# Patient Record
Sex: FEMALE | Race: BLACK OR AFRICAN AMERICAN | URBAN - METROPOLITAN AREA
[De-identification: names, ages, dates, MRNs, and addresses within clinical notes are randomized per-mention and may not be internally consistent; named-entity substitution may affect disease eponyms.]

---

## 2022-07-06 ENCOUNTER — HOSPITAL ENCOUNTER (OUTPATIENT)
Dept: LAB | Age: 47
Discharge: HOME | End: 2022-07-06
Payer: COMMERCIAL

## 2022-07-06 LAB
ALBUMIN SERPL-MCNC: 4.2 G/DL (ref 3.5–4.6)
ALP BLD-CCNC: 83 U/L (ref 40–130)
ALT SERPL-CCNC: 18 U/L (ref 0–33)
ANION GAP SERPL CALCULATED.3IONS-SCNC: 8 MEQ/L (ref 9–15)
AST SERPL-CCNC: 24 U/L (ref 0–35)
BASOPHILS ABSOLUTE: 0.1 K/UL (ref 0–0.2)
BASOPHILS RELATIVE PERCENT: 1.3 %
BILIRUB SERPL-MCNC: 0.3 MG/DL (ref 0.2–0.7)
BUN BLDV-MCNC: 10 MG/DL (ref 6–20)
CALCIUM SERPL-MCNC: 9 MG/DL (ref 8.5–9.9)
CHLORIDE BLD-SCNC: 101 MEQ/L (ref 95–107)
CHOLESTEROL, TOTAL: 168 MG/DL (ref 0–199)
CO2: 27 MEQ/L (ref 20–31)
CREAT SERPL-MCNC: 0.7 MG/DL (ref 0.5–0.9)
EOSINOPHILS ABSOLUTE: 0.2 K/UL (ref 0–0.7)
EOSINOPHILS RELATIVE PERCENT: 3.7 %
GFR AFRICAN AMERICAN: >60
GFR NON-AFRICAN AMERICAN: >60
GLOBULIN: 2 G/DL (ref 2.3–3.5)
GLUCOSE BLD-MCNC: 280 MG/DL (ref 70–99)
HBA1C MFR BLD: 11.8 % (ref 4.8–5.9)
HCT VFR BLD CALC: 38.1 % (ref 37–47)
HDLC SERPL-MCNC: 85 MG/DL (ref 40–59)
HEMOGLOBIN: 12.7 G/DL (ref 12–16)
LDL CHOLESTEROL CALCULATED: 70 MG/DL (ref 0–129)
LYMPHOCYTES ABSOLUTE: 2.2 K/UL (ref 1–4.8)
LYMPHOCYTES RELATIVE PERCENT: 37.6 %
MCH RBC QN AUTO: 27.9 PG (ref 27–31.3)
MCHC RBC AUTO-ENTMCNC: 33.4 % (ref 33–37)
MCV RBC AUTO: 83.7 FL (ref 82–100)
MONOCYTES ABSOLUTE: 0.3 K/UL (ref 0.2–0.8)
MONOCYTES RELATIVE PERCENT: 4.5 %
NEUTROPHILS ABSOLUTE: 3.1 K/UL (ref 1.4–6.5)
NEUTROPHILS RELATIVE PERCENT: 52.9 %
PDW BLD-RTO: 14.4 % (ref 11.5–14.5)
PLATELET # BLD: 246 K/UL (ref 130–400)
POTASSIUM SERPL-SCNC: 4.9 MEQ/L (ref 3.4–4.9)
RBC # BLD: 4.56 M/UL (ref 4.2–5.4)
SODIUM BLD-SCNC: 136 MEQ/L (ref 135–144)
T4 FREE: 0.92 NG/DL (ref 0.84–1.68)
TOTAL PROTEIN: 6.2 G/DL (ref 6.3–8)
TRIGL SERPL-MCNC: 63 MG/DL (ref 0–150)
TSH SERPL DL<=0.05 MIU/L-ACNC: 1.75 UIU/ML (ref 0.44–3.86)
WBC # BLD: 5.8 K/UL (ref 4.8–10.8)

## 2022-07-06 PROCEDURE — 84443 ASSAY THYROID STIM HORMONE: CPT

## 2022-07-06 PROCEDURE — 86803 HEPATITIS C AB TEST: CPT

## 2022-07-06 PROCEDURE — 80053 COMPREHEN METABOLIC PANEL: CPT

## 2022-07-06 PROCEDURE — 87389 HIV-1 AG W/HIV-1&-2 AB AG IA: CPT

## 2022-07-06 PROCEDURE — 86696 HERPES SIMPLEX TYPE 2 TEST: CPT

## 2022-07-06 PROCEDURE — 82607 VITAMIN B-12: CPT

## 2022-07-06 PROCEDURE — 84480 ASSAY TRIIODOTHYRONINE (T3): CPT

## 2022-07-06 PROCEDURE — 83036 HEMOGLOBIN GLYCOSYLATED A1C: CPT

## 2022-07-06 PROCEDURE — 83002 ASSAY OF GONADOTROPIN (LH): CPT

## 2022-07-06 PROCEDURE — 85025 COMPLETE CBC W/AUTO DIFF WBC: CPT

## 2022-07-06 PROCEDURE — 82306 VITAMIN D 25 HYDROXY: CPT

## 2022-07-06 PROCEDURE — 80061 LIPID PANEL: CPT

## 2022-07-06 PROCEDURE — 86592 SYPHILIS TEST NON-TREP QUAL: CPT

## 2022-07-06 PROCEDURE — 83001 ASSAY OF GONADOTROPIN (FSH): CPT

## 2022-07-06 PROCEDURE — 84439 ASSAY OF FREE THYROXINE: CPT

## 2022-07-07 LAB
FOLLICLE STIMULATING HORMONE: 9.3 MIU/ML (ref 1.7–21.5)
HEPATITIS C ANTIBODY: NONREACTIVE
HIV AG/AB: NONREACTIVE
LH: 4.3 MIU/ML (ref 1–95.6)
RPR: NORMAL
VITAMIN B-12: 669 PG/ML (ref 232–1245)
VITAMIN D 25-HYDROXY: 23.3 NG/ML

## 2022-07-08 LAB — T3 TOTAL: 77 NG/DL (ref 60–181)

## 2022-07-21 LAB
MISCELLANEOUS LAB TEST ORDER: ABNORMAL
WHOPPER PROMPT: ABNORMAL

## 2024-04-30 ENCOUNTER — APPOINTMENT (OUTPATIENT)
Dept: RADIOLOGY | Facility: HOSPITAL | Age: 49
End: 2024-04-30
Payer: COMMERCIAL

## 2024-04-30 ENCOUNTER — CLINICAL SUPPORT (OUTPATIENT)
Dept: EMERGENCY MEDICINE | Facility: HOSPITAL | Age: 49
End: 2024-04-30
Payer: COMMERCIAL

## 2024-04-30 ENCOUNTER — HOSPITAL ENCOUNTER (INPATIENT)
Facility: HOSPITAL | Age: 49
LOS: 5 days | Discharge: LEFT BEFORE DISCHARGE | End: 2024-05-05
Attending: EMERGENCY MEDICINE | Admitting: INTERNAL MEDICINE
Payer: COMMERCIAL

## 2024-04-30 DIAGNOSIS — Z79.4 TYPE 2 DIABETES MELLITUS WITH KETOACIDOSIS WITHOUT COMA, WITH LONG-TERM CURRENT USE OF INSULIN (MULTI): ICD-10-CM

## 2024-04-30 DIAGNOSIS — E08.10 DIABETIC KETOACIDOSIS WITHOUT COMA ASSOCIATED WITH DIABETES MELLITUS DUE TO UNDERLYING CONDITION (MULTI): Primary | ICD-10-CM

## 2024-04-30 DIAGNOSIS — E11.10 TYPE 2 DIABETES MELLITUS WITH KETOACIDOSIS WITHOUT COMA, WITH LONG-TERM CURRENT USE OF INSULIN (MULTI): ICD-10-CM

## 2024-04-30 LAB
ABO GROUP (TYPE) IN BLOOD: NORMAL
ALBUMIN SERPL BCP-MCNC: 3.4 G/DL (ref 3.4–5)
ALBUMIN SERPL BCP-MCNC: 4.3 G/DL (ref 3.4–5)
ALP SERPL-CCNC: 148 U/L (ref 33–110)
ALP SERPL-CCNC: 199 U/L (ref 33–110)
ALT SERPL W P-5'-P-CCNC: 11 U/L (ref 7–45)
ALT SERPL W P-5'-P-CCNC: 13 U/L (ref 7–45)
ANION GAP BLDV CALCULATED.4IONS-SCNC: 11 MMOL/L (ref 10–25)
ANION GAP BLDV CALCULATED.4IONS-SCNC: 11 MMOL/L (ref 10–25)
ANION GAP SERPL CALC-SCNC: 18 MMOL/L (ref 10–20)
ANION GAP SERPL CALC-SCNC: 19 MMOL/L (ref 10–20)
ANION GAP SERPL CALC-SCNC: 27 MMOL/L (ref 10–20)
ANTIBODY SCREEN: NORMAL
APPEARANCE UR: CLEAR
AST SERPL W P-5'-P-CCNC: 12 U/L (ref 9–39)
AST SERPL W P-5'-P-CCNC: 29 U/L (ref 9–39)
ATRIAL RATE: 96 BPM
B-HCG SERPL-ACNC: <3 MIU/ML
B-OH-BUTYR SERPL-SCNC: 1.86 MMOL/L (ref 0.02–0.27)
BASE EXCESS BLDV CALC-SCNC: 5.2 MMOL/L (ref -2–3)
BASE EXCESS BLDV CALC-SCNC: 7 MMOL/L (ref -2–3)
BASOPHILS # BLD AUTO: 0.09 X10*3/UL (ref 0–0.1)
BASOPHILS NFR BLD AUTO: 0.5 %
BILIRUB SERPL-MCNC: 0.3 MG/DL (ref 0–1.2)
BILIRUB SERPL-MCNC: 0.4 MG/DL (ref 0–1.2)
BILIRUB UR STRIP.AUTO-MCNC: NEGATIVE MG/DL
BNP SERPL-MCNC: 70 PG/ML (ref 0–99)
BODY TEMPERATURE: 37 DEGREES CELSIUS
BODY TEMPERATURE: 37 DEGREES CELSIUS
BUN SERPL-MCNC: 46 MG/DL (ref 6–23)
BUN SERPL-MCNC: 47 MG/DL (ref 6–23)
BUN SERPL-MCNC: 51 MG/DL (ref 6–23)
CA-I BLDV-SCNC: 1.15 MMOL/L (ref 1.1–1.33)
CA-I BLDV-SCNC: 1.32 MMOL/L (ref 1.1–1.33)
CALCIUM SERPL-MCNC: 9 MG/DL (ref 8.6–10.6)
CALCIUM SERPL-MCNC: 9.8 MG/DL (ref 8.6–10.6)
CALCIUM SERPL-MCNC: 9.8 MG/DL (ref 8.6–10.6)
CARDIAC TROPONIN I PNL SERPL HS: 30 NG/L (ref 0–34)
CHLORIDE BLDV-SCNC: 94 MMOL/L (ref 98–107)
CHLORIDE BLDV-SCNC: 96 MMOL/L (ref 98–107)
CHLORIDE SERPL-SCNC: 86 MMOL/L (ref 98–107)
CHLORIDE SERPL-SCNC: 91 MMOL/L (ref 98–107)
CHLORIDE SERPL-SCNC: 92 MMOL/L (ref 98–107)
CO2 SERPL-SCNC: 25 MMOL/L (ref 21–32)
CO2 SERPL-SCNC: 27 MMOL/L (ref 21–32)
CO2 SERPL-SCNC: 29 MMOL/L (ref 21–32)
COLOR UR: COLORLESS
CREAT SERPL-MCNC: 1.42 MG/DL (ref 0.5–1.05)
CREAT SERPL-MCNC: 1.53 MG/DL (ref 0.5–1.05)
CREAT SERPL-MCNC: 1.56 MG/DL (ref 0.5–1.05)
EGFRCR SERPLBLD CKD-EPI 2021: 41 ML/MIN/1.73M*2
EGFRCR SERPLBLD CKD-EPI 2021: 42 ML/MIN/1.73M*2
EGFRCR SERPLBLD CKD-EPI 2021: 45 ML/MIN/1.73M*2
EOSINOPHIL # BLD AUTO: 0.01 X10*3/UL (ref 0–0.7)
EOSINOPHIL NFR BLD AUTO: 0.1 %
ERYTHROCYTE [DISTWIDTH] IN BLOOD BY AUTOMATED COUNT: 12.3 % (ref 11.5–14.5)
FLUAV RNA RESP QL NAA+PROBE: NOT DETECTED
FLUBV RNA RESP QL NAA+PROBE: NOT DETECTED
GLUCOSE BLD MANUAL STRIP-MCNC: 315 MG/DL (ref 74–99)
GLUCOSE BLD MANUAL STRIP-MCNC: 352 MG/DL (ref 74–99)
GLUCOSE BLD MANUAL STRIP-MCNC: 357 MG/DL (ref 74–99)
GLUCOSE BLD MANUAL STRIP-MCNC: 400 MG/DL (ref 74–99)
GLUCOSE BLD MANUAL STRIP-MCNC: 416 MG/DL (ref 74–99)
GLUCOSE BLD MANUAL STRIP-MCNC: 508 MG/DL (ref 74–99)
GLUCOSE BLD MANUAL STRIP-MCNC: 532 MG/DL (ref 74–99)
GLUCOSE BLDV-MCNC: 390 MG/DL (ref 74–99)
GLUCOSE BLDV-MCNC: 676 MG/DL (ref 74–99)
GLUCOSE SERPL-MCNC: 366 MG/DL (ref 74–99)
GLUCOSE SERPL-MCNC: 601 MG/DL (ref 74–99)
GLUCOSE SERPL-MCNC: 614 MG/DL (ref 74–99)
GLUCOSE UR STRIP.AUTO-MCNC: ABNORMAL MG/DL
HCO3 BLDV-SCNC: 30.5 MMOL/L (ref 22–26)
HCO3 BLDV-SCNC: 33 MMOL/L (ref 22–26)
HCT VFR BLD AUTO: 43.7 % (ref 36–46)
HCT VFR BLD EST: 36 % (ref 36–46)
HCT VFR BLD EST: 37 % (ref 36–46)
HGB BLD-MCNC: 15.5 G/DL (ref 12–16)
HGB BLDV-MCNC: 11.9 G/DL (ref 12–16)
HGB BLDV-MCNC: 12.4 G/DL (ref 12–16)
HOLD SPECIMEN: NORMAL
HOLD SPECIMEN: NORMAL
IMM GRANULOCYTES # BLD AUTO: 0.09 X10*3/UL (ref 0–0.7)
IMM GRANULOCYTES NFR BLD AUTO: 0.5 % (ref 0–0.9)
INHALED O2 CONCENTRATION: 21 %
INHALED O2 CONCENTRATION: 21 %
KETONES UR STRIP.AUTO-MCNC: ABNORMAL MG/DL
LACTATE BLDV-SCNC: 1.1 MMOL/L (ref 0.4–2)
LACTATE BLDV-SCNC: 2.7 MMOL/L (ref 0.4–2)
LEUKOCYTE ESTERASE UR QL STRIP.AUTO: NEGATIVE
LIPASE SERPL-CCNC: 21 U/L (ref 9–82)
LYMPHOCYTES # BLD AUTO: 1.64 X10*3/UL (ref 1.2–4.8)
LYMPHOCYTES NFR BLD AUTO: 9.8 %
MAGNESIUM SERPL-MCNC: 2.58 MG/DL (ref 1.6–2.4)
MAGNESIUM SERPL-MCNC: 2.95 MG/DL (ref 1.6–2.4)
MCH RBC QN AUTO: 26.3 PG (ref 26–34)
MCHC RBC AUTO-ENTMCNC: 35.5 G/DL (ref 32–36)
MCV RBC AUTO: 74 FL (ref 80–100)
MONOCYTES # BLD AUTO: 0.43 X10*3/UL (ref 0.1–1)
MONOCYTES NFR BLD AUTO: 2.6 %
NEUTROPHILS # BLD AUTO: 14.47 X10*3/UL (ref 1.2–7.7)
NEUTROPHILS NFR BLD AUTO: 86.5 %
NITRITE UR QL STRIP.AUTO: NEGATIVE
NRBC BLD-RTO: 0 /100 WBCS (ref 0–0)
OSMOLALITY SERPL: 331 MOSM/KG (ref 280–300)
OXYHGB MFR BLDV: 80.3 % (ref 45–75)
OXYHGB MFR BLDV: 82.1 % (ref 45–75)
P AXIS: 72 DEGREES
P OFFSET: 206 MS
P ONSET: 163 MS
PCO2 BLDV: 47 MM HG (ref 41–51)
PCO2 BLDV: 52 MM HG (ref 41–51)
PH BLDV: 7.41 PH (ref 7.33–7.43)
PH BLDV: 7.42 PH (ref 7.33–7.43)
PH UR STRIP.AUTO: 6 [PH]
PHOSPHATE SERPL-MCNC: 5.8 MG/DL (ref 2.5–4.9)
PHOSPHATE SERPL-MCNC: 6 MG/DL (ref 2.5–4.9)
PLATELET # BLD AUTO: 431 X10*3/UL (ref 150–450)
PO2 BLDV: 53 MM HG (ref 35–45)
PO2 BLDV: 54 MM HG (ref 35–45)
POTASSIUM BLDV-SCNC: 4.7 MMOL/L (ref 3.5–5.3)
POTASSIUM BLDV-SCNC: 5.9 MMOL/L (ref 3.5–5.3)
POTASSIUM SERPL-SCNC: 4.2 MMOL/L (ref 3.5–5.3)
POTASSIUM SERPL-SCNC: 5.4 MMOL/L (ref 3.5–5.3)
POTASSIUM SERPL-SCNC: 5.9 MMOL/L (ref 3.5–5.3)
PR INTERVAL: 124 MS
PROT SERPL-MCNC: 6.4 G/DL (ref 6.4–8.2)
PROT SERPL-MCNC: 8.7 G/DL (ref 6.4–8.2)
PROT UR STRIP.AUTO-MCNC: ABNORMAL MG/DL
Q ONSET: 225 MS
QRS COUNT: 16 BEATS
QRS DURATION: 86 MS
QT INTERVAL: 370 MS
QTC CALCULATION(BAZETT): 467 MS
QTC FREDERICIA: 432 MS
R AXIS: 15 DEGREES
RBC # BLD AUTO: 5.9 X10*6/UL (ref 4–5.2)
RBC # UR STRIP.AUTO: ABNORMAL /UL
RBC #/AREA URNS AUTO: ABNORMAL /HPF
RH FACTOR (ANTIGEN D): NORMAL
SAO2 % BLDV: 82 % (ref 45–75)
SAO2 % BLDV: 84 % (ref 45–75)
SARS-COV-2 RNA RESP QL NAA+PROBE: NOT DETECTED
SODIUM BLDV-SCNC: 130 MMOL/L (ref 136–145)
SODIUM BLDV-SCNC: 135 MMOL/L (ref 136–145)
SODIUM SERPL-SCNC: 132 MMOL/L (ref 136–145)
SODIUM SERPL-SCNC: 132 MMOL/L (ref 136–145)
SODIUM SERPL-SCNC: 135 MMOL/L (ref 136–145)
SP GR UR STRIP.AUTO: 1.03
T AXIS: 74 DEGREES
T OFFSET: 410 MS
UROBILINOGEN UR STRIP.AUTO-MCNC: NORMAL MG/DL
VENTRICULAR RATE: 96 BPM
WBC # BLD AUTO: 16.7 X10*3/UL (ref 4.4–11.3)
WBC #/AREA URNS AUTO: ABNORMAL /HPF

## 2024-04-30 PROCEDURE — 2500000004 HC RX 250 GENERAL PHARMACY W/ HCPCS (ALT 636 FOR OP/ED)

## 2024-04-30 PROCEDURE — 83735 ASSAY OF MAGNESIUM: CPT

## 2024-04-30 PROCEDURE — 96365 THER/PROPH/DIAG IV INF INIT: CPT

## 2024-04-30 PROCEDURE — 96374 THER/PROPH/DIAG INJ IV PUSH: CPT | Mod: 59

## 2024-04-30 PROCEDURE — 84132 ASSAY OF SERUM POTASSIUM: CPT | Performed by: EMERGENCY MEDICINE

## 2024-04-30 PROCEDURE — 2500000002 HC RX 250 W HCPCS SELF ADMINISTERED DRUGS (ALT 637 FOR MEDICARE OP, ALT 636 FOR OP/ED): Mod: SE

## 2024-04-30 PROCEDURE — 82947 ASSAY GLUCOSE BLOOD QUANT: CPT

## 2024-04-30 PROCEDURE — 83690 ASSAY OF LIPASE: CPT

## 2024-04-30 PROCEDURE — 80053 COMPREHEN METABOLIC PANEL: CPT

## 2024-04-30 PROCEDURE — 87636 SARSCOV2 & INF A&B AMP PRB: CPT

## 2024-04-30 PROCEDURE — 99285 EMERGENCY DEPT VISIT HI MDM: CPT | Performed by: EMERGENCY MEDICINE

## 2024-04-30 PROCEDURE — 84702 CHORIONIC GONADOTROPIN TEST: CPT

## 2024-04-30 PROCEDURE — 2500000004 HC RX 250 GENERAL PHARMACY W/ HCPCS (ALT 636 FOR OP/ED): Mod: SE

## 2024-04-30 PROCEDURE — 84484 ASSAY OF TROPONIN QUANT: CPT

## 2024-04-30 PROCEDURE — 74177 CT ABD & PELVIS W/CONTRAST: CPT | Performed by: RADIOLOGY

## 2024-04-30 PROCEDURE — 36415 COLL VENOUS BLD VENIPUNCTURE: CPT

## 2024-04-30 PROCEDURE — 83880 ASSAY OF NATRIURETIC PEPTIDE: CPT

## 2024-04-30 PROCEDURE — 96376 TX/PRO/DX INJ SAME DRUG ADON: CPT

## 2024-04-30 PROCEDURE — 84100 ASSAY OF PHOSPHORUS: CPT

## 2024-04-30 PROCEDURE — 84132 ASSAY OF SERUM POTASSIUM: CPT

## 2024-04-30 PROCEDURE — 96361 HYDRATE IV INFUSION ADD-ON: CPT

## 2024-04-30 PROCEDURE — 71045 X-RAY EXAM CHEST 1 VIEW: CPT

## 2024-04-30 PROCEDURE — 93010 ELECTROCARDIOGRAM REPORT: CPT | Performed by: EMERGENCY MEDICINE

## 2024-04-30 PROCEDURE — 36415 COLL VENOUS BLD VENIPUNCTURE: CPT | Performed by: EMERGENCY MEDICINE

## 2024-04-30 PROCEDURE — 96375 TX/PRO/DX INJ NEW DRUG ADDON: CPT

## 2024-04-30 PROCEDURE — 99285 EMERGENCY DEPT VISIT HI MDM: CPT | Mod: 25

## 2024-04-30 PROCEDURE — 81001 URINALYSIS AUTO W/SCOPE: CPT

## 2024-04-30 PROCEDURE — 86901 BLOOD TYPING SEROLOGIC RH(D): CPT

## 2024-04-30 PROCEDURE — 85025 COMPLETE CBC W/AUTO DIFF WBC: CPT

## 2024-04-30 PROCEDURE — 87040 BLOOD CULTURE FOR BACTERIA: CPT

## 2024-04-30 PROCEDURE — 82010 KETONE BODYS QUAN: CPT

## 2024-04-30 PROCEDURE — 96367 TX/PROPH/DG ADDL SEQ IV INF: CPT

## 2024-04-30 PROCEDURE — 71045 X-RAY EXAM CHEST 1 VIEW: CPT | Performed by: STUDENT IN AN ORGANIZED HEALTH CARE EDUCATION/TRAINING PROGRAM

## 2024-04-30 PROCEDURE — 2500000002 HC RX 250 W HCPCS SELF ADMINISTERED DRUGS (ALT 637 FOR MEDICARE OP, ALT 636 FOR OP/ED): Performed by: STUDENT IN AN ORGANIZED HEALTH CARE EDUCATION/TRAINING PROGRAM

## 2024-04-30 PROCEDURE — 80307 DRUG TEST PRSMV CHEM ANLYZR: CPT | Performed by: STUDENT IN AN ORGANIZED HEALTH CARE EDUCATION/TRAINING PROGRAM

## 2024-04-30 PROCEDURE — 93005 ELECTROCARDIOGRAM TRACING: CPT

## 2024-04-30 PROCEDURE — 2500000004 HC RX 250 GENERAL PHARMACY W/ HCPCS (ALT 636 FOR OP/ED): Mod: SE | Performed by: EMERGENCY MEDICINE

## 2024-04-30 PROCEDURE — 74177 CT ABD & PELVIS W/CONTRAST: CPT

## 2024-04-30 PROCEDURE — 1200000002 HC GENERAL ROOM WITH TELEMETRY DAILY

## 2024-04-30 PROCEDURE — 2550000001 HC RX 255 CONTRASTS: Mod: SE | Performed by: EMERGENCY MEDICINE

## 2024-04-30 PROCEDURE — 2500000002 HC RX 250 W HCPCS SELF ADMINISTERED DRUGS (ALT 637 FOR MEDICARE OP, ALT 636 FOR OP/ED): Mod: SE | Performed by: EMERGENCY MEDICINE

## 2024-04-30 PROCEDURE — 71260 CT THORAX DX C+: CPT | Performed by: RADIOLOGY

## 2024-04-30 PROCEDURE — 83930 ASSAY OF BLOOD OSMOLALITY: CPT | Performed by: EMERGENCY MEDICINE

## 2024-04-30 PROCEDURE — 96368 THER/DIAG CONCURRENT INF: CPT

## 2024-04-30 PROCEDURE — 2500000004 HC RX 250 GENERAL PHARMACY W/ HCPCS (ALT 636 FOR OP/ED): Mod: JZ,SE | Performed by: EMERGENCY MEDICINE

## 2024-04-30 RX ORDER — AMLODIPINE BESYLATE 10 MG/1
10 TABLET ORAL DAILY
COMMUNITY

## 2024-04-30 RX ORDER — DEXTROSE 50 % IN WATER (D50W) INTRAVENOUS SYRINGE
12.5
Status: DISCONTINUED | OUTPATIENT
Start: 2024-04-30 | End: 2024-04-30 | Stop reason: SDUPTHER

## 2024-04-30 RX ORDER — INSULIN LISPRO 100 [IU]/ML
5 INJECTION, SOLUTION INTRAVENOUS; SUBCUTANEOUS ONCE
Status: COMPLETED | OUTPATIENT
Start: 2024-04-30 | End: 2024-04-30

## 2024-04-30 RX ORDER — AMOXICILLIN 250 MG
2 CAPSULE ORAL 2 TIMES DAILY
Status: DISCONTINUED | OUTPATIENT
Start: 2024-04-30 | End: 2024-05-01

## 2024-04-30 RX ORDER — ONDANSETRON HYDROCHLORIDE 2 MG/ML
4 INJECTION, SOLUTION INTRAVENOUS ONCE
Status: COMPLETED | OUTPATIENT
Start: 2024-04-30 | End: 2024-04-30

## 2024-04-30 RX ORDER — DEXTROSE MONOHYDRATE AND SODIUM CHLORIDE 5; .45 G/100ML; G/100ML
150 INJECTION, SOLUTION INTRAVENOUS CONTINUOUS
Status: DISCONTINUED | OUTPATIENT
Start: 2024-04-30 | End: 2024-04-30

## 2024-04-30 RX ORDER — LABETALOL HYDROCHLORIDE 5 MG/ML
20 INJECTION, SOLUTION INTRAVENOUS ONCE
Status: COMPLETED | OUTPATIENT
Start: 2024-04-30 | End: 2024-04-30

## 2024-04-30 RX ORDER — LISINOPRIL 40 MG/1
40 TABLET ORAL DAILY
COMMUNITY

## 2024-04-30 RX ORDER — GABAPENTIN 100 MG/1
100 CAPSULE ORAL 2 TIMES DAILY
COMMUNITY

## 2024-04-30 RX ORDER — HYDRALAZINE HYDROCHLORIDE 20 MG/ML
20 INJECTION INTRAMUSCULAR; INTRAVENOUS ONCE
Status: COMPLETED | OUTPATIENT
Start: 2024-04-30 | End: 2024-04-30

## 2024-04-30 RX ORDER — VANCOMYCIN HYDROCHLORIDE 1 G/200ML
INJECTION, SOLUTION INTRAVENOUS
Status: COMPLETED
Start: 2024-04-30 | End: 2024-04-30

## 2024-04-30 RX ORDER — INSULIN GLARGINE 100 [IU]/ML
30 INJECTION, SOLUTION SUBCUTANEOUS NIGHTLY
COMMUNITY

## 2024-04-30 RX ORDER — HYDROCHLOROTHIAZIDE 12.5 MG/1
12.5 TABLET ORAL DAILY
COMMUNITY

## 2024-04-30 RX ORDER — POLYETHYLENE GLYCOL 3350 17 G/17G
17 POWDER, FOR SOLUTION ORAL DAILY
Status: DISCONTINUED | OUTPATIENT
Start: 2024-05-01 | End: 2024-05-01

## 2024-04-30 RX ORDER — DEXTROSE 50 % IN WATER (D50W) INTRAVENOUS SYRINGE
12.5
Status: DISCONTINUED | OUTPATIENT
Start: 2024-04-30 | End: 2024-05-05 | Stop reason: HOSPADM

## 2024-04-30 RX ORDER — VANCOMYCIN HYDROCHLORIDE 1 G/20ML
INJECTION, POWDER, LYOPHILIZED, FOR SOLUTION INTRAVENOUS DAILY PRN
Status: DISCONTINUED | OUTPATIENT
Start: 2024-04-30 | End: 2024-05-03

## 2024-04-30 RX ORDER — INSULIN GLARGINE 100 [IU]/ML
20 INJECTION, SOLUTION SUBCUTANEOUS NIGHTLY
Status: DISCONTINUED | OUTPATIENT
Start: 2024-04-30 | End: 2024-04-30

## 2024-04-30 RX ORDER — INSULIN LISPRO 100 [IU]/ML
0-5 INJECTION, SOLUTION INTRAVENOUS; SUBCUTANEOUS
Status: DISCONTINUED | OUTPATIENT
Start: 2024-04-30 | End: 2024-04-30

## 2024-04-30 RX ORDER — VANCOMYCIN HYDROCHLORIDE 1 G/200ML
1000 INJECTION, SOLUTION INTRAVENOUS ONCE
Status: COMPLETED | OUTPATIENT
Start: 2024-04-30 | End: 2024-04-30

## 2024-04-30 RX ORDER — DEXTROSE MONOHYDRATE 100 MG/ML
150 INJECTION, SOLUTION INTRAVENOUS CONTINUOUS
Status: DISCONTINUED | OUTPATIENT
Start: 2024-04-30 | End: 2024-04-30

## 2024-04-30 RX ORDER — ACETAMINOPHEN 160 MG/5ML
650 SOLUTION ORAL EVERY 6 HOURS
Status: DISCONTINUED | OUTPATIENT
Start: 2024-04-30 | End: 2024-05-01

## 2024-04-30 RX ORDER — ACETAMINOPHEN 650 MG/1
650 SUPPOSITORY RECTAL EVERY 6 HOURS
Status: DISCONTINUED | OUTPATIENT
Start: 2024-04-30 | End: 2024-05-01

## 2024-04-30 RX ORDER — PANTOPRAZOLE SODIUM 40 MG/1
40 TABLET, DELAYED RELEASE ORAL 2 TIMES DAILY
COMMUNITY

## 2024-04-30 RX ORDER — INSULIN GLARGINE 100 [IU]/ML
20 INJECTION, SOLUTION SUBCUTANEOUS ONCE
Status: COMPLETED | OUTPATIENT
Start: 2024-04-30 | End: 2024-04-30

## 2024-04-30 RX ORDER — DEXTROSE 50 % IN WATER (D50W) INTRAVENOUS SYRINGE
25
Status: DISCONTINUED | OUTPATIENT
Start: 2024-04-30 | End: 2024-04-30 | Stop reason: SDUPTHER

## 2024-04-30 RX ORDER — CALCIUM GLUCONATE 20 MG/ML
2 INJECTION, SOLUTION INTRAVENOUS ONCE
Status: COMPLETED | OUTPATIENT
Start: 2024-04-30 | End: 2024-04-30

## 2024-04-30 RX ORDER — SERTRALINE HYDROCHLORIDE 25 MG/1
25 TABLET, FILM COATED ORAL DAILY
COMMUNITY

## 2024-04-30 RX ORDER — SODIUM CHLORIDE 450 MG/100ML
250 INJECTION, SOLUTION INTRAVENOUS CONTINUOUS
Status: DISCONTINUED | OUTPATIENT
Start: 2024-04-30 | End: 2024-04-30

## 2024-04-30 RX ORDER — DEXTROSE 50 % IN WATER (D50W) INTRAVENOUS SYRINGE
25
Status: DISCONTINUED | OUTPATIENT
Start: 2024-04-30 | End: 2024-05-05 | Stop reason: HOSPADM

## 2024-04-30 RX ORDER — HYDROMORPHONE HYDROCHLORIDE 1 MG/ML
0.5 INJECTION, SOLUTION INTRAMUSCULAR; INTRAVENOUS; SUBCUTANEOUS ONCE
Status: COMPLETED | OUTPATIENT
Start: 2024-04-30 | End: 2024-04-30

## 2024-04-30 RX ORDER — INSULIN LISPRO 100 [IU]/ML
0-5 INJECTION, SOLUTION INTRAVENOUS; SUBCUTANEOUS
Status: DISCONTINUED | OUTPATIENT
Start: 2024-04-30 | End: 2024-05-01

## 2024-04-30 RX ORDER — DEXTROSE 50 % IN WATER (D50W) INTRAVENOUS SYRINGE
50
Status: DISCONTINUED | OUTPATIENT
Start: 2024-04-30 | End: 2024-04-30 | Stop reason: ALTCHOICE

## 2024-04-30 RX ORDER — ACETAMINOPHEN 325 MG/1
975 TABLET ORAL EVERY 6 HOURS
Status: DISCONTINUED | OUTPATIENT
Start: 2024-04-30 | End: 2024-05-01

## 2024-04-30 RX ORDER — INSULIN LISPRO 100 [IU]/ML
2 INJECTION, SOLUTION INTRAVENOUS; SUBCUTANEOUS
COMMUNITY

## 2024-04-30 RX ADMIN — INSULIN LISPRO 5 UNITS: 100 INJECTION, SOLUTION INTRAVENOUS; SUBCUTANEOUS at 21:13

## 2024-04-30 RX ADMIN — CALCIUM GLUCONATE 2 G: 20 INJECTION, SOLUTION INTRAVENOUS at 06:45

## 2024-04-30 RX ADMIN — SODIUM CHLORIDE, POTASSIUM CHLORIDE, SODIUM LACTATE AND CALCIUM CHLORIDE 500 ML: 600; 310; 30; 20 INJECTION, SOLUTION INTRAVENOUS at 20:19

## 2024-04-30 RX ADMIN — ONDANSETRON 4 MG: 2 INJECTION INTRAMUSCULAR; INTRAVENOUS at 02:10

## 2024-04-30 RX ADMIN — INSULIN HUMAN 10 UNITS: 100 INJECTION, SOLUTION PARENTERAL at 08:21

## 2024-04-30 RX ADMIN — VANCOMYCIN HYDROCHLORIDE 1000 MG: 1 INJECTION, SOLUTION INTRAVENOUS at 08:02

## 2024-04-30 RX ADMIN — PIPERACILLIN SODIUM AND TAZOBACTAM SODIUM 3.38 G: 3; .375 INJECTION, SOLUTION INTRAVENOUS at 23:32

## 2024-04-30 RX ADMIN — SODIUM CHLORIDE, POTASSIUM CHLORIDE, SODIUM LACTATE AND CALCIUM CHLORIDE 1000 ML: 600; 310; 30; 20 INJECTION, SOLUTION INTRAVENOUS at 06:30

## 2024-04-30 RX ADMIN — SODIUM CHLORIDE, POTASSIUM CHLORIDE, SODIUM LACTATE AND CALCIUM CHLORIDE 500 ML: 600; 310; 30; 20 INJECTION, SOLUTION INTRAVENOUS at 08:21

## 2024-04-30 RX ADMIN — PIPERACILLIN SODIUM AND TAZOBACTAM SODIUM 3.38 G: 3; .375 INJECTION, SOLUTION INTRAVENOUS at 06:58

## 2024-04-30 RX ADMIN — INSULIN HUMAN 10 UNITS: 100 INJECTION, SOLUTION PARENTERAL at 06:45

## 2024-04-30 RX ADMIN — LABETALOL HYDROCHLORIDE 20 MG: 5 INJECTION, SOLUTION INTRAVENOUS at 13:43

## 2024-04-30 RX ADMIN — INSULIN LISPRO 5 UNITS: 100 INJECTION, SOLUTION INTRAVENOUS; SUBCUTANEOUS at 18:02

## 2024-04-30 RX ADMIN — ACETAMINOPHEN 975 MG: 325 TABLET ORAL at 13:43

## 2024-04-30 RX ADMIN — PIPERACILLIN SODIUM AND TAZOBACTAM SODIUM 3.38 G: 3; .375 INJECTION, SOLUTION INTRAVENOUS at 19:23

## 2024-04-30 RX ADMIN — LABETALOL HYDROCHLORIDE 20 MG: 5 INJECTION, SOLUTION INTRAVENOUS at 14:43

## 2024-04-30 RX ADMIN — ACETAMINOPHEN 975 MG: 325 TABLET ORAL at 19:32

## 2024-04-30 RX ADMIN — HYDROMORPHONE HYDROCHLORIDE 0.5 MG: 1 INJECTION, SOLUTION INTRAMUSCULAR; INTRAVENOUS; SUBCUTANEOUS at 02:10

## 2024-04-30 RX ADMIN — SODIUM CHLORIDE, POTASSIUM CHLORIDE, SODIUM LACTATE AND CALCIUM CHLORIDE 500 ML: 600; 310; 30; 20 INJECTION, SOLUTION INTRAVENOUS at 13:04

## 2024-04-30 RX ADMIN — IOHEXOL 80 ML: 350 INJECTION, SOLUTION INTRAVENOUS at 04:48

## 2024-04-30 RX ADMIN — INSULIN GLARGINE 20 UNITS: 100 INJECTION, SOLUTION SUBCUTANEOUS at 19:01

## 2024-04-30 RX ADMIN — HYDRALAZINE HYDROCHLORIDE 20 MG: 20 INJECTION INTRAMUSCULAR; INTRAVENOUS at 15:50

## 2024-04-30 ASSESSMENT — PAIN DESCRIPTION - DESCRIPTORS: DESCRIPTORS: ACHING

## 2024-04-30 ASSESSMENT — HEART SCORE
ECG: NORMAL
RISK FACTORS: 1-2 RISK FACTORS
HEART SCORE: 2
HISTORY: SLIGHTLY SUSPICIOUS
AGE: 45-64
TROPONIN: LESS THAN OR EQUAL TO NORMAL LIMIT

## 2024-04-30 ASSESSMENT — COLUMBIA-SUICIDE SEVERITY RATING SCALE - C-SSRS
2. HAVE YOU ACTUALLY HAD ANY THOUGHTS OF KILLING YOURSELF?: NO
6. HAVE YOU EVER DONE ANYTHING, STARTED TO DO ANYTHING, OR PREPARED TO DO ANYTHING TO END YOUR LIFE?: NO
1. IN THE PAST MONTH, HAVE YOU WISHED YOU WERE DEAD OR WISHED YOU COULD GO TO SLEEP AND NOT WAKE UP?: NO

## 2024-04-30 ASSESSMENT — LIFESTYLE VARIABLES
HAVE PEOPLE ANNOYED YOU BY CRITICIZING YOUR DRINKING: NO
TOTAL SCORE: 0
EVER HAD A DRINK FIRST THING IN THE MORNING TO STEADY YOUR NERVES TO GET RID OF A HANGOVER: NO
HAVE YOU EVER FELT YOU SHOULD CUT DOWN ON YOUR DRINKING: NO
EVER FELT BAD OR GUILTY ABOUT YOUR DRINKING: NO

## 2024-04-30 ASSESSMENT — PAIN SCALES - GENERAL
PAINLEVEL_OUTOF10: 5 - MODERATE PAIN
PAINLEVEL_OUTOF10: 9

## 2024-04-30 ASSESSMENT — PAIN DESCRIPTION - LOCATION: LOCATION: ABDOMEN

## 2024-04-30 NOTE — PROGRESS NOTES
Pharmacy Medication History Review    Barbara English is a 49 y.o. female admitted for Diabetic ketoacidosis without coma associated with diabetes mellitus due to underlying condition (Multi). Pharmacy reviewed the patient's [id number] medications for accuracy.    The list below reflects the updated PTA list. Comments regarding how patient may be taking medications differently can be found in the Admit Orders Activity  Prior to Admission Medications   Prescriptions Last Dose Informant   amLODIPine (Norvasc) 10 mg tablet  Self, Other   Sig: Take 1 tablet (10 mg) by mouth once daily.   gabapentin (Neurontin) 100 mg capsule  Self, Other   Sig: Take 1 capsule (100 mg) by mouth 2 times a day.   hydroCHLOROthiazide (Microzide) 12.5 mg tablet  Self, Other   Sig: Take 1 tablet (12.5 mg) by mouth once daily.   insulin glargine (Lantus U-100 Insulin) 100 unit/mL injection  Self, Other   Sig: Inject 30 Units under the skin once daily at bedtime. Take as directed per insulin instructions.   insulin lispro (HumaLOG) 100 unit/mL injection  Self, Other   Sig: Inject 0.02 mL (2 Units) under the skin 3 times a day with meals. Take as directed per insulin instructions.   lisinopril 40 mg tablet  Self, Other   Sig: Take 1 tablet (40 mg) by mouth once daily.   pantoprazole (ProtoNix) 40 mg EC tablet  Self, Other   Sig: Take 1 tablet (40 mg) by mouth 2 times a day. Do not crush, chew, or split.   sertraline (Zoloft) 25 mg tablet  Self, Other   Sig: Take 1 tablet (25 mg) by mouth once daily.      Facility-Administered Medications: None     Patient accepts M2B at discharge. Pharmacy has been updated to [facility].    Sources:    -patient interview (was not cooperative or reliable historian- said mouth hurt too much when talking, could not remember most recent insulin doses.  Just stated she has been out of all medication for at least a month and last got medications filled at [facility] pharmacy where currently on hold because  could not afford)  -outpatient pharmacy dispense history with [facility] and [facility].  Also called [facility] pharmacy and confirmed all medications filled on 3/29/24 but never picked up/on hold (discharge meds from 3/25 admission at [facility])  -[facility]  -discharge medication list from [facility] admission on 3/25/24 (meds never picked up or started)    Below are additional concerns with the patient's PTA list:    -patient states she has not taken any medications for at least 1 month, including insulin.  She could not remember names or doses of previous medications.    -medication list is based of of [facility] discharge summary med list from 3/25 admission.  Medications were filled on 3/29/24 at [facility] pharmacy (but never picked up bc/ patient could not afford)    -only medications in list above with confirmed fill dates in the last year are lantus and humalog insulin (2/6/24), gabapentin (12/9/23), lisinopril (12/9/23), and sertraline (12/9/23)      David Yoder, PharmD  Transitions of Care Pharmacist  [facility]  Please reach out via Secure Chat for questions, or if no response call Phytel or [facility]era [facility]

## 2024-04-30 NOTE — ED PROVIDER NOTES
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  Patient endorses for the past few days has had generalized abdominal pain with emesis, nonbloody, fever, chills, chest pain, shortness of breath.  Patient reports chest pain is midsternal.  Patient reports allergies to Haldol, unspecified reaction, endorses use of cocaine.                   Clari Coma Scale Score: 15                       Patient History   History reviewed. No pertinent past medical history.  Past Surgical History:   Procedure Laterality Date    CT ANGIO CORONARY ART WITH HEARTFLOW IF SCORE >30%  11/17/2021    CT HEART CORONARY ANGIOGRAM 11/17/2021 [facility]ICAL [facility]GAC[facility]     No family history on file.  Social History     Tobacco Use    Smoking status: Every Day     Types: Cigarettes    Smokeless tobacco: Not on file   Substance Use Topics    Alcohol use: Not on file    Drug use: Not on file       Physical Exam   ED Triage Vitals [04/30/24 0139]   Temperature Heart Rate Respirations BP   37.1 °C (98.8 °F) 78 16 (!) 191/120      Pulse Ox Temp src Heart Rate Source Patient Position   98 % -- -- --      BP Location FiO2 (%)     -- --       Physical Exam  Vitals and nursing note reviewed.   Constitutional:       Appearance: She is well-developed.   HENT:      Head: Normocephalic and atraumatic.   Eyes:      Conjunctiva/sclera: Conjunctivae normal.   Cardiovascular:      Rate and Rhythm: Normal rate and regular rhythm.      Heart sounds: Normal heart sounds. No murmur heard.  Pulmonary:      Effort: Pulmonary effort is normal. No respiratory distress.      Breath sounds: Normal breath sounds.   Abdominal:      General: Abdomen is flat. There is no distension.      Palpations: Abdomen is soft.       Tenderness: There is abdominal tenderness.      Comments: Generalized tenderness   Musculoskeletal:         General: No swelling.      Cervical back: Neck supple.      Comments: Right foot amputation, tp pulses intact, sensation to ankle intact, numbness near amputation per patient, no pus or drainage, open not well healed   Skin:     General: Skin is dry.      Capillary Refill: Capillary refill takes less than 2 seconds.   Neurological:      Mental Status: She is alert.   Psychiatric:         Mood and Affect: Mood normal.         ED Course & [facility]   ED Course as of 05/02/24 2041 Tue Apr 30, 2024 0317 Chest x-ray shows no acute cardiopulmonary process. [AT]   0317 EKG independently interpreted by me shows normal sinus rhythm, rate 96, , QRS 86, QTc 467. [AT]   0426 UA shows blood, no concern for UTI. [AT]   0518 CBC shows new leukocytosis with neutrophil predominance.  Suspect infectious cause. [AT]   0601 COVID-negative. [AT]   0601 Flu negative. [AT]   0601 Lipase normal.  Glucose 615.  Potassium corrected normal.  Lactate elevated concerning for DKA.  Patient started on insulin and LR. [AT]   0602 Mag and Phos elevated. [AT]   0602 preg negative [AT]   0809 Beta Hydroxybutyrate  1.86 [YH]   0812 Gap closing 19. Glucose 601. Give another 10 units insulin and 500 LR.  [YH]   1132 Basic metabolic panel [YH]   1309 /108. IV labetalol 20.  [YH]   1418 /112. Gave 2nd dose of IV labetalol 20.  [YH]   1823 Blood Gas Venous Full Panel  Blood gas obtained at the request of medicine, demonstrates pH 7.43, pCO2 34, glucose 437, up from the 300s at the prior check.  Anion gap 13, bicarb 22.6, she is hyperglycemic without evidence of DKA, no anion gap.  Medicine states that they are unable to except a blood sugar above 400 to the floor.  Will provide 5 units of subcutaneous lispro and recheck her blood sugar.  Patient otherwise hemodynamically stable, appropriate for floor admission. [SH]      ED Course  User Index  [AT] Cornelia Levy MD  [SH] Chuckie Tapia MD  [YH] Claudia Koehler MD         Diagnoses as of 05/02/24 2041   Diabetic ketoacidosis without coma associated with diabetes mellitus due to underlying condition (Multi)       Medical Decision Making  Differential includes but is not limited to ACS, mesenteric ischemia, bowel perforation, SBO, diverticulitis, PUD, GERD, gastritis, pancreatitis, appendicitis, nephrolithiasis, cholelithiasis, cholecystitis, pleuritis, pneumonia, pneumothorax, pericarditis, musculoskeletal pain.    Lungs clear to auscultation bilaterally and chest x-ray without any signs of pneumonia, pneumothorax, widened mediastinum, or mass.  Additionally, patient has equal blood pressure and pulses in all 4 extremities, and given clinical picture, do not suspect aortic dissection. No back pain. Delta troponin less than 3, do not suspect ACS.  PERC negative, do not suspect a PE as a cause of patient's pain.     Heart score 2 low risk. Dilaudid and zofran given for symptomatic treatment. Patient tired on exam, keeps falling asleep, does not appear in severe pain. Abdomen soft, nondistended.  Glucose 600s, anion gap present.  Lactate normal. Less concern DKA. Possible HHS. Calcium gluconate and IV insulin given.  CT showed no remarkable acute findings.  Vanco and Zosyn started due to concern for possible diabetic foot infection.  Blood cultures ordered.    CT abdomen/pelvis:  IMPRESSION:  1. The stomach is decompressed, however demonstrates a degree of  gastric wall thickening, slightly out of proportion to the degree of  decompression. Correlate with symptoms of gastritis.  2. Moderate colonic fecal load with mixed air and stool in the  rectum. Subtle component of associated bowel wall thickening and  enhancement involving the ascending/transverse colon. Correlate with  symptoms of constipation and concern for stercoral colitis.  3. Slight interval worsening of body wall anasarca. Mild  mesenteric  haziness. These findings may be reactive, or the result of fluid  overload. Correlate with fluid status.  4. Small sized hiatal hernia with mild concentric mid to distal  esophageal wall thickening; correlate with concern for reflux  esophagitis.    Patient to be admitted.    Procedure  Procedures            Cornelia Levy MD  Resident  05/02/24 2041

## 2024-04-30 NOTE — ED NOTES
"Pt presents to ED for multiple medical complaints. Per EMS, pt called d/t foot pain. Pt had her foot amputated in January for \"diabetes and staph infection\" per EMS. Pt endorses abd pain, CP, SOB, n/v, and stated she still has staph because she \"Did not take any antibiotics.\"      Maryann Forde RN  04/30/24 0141    "

## 2024-04-30 NOTE — CONSULTS
"Vancomycin Dosing by Pharmacy- Initial    Barbara English is a 49 y.o. year old female who Pharmacy has been consulted for vancomycin dosing for cellulitis, skin and soft tissue. Based on the patient's indication and renal status this patient is being dosed based on a goal AUC of 400-600.     Renal function can be described as Acute Kidney Injury    Renal function is decreased from baseline      Visit Vitals  BP (!) 163/97 (BP Location: Right arm, Patient Position: Lying)   Pulse 86   Temp 36.8 °C (98.3 °F) (Oral)   Resp 16        Lab Results   Component Value Date    CREATININE 1.42 (H) 04/30/2024    CREATININE 1.56 (H) 04/30/2024    CREATININE 1.53 (H) 04/30/2024    CREATININE 0.81 02/20/2023    CREATININE 0.67 05/11/2022    CREATININE 0.83 05/10/2022    CREATININE 0.87 05/09/2022        Patient weight is No results found for: \"PTWEIGHT\"    No results found for: \"VANCORANDOM\", \"CULTURE\"     No intake/output data recorded.    Lab Results   Component Value Date    PATIENTTEMP 37.0 04/30/2024    PATIENTTEMP 37.0 04/30/2024    PATIENTTEMP 37.0 02/20/2023    PATIENTTEMP 37.0 05/09/2022          Assessment/Plan    Patient has already been given a loading dose of 1000 mg. Since patient's sCr is double her baseline, I will dose by level for a day or two until sCr returns to baseline.   Follow-up level will be ordered on 5/1 at AM labs unless clinically indicated sooner.  Will continue to monitor renal function daily while on vancomycin and order serum creatinine at least every 48 hours if not already ordered.  Follow for continued vancomycin needs, clinical response, and signs/symptoms of toxicity.     Thank you for allowing me to participate in the care of this patient.     Ade Espinoza, PharmD             "

## 2024-04-30 NOTE — PROGRESS NOTES
Emergency Medicine Transition of Care Note.    I received Barbara English in signout from previous provider. Please see the previous ED provider note for all HPI, PE and MDM up to the time of sign-out. This is in addition to the primary record.    ED Course as of 04/30/24 1947   Tue Apr 30, 2024 0317 Chest x-ray shows no acute cardiopulmonary process. [AT]   0317 EKG independently interpreted by me shows normal sinus rhythm, rate 96, , QRS 86, QTc 467. [AT]   0426 UA shows blood, no concern for UTI. [AT]   0518 CBC shows new leukocytosis with neutrophil predominance.  Suspect infectious cause. [AT]   0601 COVID-negative. [AT]   0601 Flu negative. [AT]   0601 Lipase normal.  Glucose 615.  Potassium corrected normal.  Lactate elevated concerning for DKA.  Patient started on insulin and LR. [AT]   0602 Mag and Phos elevated. [AT]   0602 preg negative [AT]   0809 Beta Hydroxybutyrate  1.86 [YH]   0812 Gap closing 19. Glucose 601. Give another 10 units insulin and 500 LR.  [YH]   1132 Basic metabolic panel [YH]   1309 /108. IV labetalol 20.  [YH]   1418 /112. Gave 2nd dose of IV labetalol 20.  [YH]   1823 Blood Gas Venous Full Panel  Blood gas obtained at the request of medicine, demonstrates pH 7.43, pCO2 34, glucose 437, up from the 300s at the prior check.  Anion gap 13, bicarb 22.6, she is hyperglycemic without evidence of DKA, no anion gap.  Medicine states that they are unable to except a blood sugar above 400 to the floor.  Will provide 5 units of subcutaneous lispro and recheck her blood sugar.  Patient otherwise hemodynamically stable, appropriate for floor admission. []      ED Course User Index  [AT] Cornelia Levy MD  [] Chuckie Tapia MD  [Y] Claudia Koehler MD         Diagnoses as of 04/30/24 1947   Diabetic ketoacidosis without coma associated with diabetes mellitus due to underlying condition (Multi)     Medical Decision Making    Final diagnoses:   [E08.10] Diabetic ketoacidosis  without coma associated with diabetes mellitus due to underlying condition (Multi)     At the time of sign out, vital signs were as follows:  Vitals:    04/30/24 1937   BP: 147/89   Pulse: 76   Resp: 16   Temp:    SpO2: 99%     In brief Barbara English is an 49 y.o. female presenting for [facility].    Patient was signed out to me pending admission.  Patient arrived to deny admission due to hypertension.  Patient given labetalol, hydralazine with a current blood pressure 147/89.  Latest glucose 359.  Patient was admitted to medicine      Dispo: Admitted to medicine    Isi Izaguirre MD  Emergency Medicine, PGY-1

## 2024-04-30 NOTE — PROGRESS NOTES
ED transition of care note     Received sign-out from previous provider. 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 Also with fever, chills, mid-sternal chest pain, shortness of breath. CT AP showing gastritis and colitis, but overall unremarkable. EKG NSR wo evidence of acute ischemic changes. Trops neg. Initial labs significant for leukocytosis of 16.7. Blood cultures ordered. Vanco and Zosyn started due to concern for possible diabetic foot infection. Dilaudid and zofran given for symptomatic treatment. Glucose initially in the 600s. K 5.4. Lactate normal. Suspect early/mild DKA given UA positive for ketones and elevated beta-hydroxybutyrate (1.86). Patient received 1.5 L LR, calcium gluconate, 20 units insulin total. R/p BMP with improvement of glucose 366, K 4.2, gap closed. R/p VBG with pH 7.41, slight bump in lactate 2.7 from 1.1 Gave another 500 LR. Pt with elevated blood pressures /112, received IV labetalol 20 without significant improvement. Gave second dose of IV labetalol 20 mg. Admission to medicine floor pending improvement of BPs.       Claudia Koehler   Anesthesiology, PGY1

## 2024-05-01 LAB
ALBUMIN SERPL BCP-MCNC: 2.9 G/DL (ref 3.4–5)
ALBUMIN SERPL BCP-MCNC: 3 G/DL (ref 3.4–5)
ALP SERPL-CCNC: 109 U/L (ref 33–110)
ALP SERPL-CCNC: 114 U/L (ref 33–110)
ALT SERPL W P-5'-P-CCNC: 10 U/L (ref 7–45)
ALT SERPL W P-5'-P-CCNC: 7 U/L (ref 7–45)
AMPHETAMINES UR QL SCN: ABNORMAL
ANION GAP BLDV CALCULATED.4IONS-SCNC: 13 MMOL/L (ref 10–25)
ANION GAP SERPL CALC-SCNC: 11 MMOL/L (ref 10–20)
ANION GAP SERPL CALC-SCNC: 13 MMOL/L (ref 10–20)
AST SERPL W P-5'-P-CCNC: 10 U/L (ref 9–39)
AST SERPL W P-5'-P-CCNC: 10 U/L (ref 9–39)
BARBITURATES UR QL SCN: ABNORMAL
BASE EXCESS BLDV CALC-SCNC: -1.4 MMOL/L (ref -2–3)
BASOPHILS # BLD AUTO: 0.06 X10*3/UL (ref 0–0.1)
BASOPHILS # BLD AUTO: 0.08 X10*3/UL (ref 0–0.1)
BASOPHILS NFR BLD AUTO: 0.6 %
BASOPHILS NFR BLD AUTO: 0.7 %
BENZODIAZ UR QL SCN: ABNORMAL
BILIRUB DIRECT SERPL-MCNC: 0 MG/DL (ref 0–0.3)
BILIRUB SERPL-MCNC: 0.3 MG/DL (ref 0–1.2)
BILIRUB SERPL-MCNC: 0.4 MG/DL (ref 0–1.2)
BODY TEMPERATURE: 37 DEGREES CELSIUS
BUN SERPL-MCNC: 43 MG/DL (ref 6–23)
BUN SERPL-MCNC: 44 MG/DL (ref 6–23)
BZE UR QL SCN: ABNORMAL
CA-I BLDV-SCNC: 1.07 MMOL/L (ref 1.1–1.33)
CALCIUM SERPL-MCNC: 8.7 MG/DL (ref 8.6–10.6)
CALCIUM SERPL-MCNC: 8.8 MG/DL (ref 8.6–10.6)
CANNABINOIDS UR QL SCN: ABNORMAL
CHLORIDE BLDV-SCNC: 101 MMOL/L (ref 98–107)
CHLORIDE SERPL-SCNC: 96 MMOL/L (ref 98–107)
CHLORIDE SERPL-SCNC: 98 MMOL/L (ref 98–107)
CO2 SERPL-SCNC: 30 MMOL/L (ref 21–32)
CO2 SERPL-SCNC: 34 MMOL/L (ref 21–32)
CREAT SERPL-MCNC: 1.31 MG/DL (ref 0.5–1.05)
CREAT SERPL-MCNC: 1.41 MG/DL (ref 0.5–1.05)
EGFRCR SERPLBLD CKD-EPI 2021: 46 ML/MIN/1.73M*2
EGFRCR SERPLBLD CKD-EPI 2021: 50 ML/MIN/1.73M*2
EOSINOPHIL # BLD AUTO: 0.06 X10*3/UL (ref 0–0.7)
EOSINOPHIL # BLD AUTO: 0.11 X10*3/UL (ref 0–0.7)
EOSINOPHIL NFR BLD AUTO: 0.6 %
EOSINOPHIL NFR BLD AUTO: 1 %
ERYTHROCYTE [DISTWIDTH] IN BLOOD BY AUTOMATED COUNT: 12.1 % (ref 11.5–14.5)
ERYTHROCYTE [DISTWIDTH] IN BLOOD BY AUTOMATED COUNT: 12.3 % (ref 11.5–14.5)
ETHANOL SERPL-MCNC: <10 MG/DL
FENTANYL+NORFENTANYL UR QL SCN: ABNORMAL
GLUCOSE BLD MANUAL STRIP-MCNC: 148 MG/DL (ref 74–99)
GLUCOSE BLD MANUAL STRIP-MCNC: 189 MG/DL (ref 74–99)
GLUCOSE BLD MANUAL STRIP-MCNC: 286 MG/DL (ref 74–99)
GLUCOSE BLDV-MCNC: 437 MG/DL (ref 74–99)
GLUCOSE SERPL-MCNC: 228 MG/DL (ref 74–99)
GLUCOSE SERPL-MCNC: 301 MG/DL (ref 74–99)
HBV CORE IGM SER QL: NONREACTIVE
HBV SURFACE AB SER-ACNC: 5.6 MIU/ML
HBV SURFACE AG SERPL QL IA: NONREACTIVE
HCO3 BLDV-SCNC: 22.6 MMOL/L (ref 22–26)
HCT VFR BLD AUTO: 31.3 % (ref 36–46)
HCT VFR BLD AUTO: 31.6 % (ref 36–46)
HCT VFR BLD EST: 27 % (ref 36–46)
HCV AB SER QL: NONREACTIVE
HGB BLD-MCNC: 10.9 G/DL (ref 12–16)
HGB BLD-MCNC: 11 G/DL (ref 12–16)
HGB BLDV-MCNC: 9 G/DL (ref 12–16)
HIV 1+2 AB+HIV1 P24 AG SERPL QL IA: NONREACTIVE
IMM GRANULOCYTES # BLD AUTO: 0.03 X10*3/UL (ref 0–0.7)
IMM GRANULOCYTES # BLD AUTO: 0.04 X10*3/UL (ref 0–0.7)
IMM GRANULOCYTES NFR BLD AUTO: 0.3 % (ref 0–0.9)
IMM GRANULOCYTES NFR BLD AUTO: 0.4 % (ref 0–0.9)
INHALED O2 CONCENTRATION: 21 %
LACTATE BLDV-SCNC: 1.3 MMOL/L (ref 0.4–2)
LACTATE SERPL-SCNC: 0.7 MMOL/L (ref 0.4–2)
LYMPHOCYTES # BLD AUTO: 2.14 X10*3/UL (ref 1.2–4.8)
LYMPHOCYTES # BLD AUTO: 3.05 X10*3/UL (ref 1.2–4.8)
LYMPHOCYTES NFR BLD AUTO: 20 %
LYMPHOCYTES NFR BLD AUTO: 27.1 %
MAGNESIUM SERPL-MCNC: 2.21 MG/DL (ref 1.6–2.4)
MCH RBC QN AUTO: 26.1 PG (ref 26–34)
MCH RBC QN AUTO: 26.5 PG (ref 26–34)
MCHC RBC AUTO-ENTMCNC: 34.5 G/DL (ref 32–36)
MCHC RBC AUTO-ENTMCNC: 35.1 G/DL (ref 32–36)
MCV RBC AUTO: 75 FL (ref 80–100)
MCV RBC AUTO: 76 FL (ref 80–100)
METHADONE UR QL SCN: ABNORMAL
MONOCYTES # BLD AUTO: 0.36 X10*3/UL (ref 0.1–1)
MONOCYTES # BLD AUTO: 0.56 X10*3/UL (ref 0.1–1)
MONOCYTES NFR BLD AUTO: 3.4 %
MONOCYTES NFR BLD AUTO: 5 %
NEUTROPHILS # BLD AUTO: 7.42 X10*3/UL (ref 1.2–7.7)
NEUTROPHILS # BLD AUTO: 8.05 X10*3/UL (ref 1.2–7.7)
NEUTROPHILS NFR BLD AUTO: 65.8 %
NEUTROPHILS NFR BLD AUTO: 75.1 %
NRBC BLD-RTO: 0 /100 WBCS (ref 0–0)
NRBC BLD-RTO: 0 /100 WBCS (ref 0–0)
OPIATES UR QL SCN: ABNORMAL
OXYCODONE+OXYMORPHONE UR QL SCN: ABNORMAL
OXYHGB MFR BLDV: 75.3 % (ref 45–75)
PCO2 BLDV: 34 MM HG (ref 41–51)
PCP UR QL SCN: ABNORMAL
PH BLDV: 7.43 PH (ref 7.33–7.43)
PHOSPHATE SERPL-MCNC: 3.2 MG/DL (ref 2.5–4.9)
PLATELET # BLD AUTO: 368 X10*3/UL (ref 150–450)
PLATELET # BLD AUTO: 402 X10*3/UL (ref 150–450)
PO2 BLDV: 46 MM HG (ref 35–45)
POTASSIUM BLDV-SCNC: 4.6 MMOL/L (ref 3.5–5.3)
POTASSIUM SERPL-SCNC: 3.6 MMOL/L (ref 3.5–5.3)
POTASSIUM SERPL-SCNC: 3.7 MMOL/L (ref 3.5–5.3)
PROT SERPL-MCNC: 5.6 G/DL (ref 6.4–8.2)
PROT SERPL-MCNC: 5.7 G/DL (ref 6.4–8.2)
RBC # BLD AUTO: 4.15 X10*6/UL (ref 4–5.2)
RBC # BLD AUTO: 4.17 X10*6/UL (ref 4–5.2)
SAO2 % BLDV: 77 % (ref 45–75)
SODIUM BLDV-SCNC: 132 MMOL/L (ref 136–145)
SODIUM SERPL-SCNC: 137 MMOL/L (ref 136–145)
SODIUM SERPL-SCNC: 137 MMOL/L (ref 136–145)
VANCOMYCIN SERPL-MCNC: 8.7 UG/ML (ref 5–20)
WBC # BLD AUTO: 10.7 X10*3/UL (ref 4.4–11.3)
WBC # BLD AUTO: 11.3 X10*3/UL (ref 4.4–11.3)

## 2024-05-01 PROCEDURE — 2500000006 HC RX 250 W HCPCS SELF ADMINISTERED DRUGS (ALT 637 FOR ALL PAYERS)

## 2024-05-01 PROCEDURE — 83735 ASSAY OF MAGNESIUM: CPT | Performed by: STUDENT IN AN ORGANIZED HEALTH CARE EDUCATION/TRAINING PROGRAM

## 2024-05-01 PROCEDURE — 87340 HEPATITIS B SURFACE AG IA: CPT | Performed by: STUDENT IN AN ORGANIZED HEALTH CARE EDUCATION/TRAINING PROGRAM

## 2024-05-01 PROCEDURE — 84075 ASSAY ALKALINE PHOSPHATASE: CPT | Performed by: STUDENT IN AN ORGANIZED HEALTH CARE EDUCATION/TRAINING PROGRAM

## 2024-05-01 PROCEDURE — 82077 ASSAY SPEC XCP UR&BREATH IA: CPT | Performed by: STUDENT IN AN ORGANIZED HEALTH CARE EDUCATION/TRAINING PROGRAM

## 2024-05-01 PROCEDURE — 2500000004 HC RX 250 GENERAL PHARMACY W/ HCPCS (ALT 636 FOR OP/ED)

## 2024-05-01 PROCEDURE — 36415 COLL VENOUS BLD VENIPUNCTURE: CPT

## 2024-05-01 PROCEDURE — 2500000004 HC RX 250 GENERAL PHARMACY W/ HCPCS (ALT 636 FOR OP/ED): Performed by: STUDENT IN AN ORGANIZED HEALTH CARE EDUCATION/TRAINING PROGRAM

## 2024-05-01 PROCEDURE — 99233 SBSQ HOSP IP/OBS HIGH 50: CPT | Performed by: STUDENT IN AN ORGANIZED HEALTH CARE EDUCATION/TRAINING PROGRAM

## 2024-05-01 PROCEDURE — 83605 ASSAY OF LACTIC ACID: CPT

## 2024-05-01 PROCEDURE — 80053 COMPREHEN METABOLIC PANEL: CPT

## 2024-05-01 PROCEDURE — 86706 HEP B SURFACE ANTIBODY: CPT | Performed by: STUDENT IN AN ORGANIZED HEALTH CARE EDUCATION/TRAINING PROGRAM

## 2024-05-01 PROCEDURE — 2500000002 HC RX 250 W HCPCS SELF ADMINISTERED DRUGS (ALT 637 FOR MEDICARE OP, ALT 636 FOR OP/ED)

## 2024-05-01 PROCEDURE — 87205 SMEAR GRAM STAIN: CPT

## 2024-05-01 PROCEDURE — 2500000001 HC RX 250 WO HCPCS SELF ADMINISTERED DRUGS (ALT 637 FOR MEDICARE OP): Performed by: STUDENT IN AN ORGANIZED HEALTH CARE EDUCATION/TRAINING PROGRAM

## 2024-05-01 PROCEDURE — 85025 COMPLETE CBC W/AUTO DIFF WBC: CPT | Performed by: STUDENT IN AN ORGANIZED HEALTH CARE EDUCATION/TRAINING PROGRAM

## 2024-05-01 PROCEDURE — 85025 COMPLETE CBC W/AUTO DIFF WBC: CPT

## 2024-05-01 PROCEDURE — 80202 ASSAY OF VANCOMYCIN: CPT

## 2024-05-01 PROCEDURE — 84132 ASSAY OF SERUM POTASSIUM: CPT | Performed by: STUDENT IN AN ORGANIZED HEALTH CARE EDUCATION/TRAINING PROGRAM

## 2024-05-01 PROCEDURE — 82947 ASSAY GLUCOSE BLOOD QUANT: CPT

## 2024-05-01 PROCEDURE — 87389 HIV-1 AG W/HIV-1&-2 AB AG IA: CPT | Performed by: STUDENT IN AN ORGANIZED HEALTH CARE EDUCATION/TRAINING PROGRAM

## 2024-05-01 PROCEDURE — 84100 ASSAY OF PHOSPHORUS: CPT | Performed by: STUDENT IN AN ORGANIZED HEALTH CARE EDUCATION/TRAINING PROGRAM

## 2024-05-01 PROCEDURE — 84132 ASSAY OF SERUM POTASSIUM: CPT

## 2024-05-01 PROCEDURE — 1200000002 HC GENERAL ROOM WITH TELEMETRY DAILY

## 2024-05-01 PROCEDURE — 2500000001 HC RX 250 WO HCPCS SELF ADMINISTERED DRUGS (ALT 637 FOR MEDICARE OP)

## 2024-05-01 PROCEDURE — 86803 HEPATITIS C AB TEST: CPT | Performed by: STUDENT IN AN ORGANIZED HEALTH CARE EDUCATION/TRAINING PROGRAM

## 2024-05-01 PROCEDURE — 86705 HEP B CORE ANTIBODY IGM: CPT | Performed by: STUDENT IN AN ORGANIZED HEALTH CARE EDUCATION/TRAINING PROGRAM

## 2024-05-01 PROCEDURE — 2500000004 HC RX 250 GENERAL PHARMACY W/ HCPCS (ALT 636 FOR OP/ED): Performed by: PHARMACY TECHNICIAN

## 2024-05-01 RX ORDER — HEPARIN SODIUM 5000 [USP'U]/ML
5000 INJECTION, SOLUTION INTRAVENOUS; SUBCUTANEOUS EVERY 8 HOURS
Status: CANCELLED | OUTPATIENT
Start: 2024-05-01

## 2024-05-01 RX ORDER — INSULIN GLARGINE 100 [IU]/ML
30 INJECTION, SOLUTION SUBCUTANEOUS NIGHTLY
Status: DISCONTINUED | OUTPATIENT
Start: 2024-05-01 | End: 2024-05-02

## 2024-05-01 RX ORDER — AMLODIPINE BESYLATE 5 MG/1
10 TABLET ORAL DAILY
Status: CANCELLED | OUTPATIENT
Start: 2024-05-01

## 2024-05-01 RX ORDER — HYDROMORPHONE HYDROCHLORIDE 1 MG/ML
0.2 INJECTION, SOLUTION INTRAMUSCULAR; INTRAVENOUS; SUBCUTANEOUS
Status: DISCONTINUED | OUTPATIENT
Start: 2024-05-01 | End: 2024-05-03

## 2024-05-01 RX ORDER — NIFEDIPINE 30 MG/1
30 TABLET, FILM COATED, EXTENDED RELEASE ORAL
Status: DISCONTINUED | OUTPATIENT
Start: 2024-05-01 | End: 2024-05-05 | Stop reason: HOSPADM

## 2024-05-01 RX ORDER — HYDRALAZINE HYDROCHLORIDE 20 MG/ML
10 INJECTION INTRAMUSCULAR; INTRAVENOUS EVERY 6 HOURS PRN
Status: DISCONTINUED | OUTPATIENT
Start: 2024-05-01 | End: 2024-05-01

## 2024-05-01 RX ORDER — ONDANSETRON HYDROCHLORIDE 2 MG/ML
4 INJECTION, SOLUTION INTRAVENOUS EVERY 6 HOURS PRN
Status: DISCONTINUED | OUTPATIENT
Start: 2024-05-01 | End: 2024-05-05 | Stop reason: HOSPADM

## 2024-05-01 RX ORDER — LANOLIN ALCOHOL/MO/W.PET/CERES
100 CREAM (GRAM) TOPICAL DAILY
Status: DISCONTINUED | OUTPATIENT
Start: 2024-05-04 | End: 2024-05-02

## 2024-05-01 RX ORDER — HYDROCHLOROTHIAZIDE 25 MG/1
12.5 TABLET ORAL DAILY
Status: CANCELLED | OUTPATIENT
Start: 2024-05-01

## 2024-05-01 RX ORDER — HYDRALAZINE HYDROCHLORIDE 10 MG/1
10 TABLET, FILM COATED ORAL 3 TIMES DAILY
Status: DISCONTINUED | OUTPATIENT
Start: 2024-05-01 | End: 2024-05-01 | Stop reason: SDUPTHER

## 2024-05-01 RX ORDER — PANTOPRAZOLE SODIUM 40 MG/1
40 TABLET, DELAYED RELEASE ORAL 2 TIMES DAILY
Status: DISCONTINUED | OUTPATIENT
Start: 2024-05-01 | End: 2024-05-05

## 2024-05-01 RX ORDER — INSULIN LISPRO 100 [IU]/ML
0-10 INJECTION, SOLUTION INTRAVENOUS; SUBCUTANEOUS
Status: DISCONTINUED | OUTPATIENT
Start: 2024-05-01 | End: 2024-05-05 | Stop reason: HOSPADM

## 2024-05-01 RX ORDER — ACETAMINOPHEN 325 MG/1
650 TABLET ORAL EVERY 4 HOURS PRN
Status: DISCONTINUED | OUTPATIENT
Start: 2024-05-01 | End: 2024-05-05 | Stop reason: HOSPADM

## 2024-05-01 RX ORDER — SODIUM CHLORIDE, SODIUM LACTATE, POTASSIUM CHLORIDE, CALCIUM CHLORIDE 600; 310; 30; 20 MG/100ML; MG/100ML; MG/100ML; MG/100ML
100 INJECTION, SOLUTION INTRAVENOUS CONTINUOUS
Status: ACTIVE | OUTPATIENT
Start: 2024-05-01 | End: 2024-05-01

## 2024-05-01 RX ORDER — VANCOMYCIN HYDROCHLORIDE 1 G/200ML
1000 INJECTION, SOLUTION INTRAVENOUS ONCE
Status: DISCONTINUED | OUTPATIENT
Start: 2024-05-01 | End: 2024-05-01

## 2024-05-01 RX ORDER — HYDRALAZINE HYDROCHLORIDE 20 MG/ML
INJECTION INTRAMUSCULAR; INTRAVENOUS
Status: COMPLETED
Start: 2024-05-01 | End: 2024-05-01

## 2024-05-01 RX ORDER — FOLIC ACID 1 MG/1
1 TABLET ORAL DAILY
Status: DISCONTINUED | OUTPATIENT
Start: 2024-05-01 | End: 2024-05-05 | Stop reason: HOSPADM

## 2024-05-01 RX ORDER — THIAMINE HYDROCHLORIDE 100 MG/ML
100 INJECTION, SOLUTION INTRAMUSCULAR; INTRAVENOUS DAILY
Status: DISCONTINUED | OUTPATIENT
Start: 2024-05-01 | End: 2024-05-02

## 2024-05-01 RX ORDER — HYDRALAZINE HYDROCHLORIDE 10 MG/1
10 TABLET, FILM COATED ORAL 3 TIMES DAILY
Status: DISCONTINUED | OUTPATIENT
Start: 2024-05-01 | End: 2024-05-01

## 2024-05-01 RX ORDER — GABAPENTIN 100 MG/1
100 CAPSULE ORAL 2 TIMES DAILY
Status: DISCONTINUED | OUTPATIENT
Start: 2024-05-01 | End: 2024-05-02

## 2024-05-01 RX ORDER — MULTIVIT-MIN/IRON FUM/FOLIC AC 7.5 MG-4
1 TABLET ORAL DAILY
Status: DISCONTINUED | OUTPATIENT
Start: 2024-05-01 | End: 2024-05-05 | Stop reason: HOSPADM

## 2024-05-01 RX ORDER — ACETAMINOPHEN 160 MG/5ML
650 SOLUTION ORAL EVERY 4 HOURS PRN
Status: DISCONTINUED | OUTPATIENT
Start: 2024-05-01 | End: 2024-05-05 | Stop reason: HOSPADM

## 2024-05-01 RX ORDER — SODIUM CHLORIDE 9 MG/ML
75 INJECTION, SOLUTION INTRAVENOUS CONTINUOUS
Status: CANCELLED | OUTPATIENT
Start: 2024-05-01

## 2024-05-01 RX ORDER — INSULIN LISPRO 100 [IU]/ML
2 INJECTION, SOLUTION INTRAVENOUS; SUBCUTANEOUS
Status: DISCONTINUED | OUTPATIENT
Start: 2024-05-01 | End: 2024-05-02

## 2024-05-01 RX ORDER — VANCOMYCIN HYDROCHLORIDE 750 MG/150ML
750 INJECTION, SOLUTION INTRAVENOUS ONCE
Status: COMPLETED | OUTPATIENT
Start: 2024-05-01 | End: 2024-05-01

## 2024-05-01 RX ORDER — CALCIUM CARBONATE 200(500)MG
1000 TABLET,CHEWABLE ORAL 2 TIMES DAILY PRN
Status: DISCONTINUED | OUTPATIENT
Start: 2024-05-01 | End: 2024-05-05 | Stop reason: HOSPADM

## 2024-05-01 RX ORDER — LISINOPRIL 20 MG/1
40 TABLET ORAL DAILY
Status: CANCELLED | OUTPATIENT
Start: 2024-05-01

## 2024-05-01 RX ORDER — VANCOMYCIN HYDROCHLORIDE 750 MG/150ML
750 INJECTION, SOLUTION INTRAVENOUS ONCE
Status: DISCONTINUED | OUTPATIENT
Start: 2024-05-01 | End: 2024-05-01

## 2024-05-01 RX ORDER — ACETAMINOPHEN 650 MG/1
650 SUPPOSITORY RECTAL EVERY 4 HOURS PRN
Status: DISCONTINUED | OUTPATIENT
Start: 2024-05-01 | End: 2024-05-05 | Stop reason: HOSPADM

## 2024-05-01 RX ORDER — NIFEDIPINE 30 MG/1
30 TABLET, FILM COATED, EXTENDED RELEASE ORAL
Status: DISCONTINUED | OUTPATIENT
Start: 2024-05-01 | End: 2024-05-01 | Stop reason: SDUPTHER

## 2024-05-01 RX ORDER — SERTRALINE HYDROCHLORIDE 25 MG/1
25 TABLET, FILM COATED ORAL DAILY
Status: DISCONTINUED | OUTPATIENT
Start: 2024-05-01 | End: 2024-05-05 | Stop reason: HOSPADM

## 2024-05-01 RX ADMIN — VANCOMYCIN HYDROCHLORIDE 750 MG: 750 INJECTION, SOLUTION INTRAVENOUS at 08:29

## 2024-05-01 RX ADMIN — HYDRALAZINE HYDROCHLORIDE 10 MG: 20 INJECTION INTRAMUSCULAR; INTRAVENOUS at 03:58

## 2024-05-01 RX ADMIN — SERTRALINE HYDROCHLORIDE 25 MG: 25 TABLET ORAL at 08:29

## 2024-05-01 RX ADMIN — PIPERACILLIN SODIUM AND TAZOBACTAM SODIUM 3.38 G: 3; .375 INJECTION, SOLUTION INTRAVENOUS at 05:04

## 2024-05-01 RX ADMIN — HYDROMORPHONE HYDROCHLORIDE 0.2 MG: 1 INJECTION, SOLUTION INTRAMUSCULAR; INTRAVENOUS; SUBCUTANEOUS at 19:30

## 2024-05-01 RX ADMIN — ACETAMINOPHEN 975 MG: 325 TABLET ORAL at 06:42

## 2024-05-01 RX ADMIN — INSULIN LISPRO 2 UNITS: 100 INJECTION, SOLUTION INTRAVENOUS; SUBCUTANEOUS at 12:02

## 2024-05-01 RX ADMIN — PANTOPRAZOLE SODIUM 40 MG: 40 TABLET, DELAYED RELEASE ORAL at 21:27

## 2024-05-01 RX ADMIN — SODIUM CHLORIDE 1000 ML: 9 INJECTION, SOLUTION INTRAVENOUS at 14:01

## 2024-05-01 RX ADMIN — SODIUM CHLORIDE, POTASSIUM CHLORIDE, SODIUM LACTATE AND CALCIUM CHLORIDE 100 ML/HR: 600; 310; 30; 20 INJECTION, SOLUTION INTRAVENOUS at 02:18

## 2024-05-01 RX ADMIN — GABAPENTIN 100 MG: 100 CAPSULE ORAL at 21:27

## 2024-05-01 RX ADMIN — PIPERACILLIN SODIUM AND TAZOBACTAM SODIUM 3.38 G: 3; .375 INJECTION, SOLUTION INTRAVENOUS at 17:46

## 2024-05-01 RX ADMIN — THIAMINE HYDROCHLORIDE 100 MG: 100 INJECTION, SOLUTION INTRAMUSCULAR; INTRAVENOUS at 09:24

## 2024-05-01 RX ADMIN — PANTOPRAZOLE SODIUM 40 MG: 40 TABLET, DELAYED RELEASE ORAL at 08:29

## 2024-05-01 RX ADMIN — HYDROMORPHONE HYDROCHLORIDE 0.2 MG: 1 INJECTION, SOLUTION INTRAMUSCULAR; INTRAVENOUS; SUBCUTANEOUS at 23:35

## 2024-05-01 RX ADMIN — INSULIN LISPRO 6 UNITS: 100 INJECTION, SOLUTION INTRAVENOUS; SUBCUTANEOUS at 08:30

## 2024-05-01 RX ADMIN — PIPERACILLIN SODIUM AND TAZOBACTAM SODIUM 3.38 G: 3; .375 INJECTION, SOLUTION INTRAVENOUS at 23:36

## 2024-05-01 RX ADMIN — GABAPENTIN 100 MG: 100 CAPSULE ORAL at 08:29

## 2024-05-01 RX ADMIN — FOLIC ACID 1 MG: 1 TABLET ORAL at 09:24

## 2024-05-01 RX ADMIN — INSULIN GLARGINE 30 UNITS: 100 INJECTION, SOLUTION SUBCUTANEOUS at 21:24

## 2024-05-01 RX ADMIN — NIFEDIPINE 30 MG: 30 TABLET, FILM COATED, EXTENDED RELEASE ORAL at 06:42

## 2024-05-01 RX ADMIN — HYDROMORPHONE HYDROCHLORIDE 0.2 MG: 1 INJECTION, SOLUTION INTRAMUSCULAR; INTRAVENOUS; SUBCUTANEOUS at 05:45

## 2024-05-01 RX ADMIN — Medication 1 TABLET: at 09:24

## 2024-05-01 RX ADMIN — POLYETHYLENE GLYCOL 3350 17 G: 17 POWDER, FOR SOLUTION ORAL at 08:29

## 2024-05-01 RX ADMIN — PIPERACILLIN SODIUM AND TAZOBACTAM SODIUM 3.38 G: 3; .375 INJECTION, SOLUTION INTRAVENOUS at 10:44

## 2024-05-01 RX ADMIN — ACETAMINOPHEN 650 MG: 325 TABLET ORAL at 21:27

## 2024-05-01 RX ADMIN — ONDANSETRON 4 MG: 2 INJECTION INTRAMUSCULAR; INTRAVENOUS at 02:06

## 2024-05-01 RX ADMIN — HYDROMORPHONE HYDROCHLORIDE 0.2 MG: 1 INJECTION, SOLUTION INTRAMUSCULAR; INTRAVENOUS; SUBCUTANEOUS at 15:56

## 2024-05-01 RX ADMIN — INSULIN LISPRO 2 UNITS: 100 INJECTION, SOLUTION INTRAVENOUS; SUBCUTANEOUS at 08:31

## 2024-05-01 RX ADMIN — SENNOSIDES AND DOCUSATE SODIUM 2 TABLET: 50; 8.6 TABLET ORAL at 08:29

## 2024-05-01 RX ADMIN — HYDROMORPHONE HYDROCHLORIDE 0.2 MG: 1 INJECTION, SOLUTION INTRAMUSCULAR; INTRAVENOUS; SUBCUTANEOUS at 10:45

## 2024-05-01 RX ADMIN — ACETAMINOPHEN 975 MG: 325 TABLET ORAL at 00:45

## 2024-05-01 SDOH — SOCIAL STABILITY: SOCIAL INSECURITY: WERE YOU ABLE TO COMPLETE ALL THE BEHAVIORAL HEALTH SCREENINGS?: YES

## 2024-05-01 SDOH — SOCIAL STABILITY: SOCIAL INSECURITY: DOES ANYONE TRY TO KEEP YOU FROM HAVING/CONTACTING OTHER FRIENDS OR DOING THINGS OUTSIDE YOUR HOME?: NO

## 2024-05-01 SDOH — SOCIAL STABILITY: SOCIAL INSECURITY: HAS ANYONE EVER THREATENED TO HURT YOUR FAMILY OR YOUR PETS?: NO

## 2024-05-01 SDOH — SOCIAL STABILITY: SOCIAL INSECURITY: ARE YOU OR HAVE YOU BEEN THREATENED OR ABUSED PHYSICALLY, EMOTIONALLY, OR SEXUALLY BY ANYONE?: NO

## 2024-05-01 SDOH — SOCIAL STABILITY: SOCIAL INSECURITY: DO YOU FEEL ANYONE HAS EXPLOITED OR TAKEN ADVANTAGE OF YOU FINANCIALLY OR OF YOUR PERSONAL PROPERTY?: NO

## 2024-05-01 SDOH — SOCIAL STABILITY: SOCIAL INSECURITY: ABUSE: ADULT

## 2024-05-01 SDOH — SOCIAL STABILITY: SOCIAL INSECURITY: HAVE YOU HAD THOUGHTS OF HARMING ANYONE ELSE?: NO

## 2024-05-01 SDOH — SOCIAL STABILITY: SOCIAL INSECURITY: ARE THERE ANY APPARENT SIGNS OF INJURIES/BEHAVIORS THAT COULD BE RELATED TO ABUSE/NEGLECT?: NO

## 2024-05-01 SDOH — SOCIAL STABILITY: SOCIAL INSECURITY: DO YOU FEEL UNSAFE GOING BACK TO THE PLACE WHERE YOU ARE LIVING?: NO

## 2024-05-01 ASSESSMENT — COGNITIVE AND FUNCTIONAL STATUS - GENERAL
DRESSING REGULAR LOWER BODY CLOTHING: A LITTLE
MOBILITY SCORE: 17
PATIENT BASELINE BEDBOUND: NO
MOVING TO AND FROM BED TO CHAIR: A LITTLE
CLIMB 3 TO 5 STEPS WITH RAILING: A LOT
WALKING IN HOSPITAL ROOM: A LITTLE
TURNING FROM BACK TO SIDE WHILE IN FLAT BAD: A LITTLE
HELP NEEDED FOR BATHING: A LITTLE
EATING MEALS: A LITTLE
STANDING UP FROM CHAIR USING ARMS: A LITTLE
DAILY ACTIVITIY SCORE: 18
DRESSING REGULAR UPPER BODY CLOTHING: A LITTLE
MOVING FROM LYING ON BACK TO SITTING ON SIDE OF FLAT BED WITH BEDRAILS: A LITTLE
TOILETING: A LITTLE
PERSONAL GROOMING: A LITTLE

## 2024-05-01 ASSESSMENT — LIFESTYLE VARIABLES
HOW OFTEN DO YOU HAVE 6 OR MORE DRINKS ON ONE OCCASION: NEVER
AUDITORY DISTURBANCES: NOT PRESENT
SUBSTANCE_ABUSE_PAST_12_MONTHS: YES
TREMOR: 2
HEADACHE, FULLNESS IN HEAD: NOT PRESENT
SKIP TO QUESTIONS 9-10: 1
TOTAL SCORE: 3
ORIENTATION AND CLOUDING OF SENSORIUM: CANNOT DO SERIAL ADDITIONS OR IS UNCERTAIN ABOUT DATE
NAUSEA AND VOMITING: NO NAUSEA AND NO VOMITING
AUDIT-C TOTAL SCORE: 0
AUDIT-C TOTAL SCORE: 0
VISUAL DISTURBANCES: NOT PRESENT
AGITATION: NORMAL ACTIVITY
HOW OFTEN DO YOU HAVE A DRINK CONTAINING ALCOHOL: NEVER
HOW MANY STANDARD DRINKS CONTAINING ALCOHOL DO YOU HAVE ON A TYPICAL DAY: PATIENT DOES NOT DRINK
ANXIETY: NO ANXIETY, AT EASE
PAROXYSMAL SWEATS: NO SWEAT VISIBLE

## 2024-05-01 ASSESSMENT — ACTIVITIES OF DAILY LIVING (ADL)
JUDGMENT_ADEQUATE_SAFELY_COMPLETE_DAILY_ACTIVITIES: YES
WALKS IN HOME: NEEDS ASSISTANCE
ASSISTIVE_DEVICE: WHEELCHAIR
TOILETING: NEEDS ASSISTANCE
DRESSING YOURSELF: NEEDS ASSISTANCE
ADEQUATE_TO_COMPLETE_ADL: YES
GROOMING: NEEDS ASSISTANCE
PATIENT'S MEMORY ADEQUATE TO SAFELY COMPLETE DAILY ACTIVITIES?: YES
LACK_OF_TRANSPORTATION: YES
FEEDING YOURSELF: NEEDS ASSISTANCE
BATHING: NEEDS ASSISTANCE
HEARING - RIGHT EAR: FUNCTIONAL
HEARING - LEFT EAR: FUNCTIONAL

## 2024-05-01 ASSESSMENT — PATIENT HEALTH QUESTIONNAIRE - PHQ9
1. LITTLE INTEREST OR PLEASURE IN DOING THINGS: MORE THAN HALF THE DAYS
SUM OF ALL RESPONSES TO PHQ9 QUESTIONS 1 & 2: 4
2. FEELING DOWN, DEPRESSED OR HOPELESS: MORE THAN HALF THE DAYS

## 2024-05-01 ASSESSMENT — ENCOUNTER SYMPTOMS
CHILLS: 1
ARTHRALGIAS: 0
VOMITING: 1
PALPITATIONS: 0
FLANK PAIN: 0
DIAPHORESIS: 1
FEVER: 0
SHORTNESS OF BREATH: 0
ABDOMINAL PAIN: 1
CHEST TIGHTNESS: 1
ABDOMINAL DISTENTION: 0
NAUSEA: 1

## 2024-05-01 ASSESSMENT — PAIN - FUNCTIONAL ASSESSMENT
PAIN_FUNCTIONAL_ASSESSMENT: 0-10

## 2024-05-01 ASSESSMENT — PAIN SCALES - GENERAL
PAINLEVEL_OUTOF10: 8
PAINLEVEL_OUTOF10: 10 - WORST POSSIBLE PAIN
PAINLEVEL_OUTOF10: 0 - NO PAIN
PAINLEVEL_OUTOF10: 0 - NO PAIN
PAINLEVEL_OUTOF10: 10 - WORST POSSIBLE PAIN
PAINLEVEL_OUTOF10: 0 - NO PAIN
PAINLEVEL_OUTOF10: 3

## 2024-05-01 ASSESSMENT — PAIN DESCRIPTION - LOCATION
LOCATION: ABDOMEN

## 2024-05-01 NOTE — H&P
History Of Present Illness  Barbara English is a 49 y.o. female presented to ED with Abdominal pain worsening over few days.  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   Patient endorsed for the past few days has had generalized abdominal pain with emesis, nonbloody, fever, chills, chest pain, shortness of breath.  Patient reports chest pain is mid sternal, non radiating moderate in severity.  Patient endorses use of cocaine.        Notably, patient has been admitted for DKA twice in the past year. Once during 12/26/2023-1/9/2024 admission, during which she had amputation of 4th and 5th toes on the right sided for concern of osteomyelitis. She was then discharged to SNF but, left AMA. She was then admitted on 3/26/2024 for generalized weakness and was also found to be in DKA.     ED Course:  VS 37.1, 78, 16, 191/120  PE remarkable for diffuse abdominal tenderness, Right foot amputation, tp pulses intact, sensation to ankle intact, numbness near amputation per patient, no pus or drainage, open not well healed   Labs:  CBC  WBC 16.1, HGB 15.5, Plt 431  beta hydroxy 1.86, hyperosmolar (331) and normal bicarbonate  CMP Glu 614, Na 132, K 5.9, Lipase 21, Lactate 2.7  Troponins 30, Hgb A1c 13.3  UA   COLOR U  Colorless*   PH U  6.0   SPEC GRAV UR  1.026   PROTEIN U mg/dL 100 (2+)*   BLOOD UR  0.1 (1+)*   NITRITE U  NEGATIVE   WBC UR /HPF 1-5     Utox: +Cocaine    EKG was notable for ST-elevations in V1-V2, ST-depressions in lead II.   Imaging: CXR No evidence of acute cardiopulmonary process.  CT: 1. The stomach is decompressed, however demonstrates a degree of  gastric wall thickening, slightly out of proportion to the degree of decompression. Correlate with symptoms of gastritis.  2. Moderate colonic fecal load with mixed air and stool in the rectum. Subtle component of associated bowel wall thickening and enhancement involving the  ascending/transverse colon. Correlate with  symptoms of constipation and concern for stercoral colitis.  3. Slight interval worsening of body wall anasarca. Mild mesenteric haziness. These findings may be reactive, or the result of fluid overload. Correlate with fluid status.  4. Small sized hiatal hernia with mild concentric mid to distal esophageal wall thickening; correlate with concern for reflux esophagitis.    Management:  Glucose initially in the 600s. K 5.4. Lactate normal. Suspect early/mild DKA given UA positive for ketones and elevated beta-hydroxybutyrate (1.86). Patient received 1.5 L LR, calcium gluconate, 20 units insulin total. R/p BMP with improvement of glucose 366, K 4.2, gap closed. R/p VBG with pH 7.41, slight bump in lactate 2.7 from 1.1 Gave another 500 LR. Pt with elevated blood pressures /112, received IV labetalol 20 without significant improvement. Gave second dose of IV labetalol 20 mg.     Earlier Admission Jan 2024 at [facility]:  She presented on 12/27 with nausea and abdominal pain. She was hypertensive to >200 systolic, tachycardic, and found to be in DKA. Utox was + for cocaine. She had a purulent wound on the lateral right foot involving her toes and plantar surface of the foot. Xray showed OM of the 4th and 5th MTP joints with new erosions c/f septic arthritis. She went quickly to the OR with podiatry and had amputations of her 4th and 5th toes. Infectious disease was consulted and she was treated with broad spectrum abx until blood cultures returned + for MRSA, then she was continued on vancomycin. Podiatry also performed a wash out of the wound on 12/29 and placed a nerve block in her right leg. She underwent 4 procedures with podiatry (see below for further details). She was discharged to a SNF with CoPAT (EOT 2/21).     Upon discharge, the plan is for her to continue the vancomycin until 2/21, and the fluconazole until 1/19. She is also scheduled for follow-up with ID (2/21)  and podiatry (1/16).  Her pain regimen is as follows Dilaudid 4-6 mg PO every 3 hours, Tylenol 1g PO q6 PRN, Fentanyl 25mcg IV q2 PRN, Robaxin 500mg QID PRN, Gabapentin 300 mg Daily, Lidocaine patch.     DKA was treated with usual measures and resolved, and she was started back on her home insulin regimen. She BG was challenging to control d/t multiple diet changes iso surgeries. Her discharge regimen was Glargine 25 at night + Lispro 7 units with meals and at bedtime + SS2.     Given her + urine tox she was monitored with CIWA protocols for several days, and only required 1 dose of phenobarbital. Her hypertension was managed with her home lisinopril 10mg daily, nifedipine 60mg BID and prn hydralazine. Her discharge regimen is lisinopril 10mg OD and Nifedipine 30 BID.    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     Surgical History  Past Surgical History:   Procedure Laterality Date    CT ANGIO HEART CORONARY  11/17/2021    CT HEART CORONARY ANGIOGRAM 11/17/2021 [facility] CLINICAL LEGACY        Social History  Cocaine use    Family History  No family history on file.     Allergies  Haloperidol, Lorazepam, and Latex    Review of Systems   Constitutional:  Positive for chills and diaphoresis. Negative for fever.   Respiratory:  Positive for chest tightness. Negative for shortness of breath.    Cardiovascular:  Positive for chest pain. Negative for palpitations.   Gastrointestinal:  Positive for abdominal pain, nausea and vomiting. Negative for abdominal distention.   Genitourinary:  Negative for flank pain.   Musculoskeletal:  Negative for arthralgias.     Home Medications:  amLODIPine (Norvasc) 10 mg tablet daily  gabapentin (Neurontin) 100 mg capsule BID  nifedipine 30mg Daily  insulin glargine (Lantus U-100 Insulin) 100 unit/mL injection  30 units at bedtime  insulin lispro (HumaLOG) 100 unit/mL injection   lisinopril 40 mg tablet   "daily  pantoprazole (ProtoNix) 40 mg EC tablet   sertraline (Zoloft) 25 mg tablet at bedtime     Physical Exam   Constitutional:       Appearance: She is well-developed.   HENT:      Head: Normocephalic and atraumatic.   Eyes:      Conjunctiva/sclera: Conjunctivae normal.   Cardiovascular:      Rate and Rhythm: Normal rate and regular rhythm.      Heart sounds: Normal heart sounds. No murmur heard.  Pulmonary:      Effort: Pulmonary effort is normal. No respiratory distress.      Breath sounds: Normal breath sounds.   Abdominal:      General: Abdomen is flat. There is no distension.      Palpations: Abdomen is soft.      Tenderness: There is abdominal tenderness.      Comments: Generalized tenderness   Musculoskeletal:         General: No swelling.      Cervical back: Neck supple.      Comments: Right foot amputation, tp pulses intact, sensation to ankle intact, numbness near amputation per patient, no pus or drainage, open not well healed   Skin:     General: Skin is dry.      Capillary Refill: Capillary refill takes less than 2 seconds.   Neurological:      Mental Status: She is alert.   Psychiatric:         Mood and Affect: Mood normal.     Last Recorded Vitals  Blood pressure 103/65, pulse 82, temperature 36.8 °C (98.3 °F), temperature source Oral, resp. rate 16, height 1.626 m (5' 4\"), weight 63.5 kg (140 lb), SpO2 98%.    Relevant Results  Results for orders placed or performed during the hospital encounter of 04/30/24 (from the past 24 hour(s))   CBC and Auto Differential   Result Value Ref Range    WBC 16.7 (H) 4.4 - 11.3 x10*3/uL    nRBC 0.0 0.0 - 0.0 /100 WBCs    RBC 5.90 (H) 4.00 - 5.20 x10*6/uL    Hemoglobin 15.5 12.0 - 16.0 g/dL    Hematocrit 43.7 36.0 - 46.0 %    MCV 74 (L) 80 - 100 fL    MCH 26.3 26.0 - 34.0 pg    MCHC 35.5 32.0 - 36.0 g/dL    RDW 12.3 11.5 - 14.5 %    Platelets 431 150 - 450 x10*3/uL    Neutrophils % 86.5 40.0 - 80.0 %    Immature Granulocytes %, Automated 0.5 0.0 - 0.9 %    " Lymphocytes % 9.8 13.0 - 44.0 %    Monocytes % 2.6 2.0 - 10.0 %    Eosinophils % 0.1 0.0 - 6.0 %    Basophils % 0.5 0.0 - 2.0 %    Neutrophils Absolute 14.47 (H) 1.20 - 7.70 x10*3/uL    Immature Granulocytes Absolute, Automated 0.09 0.00 - 0.70 x10*3/uL    Lymphocytes Absolute 1.64 1.20 - 4.80 x10*3/uL    Monocytes Absolute 0.43 0.10 - 1.00 x10*3/uL    Eosinophils Absolute 0.01 0.00 - 0.70 x10*3/uL    Basophils Absolute 0.09 0.00 - 0.10 x10*3/uL   Phosphorus   Result Value Ref Range    Phosphorus 5.8 (H) 2.5 - 4.9 mg/dL   Magnesium   Result Value Ref Range    Magnesium 2.95 (H) 1.60 - 2.40 mg/dL   Comprehensive metabolic panel   Result Value Ref Range    Glucose 614 (HH) 74 - 99 mg/dL    Sodium 132 (L) 136 - 145 mmol/L    Potassium 5.9 (H) 3.5 - 5.3 mmol/L    Chloride 86 (L) 98 - 107 mmol/L    Bicarbonate 25 21 - 32 mmol/L    Anion Gap 27 (H) 10 - 20 mmol/L    Urea Nitrogen 46 (H) 6 - 23 mg/dL    Creatinine 1.53 (H) 0.50 - 1.05 mg/dL    eGFR 42 (L) >60 mL/min/1.73m*2    Calcium 9.8 8.6 - 10.6 mg/dL    Albumin 4.3 3.4 - 5.0 g/dL    Alkaline Phosphatase 199 (H) 33 - 110 U/L    Total Protein 8.7 (H) 6.4 - 8.2 g/dL    AST 29 9 - 39 U/L    Bilirubin, Total 0.4 0.0 - 1.2 mg/dL    ALT 13 7 - 45 U/L   Lipase   Result Value Ref Range    Lipase 21 9 - 82 U/L   Troponin I, High Sensitivity   Result Value Ref Range    Troponin I, High Sensitivity 30 0 - 34 ng/L   B-Type Natriuretic Peptide   Result Value Ref Range    BNP 70 0 - 99 pg/mL   Type And Screen   Result Value Ref Range    ABO TYPE A     Rh TYPE POS     ANTIBODY SCREEN NEG    Human Chorionic Gonadotropin, Serum Quantitative   Result Value Ref Range    HCG, Beta-Quantitative <3 <5 mIU/mL   Green Top   Result Value Ref Range    Extra Tube Hold for add-ons.    Sars-CoV-2 and Influenza A/B PCR   Result Value Ref Range    Flu A Result Not Detected Not Detected    Flu B Result Not Detected Not Detected    Coronavirus 2019, PCR Not Detected Not Detected   ECG 12 Lead   Result  Value Ref Range    Ventricular Rate 96 BPM    Atrial Rate 96 BPM    UT Interval 124 ms    QRS Duration 86 ms    QT Interval 370 ms    QTC Calculation(Bazett) 467 ms    P Axis 72 degrees    R Axis 15 degrees    T Axis 74 degrees    QRS Count 16 beats    Q Onset 225 ms    P Onset 163 ms    P Offset 206 ms    T Offset 410 ms    QTC Fredericia 432 ms   Urinalysis with Reflex Microscopic   Result Value Ref Range    Color, Urine Colorless (N) Light-Yellow, Yellow, Dark-Yellow    Appearance, Urine Clear Clear    Specific Gravity, Urine 1.026 1.005 - 1.035    pH, Urine 6.0 5.0, 5.5, 6.0, 6.5, 7.0, 7.5, 8.0    Protein, Urine 100 (2+) (A) NEGATIVE, 10 (TRACE), 20 (TRACE) mg/dL    Glucose, Urine OVER (4+) (A) Normal mg/dL    Blood, Urine 0.1 (1+) (A) NEGATIVE    Ketones, Urine 20 (1+) (A) NEGATIVE mg/dL    Bilirubin, Urine NEGATIVE NEGATIVE    Urobilinogen, Urine Normal Normal mg/dL    Nitrite, Urine NEGATIVE NEGATIVE    Leukocyte Esterase, Urine NEGATIVE NEGATIVE   Urine Gray Tube   Result Value Ref Range    Extra Tube Hold for add-ons.    Microscopic Only, Urine   Result Value Ref Range    WBC, Urine 1-5 1-5, NONE /HPF    RBC, Urine 11-20 (A) NONE, 1-2, 3-5 /HPF   Magnesium   Result Value Ref Range    Magnesium 2.58 (H) 1.60 - 2.40 mg/dL   Phosphorus   Result Value Ref Range    Phosphorus 6.0 (H) 2.5 - 4.9 mg/dL   Comprehensive metabolic panel   Result Value Ref Range    Glucose 601 (HH) 74 - 99 mg/dL    Sodium 132 (L) 136 - 145 mmol/L    Potassium 5.4 (H) 3.5 - 5.3 mmol/L    Chloride 91 (L) 98 - 107 mmol/L    Bicarbonate 27 21 - 32 mmol/L    Anion Gap 19 10 - 20 mmol/L    Urea Nitrogen 51 (H) 6 - 23 mg/dL    Creatinine 1.56 (H) 0.50 - 1.05 mg/dL    eGFR 41 (L) >60 mL/min/1.73m*2    Calcium 9.0 8.6 - 10.6 mg/dL    Albumin 3.4 3.4 - 5.0 g/dL    Alkaline Phosphatase 148 (H) 33 - 110 U/L    Total Protein 6.4 6.4 - 8.2 g/dL    AST 12 9 - 39 U/L    Bilirubin, Total 0.3 0.0 - 1.2 mg/dL    ALT 11 7 - 45 U/L   Osmolality   Result  Value Ref Range    Osmolality, Serum 331 (H) 280 - 300 mOsm/kg   Beta Hydroxybutyrate   Result Value Ref Range    Beta-Hydroxybutyrate 1.86 (H) 0.02 - 0.27 mmol/L   BLOOD GAS VENOUS FULL PANEL   Result Value Ref Range    POCT pH, Venous 7.42 7.33 - 7.43 pH    POCT pCO2, Venous 47 41 - 51 mm Hg    POCT pO2, Venous 54 (H) 35 - 45 mm Hg    POCT SO2, Venous 82 (H) 45 - 75 %    POCT Oxy Hemoglobin, Venous 80.3 (H) 45.0 - 75.0 %    POCT Hematocrit Calculated, Venous 36.0 36.0 - 46.0 %    POCT Sodium, Venous 130 (L) 136 - 145 mmol/L    POCT Potassium, Venous 5.9 (H) 3.5 - 5.3 mmol/L    POCT Chloride, Venous 94 (L) 98 - 107 mmol/L    POCT Ionized Calicum, Venous 1.15 1.10 - 1.33 mmol/L    POCT Glucose, Venous 676 (HH) 74 - 99 mg/dL    POCT Lactate, Venous 1.1 0.4 - 2.0 mmol/L    POCT Base Excess, Venous 5.2 (H) -2.0 - 3.0 mmol/L    POCT HCO3 Calculated, Venous 30.5 (H) 22.0 - 26.0 mmol/L    POCT Hemoglobin, Venous 11.9 (L) 12.0 - 16.0 g/dL    POCT Anion Gap, Venous 11.0 10.0 - 25.0 mmol/L    Patient Temperature 37.0 degrees Celsius    FiO2 21 %   POCT GLUCOSE   Result Value Ref Range    POCT Glucose 532 (H) 74 - 99 mg/dL   Blood Culture    Specimen: Peripheral Venipuncture; Blood culture   Result Value Ref Range    Blood Culture Loaded on Instrument - Culture in progress    Blood Culture    Specimen: Peripheral Venipuncture; Blood culture   Result Value Ref Range    Blood Culture Loaded on Instrument - Culture in progress    POCT GLUCOSE   Result Value Ref Range    POCT Glucose 400 (H) 74 - 99 mg/dL   POCT GLUCOSE   Result Value Ref Range    POCT Glucose 352 (H) 74 - 99 mg/dL   BLOOD GAS VENOUS FULL PANEL   Result Value Ref Range    POCT pH, Venous 7.41 7.33 - 7.43 pH    POCT pCO2, Venous 52 (H) 41 - 51 mm Hg    POCT pO2, Venous 53 (H) 35 - 45 mm Hg    POCT SO2, Venous 84 (H) 45 - 75 %    POCT Oxy Hemoglobin, Venous 82.1 (H) 45.0 - 75.0 %    POCT Hematocrit Calculated, Venous 37.0 36.0 - 46.0 %    POCT Sodium, Venous 135  (L) 136 - 145 mmol/L    POCT Potassium, Venous 4.7 3.5 - 5.3 mmol/L    POCT Chloride, Venous 96 (L) 98 - 107 mmol/L    POCT Ionized Calicum, Venous 1.32 1.10 - 1.33 mmol/L    POCT Glucose, Venous 390 (H) 74 - 99 mg/dL    POCT Lactate, Venous 2.7 (H) 0.4 - 2.0 mmol/L    POCT Base Excess, Venous 7.0 (H) -2.0 - 3.0 mmol/L    POCT HCO3 Calculated, Venous 33.0 (H) 22.0 - 26.0 mmol/L    POCT Hemoglobin, Venous 12.4 12.0 - 16.0 g/dL    POCT Anion Gap, Venous 11.0 10.0 - 25.0 mmol/L    Patient Temperature 37.0 degrees Celsius    FiO2 21 %   Basic metabolic panel   Result Value Ref Range    Glucose 366 (H) 74 - 99 mg/dL    Sodium 135 (L) 136 - 145 mmol/L    Potassium 4.2 3.5 - 5.3 mmol/L    Chloride 92 (L) 98 - 107 mmol/L    Bicarbonate 29 21 - 32 mmol/L    Anion Gap 18 10 - 20 mmol/L    Urea Nitrogen 47 (H) 6 - 23 mg/dL    Creatinine 1.42 (H) 0.50 - 1.05 mg/dL    eGFR 45 (L) >60 mL/min/1.73m*2    Calcium 9.8 8.6 - 10.6 mg/dL   POCT GLUCOSE   Result Value Ref Range    POCT Glucose 508 (H) 74 - 99 mg/dL   POCT GLUCOSE   Result Value Ref Range    POCT Glucose 416 (H) 74 - 99 mg/dL   POCT GLUCOSE   Result Value Ref Range    POCT Glucose 357 (H) 74 - 99 mg/dL   POCT GLUCOSE   Result Value Ref Range    POCT Glucose 315 (H) 74 - 99 mg/dL      CT chest abdomen pelvis w IV contrast    Result Date: 4/30/2024  Interpreted By:  Chas Winkler and Barbat Antonio STUDY: CT CHEST ABDOMEN PELVIS W IV CONTRAST;  4/30/2024 4:45 am   INDICATION: Signs/Symptoms:chest ab pain.   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   COMPARISON: None.   ACCESSION NUMBER(S): [id number]   ORDERING CLINICIAN: KATELYN COVARRUBIAS   TECHNIQUE: Contiguous axial images of the chest, abdomen, and pelvis were obtained after the intravenous administration 80 mL of Omnipaque 350  coronal and sagittal reformatted images were reconstructed from the axial data.   FINDINGS: CT CHEST:   MEDIASTINUM AND LYMPH NODES: There is a smallsized hiatal hernia with mild concentric mid to distal esophageal wall thickening; correlate with concern for reflux esophagitis. No enlarged intrathoracic or axillary lymph nodes by imaging criteria. No pneumomediastinum.   VESSELS: Normal caliber aorta without dissection. Mild aortic atherosclerosis.   HEART: Normal size. Mild-to-moderate coronary artery calcifications. No significant pericardial effusion.   LUNG, AIRWAYS, PLEURA: No consolidation, pulmonary edema, pleural effusion or pneumothorax.   CHEST WALL SOFT TISSUES: Interval development of a mild component of body wall anasarca.   OSSEOUS STRUCTURES: No acute osseous abnormality.     CT ABDOMEN/PELVIS:   ABDOMINAL WALL: Slight interval worsening of diffuse body wall anasarca.   LIVER: Stable hypodensity along the falciform ligament, favored to represent focal fatty infiltration. Mild periportal edema.   BILE DUCTS: No significant intrahepatic or extrahepatic dilatation.   GALLBLADDER: No significant abnormality.   PANCREAS: No significant abnormality.   SPLEEN: Stable 0.7 cm focal hypodensity within the inferior spleen, stable compared to 11/16/2021 and incompletely characterized on current examination.   ADRENALS: No significant abnormality.   KIDNEYS, URETERS, BLADDER: No significant abnormality.   REPRODUCTIVE ORGANS: No significant abnormality.   VESSELS: No significant abnormality.   RETROPERITONEUM/LYMPH NODES: No enlarged lymph nodes. No acute retroperitoneal abnormality.   BOWEL/MESENTERY/PERITONEUM: The stomach is decompressed, however demonstrates a degree of gastric wall thickening, slightly out of proportion to the degree of decompression. Moderate colonic fecal with mixed air and stool in the rectum, similar to prior exam. There is a subtle component of bowel wall thickening and enhancement  involving the ascending and transverse colon. Postsurgical changes consistent with appendectomy. Mild mesenteric haziness.. No ascites, free air, or fluid collection.   MUSCULOSKELETAL: No acute osseous abnormality.       1. The stomach is decompressed, however demonstrates a degree of gastric wall thickening, slightly out of proportion to the degree of decompression. Correlate with symptoms of gastritis. 2. Moderate colonic fecal load with mixed air and stool in the rectum. Subtle component of associated bowel wall thickening and enhancement involving the ascending/transverse colon. Correlate with symptoms of constipation and concern for stercoral colitis. 3. Slight interval worsening of body wall anasarca. Mild mesenteric haziness. These findings may be reactive, or the result of fluid overload. Correlate with fluid status. 4. Small sized hiatal hernia with mild concentric mid to distal esophageal wall thickening; correlate with concern for reflux esophagitis.   I personally reviewed the images/study and I agree with the findings as stated by Adalid Gasca MD. This study was interpreted at [facility], [place], [place]   MACRO: None.   Signed by: Chas Winkler 4/30/2024 6:03 AM Dictation workstation:   [id number]    ECG 12 Lead    Result Date: 4/30/2024  Normal sinus rhythm Possible Anterior infarct (cited on or before 30-APR-2024) Abnormal ECG When compared with ECG of 20-FEB-2023 04:29, Previous ECG has undetermined rhythm, needs review Nonspecific T wave abnormality no longer evident in Inferior leads Nonspecific T wave abnormality, improved in Anterolateral leads See ED provider note for full interpretation and clinical correlation Confirmed by Maryann Samson ([id number]) on 4/30/2024 4:52:13 AM    XR chest 1 view    Result Date: 4/30/2024  Interpreted By:  Zechariah Cole and Barbat Antonio STUDY: XR CHEST 1 VIEW;  4/30/2024 2:20 am   INDICATION: Signs/Symptoms:chest, ab  pain.   COMPARISON: Chest radiograph dated 05/10/2022.   ACCESSION NUMBER(S): [id number]   ORDERING CLINICIAN: KATELYN COVARRUBIAS   FINDINGS: AP radiograph of the chest was provided.   CARDIOMEDIASTINAL SILHOUETTE: Cardiomediastinal silhouette is normal in size and configuration.   LUNGS: No focal consolidation. No evidence of pulmonary edema, pleural effusion, or pneumothorax.   ABDOMEN: No remarkable upper abdominal findings.   BONES: No acute osseous changes.       No evidence of acute cardiopulmonary process.   I personally reviewed the images/study and I agree with the findings as stated by Adalid Gasca MD. This study was interpreted at [facility], [place], [place]   MACRO: None   Signed by: Zechariah Cole 4/30/2024 3:07 AM Dictation workstation:   [id number]       Echocardiogram 1/3/2024 at [facility]:   CONCLUSIONS:   - Technically difficult exam due to suboptimal positioning.   - Exam indication: Initial evaluation of infective endocarditis with positive   blood cultures     - The left ventricle is normal in size. There is mild concentric left ventricular   hypertrophy. Left ventricular systolic function is normal. EF = 57 ± 5% (2D   biplane)   - The right ventricle is normal in size. Right ventricular systolic function is   normal.   - The left atrial cavity is mildly dilated.   - There are no significant valvular abnormalities.   - No obvious evidence for valvular endocarditits by surface echo assessment.   - The patient has not had a prior  echocardiographic exam for comparison.     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 She was hypertensive to >200 systolic, tachycardic, and found to be in DKA. Utox was + for cocaine.    A problems-based  assessment follows:    #DKA--resolved  #Uncontrolled Diabetes type 2--Hba1c 13 5/2024  #Diabetic neuropathy   DKA precipitated by sepsis 2/2 possible diabetic foot osteomyelitis and non compliance on home insulin.   BS > 600 on admission. VBG: metabolic acidosis + resp alkalosis - pH 7.42, pCO2 29, Bicarb 18  ::S\P  1.5 L LR, calcium gluconate, 20 units insulin total. R/p BMP with improvement of glucose 366, K 4.2, gap closed. R/p VBG with pH 7.41, slight bump in lactate 2.7 from 1.1 Gave another 500 LR.     - Basal: Insulin glargine 30 units QHS  - Bolus: Insulin lispro 7u with meals + ISS #1 with meals and at bedtime  - Repeat VBG  - Repeat lactate  - SLP assessment and resume oral diet  - Diabetic diet  - maintenance fluids 100ml\hr for 1L\RL    #R plantar abscess  #?R diabetic foot infection  #?Sepsis--Lactate 2.1  Patient with history of non-healing RIGHT foot wound s/p I+D x2 right plantar foot abscess  recent admission for sepsis in 11/2023, OR culture then with strep agalactiae, corynebacterium striatum, and mixed anaerobes, Patient continued treatment with Unasyn/vancomycin with resolution of fevers and inflammatory markers. ID followed patient and recommended PO Levaquin and metronidazole on discharge through 12/18 and Wound vac, although patient denies using wound VAC and compliance with ABX.   Podiatry was consulted, and patient was taken to OR for I&D R foot with amputation of 4th and 5th digits (12/27/23) followed by debridement R foot with resection of 4th and 5th metatarsals (12/29/23) followed by open lisfranc disarticulation of R foot (1/2/24) followed by percutaneous tendo achilles lengthening right + wound debridement (1/5/24).  Pain management on board during her admission. TTE 1/3/24 w/o signs of endocarditis.   Completed vancomycin until 2/21, and fluconazole until 1/19. Follow up with ID (Dr. Ayala).     - Repeat lactate  - Continue vancomycin and zosy  - Follow Blood cx and urine cx  -  Podiatry consult at am       #HTN urgency\emergency (MOUSTAPHA)  Utox cocaine positive. she endorsed HA  BP on arrival 221/113, in the ED she was treated with home lisinopril and Nifedipine compliance is doubtful.  :: S\P 20 IV labetalol twice at ED  - Resume Nifedipine 30 Daily  - Hold lisinopril, MOUSTAPHA  - Start hydralazine 10mg TID  - avoid beta blockers in context of cocaine use     #Substance Use  Utox positive for cocaine,   - Toxicology input at am  - Avoid B-blockers iso cocaine use     #MOUSTAPHA  HTN emergency  Dehydration, DKA (Prerenal)  S\P 2L EL at ED  - another 1L maintenace RL  - Daily RFP  - Avoid nephrotoxics  - Renally dose medications  - Urine electrolytes  - I\O     #Anxiety  #Depression  - sertraline 25mg daily    #Gastritis  #Esophagitis  #Colitis\Constipation  CT findings as above  - Pantoprazole   - PEG--escalate as needed for bowel motion  - Zofran PRN    F S\P 2L RL, 1L RL 100ml\hr  E PRN, K >4  N Diabetic diet  A PIV  DVT Heparin  GI pantoprazole  Code: Full Code  NOK: friend Elva Quijano  [phone]    Plan discussed with the patient and she agreed on it.    Lucy Mcwilliams MD
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
## 2024-05-01 NOTE — CONSULTS
"Consults    Wound right foot    History Of Present Illness  Barbara English is a 49 y.o. female presented to ED with Abdominal pain worsening over few days.  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 Patient endorsed for the past few days has had generalized abdominal pain with emesis, nonbloody, fever, chills, chest pain, shortness of breath.  Patient reports chest pain is mid sternal, non radiating moderate in severity.  Patient endorses use of cocaine. Patient also complains of shooting pain in right lisfranc amputation (1/2024) and left foot. Patient states pain is intermittent, but is concerned that shooting pain may be related to infection. Patient admits to having neuropathy and taking 100 mg Gabapentin, but states that she does not believe that it is helping. Patient reports that she missed her last appointment with Dr. Chou, but admits to regular dressing changes. Patient denies any other pedal complaints at this time.      Past Medical History  She has no past medical history on file.    Surgical History  She has a past surgical history that includes CT angio coronary art with heartflow if score >30% (11/17/2021).     Social History  She has no history on file for tobacco use, alcohol use, and drug use.    Family History  No family history on file.     Allergies  Haloperidol, Lorazepam, and Latex    Review of Systems    REVIEW OF SYSTEMS  A 10+ point ROS was completed and otherwise negative except as noted above and per HPI.     Last Recorded Vitals  Blood pressure 100/61, pulse 82, temperature 36.9 °C (98.5 °F), temperature source Oral, resp. rate 17, height 1.626 m (5' 4\"), weight 63.5 kg (140 lb), SpO2 99%.      Vasc: DP and PT pulses are faintly palpable bilateral.  CFT is less than 3 seconds bilateral.  Skin temperature is warm to cool proximal to distal bilateral. No erythema noted. Mild edema noted. No lymphatic " "streaking noted B/L.    Neuro:  Light touch is diminished to the foot bilateral.  Protective sensation is diminished.  There is no clonus noted.  Admits to shooting pain b/l.      Derm: Nails left 1-5 are intact.  Skin is supple with normal texture and turgor noted.  Left webspaces are clean, dry and intact.  There are no hyperkeratoses. Ulcerations noted below. There are no verruca or other lesions noted.      MSK: Muscle strength is 5/5 for all pedal groups tested.  Ankle joint ROM is decreased Bilateral. 1st MPJ and lesser MPJ ROM to left foot is full and without pain or crepitus. Left foot type is rectus. S/p Right lisfranc amputation (1/2/24). No pain to palpation at feet B/L.     Wound: right foot lateral incisional site  Mixed wound base of fibrogranular tissue.   Minimal serosanguinous drainage with fibrotic slough.   No marko-wound maceration.   No marko-wound erythema.   No evidence of ascending cellulitis or lymphangitis.  No palpable fluctuance. Minimal malodor. No increased warmth.   No probe to bone or deep structures within the wound base.   No tunneling or tracking.   No undermining. Skin edges irregular but intact.  Physical Exam    Relevant Results  Intake/Output last 3 Shifts:  I/O last 3 completed shifts:  In: 100 (1.6 mL/kg) [IV Piggyback:100]  Out: - (0 mL/kg)   Weight: 63.5 kg     Relevant Results      Lab Results   Component Value Date    WBC 10.7 05/01/2024    HGB 11.0 (L) 05/01/2024    HCT 31.3 (L) 05/01/2024    MCV 75 (L) 05/01/2024     05/01/2024       Lab Results   Component Value Date    GLUCOSE 301 (H) 05/01/2024    CALCIUM 8.7 05/01/2024     05/01/2024    K 3.7 05/01/2024    CO2 30 05/01/2024    CL 98 05/01/2024    BUN 43 (H) 05/01/2024    CREATININE 1.31 (H) 05/01/2024       Lab Results   Component Value Date    HGBA1C 13.3 (H) 03/26/2024      No results found for: \"CRP\"   No results found for: \"SEDRATE\"  No components found for: \"CMP\"       Results from last 7 days   Lab " Units 05/01/24  0932   SODIUM mmol/L 137   POTASSIUM mmol/L 3.7   CHLORIDE mmol/L 98   CO2 mmol/L 30   BUN mg/dL 43*   CREATININE mg/dL 1.31*   CALCIUM mg/dL 8.7   PHOSPHORUS mg/dL 3.2   MAGNESIUM mg/dL 2.21   BILIRUBIN TOTAL mg/dL 0.3   ALT U/L 10   AST U/L 10     Results from last 7 days   Lab Units 04/30/24  0256   COLOR U  Colorless*   APPEARANCE U  Clear   PH U  6.0   SPEC GRAV UR  1.026   PROTEIN U mg/dL 100 (2+)*   BLOOD UR  0.1 (1+)*   NITRITE U  NEGATIVE   WBC UR /HPF 1-5         IMAGING REVIEW:  CT chest abdomen pelvis w IV contrast    Result Date: 4/30/2024  Interpreted By:  Chas Winkler and Barbat Antonio STUDY: CT CHEST ABDOMEN PELVIS W IV CONTRAST;  4/30/2024 4:45 am   INDICATION: Signs/Symptoms:chest ab pain.   Per EMR: 48-year-old female with a past medical history of diabetes mellitus, hypertension, right foot amputation (1/2024 due to staph) and depression presented to [facility] with a chief complaint of abdominal pain and multiple concerns.   COMPARISON: None.   ACCESSION NUMBER(S): [id number]   ORDERING CLINICIAN: KATELYN COVARRUBIAS   TECHNIQUE: Contiguous axial images of the chest, abdomen, and pelvis were obtained after the intravenous administration 80 mL of Omnipaque 350 coronal and sagittal reformatted images were reconstructed from the axial data.   FINDINGS: CT CHEST:   MEDIASTINUM AND LYMPH NODES: There is a smallsized hiatal hernia with mild concentric mid to distal esophageal wall thickening; correlate with concern for reflux esophagitis. No enlarged intrathoracic or axillary lymph nodes by imaging criteria. No pneumomediastinum.   VESSELS: Normal caliber aorta without dissection. Mild aortic atherosclerosis.   HEART: Normal size. Mild-to-moderate coronary artery calcifications. No significant pericardial effusion.   LUNG, AIRWAYS, PLEURA: No consolidation, pulmonary edema, pleural effusion or pneumothorax.   CHEST WALL SOFT TISSUES: Interval development of a mild component of body wall  anasarca.   OSSEOUS STRUCTURES: No acute osseous abnormality.     CT ABDOMEN/PELVIS:   ABDOMINAL WALL: Slight interval worsening of diffuse body wall anasarca.   LIVER: Stable hypodensity along the falciform ligament, favored to represent focal fatty infiltration. Mild periportal edema.   BILE DUCTS: No significant intrahepatic or extrahepatic dilatation.   GALLBLADDER: No significant abnormality.   PANCREAS: No significant abnormality.   SPLEEN: Stable 0.7 cm focal hypodensity within the inferior spleen, stable compared to 11/16/2021 and incompletely characterized on current examination.   ADRENALS: No significant abnormality.   KIDNEYS, URETERS, BLADDER: No significant abnormality.   REPRODUCTIVE ORGANS: No significant abnormality.   VESSELS: No significant abnormality.   RETROPERITONEUM/LYMPH NODES: No enlarged lymph nodes. No acute retroperitoneal abnormality.   BOWEL/MESENTERY/PERITONEUM: The stomach is decompressed, however demonstrates a degree of gastric wall thickening, slightly out of proportion to the degree of decompression. Moderate colonic fecal with mixed air and stool in the rectum, similar to prior exam. There is a subtle component of bowel wall thickening and enhancement involving the ascending and transverse colon. Postsurgical changes consistent with appendectomy. Mild mesenteric haziness.. No ascites, free air, or fluid collection.   MUSCULOSKELETAL: No acute osseous abnormality.       1. The stomach is decompressed, however demonstrates a degree of gastric wall thickening, slightly out of proportion to the degree of decompression. Correlate with symptoms of gastritis. 2. Moderate colonic fecal load with mixed air and stool in the rectum. Subtle component of associated bowel wall thickening and enhancement involving the ascending/transverse colon. Correlate with symptoms of constipation and concern for stercoral colitis. 3. Slight interval worsening of body wall anasarca. Mild mesenteric  haziness. These findings may be reactive, or the result of fluid overload. Correlate with fluid status. 4. Small sized hiatal hernia with mild concentric mid to distal esophageal wall thickening; correlate with concern for reflux esophagitis.   I personally reviewed the images/study and I agree with the findings as stated by Adalid Gasca MD. This study was interpreted at [place], [place]   MACRO: None.   Signed by: Chas Winkler 4/30/2024 6:03 AM Dictation workstation:   [id number]    ECG 12 Lead    Result Date: 4/30/2024  Normal sinus rhythm Possible Anterior infarct (cited on or before 30-APR-2024) Abnormal ECG When compared with ECG of 20-FEB-2023 04:29, Previous ECG has undetermined rhythm, needs review Nonspecific T wave abnormality no longer evident in Inferior leads Nonspecific T wave abnormality, improved in Anterolateral leads See ED provider note for full interpretation and clinical correlation Confirmed by Maryann Samson ([id number]) on 4/30/2024 4:52:13 AM    XR chest 1 view    Result Date: 4/30/2024  Interpreted By:  Zechariah Cole and Barbat Antonio STUDY: XR CHEST 1 VIEW;  4/30/2024 2:20 am   INDICATION: Signs/Symptoms:chest, ab pain.   COMPARISON: Chest radiograph dated 05/10/2022.   ACCESSION NUMBER(S): [id number]   ORDERING CLINICIAN: KATELYN COVARRUBIAS   FINDINGS: AP radiograph of the chest was provided.   CARDIOMEDIASTINAL SILHOUETTE: Cardiomediastinal silhouette is normal in size and configuration.   LUNGS: No focal consolidation. No evidence of pulmonary edema, pleural effusion, or pneumothorax.   ABDOMEN: No remarkable upper abdominal findings.   BONES: No acute osseous changes.       No evidence of acute cardiopulmonary process.   I personally reviewed the images/study and I agree with the findings as stated by Adalid Gasca MD. 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 All findings discussed with patient.   - No imaging needed  -Wound culture collected. Pending results  -Dressings: adaptic, betadine soaked 4x4s, dry 4x4s, ABD, kerlix, and ace wrap.  -Dressing orders placed: dressings to be changed every other day.  -No plan for surgical intervention. Very low suspicion for infection. Pain described by patient most likely neuropathic pain. Patient currently on 100 mg of gabapentin. Would suggest increasing gabapentin to 300 TID.  -Patient clear for discharge from Podiatry standpoint  -Patient should follow up outpatient with her Dr. Chou at [facility], but if she is not able to get an appointment in a timely manner then patient can follow up with one of our Podiatric Physician for ongoing foot care at one of the two [facility] locations. Please call to schedule appointment. Thank you!    [facility]: [address], [place] [id number] [phone]  [place]: [address], [place] [id number] [phone]     -Podiatry to sign off  -Thank you for the consult    Diet: per Primary  DVT ppx: per Primary  Weightbearing: Heel weight bearing to right foot in a surgical shoe  Wound Care: adaptic, betadine soaked 4x4s, dry 4x4s, ABD, kerlix, and ace wrap.  Dressing orders placed: Dressings to be changed every other day    Case has been discussed with attending, A&P above reflect tentative plan. Please await final signature from attending physician on service.    Osiel  CHRISTIANE Best PGY-1  Please use Secure Chat if any questions

## 2024-05-01 NOTE — PROGRESS NOTES
"Vancomycin Dosing by Pharmacy- FOLLOW UP    Barbara English is a 49 y.o. year old female who Pharmacy has been consulted for vancomycin dosing for cellulitis, skin and soft tissue. Based on the patient's indication and renal status this patient is being dosed based on a goal trough/random level of 10-15.     Renal function is currently elevated but improving. We will continue to dose by level at this time until Scr returns to baseline.    Current vancomycin dose: 1000 mg given once.    Most recent random level: 8.7 mcg/mL (estimated 24H lvl ~6.9)    Visit Vitals  /84   Pulse 72   Temp 36.7 °C (98.1 °F) (Temporal)   Resp 16        Lab Results   Component Value Date    CREATININE 1.41 (H) 05/01/2024    CREATININE 1.42 (H) 04/30/2024    CREATININE 1.56 (H) 04/30/2024    CREATININE 1.53 (H) 04/30/2024        Patient weight is 63.5 kg    No results found for: \"CULTURE\"     No intake/output data recorded.  [unfilled]    Lab Results   Component Value Date    PATIENTTEMP 37.0 04/30/2024    PATIENTTEMP 37.0 04/30/2024    PATIENTTEMP 37.0 02/20/2023    PATIENTTEMP 37.0 05/09/2022        Assessment/Plan    Below goal AUC. Orders placed for new vancomcyin regimen of 750 mg x 1 dose for now due to patients weight (63.5kg) and level after 1 dose. Anticipating levels to be therapeutic after 1-2 more doses. We will continue to dose by level at this time until renal function returns to baseline.  The next level will be obtained on 5/2 at AM labs. May be obtained sooner if clinically indicated.   Will continue to monitor renal function daily while on vancomycin and order serum creatinine at least every 48 hours if not already ordered.  Follow for continued vancomycin needs, clinical response, and signs/symptoms of toxicity.       Rishabh Trivedi, PharmD           "

## 2024-05-01 NOTE — PROGRESS NOTES
Barbara English is a 49 y.o. female on day 1 of admission presenting with Diabetic ketoacidosis without coma associated with diabetes mellitus due to underlying condition (Multi).    Subjective   This morning, pt is endorsing generalized moderate abdominal pain, nausea, and audio/visual hallucinations - that she is conversing with God. Endorsing some chest pain but noted that it improved with analgesics. Also stated that she was very anxious and diaphoretic overnight, but is feeling better since receiving pain medication.     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 Pt was continued on vancomycin (through 2/21) and fluconazole (till 1/19) upon discharge. Was discharged to SNF, where she completed her antifungal course. Pt notes that she was told that she tested positive for HBV at some point during this stay but was later told that she was not positive for HBV. She left from SNF sometime end of January/early February and as such did not complete her vancomycin course. After leaving , she had her PICC removed and was switched to clindamycin for oral MRSA coverage. After leaving , went to live with her son, where she notes that she was abstinent from crack/cocaine. Was also admitted 3/26 at [facility] for right foot pain and generalized weakness. Weakness was ultimately attributed to uncontrolled diabetes. Foot pain was evaluated and deemed to require outpatient follow-up with possible wound vac placement.    In terms of current illness, pt describes nausea, vomiting, and diarrhea for the last week. Denying fevers, chills, denies hematemesis or hematochezia. Also notes increasing urination over the last couple of weeks, but no dysuria. Endorsing thick white vaginal discharge. Reports that she last used crack 5  "days ago, which she had begun using again when she moved out of her son's house sometime in the last month. Since last using crack, notes that she has been experiencing hallucinations of insects crawling over her body. Has not taken any of her medications for weeks as lost access to them and is unable to retrieve them.       Objective   General: thin, no acute distress, mildly diaphoretic, AAOx3, 1 instance of fecal incontinence  HEENT: EOM intact, PERRL, dry MM  CV: regular rate and rhythm  Pulm: normal respiratory effort, clear to auscultation bilaterally with no wheezes, rales, rhonchi, mildly tachypneic  Abd: soft, non-distended, diffusely mildly tender to deep palpation, more notably tender to palpation in epigastric region and LLQ  Extremities: warm, no LE edema, R foot amputated to tarsometatarsal joint. Surgical site is healed, with skin ridge containing cotton. No open wound, no purulence or erythema. No tenderness to palpation.   Neuro: moves all extremities equally, CN II - XII grossly intact  Psych: appropriate mood and affect   Skin: warm, dry, intact, hyperpigments macules along upper back and upper arms    Physical Exam    Last Recorded Vitals  Blood pressure 110/67, pulse 85, temperature 36.8 °C (98.2 °F), temperature source Oral, resp. rate 18, height 1.626 m (5' 4\"), weight 63.5 kg (140 lb), SpO2 98%.  Intake/Output last 3 Shifts:  I/O last 3 completed shifts:  In: 100 (1.6 mL/kg) [IV Piggyback:100]  Out: - (0 mL/kg)   Weight: 63.5 kg     Relevant Results  Scheduled medications  folic acid, 1 mg, oral, Daily  gabapentin, 100 mg, oral, BID  insulin glargine, 30 Units, subcutaneous, Nightly  insulin lispro, 0-10 Units, subcutaneous, TID with meals  insulin lispro, 2 Units, subcutaneous, TID with meals  multivitamin with minerals, 1 tablet, oral, Daily  NIFEdipine ER, 30 mg, oral, Daily before breakfast  pantoprazole, 40 mg, oral, BID  piperacillin-tazobactam, 3.375 g, intravenous, q6h  sertraline, 25 " mg, oral, Daily  [START ON 5/4/2024] thiamine, 100 mg, oral, Daily  thiamine, 100 mg, intravenous, Daily      Continuous medications     PRN medications  PRN medications: acetaminophen **OR** acetaminophen **OR** acetaminophen, dextrose, dextrose, glucagon, glucagon, HYDROmorphone, ondansetron, vancomycin  Results for orders placed or performed during the hospital encounter of 04/30/24 (from the past 24 hour(s))   POCT GLUCOSE   Result Value Ref Range    POCT Glucose 508 (H) 74 - 99 mg/dL   POCT GLUCOSE   Result Value Ref Range    POCT Glucose 416 (H) 74 - 99 mg/dL   POCT GLUCOSE   Result Value Ref Range    POCT Glucose 357 (H) 74 - 99 mg/dL   POCT GLUCOSE   Result Value Ref Range    POCT Glucose 315 (H) 74 - 99 mg/dL   Vancomycin   Result Value Ref Range    Vancomycin 8.7 5.0 - 20.0 ug/mL   CBC and Auto Differential   Result Value Ref Range    WBC 11.3 4.4 - 11.3 x10*3/uL    nRBC 0.0 0.0 - 0.0 /100 WBCs    RBC 4.17 4.00 - 5.20 x10*6/uL    Hemoglobin 10.9 (L) 12.0 - 16.0 g/dL    Hematocrit 31.6 (L) 36.0 - 46.0 %    MCV 76 (L) 80 - 100 fL    MCH 26.1 26.0 - 34.0 pg    MCHC 34.5 32.0 - 36.0 g/dL    RDW 12.3 11.5 - 14.5 %    Platelets 402 150 - 450 x10*3/uL    Neutrophils % 65.8 40.0 - 80.0 %    Immature Granulocytes %, Automated 0.4 0.0 - 0.9 %    Lymphocytes % 27.1 13.0 - 44.0 %    Monocytes % 5.0 2.0 - 10.0 %    Eosinophils % 1.0 0.0 - 6.0 %    Basophils % 0.7 0.0 - 2.0 %    Neutrophils Absolute 7.42 1.20 - 7.70 x10*3/uL    Immature Granulocytes Absolute, Automated 0.04 0.00 - 0.70 x10*3/uL    Lymphocytes Absolute 3.05 1.20 - 4.80 x10*3/uL    Monocytes Absolute 0.56 0.10 - 1.00 x10*3/uL    Eosinophils Absolute 0.11 0.00 - 0.70 x10*3/uL    Basophils Absolute 0.08 0.00 - 0.10 x10*3/uL   Comprehensive Metabolic Panel   Result Value Ref Range    Glucose 228 (H) 74 - 99 mg/dL    Sodium 137 136 - 145 mmol/L    Potassium 3.6 3.5 - 5.3 mmol/L    Chloride 96 (L) 98 - 107 mmol/L    Bicarbonate 34 (H) 21 - 32 mmol/L    Anion  Gap 11 10 - 20 mmol/L    Urea Nitrogen 44 (H) 6 - 23 mg/dL    Creatinine 1.41 (H) 0.50 - 1.05 mg/dL    eGFR 46 (L) >60 mL/min/1.73m*2    Calcium 8.8 8.6 - 10.6 mg/dL    Albumin 3.0 (L) 3.4 - 5.0 g/dL    Alkaline Phosphatase 114 (H) 33 - 110 U/L    Total Protein 5.7 (L) 6.4 - 8.2 g/dL    AST 10 9 - 39 U/L    Bilirubin, Total 0.4 0.0 - 1.2 mg/dL    ALT 7 7 - 45 U/L   Lactate   Result Value Ref Range    Lactate 0.7 0.4 - 2.0 mmol/L   Alcohol   Result Value Ref Range    Alcohol <10 <=10 mg/dL   BLOOD GAS VENOUS FULL PANEL   Result Value Ref Range    POCT pH, Venous 7.43 7.33 - 7.43 pH    POCT pCO2, Venous 34 (L) 41 - 51 mm Hg    POCT pO2, Venous 46 (H) 35 - 45 mm Hg    POCT SO2, Venous 77 (H) 45 - 75 %    POCT Oxy Hemoglobin, Venous 75.3 (H) 45.0 - 75.0 %    POCT Hematocrit Calculated, Venous 27.0 (L) 36.0 - 46.0 %    POCT Sodium, Venous 132 (L) 136 - 145 mmol/L    POCT Potassium, Venous 4.6 3.5 - 5.3 mmol/L    POCT Chloride, Venous 101 98 - 107 mmol/L    POCT Ionized Calicum, Venous 1.07 (L) 1.10 - 1.33 mmol/L    POCT Glucose, Venous 437 (H) 74 - 99 mg/dL    POCT Lactate, Venous 1.3 0.4 - 2.0 mmol/L    POCT Base Excess, Venous -1.4 -2.0 - 3.0 mmol/L    POCT HCO3 Calculated, Venous 22.6 22.0 - 26.0 mmol/L    POCT Hemoglobin, Venous 9.0 (L) 12.0 - 16.0 g/dL    POCT Anion Gap, Venous 13.0 10.0 - 25.0 mmol/L    Patient Temperature 37.0 degrees Celsius    FiO2 21 %   POCT GLUCOSE   Result Value Ref Range    POCT Glucose 286 (H) 74 - 99 mg/dL   CBC and Auto Differential   Result Value Ref Range    WBC 10.7 4.4 - 11.3 x10*3/uL    nRBC 0.0 0.0 - 0.0 /100 WBCs    RBC 4.15 4.00 - 5.20 x10*6/uL    Hemoglobin 11.0 (L) 12.0 - 16.0 g/dL    Hematocrit 31.3 (L) 36.0 - 46.0 %    MCV 75 (L) 80 - 100 fL    MCH 26.5 26.0 - 34.0 pg    MCHC 35.1 32.0 - 36.0 g/dL    RDW 12.1 11.5 - 14.5 %    Platelets 368 150 - 450 x10*3/uL    Neutrophils % 75.1 40.0 - 80.0 %    Immature Granulocytes %, Automated 0.3 0.0 - 0.9 %    Lymphocytes % 20.0 13.0 -  44.0 %    Monocytes % 3.4 2.0 - 10.0 %    Eosinophils % 0.6 0.0 - 6.0 %    Basophils % 0.6 0.0 - 2.0 %    Neutrophils Absolute 8.05 (H) 1.20 - 7.70 x10*3/uL    Immature Granulocytes Absolute, Automated 0.03 0.00 - 0.70 x10*3/uL    Lymphocytes Absolute 2.14 1.20 - 4.80 x10*3/uL    Monocytes Absolute 0.36 0.10 - 1.00 x10*3/uL    Eosinophils Absolute 0.06 0.00 - 0.70 x10*3/uL    Basophils Absolute 0.06 0.00 - 0.10 x10*3/uL   Magnesium   Result Value Ref Range    Magnesium 2.21 1.60 - 2.40 mg/dL   Hepatic function panel   Result Value Ref Range    Albumin 2.9 (L) 3.4 - 5.0 g/dL    Bilirubin, Total 0.3 0.0 - 1.2 mg/dL    Bilirubin, Direct 0.0 0.0 - 0.3 mg/dL    Alkaline Phosphatase 109 33 - 110 U/L    ALT 10 7 - 45 U/L    AST 10 9 - 39 U/L    Total Protein 5.6 (L) 6.4 - 8.2 g/dL   Hepatitis B surface antibody   Result Value Ref Range    Hepatitis B Surface AB 5.6 <10.0 mIU/mL   Hepatitis B surface antigen   Result Value Ref Range    Hepatitis B Surface AG Nonreactive Nonreactive   Hepatitis B core antibody, IgM   Result Value Ref Range    Hepatitis B Core AB; IgM Nonreactive Nonreactive   Hepatitis C antibody   Result Value Ref Range    Hepatitis C AB Nonreactive Nonreactive   HIV 1/2 Antigen/Antibody Screen with Reflex to Confirmation   Result Value Ref Range    HIV 1/2 Antigen/Antibody Screen with Reflex to Confirmation Nonreactive Nonreactive   Phosphorus   Result Value Ref Range    Phosphorus 3.2 2.5 - 4.9 mg/dL   Basic Metabolic Panel   Result Value Ref Range    Glucose 301 (H) 74 - 99 mg/dL    Sodium 137 136 - 145 mmol/L    Potassium 3.7 3.5 - 5.3 mmol/L    Chloride 98 98 - 107 mmol/L    Bicarbonate 30 21 - 32 mmol/L    Anion Gap 13 10 - 20 mmol/L    Urea Nitrogen 43 (H) 6 - 23 mg/dL    Creatinine 1.31 (H) 0.50 - 1.05 mg/dL    eGFR 50 (L) >60 mL/min/1.73m*2    Calcium 8.7 8.6 - 10.6 mg/dL   POCT GLUCOSE   Result Value Ref Range    POCT Glucose 189 (H) 74 - 99 mg/dL     CT chest abdomen pelvis w IV  contrast    Result Date: 4/30/2024  Interpreted By:  Chas Winkler  and Campos Cordoba STUDY: CT CHEST ABDOMEN PELVIS W IV CONTRAST;  4/30/2024 4:45 am   INDICATION: Signs/Symptoms:chest ab pain.   Per EMR: 48-year-old female with a past medical history of diabetes mellitus, hypertension, right foot amputation (1/2024 due to staph) and depression presented to [facility] with a chief complaint of abdominal pain and multiple concerns.   COMPARISON: None.   ACCESSION NUMBER(S): [id number]   ORDERING CLINICIAN: KATELYN COVARRUBIAS   TECHNIQUE: Contiguous axial images of the chest, abdomen, and pelvis were obtained after the intravenous administration 80 mL of Omnipaque 350 coronal and sagittal reformatted images were reconstructed from the axial data.   FINDINGS: CT CHEST:   MEDIASTINUM AND LYMPH NODES: There is a smallsized hiatal hernia with mild concentric mid to distal esophageal wall thickening; correlate with concern for reflux esophagitis. No enlarged intrathoracic or axillary lymph nodes by imaging criteria. No pneumomediastinum.   VESSELS: Normal caliber aorta without dissection. Mild aortic atherosclerosis.   HEART: Normal size. Mild-to-moderate coronary artery calcifications. No significant pericardial effusion.   LUNG, AIRWAYS, PLEURA: No consolidation, pulmonary edema, pleural effusion or pneumothorax.   CHEST WALL SOFT TISSUES: Interval development of a mild component of body wall anasarca.   OSSEOUS STRUCTURES: No acute osseous abnormality.     CT ABDOMEN/PELVIS:   ABDOMINAL WALL: Slight interval worsening of diffuse body wall anasarca.   LIVER: Stable hypodensity along the falciform ligament, favored to represent focal fatty infiltration. Mild periportal edema.   BILE DUCTS: No significant intrahepatic or extrahepatic dilatation.   GALLBLADDER: No significant abnormality.   PANCREAS: No significant abnormality.   SPLEEN: Stable 0.7 cm focal hypodensity within the inferior spleen, stable compared to 11/16/2021 and  incompletely characterized on current examination.   ADRENALS: No significant abnormality.   KIDNEYS, URETERS, BLADDER: No significant abnormality.   REPRODUCTIVE ORGANS: No significant abnormality.   VESSELS: No significant abnormality.   RETROPERITONEUM/LYMPH NODES: No enlarged lymph nodes. No acute retroperitoneal abnormality.   BOWEL/MESENTERY/PERITONEUM: The stomach is decompressed, however demonstrates a degree of gastric wall thickening, slightly out of proportion to the degree of decompression. Moderate colonic fecal with mixed air and stool in the rectum, similar to prior exam. There is a subtle component of bowel wall thickening and enhancement involving the ascending and transverse colon. Postsurgical changes consistent with appendectomy. Mild mesenteric haziness.. No ascites, free air, or fluid collection.   MUSCULOSKELETAL: No acute osseous abnormality.       1. The stomach is decompressed, however demonstrates a degree of gastric wall thickening, slightly out of proportion to the degree of decompression. Correlate with symptoms of gastritis. 2. Moderate colonic fecal load with mixed air and stool in the rectum. Subtle component of associated bowel wall thickening and enhancement involving the ascending/transverse colon. Correlate with symptoms of constipation and concern for stercoral colitis. 3. Slight interval worsening of body wall anasarca. Mild mesenteric haziness. These findings may be reactive, or the result of fluid overload. Correlate with fluid status. 4. Small sized hiatal hernia with mild concentric mid to distal esophageal wall thickening; correlate with concern for reflux esophagitis.   I personally reviewed the images/study and I agree with the findings as stated by Adalid Gasca MD. This study was interpreted at [facility], [place], [place]   MACRO: None.   Signed by: Chas Winkler 4/30/2024 6:03 AM Dictation workstation:   [id number]    ECG 12  Lead    Result Date: 4/30/2024  Normal sinus rhythm Possible Anterior infarct (cited on or before 30-APR-2024) Abnormal ECG When compared with ECG of 20-FEB-2023 04:29, Previous ECG has undetermined rhythm, needs review Nonspecific T wave abnormality no longer evident in Inferior leads Nonspecific T wave abnormality, improved in Anterolateral leads See ED provider note for full interpretation and clinical correlation Confirmed by Maryann Samson ([id number]) on 4/30/2024 4:52:13 AM    XR chest 1 view    Result Date: 4/30/2024  Interpreted By:  Zechariah Cole and Barbat Antonio STUDY: XR CHEST 1 VIEW;  4/30/2024 2:20 am   INDICATION: Signs/Symptoms:chest, ab pain.   COMPARISON: Chest radiograph dated 05/10/2022.   ACCESSION NUMBER(S): [id number]   ORDERING CLINICIAN: KATELYN COVARRUBIAS   FINDINGS: AP radiograph of the chest was provided.   CARDIOMEDIASTINAL SILHOUETTE: Cardiomediastinal silhouette is normal in size and configuration.   LUNGS: No focal consolidation. No evidence of pulmonary edema, pleural effusion, or pneumothorax.   ABDOMEN: No remarkable upper abdominal findings.   BONES: No acute osseous changes.       No evidence of acute cardiopulmonary process.   I personally reviewed the images/study and I agree with the findings as stated by Adalid Gasca MD. This study was interpreted at [facility], [place], [place]   MACRO: None   Signed by: Zechariah Cole 4/30/2024 3:07 AM Dictation workstation:   [id number]                          Assessment/Plan   Principal Problem:    Diabetic ketoacidosis without coma associated with diabetes mellitus due to underlying condition (Multi)  Barbara English is a 50yo F with PMHx of insulin-dependent diabetes mellitus (HbA1c: 13.3% 3/24), HTN, s/p lisfranc amputation of R foot, MRSA and Candida + osteomyelitis of R foot, substance use disorder (crack/cocaine), and depression presenting with abdominal pain, nausea/vomiting, hypertensive  urgency, MOUSTAPHA and chest pain. Considering hyperglycemia but lack of metabolic acidosis, likely not in DKA but rather uncontrolled diabetes, which could account for abdominal pain/vomiting. Considering normal pressures this morning, hypertensive urgency likely due to cocaine withdrawal/pain. Chest pain possibly due to repeated emesis vs coronary vasospasm iso crack/cocaine use (trops negative, EKG non-suggestive of acute coronary syndrome). Plan to initiate insulin therapy, consult podiatry for amputation evaluation, hydrate for likely pre-renal MOUSTAPHA (will obtain urine lytes to confirm), and obtain stool cultures (for diarrhea with possible colitis on CT).     To follow-up 5/1:  - [facility] involvement if pt is amenable to inpt addiction treatment after discharge  - Diabetic educator  - HBV/HCV, HIV labs     #Uncontrolled Diabetes type 2--Hba1c 13 5/2024  #Diabetic neuropathy   DKA precipitated by sepsis 2/2 possible diabetic foot osteomyelitis and non compliance on home insulin.   BS > 600 on admission. VBG: metabolic acidosis + resp alkalosis - pH 7.42, pCO2 29, Bicarb 18  ::S\P  1.5 L LR, calcium gluconate, 20 units insulin total. R/p BMP with improvement of glucose 366, K 4.2, gap closed. R/p VBG with pH 7.41, slight bump in lactate 2.7 from 1.1 Received 1.5L LR.   :: Regimen on 1/9/24 discharge: Glargine 25U, lispro 7u TID  - Basal: Insulin glargine 30 units QHS  - Bolus: Insulin lispro 2u TID  - ISS #1 with meals and at bedtime  - Diabetic diet       #R plantar abscess  #S/p R lisfranc amputation  :: Admission for no-healing foot wound 11/23  :: S/p I&D R foot with amputation of 4th and 5th digits (12/27/23) followed by debridement R foot with resection of 4th and 5th metatarsals (12/29/23) followed by open lisfranc disarticulation of R foot (1/2/24) followed by percutaneous tendo achilles lengthening right + wound debridement (1/5/24).    :: Completed fluconazole until 1/19, vanomycin intended course till 2/21, did  not complete. Follow up with ID (Dr. Ayala).   :: Lactate 1.1 --> 2.7 --> 1.3  - Continue vancomycin and zosyn --> vancomycin rate adjusted accordingly d/t concern for previous DRESS with pt-reported rash on vancomycin  - Follow Blood cx and urine cx  - Podiatry consulted      #HTN urgency  Utox cocaine positive. she endorsed HA  BP on arrival 221/113, in the ED she was treated with home lisinopril and Nifedipine compliance is doubtful.  :: S\P 20 IV labetalol twice at ED  - Resume Nifedipine 30 Daily  - Hold lisinopril and hydrochlorothiazide iso MOUSTAPHA  - Hold hydralazine (initiated on admission for HTN) for softer pressures  - avoid beta blockers in context of cocaine use     #Substance Use  Utox positive for cocaine  - Toxicology input at am  - Avoid B-blockers iso cocaine use  - Initiate CIWA protocol, known allergy to lorazepam, so no medication indicated at this time. Amantadine can be used if necessary.   - Thiamine, folic acid, multivitamin     #MOUSTAPHA  :: On admission, Cr: 1.56 (baseline 0.6-0.9)  :: Dehydration, S\P 2L EL at ED  :: LR 1L on admission  - Daily RFP  - Avoid nephrotoxics  - Renally dose medications  - Urine electrolytes  - I\O  - NS 1L bolus 5/1     #Anxiety  #Depression  :: Sertraline dose on discharge: 35mg daily  - sertraline 25mg daily     #Gastritis  #Esophagitis  #Colitis\Constipation  CT findings as above  - Pantoprazole   - Holding PEG and Doc Senna iso fecal incontinence  - Stool culture PCR   - Zofran PRN     F S\P 2L RL, 1L RL 100ml\hr  E PRN, K >4  N Diabetic diet  A PIV  DVT Heparin  GI pantoprazole  Code: Full Code  NOK: friend Elva Quijano: [phone]    Ramila Kunz MS4      Ramila Kunz MS4

## 2024-05-01 NOTE — PROGRESS NOTES
Barbara English is a 49 y.o. female on day 1 of admission presenting with Diabetic ketoacidosis without coma associated with diabetes mellitus due to underlying condition (Multi).    Assessment/Plan   SW consulted to emet with Pt due to request for assistance with her addiction.  Met with Pt bedside. She recalled she was employed last November for [employer] but had to leave her job due to coping with her health issues. She said she was previously living in an unsafe situation but is now living with a friend. She has had multiple rehab experiences of thirty and ninety days. She recalled a recent loss of her granddaughter interrupted her rehab treatment. Pt denied she is followed for counseling. She recalled having gone to one appointment for [facility] and not wanting to return due to not liking the therapist.   SW provided Pt literature on Case Management [facility]. A list of local walk in counseling centers also given. [facility] staff consulted for followup. Pt explained she had a [facility] peer support assigned to her in the past and felt embarrassed to call her due to her relapse. ADOD is several days as Pt was admitted today.   SW to follow as needed.     VLADISLAV Villegas

## 2024-05-02 LAB
ALBUMIN SERPL BCP-MCNC: 2.8 G/DL (ref 3.4–5)
ANION GAP SERPL CALC-SCNC: 11 MMOL/L (ref 10–20)
BUN SERPL-MCNC: 27 MG/DL (ref 6–23)
CALCIUM SERPL-MCNC: 7.9 MG/DL (ref 8.6–10.6)
CHLORIDE SERPL-SCNC: 103 MMOL/L (ref 98–107)
CO2 SERPL-SCNC: 29 MMOL/L (ref 21–32)
CREAT SERPL-MCNC: 1.11 MG/DL (ref 0.5–1.05)
EGFRCR SERPLBLD CKD-EPI 2021: 61 ML/MIN/1.73M*2
ERYTHROCYTE [DISTWIDTH] IN BLOOD BY AUTOMATED COUNT: 12.5 % (ref 11.5–14.5)
GLUCOSE BLD MANUAL STRIP-MCNC: 125 MG/DL (ref 74–99)
GLUCOSE BLD MANUAL STRIP-MCNC: 214 MG/DL (ref 74–99)
GLUCOSE BLD MANUAL STRIP-MCNC: 219 MG/DL (ref 74–99)
GLUCOSE BLD MANUAL STRIP-MCNC: 313 MG/DL (ref 74–99)
GLUCOSE BLD MANUAL STRIP-MCNC: 73 MG/DL (ref 74–99)
GLUCOSE SERPL-MCNC: 61 MG/DL (ref 74–99)
HCT VFR BLD AUTO: 31.5 % (ref 36–46)
HGB BLD-MCNC: 10.1 G/DL (ref 12–16)
MAGNESIUM SERPL-MCNC: 2.23 MG/DL (ref 1.6–2.4)
MCH RBC QN AUTO: 26.7 PG (ref 26–34)
MCHC RBC AUTO-ENTMCNC: 32.1 G/DL (ref 32–36)
MCV RBC AUTO: 83 FL (ref 80–100)
NRBC BLD-RTO: 0 /100 WBCS (ref 0–0)
PHOSPHATE SERPL-MCNC: 3.1 MG/DL (ref 2.5–4.9)
PLATELET # BLD AUTO: 355 X10*3/UL (ref 150–450)
POTASSIUM SERPL-SCNC: 3.4 MMOL/L (ref 3.5–5.3)
RBC # BLD AUTO: 3.78 X10*6/UL (ref 4–5.2)
SODIUM SERPL-SCNC: 140 MMOL/L (ref 136–145)
VANCOMYCIN SERPL-MCNC: 6.7 UG/ML (ref 5–20)
WBC # BLD AUTO: 10.2 X10*3/UL (ref 4.4–11.3)

## 2024-05-02 PROCEDURE — 2500000001 HC RX 250 WO HCPCS SELF ADMINISTERED DRUGS (ALT 637 FOR MEDICARE OP)

## 2024-05-02 PROCEDURE — A4217 STERILE WATER/SALINE, 500 ML: HCPCS | Performed by: PHARMACY TECHNICIAN

## 2024-05-02 PROCEDURE — 2500000002 HC RX 250 W HCPCS SELF ADMINISTERED DRUGS (ALT 637 FOR MEDICARE OP, ALT 636 FOR OP/ED): Performed by: STUDENT IN AN ORGANIZED HEALTH CARE EDUCATION/TRAINING PROGRAM

## 2024-05-02 PROCEDURE — 2500000004 HC RX 250 GENERAL PHARMACY W/ HCPCS (ALT 636 FOR OP/ED)

## 2024-05-02 PROCEDURE — 2500000004 HC RX 250 GENERAL PHARMACY W/ HCPCS (ALT 636 FOR OP/ED): Performed by: PHARMACY TECHNICIAN

## 2024-05-02 PROCEDURE — 83735 ASSAY OF MAGNESIUM: CPT | Performed by: STUDENT IN AN ORGANIZED HEALTH CARE EDUCATION/TRAINING PROGRAM

## 2024-05-02 PROCEDURE — 2500000001 HC RX 250 WO HCPCS SELF ADMINISTERED DRUGS (ALT 637 FOR MEDICARE OP): Performed by: STUDENT IN AN ORGANIZED HEALTH CARE EDUCATION/TRAINING PROGRAM

## 2024-05-02 PROCEDURE — 80202 ASSAY OF VANCOMYCIN: CPT

## 2024-05-02 PROCEDURE — 2500000002 HC RX 250 W HCPCS SELF ADMINISTERED DRUGS (ALT 637 FOR MEDICARE OP, ALT 636 FOR OP/ED)

## 2024-05-02 PROCEDURE — 85027 COMPLETE CBC AUTOMATED: CPT | Performed by: STUDENT IN AN ORGANIZED HEALTH CARE EDUCATION/TRAINING PROGRAM

## 2024-05-02 PROCEDURE — 99233 SBSQ HOSP IP/OBS HIGH 50: CPT | Performed by: STUDENT IN AN ORGANIZED HEALTH CARE EDUCATION/TRAINING PROGRAM

## 2024-05-02 PROCEDURE — 84100 ASSAY OF PHOSPHORUS: CPT | Performed by: STUDENT IN AN ORGANIZED HEALTH CARE EDUCATION/TRAINING PROGRAM

## 2024-05-02 PROCEDURE — 1200000002 HC GENERAL ROOM WITH TELEMETRY DAILY

## 2024-05-02 PROCEDURE — 36415 COLL VENOUS BLD VENIPUNCTURE: CPT | Performed by: STUDENT IN AN ORGANIZED HEALTH CARE EDUCATION/TRAINING PROGRAM

## 2024-05-02 PROCEDURE — 2500000006 HC RX 250 W HCPCS SELF ADMINISTERED DRUGS (ALT 637 FOR ALL PAYERS)

## 2024-05-02 PROCEDURE — 97161 PT EVAL LOW COMPLEX 20 MIN: CPT | Mod: GP | Performed by: PHYSICAL THERAPIST

## 2024-05-02 PROCEDURE — 2500000004 HC RX 250 GENERAL PHARMACY W/ HCPCS (ALT 636 FOR OP/ED): Performed by: STUDENT IN AN ORGANIZED HEALTH CARE EDUCATION/TRAINING PROGRAM

## 2024-05-02 PROCEDURE — 82947 ASSAY GLUCOSE BLOOD QUANT: CPT

## 2024-05-02 RX ORDER — POLYETHYLENE GLYCOL 3350 17 G/17G
17 POWDER, FOR SOLUTION ORAL DAILY
Status: DISCONTINUED | OUTPATIENT
Start: 2024-05-02 | End: 2024-05-05 | Stop reason: HOSPADM

## 2024-05-02 RX ORDER — ALUMINUM HYDROXIDE, MAGNESIUM HYDROXIDE, AND SIMETHICONE 1200; 120; 1200 MG/30ML; MG/30ML; MG/30ML
10 SUSPENSION ORAL 4 TIMES DAILY PRN
Status: DISCONTINUED | OUTPATIENT
Start: 2024-05-02 | End: 2024-05-05 | Stop reason: HOSPADM

## 2024-05-02 RX ORDER — GABAPENTIN 300 MG/1
300 CAPSULE ORAL 3 TIMES DAILY
Status: DISCONTINUED | OUTPATIENT
Start: 2024-05-02 | End: 2024-05-05 | Stop reason: HOSPADM

## 2024-05-02 RX ORDER — LANOLIN ALCOHOL/MO/W.PET/CERES
100 CREAM (GRAM) TOPICAL DAILY
Status: DISCONTINUED | OUTPATIENT
Start: 2024-05-02 | End: 2024-05-05 | Stop reason: HOSPADM

## 2024-05-02 RX ORDER — INSULIN GLARGINE 100 [IU]/ML
15 INJECTION, SOLUTION SUBCUTANEOUS NIGHTLY
Status: DISCONTINUED | OUTPATIENT
Start: 2024-05-02 | End: 2024-05-03

## 2024-05-02 RX ORDER — SENNOSIDES 8.6 MG/1
1 TABLET ORAL 2 TIMES DAILY
Status: DISCONTINUED | OUTPATIENT
Start: 2024-05-02 | End: 2024-05-05 | Stop reason: HOSPADM

## 2024-05-02 RX ORDER — POTASSIUM CHLORIDE 1.5 G/1.58G
40 POWDER, FOR SOLUTION ORAL ONCE
Status: COMPLETED | OUTPATIENT
Start: 2024-05-02 | End: 2024-05-02

## 2024-05-02 RX ADMIN — PANTOPRAZOLE SODIUM 40 MG: 40 TABLET, DELAYED RELEASE ORAL at 21:18

## 2024-05-02 RX ADMIN — POTASSIUM CHLORIDE 40 MEQ: 1.5 POWDER, FOR SOLUTION ORAL at 11:51

## 2024-05-02 RX ADMIN — PIPERACILLIN SODIUM AND TAZOBACTAM SODIUM 3.38 G: 3; .375 INJECTION, SOLUTION INTRAVENOUS at 05:10

## 2024-05-02 RX ADMIN — FOLIC ACID 1 MG: 1 TABLET ORAL at 08:31

## 2024-05-02 RX ADMIN — NIFEDIPINE 30 MG: 30 TABLET, FILM COATED, EXTENDED RELEASE ORAL at 08:30

## 2024-05-02 RX ADMIN — GABAPENTIN 300 MG: 300 CAPSULE ORAL at 15:28

## 2024-05-02 RX ADMIN — ACETAMINOPHEN 650 MG: 650 SOLUTION ORAL at 11:21

## 2024-05-02 RX ADMIN — PIPERACILLIN SODIUM AND TAZOBACTAM SODIUM 3.38 G: 3; .375 INJECTION, SOLUTION INTRAVENOUS at 11:10

## 2024-05-02 RX ADMIN — ACETAMINOPHEN 650 MG: 325 TABLET ORAL at 08:30

## 2024-05-02 RX ADMIN — HYDROMORPHONE HYDROCHLORIDE 0.2 MG: 1 INJECTION, SOLUTION INTRAMUSCULAR; INTRAVENOUS; SUBCUTANEOUS at 08:49

## 2024-05-02 RX ADMIN — PIPERACILLIN SODIUM AND TAZOBACTAM SODIUM 3.38 G: 3; .375 INJECTION, SOLUTION INTRAVENOUS at 23:14

## 2024-05-02 RX ADMIN — GABAPENTIN 300 MG: 300 CAPSULE ORAL at 21:18

## 2024-05-02 RX ADMIN — Medication 1 TABLET: at 08:31

## 2024-05-02 RX ADMIN — ACETAMINOPHEN 650 MG: 650 SOLUTION ORAL at 16:34

## 2024-05-02 RX ADMIN — THIAMINE HYDROCHLORIDE 100 MG: 100 INJECTION, SOLUTION INTRAMUSCULAR; INTRAVENOUS at 08:31

## 2024-05-02 RX ADMIN — INSULIN LISPRO 8 UNITS: 100 INJECTION, SOLUTION INTRAVENOUS; SUBCUTANEOUS at 16:28

## 2024-05-02 RX ADMIN — SERTRALINE HYDROCHLORIDE 25 MG: 25 TABLET ORAL at 08:30

## 2024-05-02 RX ADMIN — HYDROMORPHONE HYDROCHLORIDE 0.2 MG: 1 INJECTION, SOLUTION INTRAMUSCULAR; INTRAVENOUS; SUBCUTANEOUS at 13:47

## 2024-05-02 RX ADMIN — HYDROMORPHONE HYDROCHLORIDE 0.2 MG: 1 INJECTION, SOLUTION INTRAMUSCULAR; INTRAVENOUS; SUBCUTANEOUS at 05:09

## 2024-05-02 RX ADMIN — INSULIN GLARGINE 15 UNITS: 100 INJECTION, SOLUTION SUBCUTANEOUS at 21:18

## 2024-05-02 RX ADMIN — HYDROMORPHONE HYDROCHLORIDE 0.2 MG: 1 INJECTION, SOLUTION INTRAMUSCULAR; INTRAVENOUS; SUBCUTANEOUS at 17:55

## 2024-05-02 RX ADMIN — GABAPENTIN 100 MG: 100 CAPSULE ORAL at 08:30

## 2024-05-02 RX ADMIN — PIPERACILLIN SODIUM AND TAZOBACTAM SODIUM 3.38 G: 3; .375 INJECTION, SOLUTION INTRAVENOUS at 17:54

## 2024-05-02 RX ADMIN — HYDROMORPHONE HYDROCHLORIDE 0.2 MG: 1 INJECTION, SOLUTION INTRAMUSCULAR; INTRAVENOUS; SUBCUTANEOUS at 21:22

## 2024-05-02 RX ADMIN — SENNOSIDES 8.6 MG: 8.6 TABLET, FILM COATED ORAL at 21:18

## 2024-05-02 RX ADMIN — VANCOMYCIN HYDROCHLORIDE 1250 MG: 5 INJECTION, POWDER, LYOPHILIZED, FOR SOLUTION INTRAVENOUS at 11:22

## 2024-05-02 RX ADMIN — PANTOPRAZOLE SODIUM 40 MG: 40 TABLET, DELAYED RELEASE ORAL at 08:31

## 2024-05-02 RX ADMIN — ONDANSETRON 4 MG: 2 INJECTION INTRAMUSCULAR; INTRAVENOUS at 09:13

## 2024-05-02 RX ADMIN — ALUMINUM HYDROXIDE, MAGNESIUM HYDROXIDE, AND SIMETHICONE 10 ML: 1200; 120; 1200 SUSPENSION ORAL at 21:21

## 2024-05-02 ASSESSMENT — LIFESTYLE VARIABLES
TREMOR: NO TREMOR
ANXIETY: NO ANXIETY, AT EASE
AGITATION: NORMAL ACTIVITY
PAROXYSMAL SWEATS: BARELY PERCEPTIBLE SWEATING, PALMS MOIST
VISUAL DISTURBANCES: NOT PRESENT
ANXIETY: MILDLY ANXIOUS
HEADACHE, FULLNESS IN HEAD: VERY MILD
TOTAL SCORE: 0
BLOOD PRESSURE: 132/80
NAUSEA AND VOMITING: NO NAUSEA AND NO VOMITING
PAROXYSMAL SWEATS: BARELY PERCEPTIBLE SWEATING, PALMS MOIST
VISUAL DISTURBANCES: NOT PRESENT
AGITATION: NORMAL ACTIVITY
VISUAL DISTURBANCES: NOT PRESENT
TACTILE DISTURBANCES: VERY MILD ITCHING, PINS AND NEEDLES, BURNING OR NUMBNESS
AUDITORY DISTURBANCES: NOT PRESENT
AUDITORY DISTURBANCES: NOT PRESENT
HEADACHE, FULLNESS IN HEAD: NOT PRESENT
HEADACHE, FULLNESS IN HEAD: NOT PRESENT
ORIENTATION AND CLOUDING OF SENSORIUM: ORIENTED AND CAN DO SERIAL ADDITIONS
AGITATION: NORMAL ACTIVITY
ORIENTATION AND CLOUDING OF SENSORIUM: ORIENTED AND CAN DO SERIAL ADDITIONS
PAROXYSMAL SWEATS: NO SWEAT VISIBLE
AUDITORY DISTURBANCES: NOT PRESENT
PAROXYSMAL SWEATS: NO SWEAT VISIBLE
ANXIETY: NO ANXIETY, AT EASE
NAUSEA AND VOMITING: NO NAUSEA AND NO VOMITING
NAUSEA AND VOMITING: NO NAUSEA AND NO VOMITING
HEADACHE, FULLNESS IN HEAD: NOT PRESENT
VISUAL DISTURBANCES: NOT PRESENT
HEADACHE, FULLNESS IN HEAD: NOT PRESENT
PAROXYSMAL SWEATS: NO SWEAT VISIBLE
ORIENTATION AND CLOUDING OF SENSORIUM: ORIENTED AND CAN DO SERIAL ADDITIONS
AUDITORY DISTURBANCES: NOT PRESENT
HEADACHE, FULLNESS IN HEAD: VERY MILD
TOTAL SCORE: 3
ORIENTATION AND CLOUDING OF SENSORIUM: ORIENTED AND CAN DO SERIAL ADDITIONS
AUDITORY DISTURBANCES: NOT PRESENT
VISUAL DISTURBANCES: NOT PRESENT
PAROXYSMAL SWEATS: NO SWEAT VISIBLE
TREMOR: NO TREMOR
ANXIETY: MILDLY ANXIOUS
TOTAL SCORE: 0
NAUSEA AND VOMITING: NO NAUSEA AND NO VOMITING
TOTAL SCORE: 1
TREMOR: NO TREMOR
BLOOD PRESSURE: 145/86
PULSE: 75
TREMOR: NO TREMOR
AUDITORY DISTURBANCES: NOT PRESENT
NAUSEA AND VOMITING: MILD NAUSEA WITH NO VOMITING
AGITATION: NORMAL ACTIVITY
ORIENTATION AND CLOUDING OF SENSORIUM: ORIENTED AND CAN DO SERIAL ADDITIONS
ANXIETY: NO ANXIETY, AT EASE
VISUAL DISTURBANCES: NOT PRESENT
HEADACHE, FULLNESS IN HEAD: NOT PRESENT
PULSE: 75
ANXIETY: MILDLY ANXIOUS
ORIENTATION AND CLOUDING OF SENSORIUM: ORIENTED AND CAN DO SERIAL ADDITIONS
PULSE: 70
AUDITORY DISTURBANCES: NOT PRESENT
HEADACHE, FULLNESS IN HEAD: VERY MILD
TREMOR: NO TREMOR
NAUSEA AND VOMITING: NO NAUSEA AND NO VOMITING
VISUAL DISTURBANCES: NOT PRESENT
TOTAL SCORE: 2
TREMOR: NO TREMOR
ORIENTATION AND CLOUDING OF SENSORIUM: ORIENTED AND CAN DO SERIAL ADDITIONS
TREMOR: NO TREMOR
TOTAL SCORE: 3
ANXIETY: NO ANXIETY, AT EASE
HEADACHE, FULLNESS IN HEAD: VERY MILD
NAUSEA AND VOMITING: NO NAUSEA AND NO VOMITING
TOTAL SCORE: 5
ORIENTATION AND CLOUDING OF SENSORIUM: ORIENTED AND CAN DO SERIAL ADDITIONS
TOTAL SCORE: 0
TOTAL SCORE: 0
ORIENTATION AND CLOUDING OF SENSORIUM: ORIENTED AND CAN DO SERIAL ADDITIONS
AGITATION: NORMAL ACTIVITY
NAUSEA AND VOMITING: NO NAUSEA AND NO VOMITING
ORIENTATION AND CLOUDING OF SENSORIUM: ORIENTED AND CAN DO SERIAL ADDITIONS
PULSE: 78
VISUAL DISTURBANCES: NOT PRESENT
TOTAL SCORE: 3
HEADACHE, FULLNESS IN HEAD: VERY MILD
VISUAL DISTURBANCES: MILD SENSITIVITY
TREMOR: NO TREMOR
PULSE: 68
AGITATION: NORMAL ACTIVITY
AUDITORY DISTURBANCES: NOT PRESENT
VISUAL DISTURBANCES: NOT PRESENT
PULSE: 78
TREMOR: NO TREMOR
ANXIETY: NO ANXIETY, AT EASE
AGITATION: NORMAL ACTIVITY
PAROXYSMAL SWEATS: BARELY PERCEPTIBLE SWEATING, PALMS MOIST
PAROXYSMAL SWEATS: NO SWEAT VISIBLE
PAROXYSMAL SWEATS: NO SWEAT VISIBLE
AGITATION: NORMAL ACTIVITY
TREMOR: NO TREMOR
NAUSEA AND VOMITING: NO NAUSEA AND NO VOMITING
ANXIETY: MILDLY ANXIOUS
AUDITORY DISTURBANCES: NOT PRESENT
ANXIETY: MILDLY ANXIOUS
PAROXYSMAL SWEATS: NO SWEAT VISIBLE
AUDITORY DISTURBANCES: NOT PRESENT
NAUSEA AND VOMITING: NO NAUSEA AND NO VOMITING

## 2024-05-02 ASSESSMENT — COGNITIVE AND FUNCTIONAL STATUS - GENERAL
TURNING FROM BACK TO SIDE WHILE IN FLAT BAD: A LITTLE
DRESSING REGULAR LOWER BODY CLOTHING: A LITTLE
HELP NEEDED FOR BATHING: A LITTLE
TOILETING: A LITTLE
STANDING UP FROM CHAIR USING ARMS: A LITTLE
DRESSING REGULAR UPPER BODY CLOTHING: A LITTLE
MOBILITY SCORE: 21
WALKING IN HOSPITAL ROOM: A LITTLE
MOBILITY SCORE: 21
WALKING IN HOSPITAL ROOM: A LITTLE
MOVING FROM LYING ON BACK TO SITTING ON SIDE OF FLAT BED WITH BEDRAILS: A LITTLE
CLIMB 3 TO 5 STEPS WITH RAILING: A LITTLE
HELP NEEDED FOR BATHING: A LITTLE
STANDING UP FROM CHAIR USING ARMS: A LITTLE
STANDING UP FROM CHAIR USING ARMS: A LITTLE
DAILY ACTIVITIY SCORE: 18
WALKING IN HOSPITAL ROOM: A LITTLE
PERSONAL GROOMING: A LITTLE
TOILETING: A LITTLE
EATING MEALS: A LITTLE
CLIMB 3 TO 5 STEPS WITH RAILING: A LITTLE
CLIMB 3 TO 5 STEPS WITH RAILING: A LITTLE
MOBILITY SCORE: 18
EATING MEALS: A LITTLE
DAILY ACTIVITIY SCORE: 18
DRESSING REGULAR UPPER BODY CLOTHING: A LITTLE
MOVING TO AND FROM BED TO CHAIR: A LITTLE
DRESSING REGULAR LOWER BODY CLOTHING: A LITTLE
PERSONAL GROOMING: A LITTLE

## 2024-05-02 ASSESSMENT — PAIN DESCRIPTION - LOCATION
LOCATION: ABDOMEN
LOCATION: CHEST
LOCATION: ABDOMEN
LOCATION: CHEST
LOCATION: ABDOMEN
LOCATION: ABDOMEN

## 2024-05-02 ASSESSMENT — PAIN SCALES - GENERAL
PAINLEVEL_OUTOF10: 8
PAINLEVEL_OUTOF10: 4
PAINLEVEL_OUTOF10: 8
PAINLEVEL_OUTOF10: 4
PAINLEVEL_OUTOF10: 6
PAINLEVEL_OUTOF10: 8
PAINLEVEL_OUTOF10: 6
PAINLEVEL_OUTOF10: 8
PAINLEVEL_OUTOF10: 4
PAINLEVEL_OUTOF10: 7
PAINLEVEL_OUTOF10: 8

## 2024-05-02 NOTE — PROGRESS NOTES
Physical Therapy    Physical Therapy Evaluation    Patient Name: Barbara English  MRN: [id number]  Today's Date: 5/2/2024   Time Calculation  Start Time: 1323  Stop Time: 1333  Time Calculation (min): 10 min    Assessment/Plan   PT Assessment  PT Assessment Results: Impaired balance, Decreased mobility, Pain  Rehab Prognosis: Good  Barriers to Discharge: stairs to enter son's house or friend's townhouse  Barriers to Participation: Comorbidities  End of Session Communication: Bedside nurse  Assessment Comment: pt is a 49 y.o female admitted withg abdominal pain . pt would benefit from skilled services while in the hospital to improve funcitonal mobility  End of Session Patient Position: Bed, 2 rail up, Alarm off, not on at start of session  IP OR SWING BED PT PLAN  Inpatient or Swing Bed: Inpatient  PT Plan  Treatment/Interventions: Bed mobility, Transfer training, Gait training, Stair training, Balance training, Endurance training, Strengthening, Range of motion, Therapeutic exercise, Therapeutic activity  PT Plan: Skilled PT  PT Frequency: 3 times per week  PT Discharge Recommendations: No PT needed after discharge  Equipment Recommended upon Discharge: Wheeled walker  PT Recommended Transfer Status: Stand by assist  PT - OK to Discharge: Yes (Eval received and completed. 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  Family/Caregiver Present: No  Prior to Session Communication: Bedside nurse  Patient Position Received: Bed, 3 rail up  General Comment: pt supine and willing to participate.  Home Living:  Home Living  Type of Home: House (pt stated she bounces around . she was staying at friends [facility] but plans to go to son's house)  Home Adaptive Equipment: Wheelchair-manual (pt has a wc at ex's  house)  Home Layout: One level  Home Access: Stairs to enter with rails  Entrance Stairs-Number of Steps: son's house has 6 to enter  Prior Level of Function:  Prior Function Per Pt/Caregiver Report  Level of [place]: Independent with ADLs and functional transfers, Independent with homemaking with ambulation  Precautions:  Precautions  LE Weight Bearing Status:  (R heel weight bearing- pt states her shoe is at her exs)  Medical Precautions: Fall precautions  Vital Signs:  Vital Signs  Heart Rate: 84  Heart Rate Source: Monitor  SpO2: 99 %  BP: 137/80  BP Location: Left arm  BP Method: Automatic  Patient Position: Lying    Objective   Pain:  Pain Assessment  Pain Assessment: 0-10  Pain Score: 8  Pain Type: Chronic pain  Pain Location:  (whole body)  Pain Interventions: Repositioned  Cognition:  Cognition  Orientation Level: Oriented X4  Impulsive: Mildly    General Assessments:  Activity Tolerance  Endurance: Endurance does not limit participation in activity         Strength  Strength Comments: B LE grossly WFL  Postural Control  Postural Control: Within Functional Limits    Static Sitting Balance  Static Sitting-Balance Support: Feet supported  Static Sitting-Level of Assistance: Close supervision    Static Standing Balance  Static Standing-Balance Support: Bilateral upper extremity supported  Static Standing-Level of Assistance: Close supervision  Functional Assessments:  Bed Mobility  Bed Mobility: Yes  Bed Mobility 1  Bed Mobility 1: Supine to sitting, Sitting to supine  Level of Assistance 1: Close supervision    Transfers  Transfer: Yes  Transfer 1  Transfer From 1: Sit to, Stand to  Transfer to 1: Stand, Sit  Technique 1: Sit to stand, Stand to sit  Transfer Device 1: Walker  Transfer Level of Assistance 1: Close supervision    Ambulation/Gait Training  Ambulation/Gait Training Performed: Yes  Ambulation/Gait Training 1  Surface 1: Level tile  Device 1: Rolling walker  Assistance 1: Close  supervision  Quality of Gait 1:  (pt cued for heel weight bearing but decided to hop)  Comments/Distance (ft) 1: pt ambulated 15ft within the room       Extremity/Trunk Assessments:  RLE   RLE : Within Functional Limits  LLE   LLE : Within Functional Limits  Outcome Measures:  [facility] Basic Mobility  Turning from your back to your side while in a flat bed without using bedrails: None  Moving from lying on your back to sitting on the side of a flat bed without using bedrails: None  Moving to and from bed to chair (including a wheelchair): None  Standing up from a chair using your arms (e.g. wheelchair or bedside chair): A little  To walk in hospital room: A little  Climbing 3-5 steps with railing: A little  Basic Mobility - Total Score: 21    Encounter Problems       Encounter Problems (Active)       Balance       STG - Maintains dynamic standing balance with upper extremity support with ww for 5 mins without LOB  (Progressing)       Start:  05/02/24    Expected End:  05/16/24       INTERVENTIONS:  1. Practice standing with minimal support.  2. Educate patient about standing tolerance.  3. Educate patient about independence with gait, transfers, and ADL's.  4. Educate patient about use of assistive device.  5. Educate patient about self-directed care.            Mobility       STG - Patient will ambulate 150 ft with ww  (Progressing)       Start:  05/02/24    Expected End:  05/16/24            STG - Patient will ascend and descend four to six stairs with CGA and HR  (Progressing)       Start:  05/02/24    Expected End:  05/16/24               PT Transfers       STG - Transfer from bed to chair with ww  (Progressing)       Start:  05/02/24    Expected End:  05/16/24                   Education Documentation  Mobility Training, taught by Kelsie Mcpherson, PT at 5/2/2024  1:45 PM.  Learner: Patient  Readiness: Eager  Method: Explanation  Response: Verbalizes Understanding    Education Comments  No comments  found.

## 2024-05-02 NOTE — NURSING NOTE
Admission note:  Patient comes to [facility] from the ED for DKA. Patient is alert and oriented x 4. Patient endorses the use of a wheelchair at home but no other ambulatory aids. Belongings are by the bedside which include cell phone and cell phone . S/O took patient's clothing home. Patient oriented to room and call light system. VSS, Telemetry applied, call light within reach. Discharge disposition unknown at this time. Vielka Conrad RN

## 2024-05-02 NOTE — PROGRESS NOTES
"Barbara English is a 49 y.o. female on day 2 of admission presenting with Diabetic ketoacidosis without coma associated with diabetes mellitus due to underlying condition (Multi).    Subjective   Pt endorsing throat and upper abdominal pain this morning. Notes that she is having difficulty swallowing and has been unable to eat beyond some apple sauce. Shortness of breath has improved. Denying f/c/n/v.   Per pt, has been taking 800mg-1g of ibuprofen daily prior to admission.    Objective   General: thin, no acute distress, mildly diaphoretic, AAOx3, 1 instance of fecal incontinence  HEENT: EOM intact, PERRL, dry MM  CV: regular rate and rhythm  Pulm: normal respiratory effort, clear to auscultation bilaterally with no wheezes, rales, rhonchi  Abd: soft, non-distended, diffusely mildly tender to deep palpation, more notably tender to palpation in epigastric region and LLQ  Extremities: warm, no LE edema, R foot amputated to tarsometatarsal joint. Surgical site is healed, with skin ridge containing cotton. No open wound, no purulence or erythema. No tenderness to palpation.   Neuro: moves all extremities equally, CN II - XII grossly intact  Psych: appropriate mood and affect   Skin: warm, dry, intact, hyperpigments macules along upper back and upper arms    Physical Exam    Last Recorded Vitals  Blood pressure 132/75, pulse 71, temperature 37 °C (98.6 °F), temperature source Temporal, resp. rate 18, height 1.626 m (5' 4\"), weight 63.5 kg (140 lb), SpO2 100%.  Intake/Output last 3 Shifts:  I/O last 3 completed shifts:  In: 1690 (26.6 mL/kg) [P.O.:240; IV Piggyback:1450]  Out: - (0 mL/kg)   Weight: 63.5 kg     Relevant Results  Scheduled medications  folic acid, 1 mg, oral, Daily  gabapentin, 300 mg, oral, TID  insulin glargine, 15 Units, subcutaneous, Nightly  insulin lispro, 0-10 Units, subcutaneous, TID with meals  multivitamin with minerals, 1 tablet, oral, Daily  NIFEdipine ER, 30 mg, oral, Daily before " breakfast  pantoprazole, 40 mg, oral, BID  piperacillin-tazobactam, 3.375 g, intravenous, q6h  sertraline, 25 mg, oral, Daily  thiamine, 100 mg, oral, Daily  vancomycin, 1,250 mg, intravenous, q24h      Continuous medications     PRN medications  PRN medications: acetaminophen **OR** acetaminophen **OR** acetaminophen, alum-mag hydroxide-simeth, calcium carbonate, dextrose, dextrose, glucagon, glucagon, HYDROmorphone, ondansetron, vancomycin  Results for orders placed or performed during the hospital encounter of 04/30/24 (from the past 24 hour(s))   Tissue/Wound Culture/Smear    Specimen: Wound/Tissue; Tissue/Biopsy   Result Value Ref Range    Tissue/Wound Culture/Smear Culture in progress     Gram Stain No polymorphonuclear leukocytes seen (A)     Gram Stain (3+) Moderate Gram positive pleomorphic bacilli (A)    POCT GLUCOSE   Result Value Ref Range    POCT Glucose 148 (H) 74 - 99 mg/dL   Vancomycin   Result Value Ref Range    Vancomycin 6.7 5.0 - 20.0 ug/mL   CBC   Result Value Ref Range    WBC 10.2 4.4 - 11.3 x10*3/uL    nRBC 0.0 0.0 - 0.0 /100 WBCs    RBC 3.78 (L) 4.00 - 5.20 x10*6/uL    Hemoglobin 10.1 (L) 12.0 - 16.0 g/dL    Hematocrit 31.5 (L) 36.0 - 46.0 %    MCV 83 80 - 100 fL    MCH 26.7 26.0 - 34.0 pg    MCHC 32.1 32.0 - 36.0 g/dL    RDW 12.5 11.5 - 14.5 %    Platelets 355 150 - 450 x10*3/uL   Renal function panel   Result Value Ref Range    Glucose 61 (L) 74 - 99 mg/dL    Sodium 140 136 - 145 mmol/L    Potassium 3.4 (L) 3.5 - 5.3 mmol/L    Chloride 103 98 - 107 mmol/L    Bicarbonate 29 21 - 32 mmol/L    Anion Gap 11 10 - 20 mmol/L    Urea Nitrogen 27 (H) 6 - 23 mg/dL    Creatinine 1.11 (H) 0.50 - 1.05 mg/dL    eGFR 61 >60 mL/min/1.73m*2    Calcium 7.9 (L) 8.6 - 10.6 mg/dL    Phosphorus 3.1 2.5 - 4.9 mg/dL    Albumin 2.8 (L) 3.4 - 5.0 g/dL   Magnesium   Result Value Ref Range    Magnesium 2.23 1.60 - 2.40 mg/dL   POCT GLUCOSE   Result Value Ref Range    POCT Glucose 73 (L) 74 - 99 mg/dL   POCT GLUCOSE    Result Value Ref Range    POCT Glucose 125 (H) 74 - 99 mg/dL     No results found.                        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 Considering hyperglycemia but lack of metabolic acidosis, likely not in DKA but rather uncontrolled diabetes, which could account for abdominal pain/vomiting. Considering normal pressures this morning, hypertensive urgency likely due to cocaine withdrawal/pain. Chest pain possibly due to repeated emesis vs coronary vasospasm iso crack/cocaine use (trops negative, EKG non-suggestive of acute coronary syndrome). Plan to initiate insulin therapy, consult podiatry for amputation evaluation, hydrate for likely pre-renal MOUSTAPHA (will obtain urine lytes to confirm), and obtain stool cultures (for diarrhea with possible colitis on CT).     Update 5/2:  - Glargine 15u, holding bolus, keeping SSI while not eating much  - Mylanta for esophagitis   - Increasing gabapentin to 300mg TID per podiatry recs  - HBV/HCV, HIV labs neg  - Wound culture positive for gram positive pleomorphic bacilli (skin emilia)  - Restart PEG and Doc    #Uncontrolled Diabetes type 2--Hba1c 13 5/2024  #Diabetic neuropathy   DKA precipitated by sepsis 2/2 possible diabetic foot osteomyelitis and non compliance on home insulin.   BS > 600 on admission. VBG: metabolic acidosis + resp alkalosis - pH 7.42, pCO2 29, Bicarb 18  ::S\P  1.5 L LR, calcium gluconate, 20 units insulin total. R/p BMP with improvement of glucose 366, K 4.2, gap closed. R/p VBG with pH 7.41, slight bump in lactate 2.7  from 1.1 Received 1.5L LR.   :: Regimen on 1/9/24 discharge: Glargine 25U, lispro 7u TID  - Basal: Insulin glargine 15 units QHS  - HOLD Bolus: Insulin lispro 2u TID  - ISS #1 with meals and at bedtime  - Increase home gabapentin 100 BID --> 300 TID  - Diabetic diet       #R plantar abscess  #S/p R lisfranc amputation  :: Admission for no-healing foot wound 11/23  :: S/p I&D R foot with amputation of 4th and 5th digits (12/27/23) followed by debridement R foot with resection of 4th and 5th metatarsals (12/29/23) followed by open lisfranc disarticulation of R foot (1/2/24) followed by percutaneous tendo achilles lengthening right + wound debridement (1/5/24).    :: Completed fluconazole until 1/19, vanomycin intended course till 2/21, did not complete. Follow up with ID (Dr. Ayala).   :: Lactate 1.1 --> 2.7 --> 1.3  - Continue vancomycin and zosyn --> vancomycin rate adjusted accordingly d/t concern for previous DRESS with pt-reported rash on vancomycin  - Follow Blood cx and urine cx  - Podiatry consulted      #HTN urgency  Utox cocaine positive. she endorsed HA  BP on arrival 221/113, in the ED she was treated with home lisinopril and Nifedipine compliance is doubtful.  :: S\P 20 IV labetalol twice at ED  - Resume Nifedipine 30 Daily  - Hold lisinopril and hydrochlorothiazide iso MOUSTAPHA  - Hold hydralazine (initiated on admission for HTN) for softer pressures  - avoid beta blockers in context of cocaine use     #Substance Use  Utox positive for cocaine  - Toxicology input at am  - Avoid B-blockers iso cocaine use  - Initiate CIWA protocol, known allergy to lorazepam, so no medication indicated at this time. Amantadine can be used if necessary.   - Thiamine, folic acid, multivitamin     #MOUSTAPHA  :: On admission, Cr: 1.56 (baseline 0.6-0.9)  :: Dehydration, S\P 2L EL at ED  :: LR 1L on admission  - Daily RFP  - Avoid nephrotoxics  - Renally dose medications  - Urine electrolytes  - I\O  - NS 1L bolus 5/1      #Anxiety  #Depression  :: Sertraline dose on discharge: 35mg daily  - sertraline 25mg daily     #Gastritis  #Esophagitis  #Colitis\Constipation  CT findings as above  - Pantoprazole   - Restart PEG and Doc Senna   - Stool culture PCR   - Zofran PRN     F S\P 2L RL, 1L RL 100ml\hr  E PRN, K >4  N Diabetic diet  A PIV  DVT Heparin  GI pantoprazole  Code: Full Code  NOK: friend Elva Samm: [phone]          Ramila Kunz, MS4

## 2024-05-02 NOTE — CONSULTS
"Nutrition Initial Assessment:   Nutrition Assessment    Reason for Assessment: Admission nursing screening    Patient is a 49 y.o. female presenting with abdominal pain worsening over few days. Patient endorsed for the past few days has had generalized abdominal pain with emesis, nonbloody, fever, chills, chest pain, shortness of breath. Admission for Diabetic ketoacidosis.    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    Nutrition History:  Energy Intake: Poor < 50 %  Food and Nutrient History: Pt reports she has had a decreased appetite for the past several months. Reports she is having difficulty eating food because she has pain when swallowing solids/liquids. Reports the pain is in her esophagus and stomach after ingestion of food. Endorses difficulty chewing/swallowing. Observed breakfast tray and noted pt only took a couple bites of sausage and eggs. Reports she has only been eating small bites of meals for the past week. Pt reports she does not like milk type Boost/Ensure/Glucerna. Reports she likes Ensure Clear and is willing to have it during admission. Reports she likes the lopez flavor and would like to try apple flavor. Reports she has tried Aiden before and likes it. Has not tried Pro-Stat but is willing to try. Endorses nausea w/o emesis. Denies constipation, diarrhea.  Food Allergies/Intolerances:  None  GI Symptoms: Nausea and Abdominal pain  Oral Problems: Chewing difficulty, Swallowing difficulty, and painful swallow per pt       Anthropometrics:  Height: 162.6 cm (5' 4\")   Weight: 63.5 kg (140 lb)   BMI (Calculated): 24.02  IBW/kg (Dietitian Calculated): 54.5 kg  Percent of IBW: 117 %       Weight History:   Wt Readings from Last 10 Encounters:   04/30/24 63.5 kg (140 lb)   05/09/22 55 kg (121 lb 4.1 oz)     Per chart review:  01/09/24 68.1 kg - 6.8% loss  12/26/23 68.1 kg    Weight Change %:  Weight History / % Weight Change: Reports usual " body wt of 160lb. Reports wt loss occured over ~6 months representing 12.5% significant wt loss per pt.  Significant Weight Loss: Yes  Interpretation of Weight Loss: >10% in 6 months    Nutrition Focused Physical Exam Findings:    Subcutaneous Fat Loss:   Orbital Fat Pads: Mild-Moderate (slight dark circles and slight hollowing)  Buccal Fat Pads: Mild-Moderate (flat cheeks, minimal bounce)  Triceps: Mild-Moderate (less than ample fat tissue)  Muscle Wasting:  Temporalis: Mild-Moderate (slight depression)  Pectoralis (Clavicular Region): Mild-Moderate (some protrusion of clavicle)  Deltoid/Trapezius: Mild-Moderate (slight protrusion of acromion process)  Quadriceps: Mild-Moderate (mild depression on inner and outer thigh)  Gastrocnemius: Mild-Moderate (not well developed muscle)  Edema:  Edema:  (non-pitting)  Edema Location: RLE  Physical Findings:  Hair: Negative  Eyes: Negative  Skin: Positive (R foot wound)    Nutrition Significant Labs:  CBC Trend:   Results from last 7 days   Lab Units 05/02/24 0744 05/01/24  0932 05/01/24 0214 04/30/24  0202   WBC AUTO x10*3/uL 10.2 10.7 11.3 16.7*   RBC AUTO x10*6/uL 3.78* 4.15 4.17 5.90*   HEMOGLOBIN g/dL 10.1* 11.0* 10.9* 15.5   HEMATOCRIT % 31.5* 31.3* 31.6* 43.7   MCV fL 83 75* 76* 74*   PLATELETS AUTO x10*3/uL 355 368 402 431    , BMP Trend:   Results from last 7 days   Lab Units 05/02/24  0744 05/01/24  0932 05/01/24 0214 04/30/24  0951   GLUCOSE mg/dL 61* 301* 228* 366*   CALCIUM mg/dL 7.9* 8.7 8.8 9.8   SODIUM mmol/L 140 137 137 135*   POTASSIUM mmol/L 3.4* 3.7 3.6 4.2   CO2 mmol/L 29 30 34* 29   CHLORIDE mmol/L 103 98 96* 92*   BUN mg/dL 27* 43* 44* 47*   CREATININE mg/dL 1.11* 1.31* 1.41* 1.42*    , BG POCT trend:   Results from last 7 days   Lab Units 05/02/24  1122 05/02/24  0808 05/01/24  1718 05/01/24  1154 05/01/24  0826   POCT GLUCOSE mg/dL 125* 73* 148* 189* 286*    , Renal Lab Trend:   Results from last 7 days   Lab Units 05/02/24  0744 05/01/24  0932  05/01/24  0214 04/30/24  0951   POTASSIUM mmol/L 3.4* 3.7 3.6 4.2   PHOSPHORUS mg/dL 3.1 3.2  --   --    SODIUM mmol/L 140 137 137 135*   MAGNESIUM mg/dL 2.23 2.21  --   --    EGFR mL/min/1.73m*2 61 50* 46* 45*   BUN mg/dL 27* 43* 44* 47*   CREATININE mg/dL 1.11* 1.31* 1.41* 1.42*        Nutrition Specific Medications:  Scheduled medications  folic acid, 1 mg, oral, Daily  gabapentin, 300 mg, oral, TID  insulin glargine, 15 Units, subcutaneous, Nightly  insulin lispro, 0-10 Units, subcutaneous, TID with meals  multivitamin with minerals, 1 tablet, oral, Daily  NIFEdipine ER, 30 mg, oral, Daily before breakfast  pantoprazole, 40 mg, oral, BID  piperacillin-tazobactam, 3.375 g, intravenous, q6h  potassium chloride, 40 mEq, oral, Once  sertraline, 25 mg, oral, Daily  thiamine, 100 mg, oral, Daily  vancomycin, 1,250 mg, intravenous, q24h        I/O:   No BM documented since admission ;      Dietary Orders (From admission, onward)       Start     Ordered    05/01/24 0851  Adult diet Regular  Diet effective now        Question:  Diet type  Answer:  Regular    05/01/24 0906                     Estimated Needs:   Total Energy Estimated Needs (kCal): 1650 kCal  Method for Estimating Needs: 30 kcal/kg (IBW 54.5 kg)  Total Protein Estimated Needs (g):  (70+)  Method for Estimating Needs: 1.3+ g/kg IBW  Total Fluid Estimated Needs (mL):  (1ml/kcal or per team)           Nutrition Diagnosis   Malnutrition Diagnosis  Patient has Malnutrition Diagnosis: Yes  Diagnosis Status: New  Malnutrition Diagnosis: Moderate malnutrition related to chronic disease or condition  As Evidenced by: suspect patient meeting <75% of estimated energy needs for >1 month; mild-moderate muscle and subcutaneous fat loss; patient report of 12.5% significant weight loss in ~6 months.            Nutrition Interventions/Recommendations         Nutrition Prescription:  Consider formal SLP eval d/t pt report of difficulty chewing/swallowing.  Ensure Clear BID  (240kcal, 8g protein each).  Pro-Stat AWC once daily (100kcal, 17g protein).  Continue regular diet to promote PO intake.        Nutrition Interventions:   Interventions: Meals and snacks, Medical food supplement  Goal: consume >50% of meals  Medical Food Supplement: Commercial beverage  Goal: consume 100% ONS       Nutrition Education:   Encouraged pt to consume ONS. Provided pt with Handout from [facility]: Carbohydrate Counting for People with Diabetes to review when appetite has improved. Pt reports she has been educated on carb controlled diet multiple times. Pt reports she has high BG even when she isn't eating anything and thinks her BG is elevated d/t not taking insulin at times.       Nutrition Monitoring and Evaluation   Food/Nutrient Related History Monitoring  Monitoring and Evaluation Plan: Energy intake, Amount of food  Criteria: meet >75% of estimated energy needs  Amount of Food: Medical food intake  Criteria: consume 100% ONS    Body Composition/Growth/Weight History  Monitoring and Evaluation Plan: Weight  Criteria: stable weight    Biochemical Data, Medical Tests and Procedures  Monitoring and Evaluation Plan: Electrolyte/renal panel, Glucose/endocrine profile  Criteria: labs WNL    Nutrition Focused Physical Findings  Monitoring and Evaluation Plan: Digestive System  Criteria: GI function       Time Spent/Follow-up Reminder:   Time Spent (min): 60 minutes  Last Date of Nutrition Visit: 05/02/24  Nutrition Follow-Up Needed?: Dietitian to reassess per policy

## 2024-05-02 NOTE — PROGRESS NOTES
Barbara English is a 49 y.o. female on day 2 of admission presenting with Diabetic ketoacidosis without coma associated with diabetes mellitus due to underlying condition (Multi).    Transitional Care Coordination Progress Note:  Patient discussed during interdisciplinary rounds.   Team members present: MD and TCC  Plan per Medical/Surgical team: Waiting on PT/Ot evaluation, Resolving MOUSTAPHA and titration of medications.  Payor: [facility]  Discharge disposition: Home  Potential Barriers: None  ADOD: 05/04/24    LEIDA TAO RN

## 2024-05-02 NOTE — PROGRESS NOTES
Vancomycin Dosing by Pharmacy- FOLLOW UP    Barbara English is a 49 y.o. year old female who Pharmacy has been consulted for vancomycin dosing for cellulitis, skin and soft tissue. Based on the patient's indication and renal status this patient is being dosed based on a goal AUC of 400-600.     Renal function is currently improving, so switching to AUC dosing today.    Current vancomycin dose: 750 mg given once yesterday    Most recent random level: 6.7 mcg/mL    Visit Vitals  /75 (Patient Position: Lying)   Pulse 71   Temp 37 °C (98.6 °F) (Temporal)   Resp 18        Lab Results   Component Value Date    CREATININE 1.11 (H) 05/02/2024    CREATININE 1.31 (H) 05/01/2024    CREATININE 1.41 (H) 05/01/2024    CREATININE 1.42 (H) 04/30/2024 5/1 wound cx with gram positive pleomorphic bacilli  4/30 blood cx NGTD    I/O last 3 completed shifts:  In: 1690 (26.6 mL/kg) [P.O.:240; IV Piggyback:1450]  Out: - (0 mL/kg)   Weight: 63.5 kg       Lab Results   Component Value Date    PATIENTTEMP 37.0 05/01/2024    PATIENTTEMP 37.0 04/30/2024    PATIENTTEMP 37.0 04/30/2024        Assessment/Plan    Below goal AUC. Orders placed for new vancomcyin regimen of 1250 mg every 24 hours.    This dosing regimen is predicted by [employer] to result in the following pharmacokinetic parameters:  AUC24,ss: 478 mg/L.hr  Probability of AUC24 > 400: 91 %  Ctrough,ss: 12.1 mg/L  Probability of Ctrough,ss > 20: 1 %  Probability of nephrotoxicity (Lodise JOSEPH 2009): 7 %    The next level will be obtained on 5/3 at AM labs. May be obtained sooner if clinically indicated.   Will continue to monitor renal function daily while on vancomycin and order serum creatinine at least every 48 hours if not already ordered.  Follow for continued vancomycin needs, clinical response, and signs/symptoms of toxicity.       Niki Estevez, PharmD

## 2024-05-02 NOTE — NURSING NOTE
"Ms. English is resting in bed -- verbalizes she is feeling better.  Education today centering on sick day management (h/o provided).  She is requesting CGM but her phone is not compatible to support a CGM albert.  Explained to  her that she would require a reader for the CGM.  Ms. English is interested in endocrinology follow up -- states she has not bbeen seen by one in the past.  She sttes she wants \"to manage my diabetes better\"    She is comfortable with the current insulin regimen and can follow this at home (basal, bolus, SSI).  Will message her care team for endocrinology follow up.  She is agreeable to follow up tomorrow.  Time spent:  25 mins.  "

## 2024-05-02 NOTE — CARE PLAN
Problem: Skin  Goal: Promote/optimize nutrition  5/1/2024 2018 by Vielka Conrad RN  Outcome: Progressing  Flowsheets (Taken 5/1/2024 2018)  Promote/optimize nutrition:   Consume > 50% meals/supplements   Monitor/record intake including meals  5/1/2024 2018 by Vielka Conrad RN  Outcome: Progressing  Flowsheets (Taken 5/1/2024 2018)  Promote/optimize nutrition:   Consume > 50% meals/supplements   Monitor/record intake including meals  5/1/2024 2017 by Vielka Conrad RN  Flowsheets (Taken 5/1/2024 2017)  Promote/optimize nutrition:   Offer water/supplements/favorite foods   Assist with feeding   Consume > 50% meals/supplements   Monitor/record intake including meals     Problem: Safety  Goal: Patient will be injury free during hospitalization  5/1/2024 2018 by Vielka Conrad RN  Outcome: Progressing  5/1/2024 2018 by Vielka Conrad RN  Outcome: Progressing     Problem: Safety  Goal: I will remain free of falls  5/1/2024 2018 by Vielka Conrad RN  Outcome: Progressing  5/1/2024 2018 by Vielka Conrad RN  Outcome: Progressing     Problem: Daily Care  Goal: Daily care needs are met  5/1/2024 2018 by Vielka Conrad RN  Outcome: Progressing  5/1/2024 2018 by Vielka Conrad RN  Outcome: Progressing     Problem: Discharge Barriers  Goal: My discharge needs are met  5/1/2024 2018 by Vielka Conrad RN  Outcome: Progressing  5/1/2024 2018 by Vielka Conrad RN  Outcome: Progressing   The patient's goals for the shift include      The clinical goals for the shift include Patient will rate pain<6 by the end of this shift

## 2024-05-02 NOTE — CARE PLAN
The patient's goals for the shift include  remain HDS, hgb above 7, pain management, no falls or injury    The clinical goals for the shift include Patient will rate pain<6 by the end of this shift      Problem: Skin  Goal: Decreased wound size/increased tissue granulation at next dressing change  Outcome: Progressing  Flowsheets (Taken 5/2/2024 1157)  Decreased wound size/increased tissue granulation at next dressing change: Promote sleep for wound healing  Goal: Participates in plan/prevention/treatment measures  Outcome: Progressing  Flowsheets (Taken 5/2/2024 1157)  Participates in plan/prevention/treatment measures:   Elevate heels   Discuss with provider PT/OT consult  Goal: Prevent/manage excess moisture  Outcome: Progressing  Flowsheets (Taken 5/2/2024 1157)  Prevent/manage excess moisture:   Cleanse incontinence/protect with barrier cream   Monitor for/manage infection if present  Goal: Prevent/minimize sheer/friction injuries  Outcome: Progressing  Flowsheets (Taken 5/2/2024 1157)  Prevent/minimize sheer/friction injuries: HOB 30 degrees or less  Goal: Promote/optimize nutrition  Outcome: Progressing  Flowsheets (Taken 5/2/2024 1157)  Promote/optimize nutrition: Offer water/supplements/favorite foods  Goal: Promote skin healing  Outcome: Progressing  Flowsheets (Taken 5/2/2024 1157)  Promote skin healing: Turn/reposition every 2 hours/use positioning/transfer devices     Problem: Pain  Goal: My pain/discomfort is manageable  Outcome: Progressing     Problem: Safety  Goal: Patient will be injury free during hospitalization  Outcome: Progressing  Goal: I will remain free of falls  Outcome: Progressing     Problem: Daily Care  Goal: Daily care needs are met  Outcome: Progressing     Problem: Psychosocial Needs  Goal: Demonstrates ability to cope with hospitalization/illness  Outcome: Progressing  Goal: Collaborate with me, my family, and caregiver to identify my specific goals  Outcome: Progressing      Problem: Discharge Barriers  Goal: My discharge needs are met  Outcome: Progressing     Problem: Respiratory  Goal: Clear secretions with interventions this shift  Outcome: Progressing  Goal: Minimize anxiety/maximize coping throughout shift  Outcome: Progressing  Goal: Minimal/no exertional discomfort or dyspnea this shift  Outcome: Progressing  Goal: No signs of respiratory distress (eg. Use of accessory muscles. Peds grunting)  Outcome: Progressing  Goal: Patent airway maintained this shift  Outcome: Progressing  Goal: Tolerate mechanical ventilation evidenced by VS/agitation level this shift  Outcome: Progressing  Goal: Tolerate pulmonary toileting this shift  Outcome: Progressing  Goal: Verbalize decreased shortness of breath this shift  Outcome: Progressing  Goal: Wean oxygen to maintain O2 saturation per order/standard this shift  Outcome: Progressing  Goal: Increase self care and/or family involvement in next 24 hours  Outcome: Progressing

## 2024-05-03 LAB
ALBUMIN SERPL BCP-MCNC: 3.1 G/DL (ref 3.4–5)
ANION GAP SERPL CALC-SCNC: 11 MMOL/L (ref 10–20)
BUN SERPL-MCNC: 19 MG/DL (ref 6–23)
CALCIUM SERPL-MCNC: 8.4 MG/DL (ref 8.6–10.6)
CARDIAC TROPONIN I PNL SERPL HS: 7 NG/L (ref 0–34)
CHLORIDE SERPL-SCNC: 105 MMOL/L (ref 98–107)
CHLORIDE UR-SCNC: 25 MMOL/L
CHLORIDE/CREATININE (MMOL/G) IN URINE: 22 MMOL/G CREAT (ref 38–318)
CO2 SERPL-SCNC: 29 MMOL/L (ref 21–32)
CREAT SERPL-MCNC: 1.04 MG/DL (ref 0.5–1.05)
CREAT UR-MCNC: 112.5 MG/DL (ref 20–320)
EGFRCR SERPLBLD CKD-EPI 2021: 66 ML/MIN/1.73M*2
ERYTHROCYTE [DISTWIDTH] IN BLOOD BY AUTOMATED COUNT: 12.5 % (ref 11.5–14.5)
GLUCOSE BLD MANUAL STRIP-MCNC: 131 MG/DL (ref 74–99)
GLUCOSE BLD MANUAL STRIP-MCNC: 167 MG/DL (ref 74–99)
GLUCOSE BLD MANUAL STRIP-MCNC: 269 MG/DL (ref 74–99)
GLUCOSE SERPL-MCNC: 162 MG/DL (ref 74–99)
HCT VFR BLD AUTO: 32.1 % (ref 36–46)
HGB BLD-MCNC: 10.4 G/DL (ref 12–16)
LACTATE SERPL-SCNC: 0.8 MMOL/L (ref 0.4–2)
MCH RBC QN AUTO: 27.2 PG (ref 26–34)
MCHC RBC AUTO-ENTMCNC: 32.4 G/DL (ref 32–36)
MCV RBC AUTO: 84 FL (ref 80–100)
NRBC BLD-RTO: 0 /100 WBCS (ref 0–0)
PHOSPHATE SERPL-MCNC: 2 MG/DL (ref 2.5–4.9)
PLATELET # BLD AUTO: 290 X10*3/UL (ref 150–450)
POTASSIUM SERPL-SCNC: 3.7 MMOL/L (ref 3.5–5.3)
POTASSIUM UR-SCNC: 39 MMOL/L
POTASSIUM/CREAT UR-RTO: 35 MMOL/G CREAT
RBC # BLD AUTO: 3.83 X10*6/UL (ref 4–5.2)
SODIUM SERPL-SCNC: 141 MMOL/L (ref 136–145)
SODIUM UR-SCNC: 71 MMOL/L
SODIUM/CREAT UR-RTO: 63 MMOL/G CREAT
VANCOMYCIN SERPL-MCNC: 9.4 UG/ML (ref 5–20)
WBC # BLD AUTO: 6.4 X10*3/UL (ref 4.4–11.3)

## 2024-05-03 PROCEDURE — 2500000006 HC RX 250 W HCPCS SELF ADMINISTERED DRUGS (ALT 637 FOR ALL PAYERS)

## 2024-05-03 PROCEDURE — 85027 COMPLETE CBC AUTOMATED: CPT | Performed by: STUDENT IN AN ORGANIZED HEALTH CARE EDUCATION/TRAINING PROGRAM

## 2024-05-03 PROCEDURE — 2500000005 HC RX 250 GENERAL PHARMACY W/O HCPCS: Performed by: STUDENT IN AN ORGANIZED HEALTH CARE EDUCATION/TRAINING PROGRAM

## 2024-05-03 PROCEDURE — 82947 ASSAY GLUCOSE BLOOD QUANT: CPT

## 2024-05-03 PROCEDURE — 2500000004 HC RX 250 GENERAL PHARMACY W/ HCPCS (ALT 636 FOR OP/ED): Performed by: STUDENT IN AN ORGANIZED HEALTH CARE EDUCATION/TRAINING PROGRAM

## 2024-05-03 PROCEDURE — 36415 COLL VENOUS BLD VENIPUNCTURE: CPT

## 2024-05-03 PROCEDURE — 84484 ASSAY OF TROPONIN QUANT: CPT

## 2024-05-03 PROCEDURE — 2500000001 HC RX 250 WO HCPCS SELF ADMINISTERED DRUGS (ALT 637 FOR MEDICARE OP)

## 2024-05-03 PROCEDURE — 83605 ASSAY OF LACTIC ACID: CPT

## 2024-05-03 PROCEDURE — 36415 COLL VENOUS BLD VENIPUNCTURE: CPT | Performed by: STUDENT IN AN ORGANIZED HEALTH CARE EDUCATION/TRAINING PROGRAM

## 2024-05-03 PROCEDURE — 1200000002 HC GENERAL ROOM WITH TELEMETRY DAILY

## 2024-05-03 PROCEDURE — 84100 ASSAY OF PHOSPHORUS: CPT | Performed by: STUDENT IN AN ORGANIZED HEALTH CARE EDUCATION/TRAINING PROGRAM

## 2024-05-03 PROCEDURE — 2500000001 HC RX 250 WO HCPCS SELF ADMINISTERED DRUGS (ALT 637 FOR MEDICARE OP): Performed by: STUDENT IN AN ORGANIZED HEALTH CARE EDUCATION/TRAINING PROGRAM

## 2024-05-03 PROCEDURE — 2500000002 HC RX 250 W HCPCS SELF ADMINISTERED DRUGS (ALT 637 FOR MEDICARE OP, ALT 636 FOR OP/ED)

## 2024-05-03 PROCEDURE — 99232 SBSQ HOSP IP/OBS MODERATE 35: CPT | Performed by: STUDENT IN AN ORGANIZED HEALTH CARE EDUCATION/TRAINING PROGRAM

## 2024-05-03 PROCEDURE — 82436 ASSAY OF URINE CHLORIDE: CPT | Performed by: STUDENT IN AN ORGANIZED HEALTH CARE EDUCATION/TRAINING PROGRAM

## 2024-05-03 PROCEDURE — 2500000004 HC RX 250 GENERAL PHARMACY W/ HCPCS (ALT 636 FOR OP/ED)

## 2024-05-03 PROCEDURE — 80202 ASSAY OF VANCOMYCIN: CPT | Performed by: PHARMACY TECHNICIAN

## 2024-05-03 PROCEDURE — 2500000002 HC RX 250 W HCPCS SELF ADMINISTERED DRUGS (ALT 637 FOR MEDICARE OP, ALT 636 FOR OP/ED): Performed by: STUDENT IN AN ORGANIZED HEALTH CARE EDUCATION/TRAINING PROGRAM

## 2024-05-03 RX ORDER — OXYCODONE HYDROCHLORIDE 5 MG/1
5 TABLET ORAL EVERY 6 HOURS PRN
Status: DISCONTINUED | OUTPATIENT
Start: 2024-05-03 | End: 2024-05-04

## 2024-05-03 RX ORDER — INSULIN GLARGINE 100 [IU]/ML
17 INJECTION, SOLUTION SUBCUTANEOUS NIGHTLY
Status: DISCONTINUED | OUTPATIENT
Start: 2024-05-03 | End: 2024-05-05 | Stop reason: HOSPADM

## 2024-05-03 RX ORDER — BLOOD-GLUCOSE SENSOR
1 EACH MISCELLANEOUS
Qty: 2 EACH | Refills: 3 | Status: SHIPPED | OUTPATIENT
Start: 2024-05-03 | End: 2024-06-02

## 2024-05-03 RX ORDER — INSULIN LISPRO 100 [IU]/ML
2 INJECTION, SOLUTION INTRAVENOUS; SUBCUTANEOUS
Status: DISCONTINUED | OUTPATIENT
Start: 2024-05-03 | End: 2024-05-05 | Stop reason: HOSPADM

## 2024-05-03 RX ORDER — TALC
3 POWDER (GRAM) TOPICAL NIGHTLY PRN
Status: DISCONTINUED | OUTPATIENT
Start: 2024-05-03 | End: 2024-05-05 | Stop reason: HOSPADM

## 2024-05-03 RX ADMIN — ALUMINUM HYDROXIDE, MAGNESIUM HYDROXIDE, AND SIMETHICONE 10 ML: 1200; 120; 1200 SUSPENSION ORAL at 13:26

## 2024-05-03 RX ADMIN — HYDROMORPHONE HYDROCHLORIDE 0.2 MG: 1 INJECTION, SOLUTION INTRAMUSCULAR; INTRAVENOUS; SUBCUTANEOUS at 07:49

## 2024-05-03 RX ADMIN — Medication 1 TABLET: at 08:00

## 2024-05-03 RX ADMIN — GABAPENTIN 300 MG: 300 CAPSULE ORAL at 08:00

## 2024-05-03 RX ADMIN — INSULIN LISPRO 2 UNITS: 100 INJECTION, SOLUTION INTRAVENOUS; SUBCUTANEOUS at 18:02

## 2024-05-03 RX ADMIN — INSULIN LISPRO 6 UNITS: 100 INJECTION, SOLUTION INTRAVENOUS; SUBCUTANEOUS at 13:19

## 2024-05-03 RX ADMIN — ACETAMINOPHEN 650 MG: 650 SOLUTION ORAL at 20:06

## 2024-05-03 RX ADMIN — SODIUM PHOSPHATE, MONOBASIC, MONOHYDRATE AND SODIUM PHOSPHATE, DIBASIC, ANHYDROUS 15 MMOL: 142; 276 INJECTION, SOLUTION INTRAVENOUS at 17:10

## 2024-05-03 RX ADMIN — NIFEDIPINE 30 MG: 30 TABLET, FILM COATED, EXTENDED RELEASE ORAL at 05:07

## 2024-05-03 RX ADMIN — PANTOPRAZOLE SODIUM 40 MG: 40 TABLET, DELAYED RELEASE ORAL at 08:00

## 2024-05-03 RX ADMIN — THIAMINE HCL TAB 100 MG 100 MG: 100 TAB at 08:00

## 2024-05-03 RX ADMIN — INSULIN GLARGINE 17 UNITS: 100 INJECTION, SOLUTION SUBCUTANEOUS at 20:01

## 2024-05-03 RX ADMIN — GABAPENTIN 300 MG: 300 CAPSULE ORAL at 20:01

## 2024-05-03 RX ADMIN — PANTOPRAZOLE SODIUM 40 MG: 40 TABLET, DELAYED RELEASE ORAL at 20:01

## 2024-05-03 RX ADMIN — GABAPENTIN 300 MG: 300 CAPSULE ORAL at 15:08

## 2024-05-03 RX ADMIN — SENNOSIDES 8.6 MG: 8.6 TABLET, FILM COATED ORAL at 20:01

## 2024-05-03 RX ADMIN — CALCIUM CARBONATE (ANTACID) CHEW TAB 500 MG 1000 MG: 500 CHEW TAB at 20:01

## 2024-05-03 RX ADMIN — OXYCODONE HYDROCHLORIDE 5 MG: 5 TABLET ORAL at 11:55

## 2024-05-03 RX ADMIN — INSULIN LISPRO 2 UNITS: 100 INJECTION, SOLUTION INTRAVENOUS; SUBCUTANEOUS at 13:20

## 2024-05-03 RX ADMIN — ALUMINUM HYDROXIDE, MAGNESIUM HYDROXIDE, AND SIMETHICONE 10 ML: 1200; 120; 1200 SUSPENSION ORAL at 20:01

## 2024-05-03 RX ADMIN — HYDROMORPHONE HYDROCHLORIDE 0.2 MG: 1 INJECTION, SOLUTION INTRAMUSCULAR; INTRAVENOUS; SUBCUTANEOUS at 03:50

## 2024-05-03 RX ADMIN — FOLIC ACID 1 MG: 1 TABLET ORAL at 08:00

## 2024-05-03 RX ADMIN — OXYCODONE HYDROCHLORIDE 5 MG: 5 TABLET ORAL at 17:59

## 2024-05-03 RX ADMIN — PIPERACILLIN SODIUM AND TAZOBACTAM SODIUM 3.38 G: 3; .375 INJECTION, SOLUTION INTRAVENOUS at 05:07

## 2024-05-03 RX ADMIN — SERTRALINE HYDROCHLORIDE 25 MG: 25 TABLET ORAL at 08:00

## 2024-05-03 RX ADMIN — Medication 3 MG: at 22:16

## 2024-05-03 ASSESSMENT — PAIN SCALES - GENERAL
PAINLEVEL_OUTOF10: 7
PAINLEVEL_OUTOF10: 9
PAINLEVEL_OUTOF10: 5 - MODERATE PAIN
PAINLEVEL_OUTOF10: 7

## 2024-05-03 ASSESSMENT — LIFESTYLE VARIABLES
ORIENTATION AND CLOUDING OF SENSORIUM: ORIENTED AND CAN DO SERIAL ADDITIONS
AUDITORY DISTURBANCES: NOT PRESENT
ORIENTATION AND CLOUDING OF SENSORIUM: ORIENTED AND CAN DO SERIAL ADDITIONS
PULSE: 68
NAUSEA AND VOMITING: NO NAUSEA AND NO VOMITING
HEADACHE, FULLNESS IN HEAD: NOT PRESENT
TREMOR: NO TREMOR
PAROXYSMAL SWEATS: NO SWEAT VISIBLE
TREMOR: NO TREMOR
AGITATION: NORMAL ACTIVITY
TOTAL SCORE: 0
ANXIETY: MILDLY ANXIOUS
TOTAL SCORE: 1
NAUSEA AND VOMITING: NO NAUSEA AND NO VOMITING
AUDITORY DISTURBANCES: NOT PRESENT
VISUAL DISTURBANCES: NOT PRESENT
PAROXYSMAL SWEATS: NO SWEAT VISIBLE
AGITATION: NORMAL ACTIVITY
ANXIETY: NO ANXIETY, AT EASE
VISUAL DISTURBANCES: NOT PRESENT
HEADACHE, FULLNESS IN HEAD: NOT PRESENT

## 2024-05-03 ASSESSMENT — COGNITIVE AND FUNCTIONAL STATUS - GENERAL
DRESSING REGULAR UPPER BODY CLOTHING: A LITTLE
WALKING IN HOSPITAL ROOM: A LITTLE
PERSONAL GROOMING: A LITTLE
DAILY ACTIVITIY SCORE: 18
CLIMB 3 TO 5 STEPS WITH RAILING: A LITTLE
HELP NEEDED FOR BATHING: A LITTLE
TOILETING: A LITTLE
MOBILITY SCORE: 21
DRESSING REGULAR LOWER BODY CLOTHING: A LITTLE
EATING MEALS: A LITTLE
STANDING UP FROM CHAIR USING ARMS: A LITTLE

## 2024-05-03 ASSESSMENT — PAIN - FUNCTIONAL ASSESSMENT
PAIN_FUNCTIONAL_ASSESSMENT: 0-10

## 2024-05-03 ASSESSMENT — PAIN DESCRIPTION - LOCATION
LOCATION: OTHER (COMMENT)
LOCATION: ABDOMEN
LOCATION: CHEST
LOCATION: THROAT

## 2024-05-03 ASSESSMENT — PAIN SCALES - PAIN ASSESSMENT IN ADVANCED DEMENTIA (PAINAD): TOTALSCORE: MEDICATION (SEE MAR)

## 2024-05-03 NOTE — NURSING NOTE
"Ms. English is feeling \"much better\" today.  States she is to be discharged home tomorrow.  She is comfortable with her insulin regimen and states she can follow at home.  She would like endocrinology follow up outpatient.  We reviewed sick day management -- she is asking questions and they are answered.  At this time I will sign off as she is to be discharged over the weekend.  Time spent:  20 mins.  "

## 2024-05-03 NOTE — PROGRESS NOTES
"Barbara English is a 49 y.o. female on day 3 of admission presenting with Diabetic ketoacidosis without coma associated with diabetes mellitus due to underlying condition (Multi).    Subjective   Pt endorsing throat pain and continued SOB. Is endorsing an appetite. Denying diarrhea. Endorsing paraesthesias. Denying f/c/n/v.      Objective   General: thin, no acute distress, mildly diaphoretic, AAOx3, 1 instance of fecal incontinence  HEENT: EOM intact, PERRL, dry MM  CV: regular rate and rhythm  Pulm: normal respiratory effort, clear to auscultation bilaterally with no wheezes, rales, rhonchi  Abd: soft, non-distended, diffusely mildly tender to deep palpation, more notably tender to palpation in epigastric region and LLQ  Extremities: warm, no LE edema, R foot amputated to tarsometatarsal joint. Surgical site is healed, with skin ridge containing cotton. No open wound, no purulence or erythema. No tenderness to palpation.   Neuro: moves all extremities equally, CN II - XII grossly intact  Psych: appropriate mood and affect   Skin: warm, dry, intact, hyperpigments macules along upper back and upper arms    Physical Exam    Last Recorded Vitals  Blood pressure 149/89, pulse 68, temperature 36.9 °C (98.4 °F), temperature source Tympanic, resp. rate 18, height 1.626 m (5' 4\"), weight 63.5 kg (140 lb), SpO2 100%.  Intake/Output last 3 Shifts:  I/O last 3 completed shifts:  In: 840 (13.2 mL/kg) [P.O.:540; IV Piggyback:300]  Out: 400 (6.3 mL/kg) [Urine:400 (0.2 mL/kg/hr)]  Weight: 63.5 kg     Relevant Results  Scheduled medications  folic acid, 1 mg, oral, Daily  gabapentin, 300 mg, oral, TID  insulin glargine, 15 Units, subcutaneous, Nightly  insulin lispro, 0-10 Units, subcutaneous, TID with meals  multivitamin with minerals, 1 tablet, oral, Daily  NIFEdipine ER, 30 mg, oral, Daily before breakfast  pantoprazole, 40 mg, oral, BID  piperacillin-tazobactam, 3.375 g, intravenous, q6h  polyethylene glycol, 17 g, oral, " Daily  sennosides, 1 tablet, oral, BID  sertraline, 25 mg, oral, Daily  thiamine, 100 mg, oral, Daily  vancomycin, 1,250 mg, intravenous, q24h      Continuous medications     PRN medications  PRN medications: acetaminophen **OR** acetaminophen **OR** acetaminophen, alum-mag hydroxide-simeth, calcium carbonate, dextrose, dextrose, glucagon, glucagon, HYDROmorphone, ondansetron, vancomycin  Results for orders placed or performed during the hospital encounter of 04/30/24 (from the past 24 hour(s))   POCT GLUCOSE   Result Value Ref Range    POCT Glucose 125 (H) 74 - 99 mg/dL   POCT GLUCOSE   Result Value Ref Range    POCT Glucose 313 (H) 74 - 99 mg/dL   POCT GLUCOSE   Result Value Ref Range    POCT Glucose 214 (H) 74 - 99 mg/dL   POCT GLUCOSE   Result Value Ref Range    POCT Glucose 219 (H) 74 - 99 mg/dL   Urine electrolytes   Result Value Ref Range    Sodium, Urine Random 71 mmol/L    Sodium/Creatinine Ratio 63 Not established. mmol/g Creat    Potassium, Urine Random 39 mmol/L    Potassium/Creatinine Ratio 35 Not established mmol/g Creat    Chloride, Urine Random 25 mmol/L    Chloride/Creatinine Ratio 22 (L) 38 - 318 mmol/g creat    Creatinine, Urine Random 112.5 20.0 - 320.0 mg/dL   POCT GLUCOSE   Result Value Ref Range    POCT Glucose 167 (H) 74 - 99 mg/dL     No results found.                        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 Considering hyperglycemia but lack of metabolic acidosis, likely not in DKA but rather uncontrolled diabetes, which could account for abdominal  pain/vomiting. Considering normal pressures this morning, hypertensive urgency likely due to cocaine withdrawal/pain. Chest pain possibly due to repeated emesis vs coronary vasospasm iso crack/cocaine use (trops negative, EKG non-suggestive of acute coronary syndrome). Plan to initiate insulin therapy, consult podiatry for amputation evaluation, hydrate for likely pre-renal MOUSTAPHA (will obtain urine lytes to confirm), and obtain stool cultures (for diarrhea with possible colitis on CT).     Update 5/3:  - Glargine 15u--> 17u, restart Lispro 2u TID  - Follow-up how to start CGM  - Discontinuing Dilaudid, keep oxy PRN  - Bcx neg for 48 hr, discontinue vanc/zosyn  - Discontinue CIWA    #Uncontrolled Diabetes type 2--Hba1c 13 5/2024  #Diabetic neuropathy   DKA precipitated by sepsis 2/2 possible diabetic foot osteomyelitis and non compliance on home insulin.   BS > 600 on admission. VBG: metabolic acidosis + resp alkalosis - pH 7.42, pCO2 29, Bicarb 18  ::S\P  1.5 L LR, calcium gluconate, 20 units insulin total. R/p BMP with improvement of glucose 366, K 4.2, gap closed. R/p VBG with pH 7.41, slight bump in lactate 2.7 from 1.1 Received 1.5L LR.   :: Regimen on 1/9/24 discharge: Glargine 25U, lispro 7u TID  - Basal: Insulin glargine 15 --> 17 units QHS  - Bolus: Insulin lispro 2u TID  - ISS #1 with meals and at bedtime  - Increase home gabapentin 100 BID --> 300 TID  - Diabetic diet       #R plantar abscess  #S/p R lisfranc amputation  :: Admission for no-healing foot wound 11/23  :: S/p I&D R foot with amputation of 4th and 5th digits (12/27/23) followed by debridement R foot with resection of 4th and 5th metatarsals (12/29/23) followed by open lisfranc disarticulation of R foot (1/2/24) followed by percutaneous tendo achilles lengthening right + wound debridement (1/5/24).    :: Completed fluconazole until 1/19, vanomycin intended course till 2/21, did not complete. Follow up with ID (Dr. Ayala).   :: Lactate 1.1 --> 2.7  --> 1.3  :: Bcx neg at 48hr  - Discontinue vanc/zosyn 5/3  - Podiatry consulted      #HTN urgency  Utox cocaine positive. she endorsed HA  BP on arrival 221/113, in the ED she was treated with home lisinopril and Nifedipine compliance is doubtful.  :: S\P 20 IV labetalol twice at ED  - Resume Nifedipine 30 Daily  - Hold lisinopril and hydrochlorothiazide iso MOUSTAPHA  - Hold hydralazine (initiated on admission for HTN) for softer pressures  - avoid beta blockers in context of cocaine use     #Substance Use  Utox positive for cocaine  - Avoid B-blockers iso cocaine use  - Discontinue CIWA   - Thiamine, folic acid, multivitamin     #MOUSTAPHA  :: On admission, Cr: 1.56 (baseline 0.6-0.9)  :: Dehydration, S\P 2L EL at ED  :: LR 1L on admission  - Daily RFP  - Avoid nephrotoxics  - Renally dose medications  - Urine electrolytes  - I\O  - NS 1L bolus 5/1     #Anxiety  #Depression  :: Sertraline dose on discharge: 35mg daily  - sertraline 25mg daily     #Gastritis  #Esophagitis  #Colitis\Constipation  CT findings as above  - Pantoprazole   - Restart PEG and Doc Senna   - Mylanta  - Stool culture PCR   - Zofran PRN     F S\P 2L RL, 1L RL 100ml\hr  E PRN, K >4  N Diabetic diet  A PIV  DVT Heparin  GI pantoprazole  Code: Full Code  NOK: friend Elva Samm: [phone]          Ramila Kunz, MS4

## 2024-05-03 NOTE — CARE PLAN
Problem: Pain  Goal: My pain/discomfort is manageable  Outcome: Progressing     Problem: Safety  Goal: Patient will be injury free during hospitalization  Outcome: Progressing   The patient's goals for the shift include      The clinical goals for the shift include Will have pain controlled during shift    \

## 2024-05-03 NOTE — PROGRESS NOTES
Vancomycin Dosing by Pharmacy- Cessation of Therapy    Consult to pharmacy for vancomycin dosing has been discontinued by the prescriber, pharmacy will sign off at this time.    Please call pharmacy if there are further questions or re-enter a consult if vancomycin is resumed.     Niki Estevez, PharmD

## 2024-05-04 LAB
ALBUMIN SERPL BCP-MCNC: 2.9 G/DL (ref 3.4–5)
ANION GAP SERPL CALC-SCNC: 12 MMOL/L (ref 10–20)
BACTERIA BLD CULT: NORMAL
BACTERIA BLD CULT: NORMAL
BUN SERPL-MCNC: 15 MG/DL (ref 6–23)
CALCIUM SERPL-MCNC: 7.9 MG/DL (ref 8.6–10.6)
CHLORIDE SERPL-SCNC: 106 MMOL/L (ref 98–107)
CO2 SERPL-SCNC: 29 MMOL/L (ref 21–32)
CREAT SERPL-MCNC: 0.86 MG/DL (ref 0.5–1.05)
EGFRCR SERPLBLD CKD-EPI 2021: 83 ML/MIN/1.73M*2
ERYTHROCYTE [DISTWIDTH] IN BLOOD BY AUTOMATED COUNT: 12.3 % (ref 11.5–14.5)
GLUCOSE BLD MANUAL STRIP-MCNC: 132 MG/DL (ref 74–99)
GLUCOSE BLD MANUAL STRIP-MCNC: 202 MG/DL (ref 74–99)
GLUCOSE BLD MANUAL STRIP-MCNC: 243 MG/DL (ref 74–99)
GLUCOSE BLD MANUAL STRIP-MCNC: 255 MG/DL (ref 74–99)
GLUCOSE SERPL-MCNC: 104 MG/DL (ref 74–99)
HCT VFR BLD AUTO: 29.9 % (ref 36–46)
HGB BLD-MCNC: 9.4 G/DL (ref 12–16)
MCH RBC QN AUTO: 26.3 PG (ref 26–34)
MCHC RBC AUTO-ENTMCNC: 31.4 G/DL (ref 32–36)
MCV RBC AUTO: 84 FL (ref 80–100)
NRBC BLD-RTO: 0 /100 WBCS (ref 0–0)
PHOSPHATE SERPL-MCNC: 2.4 MG/DL (ref 2.5–4.9)
PLATELET # BLD AUTO: 301 X10*3/UL (ref 150–450)
POTASSIUM SERPL-SCNC: 4.5 MMOL/L (ref 3.5–5.3)
RBC # BLD AUTO: 3.57 X10*6/UL (ref 4–5.2)
SODIUM SERPL-SCNC: 142 MMOL/L (ref 136–145)
WBC # BLD AUTO: 8.4 X10*3/UL (ref 4.4–11.3)

## 2024-05-04 PROCEDURE — 1200000002 HC GENERAL ROOM WITH TELEMETRY DAILY

## 2024-05-04 PROCEDURE — 99233 SBSQ HOSP IP/OBS HIGH 50: CPT | Performed by: INTERNAL MEDICINE

## 2024-05-04 PROCEDURE — 2500000001 HC RX 250 WO HCPCS SELF ADMINISTERED DRUGS (ALT 637 FOR MEDICARE OP)

## 2024-05-04 PROCEDURE — 2500000002 HC RX 250 W HCPCS SELF ADMINISTERED DRUGS (ALT 637 FOR MEDICARE OP, ALT 636 FOR OP/ED)

## 2024-05-04 PROCEDURE — 2500000001 HC RX 250 WO HCPCS SELF ADMINISTERED DRUGS (ALT 637 FOR MEDICARE OP): Performed by: STUDENT IN AN ORGANIZED HEALTH CARE EDUCATION/TRAINING PROGRAM

## 2024-05-04 PROCEDURE — 80069 RENAL FUNCTION PANEL: CPT | Performed by: STUDENT IN AN ORGANIZED HEALTH CARE EDUCATION/TRAINING PROGRAM

## 2024-05-04 PROCEDURE — 85027 COMPLETE CBC AUTOMATED: CPT | Performed by: STUDENT IN AN ORGANIZED HEALTH CARE EDUCATION/TRAINING PROGRAM

## 2024-05-04 PROCEDURE — 2500000004 HC RX 250 GENERAL PHARMACY W/ HCPCS (ALT 636 FOR OP/ED): Performed by: STUDENT IN AN ORGANIZED HEALTH CARE EDUCATION/TRAINING PROGRAM

## 2024-05-04 PROCEDURE — 2500000002 HC RX 250 W HCPCS SELF ADMINISTERED DRUGS (ALT 637 FOR MEDICARE OP, ALT 636 FOR OP/ED): Performed by: STUDENT IN AN ORGANIZED HEALTH CARE EDUCATION/TRAINING PROGRAM

## 2024-05-04 PROCEDURE — 36415 COLL VENOUS BLD VENIPUNCTURE: CPT | Performed by: STUDENT IN AN ORGANIZED HEALTH CARE EDUCATION/TRAINING PROGRAM

## 2024-05-04 PROCEDURE — 2500000006 HC RX 250 W HCPCS SELF ADMINISTERED DRUGS (ALT 637 FOR ALL PAYERS)

## 2024-05-04 PROCEDURE — 82947 ASSAY GLUCOSE BLOOD QUANT: CPT

## 2024-05-04 RX ORDER — NIFEDIPINE 30 MG/1
30 TABLET, FILM COATED, EXTENDED RELEASE ORAL
Qty: 30 TABLET | Refills: 0 | OUTPATIENT
Start: 2024-05-05 | End: 2024-06-04

## 2024-05-04 RX ORDER — PANTOPRAZOLE SODIUM 40 MG/1
40 TABLET, DELAYED RELEASE ORAL 2 TIMES DAILY
Qty: 60 TABLET | Refills: 0 | OUTPATIENT
Start: 2024-05-04 | End: 2024-06-03

## 2024-05-04 RX ORDER — GABAPENTIN 300 MG/1
300 CAPSULE ORAL 3 TIMES DAILY
Start: 2024-05-04 | End: 2024-06-03

## 2024-05-04 RX ORDER — INSULIN GLARGINE 100 [IU]/ML
17 INJECTION, SOLUTION SUBCUTANEOUS NIGHTLY
Qty: 5.1 ML | Refills: 0 | OUTPATIENT
Start: 2024-05-04 | End: 2024-06-03

## 2024-05-04 RX ORDER — SERTRALINE HYDROCHLORIDE 25 MG/1
25 TABLET, FILM COATED ORAL DAILY
Qty: 30 TABLET | Refills: 0 | OUTPATIENT
Start: 2024-05-04 | End: 2024-06-03

## 2024-05-04 RX ORDER — OXYCODONE HYDROCHLORIDE 5 MG/1
5 TABLET ORAL EVERY 6 HOURS PRN
Status: DISCONTINUED | OUTPATIENT
Start: 2024-05-04 | End: 2024-05-05

## 2024-05-04 RX ORDER — MULTIVIT-MIN/IRON FUM/FOLIC AC 7.5 MG-4
1 TABLET ORAL DAILY
Qty: 30 TABLET | Refills: 0 | OUTPATIENT
Start: 2024-05-05 | End: 2024-06-04

## 2024-05-04 RX ADMIN — ALUMINUM HYDROXIDE, MAGNESIUM HYDROXIDE, AND SIMETHICONE 10 ML: 1200; 120; 1200 SUSPENSION ORAL at 16:15

## 2024-05-04 RX ADMIN — CALCIUM CARBONATE (ANTACID) CHEW TAB 500 MG 1000 MG: 500 CHEW TAB at 18:28

## 2024-05-04 RX ADMIN — OXYCODONE HYDROCHLORIDE 5 MG: 5 TABLET ORAL at 04:21

## 2024-05-04 RX ADMIN — GABAPENTIN 300 MG: 300 CAPSULE ORAL at 08:16

## 2024-05-04 RX ADMIN — INSULIN GLARGINE 17 UNITS: 100 INJECTION, SOLUTION SUBCUTANEOUS at 21:15

## 2024-05-04 RX ADMIN — INSULIN LISPRO 2 UNITS: 100 INJECTION, SOLUTION INTRAVENOUS; SUBCUTANEOUS at 08:24

## 2024-05-04 RX ADMIN — INSULIN LISPRO 2 UNITS: 100 INJECTION, SOLUTION INTRAVENOUS; SUBCUTANEOUS at 12:50

## 2024-05-04 RX ADMIN — PANTOPRAZOLE SODIUM 40 MG: 40 TABLET, DELAYED RELEASE ORAL at 21:15

## 2024-05-04 RX ADMIN — OXYCODONE HYDROCHLORIDE 5 MG: 5 TABLET ORAL at 18:25

## 2024-05-04 RX ADMIN — THIAMINE HCL TAB 100 MG 100 MG: 100 TAB at 08:16

## 2024-05-04 RX ADMIN — PANTOPRAZOLE SODIUM 40 MG: 40 TABLET, DELAYED RELEASE ORAL at 08:16

## 2024-05-04 RX ADMIN — INSULIN LISPRO 2 UNITS: 100 INJECTION, SOLUTION INTRAVENOUS; SUBCUTANEOUS at 16:09

## 2024-05-04 RX ADMIN — ALUMINUM HYDROXIDE, MAGNESIUM HYDROXIDE, AND SIMETHICONE 10 ML: 1200; 120; 1200 SUSPENSION ORAL at 08:51

## 2024-05-04 RX ADMIN — INSULIN LISPRO 4 UNITS: 100 INJECTION, SOLUTION INTRAVENOUS; SUBCUTANEOUS at 12:49

## 2024-05-04 RX ADMIN — ALUMINUM HYDROXIDE, MAGNESIUM HYDROXIDE, AND SIMETHICONE 10 ML: 1200; 120; 1200 SUSPENSION ORAL at 21:14

## 2024-05-04 RX ADMIN — SERTRALINE HYDROCHLORIDE 25 MG: 25 TABLET ORAL at 08:16

## 2024-05-04 RX ADMIN — OXYCODONE HYDROCHLORIDE 5 MG: 5 TABLET ORAL at 12:03

## 2024-05-04 RX ADMIN — GABAPENTIN 300 MG: 300 CAPSULE ORAL at 21:15

## 2024-05-04 RX ADMIN — ALUMINUM HYDROXIDE, MAGNESIUM HYDROXIDE, AND SIMETHICONE 10 ML: 1200; 120; 1200 SUSPENSION ORAL at 05:24

## 2024-05-04 RX ADMIN — ACETAMINOPHEN 650 MG: 650 SOLUTION ORAL at 05:28

## 2024-05-04 RX ADMIN — Medication 1 TABLET: at 08:16

## 2024-05-04 RX ADMIN — GABAPENTIN 300 MG: 300 CAPSULE ORAL at 16:09

## 2024-05-04 RX ADMIN — ACETAMINOPHEN 650 MG: 650 SOLUTION ORAL at 18:28

## 2024-05-04 RX ADMIN — FOLIC ACID 1 MG: 1 TABLET ORAL at 08:16

## 2024-05-04 RX ADMIN — Medication 3 MG: at 22:22

## 2024-05-04 RX ADMIN — NIFEDIPINE 30 MG: 30 TABLET, FILM COATED, EXTENDED RELEASE ORAL at 08:16

## 2024-05-04 RX ADMIN — INSULIN LISPRO 4 UNITS: 100 INJECTION, SOLUTION INTRAVENOUS; SUBCUTANEOUS at 16:09

## 2024-05-04 ASSESSMENT — LIFESTYLE VARIABLES
ANXIETY: MILDLY ANXIOUS
PAROXYSMAL SWEATS: NO SWEAT VISIBLE
ANXIETY: NO ANXIETY, AT EASE
AUDITORY DISTURBANCES: NOT PRESENT
AGITATION: NORMAL ACTIVITY
AGITATION: NORMAL ACTIVITY
NAUSEA AND VOMITING: NO NAUSEA AND NO VOMITING
TOTAL SCORE: 0
VISUAL DISTURBANCES: NOT PRESENT
HEADACHE, FULLNESS IN HEAD: NOT PRESENT
TOTAL SCORE: 1
VISUAL DISTURBANCES: NOT PRESENT
NAUSEA AND VOMITING: NO NAUSEA AND NO VOMITING
HEADACHE, FULLNESS IN HEAD: NOT PRESENT
TREMOR: NO TREMOR
PULSE: 80
TREMOR: NO TREMOR
ORIENTATION AND CLOUDING OF SENSORIUM: ORIENTED AND CAN DO SERIAL ADDITIONS
AUDITORY DISTURBANCES: NOT PRESENT
ORIENTATION AND CLOUDING OF SENSORIUM: ORIENTED AND CAN DO SERIAL ADDITIONS
PAROXYSMAL SWEATS: NO SWEAT VISIBLE
BLOOD PRESSURE: 132/76

## 2024-05-04 ASSESSMENT — PAIN - FUNCTIONAL ASSESSMENT
PAIN_FUNCTIONAL_ASSESSMENT: 0-10

## 2024-05-04 ASSESSMENT — COGNITIVE AND FUNCTIONAL STATUS - GENERAL
MOBILITY SCORE: 23
DRESSING REGULAR LOWER BODY CLOTHING: A LITTLE
TOILETING: A LITTLE
MOBILITY SCORE: 21
DAILY ACTIVITIY SCORE: 18
DRESSING REGULAR UPPER BODY CLOTHING: A LITTLE
CLIMB 3 TO 5 STEPS WITH RAILING: A LITTLE
EATING MEALS: A LITTLE
WALKING IN HOSPITAL ROOM: A LITTLE
CLIMB 3 TO 5 STEPS WITH RAILING: A LITTLE
PERSONAL GROOMING: A LITTLE
STANDING UP FROM CHAIR USING ARMS: A LITTLE
DAILY ACTIVITIY SCORE: 24
HELP NEEDED FOR BATHING: A LITTLE

## 2024-05-04 ASSESSMENT — PAIN DESCRIPTION - LOCATION
LOCATION: THROAT
LOCATION: CHEST

## 2024-05-04 ASSESSMENT — PAIN SCALES - GENERAL
PAINLEVEL_OUTOF10: 9
PAINLEVEL_OUTOF10: 5 - MODERATE PAIN
PAINLEVEL_OUTOF10: 7
PAINLEVEL_OUTOF10: 8
PAINLEVEL_OUTOF10: 7

## 2024-05-04 NOTE — PROGRESS NOTES
"Barbara English is a 49 y.o. female on day 4 of admission presenting with Diabetic ketoacidosis without coma associated with diabetes mellitus due to underlying condition (Multi).    Subjective   Pt endorsing throat pain and continued SOB. Has been able to drink some soup but unable to eat any solid food without pain.  Denying f/c/n/v.      Objective   General: thin, no acute distress, mildly diaphoretic, AAOx3  HEENT: EOM intact, PERRL, dry MM, no thrush visible in oral cavity  CV: regular rate and rhythm  Pulm: normal respiratory effort, clear to auscultation bilaterally with no wheezes, rales, rhonchi  Abd: soft, non-distended, tender to palpation in epigastric region  Extremities: warm, no LE edema, R foot amputated to tarsometatarsal joint. Surgical site wrapped.   Neuro: moves all extremities equally, CN II - XII grossly intact  Psych: appropriate mood and affect   Skin: warm, dry, intact, hyperpigments macules along upper back and upper arms    Physical Exam    Last Recorded Vitals  Blood pressure 142/86, pulse 67, temperature 36.9 °C (98.4 °F), resp. rate 18, height 1.626 m (5' 4\"), weight 63.5 kg (140 lb), SpO2 99%.  Intake/Output last 3 Shifts:  I/O last 3 completed shifts:  In: 1050 (16.5 mL/kg) [P.O.:700; IV Piggyback:350]  Out: 400 (6.3 mL/kg) [Urine:400 (0.2 mL/kg/hr)]  Weight: 63.5 kg     Relevant Results  Scheduled medications  folic acid, 1 mg, oral, Daily  gabapentin, 300 mg, oral, TID  insulin glargine, 17 Units, subcutaneous, Nightly  insulin lispro, 0-10 Units, subcutaneous, TID with meals  insulin lispro, 2 Units, subcutaneous, TID with meals  multivitamin with minerals, 1 tablet, oral, Daily  NIFEdipine ER, 30 mg, oral, Daily before breakfast  pantoprazole, 40 mg, oral, BID  polyethylene glycol, 17 g, oral, Daily  sennosides, 1 tablet, oral, BID  sertraline, 25 mg, oral, Daily  thiamine, 100 mg, oral, Daily      Continuous medications     PRN medications  PRN medications: acetaminophen **OR** " acetaminophen **OR** acetaminophen, alum-mag hydroxide-simeth, calcium carbonate, dextrose, dextrose, glucagon, glucagon, melatonin, ondansetron, oxyCODONE  Results for orders placed or performed during the hospital encounter of 04/30/24 (from the past 24 hour(s))   POCT GLUCOSE   Result Value Ref Range    POCT Glucose 131 (H) 74 - 99 mg/dL   Lactate   Result Value Ref Range    Lactate 0.8 0.4 - 2.0 mmol/L   POCT GLUCOSE   Result Value Ref Range    POCT Glucose 132 (H) 74 - 99 mg/dL   CBC   Result Value Ref Range    WBC 8.4 4.4 - 11.3 x10*3/uL    nRBC 0.0 0.0 - 0.0 /100 WBCs    RBC 3.57 (L) 4.00 - 5.20 x10*6/uL    Hemoglobin 9.4 (L) 12.0 - 16.0 g/dL    Hematocrit 29.9 (L) 36.0 - 46.0 %    MCV 84 80 - 100 fL    MCH 26.3 26.0 - 34.0 pg    MCHC 31.4 (L) 32.0 - 36.0 g/dL    RDW 12.3 11.5 - 14.5 %    Platelets 301 150 - 450 x10*3/uL   Renal function panel   Result Value Ref Range    Glucose 104 (H) 74 - 99 mg/dL    Sodium 142 136 - 145 mmol/L    Potassium 4.5 3.5 - 5.3 mmol/L    Chloride 106 98 - 107 mmol/L    Bicarbonate 29 21 - 32 mmol/L    Anion Gap 12 10 - 20 mmol/L    Urea Nitrogen 15 6 - 23 mg/dL    Creatinine 0.86 0.50 - 1.05 mg/dL    eGFR 83 >60 mL/min/1.73m*2    Calcium 7.9 (L) 8.6 - 10.6 mg/dL    Phosphorus 2.4 (L) 2.5 - 4.9 mg/dL    Albumin 2.9 (L) 3.4 - 5.0 g/dL   POCT GLUCOSE   Result Value Ref Range    POCT Glucose 243 (H) 74 - 99 mg/dL     No results found.                        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 Considering hyperglycemia but lack of metabolic acidosis,  likely not in DKA but rather uncontrolled diabetes, which could account for abdominal pain/vomiting. Considering normal pressures this morning, hypertensive urgency likely due to cocaine withdrawal/pain. Chest pain possibly due to repeated emesis vs coronary vasospasm iso crack/cocaine use (trops negative, EKG non-suggestive of acute coronary syndrome). Plan to initiate insulin therapy, consult podiatry for amputation evaluation, hydrate for likely pre-renal MOUSTAPHA (will obtain urine lytes to confirm), and obtain stool cultures (for diarrhea with possible colitis on CT).     Update 5/4:  - GI consult for continued dysphagia - c/f Candida esophagitis   - H pylori stool antigen  - Insulin regimen likely read for home going     #Uncontrolled Diabetes type 2--Hba1c 13 5/2024  #Diabetic neuropathy   DKA precipitated by sepsis 2/2 possible diabetic foot osteomyelitis and non compliance on home insulin.   BS > 600 on admission. VBG: metabolic acidosis + resp alkalosis - pH 7.42, pCO2 29, Bicarb 18  ::S\P  1.5 L LR, calcium gluconate, 20 units insulin total. R/p BMP with improvement of glucose 366, K 4.2, gap closed. R/p VBG with pH 7.41, slight bump in lactate 2.7 from 1.1 Received 1.5L LR.   :: Regimen on 1/9/24 discharge: Glargine 25U, lispro 7u TID  - Basal: Insulin glargine 15 --> 17 units QHS  - Bolus: Insulin lispro 2u TID  - ISS #1 with meals and at bedtime  - Increase home gabapentin 100 BID --> 300 TID  - Diabetic diet       #R plantar abscess  #S/p R lisfranc amputation  :: Admission for no-healing foot wound 11/23  :: S/p I&D R foot with amputation of 4th and 5th digits (12/27/23) followed by debridement R foot with resection of 4th and 5th metatarsals (12/29/23) followed by open lisfranc disarticulation of R foot (1/2/24) followed by percutaneous tendo achilles lengthening right + wound debridement (1/5/24).    :: Completed fluconazole until 1/19, vanomycin intended course till 2/21, did not complete. Follow up  with ID (Dr. Ayala).   :: Lactate 1.1 --> 2.7 --> 1.3  :: Bcx neg at 48hr  - Discontinue vanc/zosyn 5/3  - Podiatry consulted      #HTN urgency  Utox cocaine positive. she endorsed HA  BP on arrival 221/113, in the ED she was treated with home lisinopril and Nifedipine compliance is doubtful.  :: S\P 20 IV labetalol twice at ED  - Resume Nifedipine 30 Daily  - Hold lisinopril and hydrochlorothiazide iso MOUSTAPHA  - Hold hydralazine (initiated on admission for HTN) for softer pressures  - avoid beta blockers in context of cocaine use     #Substance Use  Utox positive for cocaine  - Avoid B-blockers iso cocaine use  - Discontinue CIWA   - Thiamine, folic acid, multivitamin     #MOUSTAPHA  :: On admission, Cr: 1.56 (baseline 0.6-0.9)  :: Dehydration, S\P 2L EL at ED  :: LR 1L on admission  - Daily RFP  - Avoid nephrotoxics  - Renally dose medications  - Urine electrolytes  - I\O  - NS 1L bolus 5/1     #Anxiety  #Depression  :: Sertraline dose on discharge: 35mg daily  - sertraline 25mg daily     #Gastritis  #Esophagitis  #Colitis\Constipation  CT findings as above  - GI consulted, appreciate recs  - Pantoprazole   - Restart PEG and Doc Senna   - Mylanta  - Stool culture PCR, H pylori stool antigen  - Zofran PRN     F S\P 2L RL, 1L RL 100ml\hr  E PRN, K >4  N Diabetic diet  A PIV  DVT Heparin  GI pantoprazole  Code: Full Code  NOK: friend Elva Samm: [phone]          Ramila Kunz, MS4

## 2024-05-04 NOTE — CARE PLAN
The patient's goals for the shift include      The clinical goals for the shift include Pt pain will be controlled during the shift. Goal progressing. Pt pain was controlled with medication and repositioning. Pt was checked and monitored throughout the shift. Pt had no injuries or falls during the shift. Most goals met.       Problem: Respiratory  Goal: Clear secretions with interventions this shift  Outcome: Progressing  Goal: Minimize anxiety/maximize coping throughout shift  Outcome: Progressing  Goal: Minimal/no exertional discomfort or dyspnea this shift  Outcome: Progressing  Goal: No signs of respiratory distress (eg. Use of accessory muscles. Peds grunting)  Outcome: Progressing  Goal: Patent airway maintained this shift  Outcome: Progressing  Goal: Tolerate pulmonary toileting this shift  Outcome: Progressing  Goal: Verbalize decreased shortness of breath this shift  Outcome: Progressing  Goal: Wean oxygen to maintain O2 saturation per order/standard this shift  Outcome: Progressing  Goal: Increase self care and/or family involvement in next 24 hours  Outcome: Progressing
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
## 2024-05-04 NOTE — CONSULTS
[facility]   [facility]  INITIAL CONSULT NOTE       Reason For Consult  Concern for candida esophagitis, follow up of previously diagnosed gastric ulcer    SUBJECTIVE     History Of Present Illness  Barbara English is a 49 y.o. female with a past medical history of uncontrolled IDDM2 (last A1c 13.3%) c/b diabetic neuropathy and non healing R foot wound (osteomyelitis including Candida Glabrata and MRSA) s/p amputation of 4th and 5th digits (12/27/2023) followed by debridement R foot with resection of 4th and 5th metatarsals (12/29/2023) followed by open lisfranc disarticulation of R foot (1/2/2024) followed by percutaneous tendo achilles lengthening right + wound debridement (1/5/2024), polysubstance abuse (cocaine and cigarettes), HTN, prior PUD, and candida esophagitis, as well as depression and suicide attempt in the past who was admitted on 4/30/2024 with multiple complaints including abdominal pain, nausea/vomiting, hypertensive urgency, MOUSTAPHA and chest pain. GI is consulted for concern for abnormal imaging findings on CT as well as dysphagia.     Per patient, she started experiencing some vomiting about a week ago. Was vomiting 10 times or more, mostly bilious, non-bloody emesis. No hematemesis. Says vomiting stopped when she came to the hospital after getting anti-emetics. Also complains of some midsternal chest pain. Says that eating and drinking and even talking continuously makes her short of breath. Anything she eats, be it solids or liquids makes her have chest discomfort and has trouble getting them down. She also complains of severe abdominal pain, which is pronounced when she eats anything. The pain is mostly in the epigastric region. She does not smoke marijuana. She says that she took some NSAIDs (4X200 mg tablets for 2 days) and it helped relieve her abdominal pain slightly, but then it came back very quickly. She does not regularly take NSAIDs and  only takes them occasionally. Denies seeing any blood in her stools, but does endorse occasional loose stools. Her appetite is greatly reduced because of her emesis and abdominal discomfort.     In addition, she complains of mid sternal discomfort which she says is present all the time. She says she has lot quite a bit of weight. Normally weighs ~160 lbs, but her weight during this admission is 140 lbs. She says it could be related to all the acute illnesses and hospitalizations she has had in the last few months. She also blames her lack of appetite on being depressed.     She had a CT a/p on 4/30/2024 that showed mild concentric mid to distal esophageal wall thickening. In addition, it showed some gastric wall thickening, as well as subtle component of bowel wall thickening and enhancement involving the ascending and transverse colon.     Of note, the patient had a similar presentation around this time last year in May 2023 to [facility] when she had presented with nausea, vomiting and abd pain and non bloody diarrhea. She had a CT at that time that showed mild circumferential lower esophageal wall thickening. She underwent an EGD that showed LA Grade A esophagitis, but was otherwise unremarkable and a colonoscopy that was mostly unremarkable (polyps removed from the sigmoid were hyperplastic). Full impressions are provided below.    Patient also endorses occasional night sweats, but otherwise no fevers or chills. Of note, even though she has a chart diagnosis of gastroparesis, she had a gastric emptying study done in Oct 2022 which showed normal gastric emptying (full report below).     She is supposed to be on 40mg Pantoprazole daily, but per chart review, this was a discharge med from [facility] on 3/29/24, but patient never picked it up.     She reports very rare alcohol use (maybe a couple of drinks a year). She smokes a pack of cigarettes every other day since 2014. She regularly smokes crack cocaine and  smoked it just before admission.     Review of Systems  A 12 point ROS was performed and is negative except for HPI above.      Past Medical History:  History reviewed. No pertinent past medical history.    Home Medications  Medications Prior to Admission   Medication Sig Dispense Refill Last Dose    amLODIPine (Norvasc) 10 mg tablet Take 1 tablet (10 mg) by mouth once daily.       gabapentin (Neurontin) 100 mg capsule Take 1 capsule (100 mg) by mouth 2 times a day.       hydroCHLOROthiazide (Microzide) 12.5 mg tablet Take 1 tablet (12.5 mg) by mouth once daily.       insulin glargine (Lantus U-100 Insulin) 100 unit/mL injection Inject 30 Units under the skin once daily at bedtime. Take as directed per insulin instructions.       insulin lispro (HumaLOG) 100 unit/mL injection Inject 0.02 mL (2 Units) under the skin 3 times a day with meals. Take as directed per insulin instructions.       lisinopril 40 mg tablet Take 1 tablet (40 mg) by mouth once daily.       pantoprazole (ProtoNix) 40 mg EC tablet Take 1 tablet (40 mg) by mouth 2 times a day. Do not crush, chew, or split.       sertraline (Zoloft) 25 mg tablet Take 1 tablet (25 mg) by mouth once daily.            Surgical History:    Past Surgical History:   Procedure Laterality Date    CT ANGIO CORONARY ART WITH HEARTFLOW IF SCORE >30%  11/17/2021    CT HEART CORONARY ANGIOGRAM 11/17/2021 [facility] CLINICAL LEGACY       Allergies:    Allergies   Allergen Reactions    Haloperidol Swelling    Lorazepam Swelling    Latex Itching and Rash    Vancomycin Rash     Suspected red man syndrome with vancomycin infusion - improves with prolonged infusion times       Social History:    Social History     Socioeconomic History    Marital status: Single     Spouse name: Not on file    Number of children: Not on file    Years of education: Not on file    Highest education level: Not on file   Occupational History    Not on file   Tobacco Use    Smoking status: Every Day     Types:  Cigarettes    Smokeless tobacco: Not on file   Substance and Sexual Activity    Alcohol use: Not on file    Drug use: Not on file    Sexual activity: Not on file   Other Topics Concern    Not on file   Social History Narrative    Not on file     Social Determinants of Health     Financial Resource Strain: High Risk (5/1/2024)    Overall Financial Resource Strain (CARDIA)     Difficulty of Paying Living Expenses: Hard   Food Insecurity: Food Insecurity Present (4/7/2024)    Received from [facility]    Hunger Vital Sign     Worried About Running Out of Food in the Last Year: Often true     Ran Out of Food in the Last Year: Often true   Transportation Needs: Unmet Transportation Needs (5/1/2024)    PRAPARE - Transportation     Lack of Transportation (Medical): Yes     Lack of Transportation (Non-Medical): Yes   Physical Activity: Inactive (4/7/2024)    Received from [facility]    Exercise Vital Sign     Days of Exercise per Week: 0 days     Minutes of Exercise per Session: 0 min   Stress: Stress Concern Present (4/7/2024)    Received from [facility]    [employer] - Occupational Stress Questionnaire     Feeling of Stress : Very much   Social Connections: Moderately Integrated (4/7/2024)    Received from [facility]    Social Connection and Isolation Panel [NHANES]     Frequency of Communication with Friends and Family: Three times a week     Frequency of Social Gatherings with Friends and Family: Twice a week     Attends Taoist Services: 1 to 4 times per year     Active Member of Clubs or Organizations: No     Attends Club or Organization Meetings: Never     Marital Status: Living with partner   Intimate Partner Violence: Not on file   Housing Stability: High Risk (5/1/2024)    Housing Stability Vital Sign     Unable to Pay for Housing in the Last Year: Yes     Number of Places Lived in the Last Year: 2     Unstable Housing in the Last Year: Yes       Family  History:  No family history on file.    EXAM     Vitals:    Vitals:    05/03/24 1605 05/03/24 1907 05/04/24 0400 05/04/24 0756   BP: 125/78 159/85 132/76 142/86   BP Location:       Patient Position:       Pulse: 82 87 80 67   Resp: 20 20  18   Temp: 37 °C (98.6 °F) 36.6 °C (97.9 °F)  36.9 °C (98.4 °F)   TempSrc:       SpO2: 99% 100%  99%   Weight:       Height:         Failed to redirect to the Timeline version of the i.TV SmartLink.    Intake/Output Summary (Last 24 hours) at 5/4/2024 1425  Last data filed at 5/4/2024 0100  Gross per 24 hour   Intake 0 ml   Output --   Net 0 ml         Physical Exam  General: appears chronically ill, no acute distress  HEENT: PERRLA, EOM intact, no scleral icterus, moist MM. No oral thrush  Respiratory: CTA bilaterally, normal work of breathing  Cardiovascular: S1, S2 heard  Abdomen: Soft, mild tenderness in epigastric and hypogastric regions, no tenderness in right or left quadrants. Bowel sounds appreciated  Extremities: no edema, no asterixis  Neuro: alert and oriented, strength normal in BUE and BLE, sensations not checked  Ext: s/p R foot amputation of multiple toes with evidence of healed scar. Healed scar on top of L foot. Feet are not swollen     OBJECTIVE                                                                              Medications       Current Facility-Administered Medications:     acetaminophen (Tylenol) tablet 650 mg, 650 mg, oral, q4h PRN, 650 mg at 05/02/24 0830 **OR** acetaminophen (Tylenol) oral liquid 650 mg, 650 mg, nasogastric tube, q4h PRN, 650 mg at 05/04/24 0528 **OR** acetaminophen (Tylenol) suppository 650 mg, 650 mg, rectal, q4h PRN, Lucy Mcwilliams MD    alum-mag hydroxide-simeth (Mylanta) 200-200-20 mg/5 mL oral suspension 10 mL, 10 mL, oral, 4x daily PRN, Akanksha Bower MD, 10 mL at 05/04/24 0851    calcium carbonate (Tums) chewable tablet 1,000 mg, 1,000 mg, oral, BID PRN, Raphael Lema MD, 1,000 mg at 05/03/24 2001    dextrose 50 %  injection 12.5 g, 12.5 g, intravenous, q15 min PRN, Lucy Mcwilliams MD    dextrose 50 % injection 25 g, 25 g, intravenous, q15 min PRN, Lucy Mcwilliams MD    folic acid (Folvite) tablet 1 mg, 1 mg, oral, Daily, Akanksha Bower MD, 1 mg at 05/04/24 0816    gabapentin (Neurontin) capsule 300 mg, 300 mg, oral, TID, Akanksha Bower MD, 300 mg at 05/04/24 0816    glucagon (Glucagen) injection 1 mg, 1 mg, intramuscular, q15 min PRN, Lucy Mcwilliams MD    glucagon (Glucagen) injection 1 mg, 1 mg, intramuscular, q15 min PRN, Lucy Mcwilliams MD    insulin glargine (Lantus) injection 17 Units, 17 Units, subcutaneous, Nightly, Akanksha Bower MD, 17 Units at 05/03/24 2001    insulin lispro (HumaLOG) injection 0-10 Units, 0-10 Units, subcutaneous, TID with meals, Lucy Mcwilliams MD, 4 Units at 05/04/24 1249    insulin lispro (HumaLOG) injection 2 Units, 2 Units, subcutaneous, TID with meals, Akanksha Bower MD, 2 Units at 05/04/24 1250    melatonin tablet 3 mg, 3 mg, oral, Nightly PRN, Lilo Lorenzo MD, 3 mg at 05/03/24 2216    multivitamin with minerals 1 tablet, 1 tablet, oral, Daily, Akanksha Bower MD, 1 tablet at 05/04/24 0816    NIFEdipine ER (Adalat CC) 24 hr tablet 30 mg, 30 mg, oral, Daily before breakfast, Lucy Mcwilliams MD, 30 mg at 05/04/24 0816    ondansetron (Zofran) injection 4 mg, 4 mg, intravenous, q6h PRN, Lucy Mcwilliams MD, 4 mg at 05/02/24 0913    oxyCODONE (Roxicodone) immediate release tablet 5 mg, 5 mg, oral, q6h PRN, Akanksha Bower MD    pantoprazole (ProtoNix) EC tablet 40 mg, 40 mg, oral, BID, Lucy Mcwilliams MD, 40 mg at 05/04/24 0816    polyethylene glycol (Glycolax, Miralax) packet 17 g, 17 g, oral, Daily, Akanksha Bower MD    sennosides (Senokot) tablet 8.6 mg, 1 tablet, oral, BID, Akanksha Bower MD, 8.6 mg at 05/03/24 2001    sertraline (Zoloft) tablet 25 mg, 25 mg, oral, Daily, Lucy Mcwilliams MD, 25 mg at 05/04/24 0816    thiamine (Vitamin B-1) tablet 100 mg, 100 mg, oral, Daily, Akanksha Bower MD, 100 mg at 05/04/24 0816                                                                             Labs     Results for orders placed or performed during the hospital encounter of 04/30/24 (from the past 24 hour(s))   POCT GLUCOSE   Result Value Ref Range    POCT Glucose 131 (H) 74 - 99 mg/dL   Lactate   Result Value Ref Range    Lactate 0.8 0.4 - 2.0 mmol/L   POCT GLUCOSE   Result Value Ref Range    POCT Glucose 132 (H) 74 - 99 mg/dL   CBC   Result Value Ref Range    WBC 8.4 4.4 - 11.3 x10*3/uL    nRBC 0.0 0.0 - 0.0 /100 WBCs    RBC 3.57 (L) 4.00 - 5.20 x10*6/uL    Hemoglobin 9.4 (L) 12.0 - 16.0 g/dL    Hematocrit 29.9 (L) 36.0 - 46.0 %    MCV 84 80 - 100 fL    MCH 26.3 26.0 - 34.0 pg    MCHC 31.4 (L) 32.0 - 36.0 g/dL    RDW 12.3 11.5 - 14.5 %    Platelets 301 150 - 450 x10*3/uL   Renal function panel   Result Value Ref Range    Glucose 104 (H) 74 - 99 mg/dL    Sodium 142 136 - 145 mmol/L    Potassium 4.5 3.5 - 5.3 mmol/L    Chloride 106 98 - 107 mmol/L    Bicarbonate 29 21 - 32 mmol/L    Anion Gap 12 10 - 20 mmol/L    Urea Nitrogen 15 6 - 23 mg/dL    Creatinine 0.86 0.50 - 1.05 mg/dL    eGFR 83 >60 mL/min/1.73m*2    Calcium 7.9 (L) 8.6 - 10.6 mg/dL    Phosphorus 2.4 (L) 2.5 - 4.9 mg/dL    Albumin 2.9 (L) 3.4 - 5.0 g/dL   POCT GLUCOSE   Result Value Ref Range    POCT Glucose 243 (H) 74 - 99 mg/dL                                                                              Imaging   CT c/a/p with IV contrast 4/30/2024  IMPRESSION:  1. The stomach is decompressed, however demonstrates a degree of  gastric wall thickening, slightly out of proportion to the degree of  decompression. Correlate with symptoms of gastritis.  2. Moderate colonic fecal load with mixed air and stool in the  rectum. Subtle component of associated bowel wall thickening and  enhancement involving the ascending/transverse colon. Correlate with symptoms of constipation and concern for stercoral colitis.  3. Slight interval worsening of body wall anasarca. Mild mesenteric  haziness. These  findings may be reactive, or the result of fluid  overload. Correlate with fluid status.  4. Small sized hiatal hernia with mild concentric mid to distal  esophageal wall thickening; correlate with concern for reflux  esophagitis.    NM GASTRIC EMPTYING STUDY 10/18/2022 ([facility])  FINDINGS:        0 hour delay: 100% retained.        1 hour delay: 89% retained. (accelerated <30% retained, delayed >90% retained)          2 hour delay: 52% retained. (delayed >60% retained)    3 hour delay: 19% retained. (delayed >30% retained)   4 hour delay: 7% retained. (delayed >10% retained)          There was 7% retention of the meal at 4 hours. (normal retention at 4 hours is <10% or normal emptying at 4 hours is >90%)           IMPRESSION:          Normal solid gastric emptying.                                                                       GI Procedures   EGD 12/23/2019 ([facility])  Impression:     - Esophageal plaques were found, consistent with  candidiasis. Brushed.    - LA Grade D esophagitis.    - Z-line, 35 cm from the incisors.   - Erythematous mucosa in the gastric body. Biopsied.     - Non-bleeding gastric ulcers with a clean ulcer base (Nick Class III).      - Duodenal erosions without bleeding.        EGD 5/12/2023 (Dr Soraya Gallegos at [facility])  Impression:                                - LA Grade A esophagitis with no bleeding.                         - Normal stomach.                          - Normal examined duodenum.      Colonoscopy 5/15/2023 (Dr. Sohail Webster at [facility])  Impression:                             - The examined portion of the ileum was normal.                         - One 6 mm mucosal irregularity in the sigmoid                          colon, removed with a cold snare. Resected and                          retrieved.                          - One 4 mm polyp in the sigmoid colon, removed with                          a cold biopsy forceps. Resected and retrieved.                         -  Non-bleeding internal hemorrhoids.                         - The examination was otherwise normal.                         - Biopsies were taken with a cold forceps for                          histology in the transverse colon.    Biopsy results:  A.  Colon, random biopsies:  - Colonic mucosa with no significant histopathologic findings.  - No evidence of microscopic colitis.    B.  Colon, sigmoid, mucosal irregularity, biopsy:  - Colonic mucosa with no significant histopathologic findings.  - Negative for dysplasia.    C.  Colon, sigmoid, polyp, biopsy:  - Hyperplastic polyp.    ASSESSMENT / PLAN                  ASSESSMENT/PLAN:  Barbara English is a 49 y.o. female with a past medical history of uncontrolled IDDM2 (last A1c 13.3%) c/b diabetic neuropathy and non healing R foot wound (osteomyelitis including Candida Glabrata and MRSA) s/p amputation of 4th and 5th digits (12/27/2023) followed by debridement R foot with resection of 4th and 5th metatarsals (12/29/2023) followed by open lisfranc disarticulation of R foot (1/2/2024) followed by percutaneous tendo achilles lengthening right + wound debridement (1/5/2024), polysubstance abuse (cocaine and cigarettes), HTN, prior PUD, and candida esophagitis, as well as depression and suicide attempt in the past who was admitted on 4/30/2024 with multiple complaints including abdominal pain, nausea/vomiting, hypertensive urgency, MOUSTAPHA and chest pain. GI is consulted for concern for abnormal imaging findings on CT as well as dysphagia.     Patient's persistent nausea and vomiting could be in the setting of recurrent candida esophagitis due to her very poorly controlled DM2. She does not have oral thrush, but absence of oropharyngeal candidiasis does not necessarily exclude esophageal candidiasis. She has had endoscopically proven esophageal candidiasis in 2019. She may also just have reflux esophagitis given her CT findings suggestive of the same. Notably, despite having a  chart diagnosis of gastroparesis, her gastric emptying study at least in 2022 showed normal gastric emptying. Furthermore, back in 2019, she was noted to have gastric ulcers ([place] III) and duodenal erosions. She had a repeat EGD in May 2023 that did not show any gastric ulcers or duodenal erosions. It is quite possible that she may have gastritis or even gastric/duodenal ulcers again, especially as she her abdominal pain is more pronounced post-prandially. Given that she also regularly uses cocaine, which acts as a potent vasoconstrictor, it could be preventing the healing of any ulcers or erosions. Her midsternal chest discomfort could also be in the setting of vasospasm caused by cocaine. She has had quite a bit of weight loss recently, so malignancy needs to be in the Ddx, but less likely, as other reasons (such as her drug habit and recurrent hospitalizations and acute illnesses) can be blamed for her weight loss.     Recommendations:  We will plan for EGD with anesthesia (preferred due to her cocaine abuse) on Monday. Will have to be made NPO at 1159 on Sunday 5/5/2024  IV PPI BID  Please send H.pylori stool antigen given concern for gastritis on imaging  Patient does not have proven gastroparesis (based on 2022 NM gastric emptying study)  Strict control of diabetes   Nutrition following, appreciate recs  Complete cessation of cocaine and cigarettes     Patient was discussed with GI attending Dr. [facility]. Recommendations not final until patient is seen by attending and case is fully staffed in AM.     Thank you for this interesting consult. Gastroenterology will continue to follow the patient.    -During weekday hours of 7am-5pm please do not hesitate to contact me on [employer] Chat or page [phone] if there are any further questions between the weekday hours of 7 AM - 5 PM.   -After hours, on weekends, and on holidays, please page the on-call GI fellow at [phone]. Thank you.    Benita Paris MD MPH    Gastroenterology Fellow  [facility] [place]

## 2024-05-05 VITALS
SYSTOLIC BLOOD PRESSURE: 166 MMHG | WEIGHT: 140 LBS | OXYGEN SATURATION: 100 % | HEART RATE: 75 BPM | TEMPERATURE: 98 F | HEIGHT: 64 IN | DIASTOLIC BLOOD PRESSURE: 96 MMHG | BODY MASS INDEX: 23.9 KG/M2 | RESPIRATION RATE: 18 BRPM

## 2024-05-05 LAB
ALBUMIN SERPL BCP-MCNC: 3 G/DL (ref 3.4–5)
ANION GAP SERPL CALC-SCNC: 13 MMOL/L (ref 10–20)
B-LACTAMASE ORGANISM ISLT: NEGATIVE
BACTERIA SPEC CULT: ABNORMAL
BUN SERPL-MCNC: 14 MG/DL (ref 6–23)
CALCIUM SERPL-MCNC: 8.2 MG/DL (ref 8.6–10.6)
CHLORIDE SERPL-SCNC: 107 MMOL/L (ref 98–107)
CO2 SERPL-SCNC: 25 MMOL/L (ref 21–32)
CREAT SERPL-MCNC: 0.83 MG/DL (ref 0.5–1.05)
EGFRCR SERPLBLD CKD-EPI 2021: 87 ML/MIN/1.73M*2
ERYTHROCYTE [DISTWIDTH] IN BLOOD BY AUTOMATED COUNT: 12.3 % (ref 11.5–14.5)
GLUCOSE BLD MANUAL STRIP-MCNC: 185 MG/DL (ref 74–99)
GLUCOSE BLD MANUAL STRIP-MCNC: 228 MG/DL (ref 74–99)
GLUCOSE BLD MANUAL STRIP-MCNC: 243 MG/DL (ref 74–99)
GLUCOSE SERPL-MCNC: 135 MG/DL (ref 74–99)
GRAM STN SPEC: ABNORMAL
GRAM STN SPEC: ABNORMAL
HCT VFR BLD AUTO: 29.6 % (ref 36–46)
HGB BLD-MCNC: 9.7 G/DL (ref 12–16)
MCH RBC QN AUTO: 26.7 PG (ref 26–34)
MCHC RBC AUTO-ENTMCNC: 32.8 G/DL (ref 32–36)
MCV RBC AUTO: 82 FL (ref 80–100)
NRBC BLD-RTO: 0 /100 WBCS (ref 0–0)
PHOSPHATE SERPL-MCNC: 2.8 MG/DL (ref 2.5–4.9)
PLATELET # BLD AUTO: 299 X10*3/UL (ref 150–450)
POTASSIUM SERPL-SCNC: 4.4 MMOL/L (ref 3.5–5.3)
RBC # BLD AUTO: 3.63 X10*6/UL (ref 4–5.2)
SODIUM SERPL-SCNC: 141 MMOL/L (ref 136–145)
WBC # BLD AUTO: 7.1 X10*3/UL (ref 4.4–11.3)

## 2024-05-05 PROCEDURE — 97165 OT EVAL LOW COMPLEX 30 MIN: CPT | Mod: GO

## 2024-05-05 PROCEDURE — 36415 COLL VENOUS BLD VENIPUNCTURE: CPT | Performed by: STUDENT IN AN ORGANIZED HEALTH CARE EDUCATION/TRAINING PROGRAM

## 2024-05-05 PROCEDURE — 99232 SBSQ HOSP IP/OBS MODERATE 35: CPT | Performed by: INTERNAL MEDICINE

## 2024-05-05 PROCEDURE — 82947 ASSAY GLUCOSE BLOOD QUANT: CPT

## 2024-05-05 PROCEDURE — 2500000006 HC RX 250 W HCPCS SELF ADMINISTERED DRUGS (ALT 637 FOR ALL PAYERS)

## 2024-05-05 PROCEDURE — 2500000002 HC RX 250 W HCPCS SELF ADMINISTERED DRUGS (ALT 637 FOR MEDICARE OP, ALT 636 FOR OP/ED)

## 2024-05-05 PROCEDURE — 2500000002 HC RX 250 W HCPCS SELF ADMINISTERED DRUGS (ALT 637 FOR MEDICARE OP, ALT 636 FOR OP/ED): Performed by: STUDENT IN AN ORGANIZED HEALTH CARE EDUCATION/TRAINING PROGRAM

## 2024-05-05 PROCEDURE — 2500000004 HC RX 250 GENERAL PHARMACY W/ HCPCS (ALT 636 FOR OP/ED)

## 2024-05-05 PROCEDURE — 2500000004 HC RX 250 GENERAL PHARMACY W/ HCPCS (ALT 636 FOR OP/ED): Performed by: STUDENT IN AN ORGANIZED HEALTH CARE EDUCATION/TRAINING PROGRAM

## 2024-05-05 PROCEDURE — 2500000001 HC RX 250 WO HCPCS SELF ADMINISTERED DRUGS (ALT 637 FOR MEDICARE OP): Performed by: STUDENT IN AN ORGANIZED HEALTH CARE EDUCATION/TRAINING PROGRAM

## 2024-05-05 PROCEDURE — 2500000001 HC RX 250 WO HCPCS SELF ADMINISTERED DRUGS (ALT 637 FOR MEDICARE OP)

## 2024-05-05 PROCEDURE — 97535 SELF CARE MNGMENT TRAINING: CPT | Mod: GO

## 2024-05-05 PROCEDURE — C9113 INJ PANTOPRAZOLE SODIUM, VIA: HCPCS

## 2024-05-05 PROCEDURE — 80069 RENAL FUNCTION PANEL: CPT | Performed by: STUDENT IN AN ORGANIZED HEALTH CARE EDUCATION/TRAINING PROGRAM

## 2024-05-05 PROCEDURE — 85027 COMPLETE CBC AUTOMATED: CPT | Performed by: STUDENT IN AN ORGANIZED HEALTH CARE EDUCATION/TRAINING PROGRAM

## 2024-05-05 RX ORDER — LISINOPRIL 10 MG/1
40 TABLET ORAL DAILY
Status: DISCONTINUED | OUTPATIENT
Start: 2024-05-05 | End: 2024-05-05 | Stop reason: HOSPADM

## 2024-05-05 RX ORDER — PANTOPRAZOLE SODIUM 40 MG/10ML
40 INJECTION, POWDER, LYOPHILIZED, FOR SOLUTION INTRAVENOUS 2 TIMES DAILY
Status: DISCONTINUED | OUTPATIENT
Start: 2024-05-05 | End: 2024-05-05 | Stop reason: HOSPADM

## 2024-05-05 RX ORDER — HYDROMORPHONE HYDROCHLORIDE 1 MG/ML
0.2 INJECTION, SOLUTION INTRAMUSCULAR; INTRAVENOUS; SUBCUTANEOUS EVERY 4 HOURS PRN
Status: DISCONTINUED | OUTPATIENT
Start: 2024-05-05 | End: 2024-05-05 | Stop reason: HOSPADM

## 2024-05-05 RX ADMIN — GABAPENTIN 300 MG: 300 CAPSULE ORAL at 08:34

## 2024-05-05 RX ADMIN — ACETAMINOPHEN 650 MG: 650 SOLUTION ORAL at 14:56

## 2024-05-05 RX ADMIN — LISINOPRIL 40 MG: 10 TABLET ORAL at 11:25

## 2024-05-05 RX ADMIN — INSULIN LISPRO 2 UNITS: 100 INJECTION, SOLUTION INTRAVENOUS; SUBCUTANEOUS at 12:27

## 2024-05-05 RX ADMIN — INSULIN LISPRO 4 UNITS: 100 INJECTION, SOLUTION INTRAVENOUS; SUBCUTANEOUS at 08:37

## 2024-05-05 RX ADMIN — THIAMINE HCL TAB 100 MG 100 MG: 100 TAB at 08:34

## 2024-05-05 RX ADMIN — INSULIN LISPRO 2 UNITS: 100 INJECTION, SOLUTION INTRAVENOUS; SUBCUTANEOUS at 19:16

## 2024-05-05 RX ADMIN — HYDROMORPHONE HYDROCHLORIDE 0.2 MG: 1 INJECTION, SOLUTION INTRAMUSCULAR; INTRAVENOUS; SUBCUTANEOUS at 14:53

## 2024-05-05 RX ADMIN — HYDROMORPHONE HYDROCHLORIDE 0.2 MG: 1 INJECTION, SOLUTION INTRAMUSCULAR; INTRAVENOUS; SUBCUTANEOUS at 11:02

## 2024-05-05 RX ADMIN — ALUMINUM HYDROXIDE, MAGNESIUM HYDROXIDE, AND SIMETHICONE 10 ML: 1200; 120; 1200 SUSPENSION ORAL at 08:34

## 2024-05-05 RX ADMIN — Medication 1 TABLET: at 08:34

## 2024-05-05 RX ADMIN — PANTOPRAZOLE SODIUM 40 MG: 40 INJECTION, POWDER, FOR SOLUTION INTRAVENOUS at 11:26

## 2024-05-05 RX ADMIN — ALUMINUM HYDROXIDE, MAGNESIUM HYDROXIDE, AND SIMETHICONE 10 ML: 1200; 120; 1200 SUSPENSION ORAL at 14:56

## 2024-05-05 RX ADMIN — ACETAMINOPHEN 650 MG: 650 SOLUTION ORAL at 08:34

## 2024-05-05 RX ADMIN — GABAPENTIN 300 MG: 300 CAPSULE ORAL at 14:53

## 2024-05-05 RX ADMIN — SERTRALINE HYDROCHLORIDE 25 MG: 25 TABLET ORAL at 08:34

## 2024-05-05 RX ADMIN — INSULIN LISPRO 2 UNITS: 100 INJECTION, SOLUTION INTRAVENOUS; SUBCUTANEOUS at 08:38

## 2024-05-05 RX ADMIN — FOLIC ACID 1 MG: 1 TABLET ORAL at 08:34

## 2024-05-05 RX ADMIN — NIFEDIPINE 30 MG: 30 TABLET, FILM COATED, EXTENDED RELEASE ORAL at 08:34

## 2024-05-05 RX ADMIN — INSULIN LISPRO 4 UNITS: 100 INJECTION, SOLUTION INTRAVENOUS; SUBCUTANEOUS at 19:13

## 2024-05-05 RX ADMIN — OXYCODONE HYDROCHLORIDE 5 MG: 5 TABLET ORAL at 03:03

## 2024-05-05 RX ADMIN — ACETAMINOPHEN 650 MG: 325 TABLET ORAL at 03:02

## 2024-05-05 ASSESSMENT — ACTIVITIES OF DAILY LIVING (ADL)
BATHING_ASSISTANCE: STAND BY
HOME_MANAGEMENT_TIME_ENTRY: 17
ADL_ASSISTANCE: INDEPENDENT

## 2024-05-05 ASSESSMENT — COGNITIVE AND FUNCTIONAL STATUS - GENERAL: DAILY ACTIVITIY SCORE: 24

## 2024-05-05 ASSESSMENT — PAIN - FUNCTIONAL ASSESSMENT: PAIN_FUNCTIONAL_ASSESSMENT: 0-10

## 2024-05-05 ASSESSMENT — PAIN SCALES - GENERAL
PAINLEVEL_OUTOF10: 8
PAINLEVEL_OUTOF10: 10 - WORST POSSIBLE PAIN

## 2024-05-05 NOTE — NURSING NOTE
Pt leaves unit with partner. Pt had all belongings with her at this time. Teaching provided. AMA form signed. IV access removed. Pt fully oriented and stable at time of leaving unit.

## 2024-05-05 NOTE — PROGRESS NOTES
Occupational Therapy    Evaluation and Treatment    Patient Name: Barbara English  MRN: [id number]  Today's Date: 5/5/2024  Room: [address] [place] [address]  Time Calculation  Start Time: 1438  Stop Time: 1507  Time Calculation (min): 29 min    Assessment  IP OT Assessment  OT Assessment: Overall, patient demonstrated independence with all ADLs and functional mobility. Pt reports she is at baseline function for ADLs and functional mobility. Pt encouraged to use AD for balance defecits- pt verbalized understanding but hesitant to agree. Pt does  not require skilled OT services at this time.  Prognosis: Excellent  Barriers to Discharge: Inaccessible home environment  Evaluation/Treatment Tolerance: Patient tolerated treatment well  Medical Staff Made Aware: Yes  End of Session Communication: Bedside nurse  End of Session Patient Position: Bed, 3 rail up, Alarm off, not on at start of session  Plan:  No Skilled OT: Independent with ADLs  OT Frequency: OT eval only  OT Discharge Recommendations: No further acute OT, No OT needed after discharge  OT Recommended Transfer Status: Assist of 1, Stand by assist  OT - OK to Discharge: Yes    Subjective   Current Problem:  1. Diabetic ketoacidosis without coma associated with diabetes mellitus due to underlying condition (Multi)  Continuous Glucose Monitoring      2. 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  Prior to Session Communication: Bedside nurse  Patient Position Received: Bed, 3 rail up, Alarm off, not on at start of session  Family/Caregiver Present: No  General Comment: Pt supine in bed upon arrival. Agreeable to OT evaluation.    Precautions:  LE Weight Bearing Status: Right Partial Weight Bearing (Heel WB)  Medical Precautions: Fall precautions  Vital Signs:     Pain:  Pain Assessment  Pain Assessment: 0-10  Pain Score: 8  Pain Type: Chronic pain  Pain Location: Throat  Pain Interventions: Relaxation technique, Rest  Response to Interventions: Improved    Objective   Cognition:  Overall Cognitive Status: Within Functional Limits  Orientation Level: Oriented X4    Home Living:  Type of Home: House  Home Adaptive Equipment: Wheelchair-manual (Not in her possession, with ex)  Home Living Comments: Pt reports she does not have stable living situation and bounces around between friends or family. Reports she is still not sure where she is discharging to but ensured that she has support of friends/family   Prior Function:  Level of Cobb: Independent with ADLs and functional transfers, Independent with homemaking with ambulation  ADL Assistance: Independent  Homemaking Assistance: Independent  Ambulatory Assistance: Independent  IADL History:  Homemaking Responsibilities: No  Occupation: Other (Comment) (Currently on leave but plans to look into disability)  Type of Occupation: Previously worked at [employer]  Leisure and Hobbies: Spending time with friends/family  ADL:  Eating Assistance: Independent (Anticipated)  Grooming Assistance: Independent (Anticipated)  Bathing Assistance: Stand by (Pt able to shower with SBA in room and demonstrated safety while completeing)  UE Dressing Assistance: Independent (Pt able to don hospital gown IND)  LE Dressing Assistance: Independent (Demonstrated ability to doff and don bilateral socks IND)  Toileting Assistance with Device: Independent (Anticipated)  Activity Tolerance:  Endurance: Endurance does not limit participation in activity  Balance:  Dynamic Sitting Balance  Dynamic Sitting-Balance Support: No upper extremity supported  Dynamic Sitting-Balance: Lateral lean, Forward lean, Reaching for  objects  Dynamic Sitting-Comments: SBA  Dynamic Standing Balance  Dynamic Standing-Balance Support: No upper extremity supported  Dynamic Standing-Balance: Lateral lean, Forward lean, Reaching for objects, Turning, Reaching across midline  Dynamic Standing-Comments: SBA during ADL tasks and mobility  Static Sitting Balance  Static Sitting-Balance Support: Feet supported  Static Sitting-Level of Assistance: Distant supervision  Static Standing Balance  Static Standing-Balance Support: No upper extremity supported  Static Standing-Level of Assistance: Close supervision  Bed Mobility/Transfers: Bed Mobility/Transfers: Bed Mobility  Bed Mobility: Yes  Bed Mobility 1  Bed Mobility 1: Supine to sitting, Sitting to supine  Level of Assistance 1: Modified independent  Functional Mobility  Functional Mobility Performed: Yes  Functional Mobility 1  Comments 1: Pt ambulated MOD household distance in room from bedside into bathroom and back with distant supervision   and Transfers  Transfer: Yes  Transfer 1  Transfer From 1: Bed to, Stand to  Transfer to 1: Stand, Bed  Technique 1: Sit to stand, Stand to sit  Transfer Device 1:  (No AD per pt request)  Transfer Level of Assistance 1: Close supervision  Trials/Comments 1: x3 trials  IADL's:   Homemaking Responsibilities: No  Occupation: Other (Comment) (Currently on leave but plans to look into disability)  Type of Occupation: Previously worked at [employer]  Leisure and Hobbies: Spending time with friends/family  Vision: Vision - Basic Assessment  Current Vision: No visual deficits   and    Sensation:  Light Touch: No apparent deficits  Strength:  Strength Comments: SARIKA WFL  Perception:  Inattention/Neglect: Appears intact  Coordination:  Movements are Fluid and Coordinated: Yes   Hand Function:  Hand Function  Gross Grasp: Functional  Coordination: Functional  Extremities:   RUE   RUE : Within Functional Limits, LUE   LUE: Within Functional Limits    Outcome Measures: [facility] Daily  Activity  Putting on and taking off regular lower body clothing: None  Bathing (including washing, rinsing, drying): None  Putting on and taking off regular upper body clothing: None  Toileting, which includes using toilet, bedpan or urinal: None  Taking care of personal grooming such as brushing teeth: None  Eating Meals: None  Daily Activity - Total Score: 24    OT Adult Other Outcome Measures  4AT: -    Education Documentation  Body Mechanics, taught by Freddie Sanabria OT at 5/5/2024  4:20 PM.  Learner: Patient  Readiness: Acceptance  Method: Explanation  Response: Verbalizes Understanding, Demonstrated Understanding    Precautions, taught by Freddie Sanabria OT at 5/5/2024  4:20 PM.  Learner: Patient  Readiness: Acceptance  Method: Explanation  Response: Verbalizes Understanding, Demonstrated Understanding    ADL Training, taught by Freddie Sanabria OT at 5/5/2024  4:20 PM.  Learner: Patient  Readiness: Acceptance  Method: Explanation  Response: Verbalizes Understanding, Demonstrated Understanding    Education Comments  No comments found.      Treatment Completed on Evaluation    Activities of Daily Living:       UE Bathing  UE Bathing Comments: Pt completed full body bathing while standing in shower with SBA and Min VCs for safety while weight shifting and during task performance  LE Bathing  LE Bathing Comments: Pt completed full body bathing while standing in shower with SBA and Min VCs for safety while weight shifting and during task performance  UE Dressing  UE Dressing Comments: Pt able to doff and don hospital gown with Setup assist  LE Dressing  LE Dressing: Yes  LE Dressing Comments: Pt doffed and donned bilateral socks with SBA while seated EOB and while standing in bathroom. Min VCs for safety         05/05/24 at 4:21 PM   FREDDIE SANABRIA OT   Rehab Office: [phone]

## 2024-05-05 NOTE — PROGRESS NOTES
"Barbara English is a 49 y.o. female on day 5 of admission presenting with Diabetic ketoacidosis without coma associated with diabetes mellitus due to underlying condition (Multi).    Subjective   Pt endorsing throat pain and continued SOB. Ate hot dog yday. Requesting additional analgesia d/t insufficient pain control with tylenol/oxy.  Denying f/c/n/v.      Objective   Physical Exam  General: thin, no acute distress, mildly diaphoretic, AAOx3  HEENT: EOM intact, PERRL, dry MM, no thrush visible in oral cavity  CV: regular rate and rhythm  Pulm: normal respiratory effort, clear to auscultation bilaterally with no wheezes, rales, rhonchi  Abd: soft, non-distended, tender to palpation in epigastric region  Extremities: warm, no LE edema, R foot amputated to tarsometatarsal joint. Surgical site wrapped.   Neuro: moves all extremities equally, CN II - XII grossly intact  Psych: appropriate mood and affect   Skin: warm, dry, intact, hyperpigments macules along upper back and upper arms        Last Recorded Vitals  Blood pressure (!) 166/96, pulse 75, temperature 36.7 °C (98 °F), resp. rate 18, height 1.626 m (5' 4\"), weight 63.5 kg (140 lb), SpO2 100%.  Intake/Output last 3 Shifts:  No intake/output data recorded.    Relevant Results  Scheduled medications  folic acid, 1 mg, oral, Daily  gabapentin, 300 mg, oral, TID  insulin glargine, 17 Units, subcutaneous, Nightly  insulin lispro, 0-10 Units, subcutaneous, TID with meals  insulin lispro, 2 Units, subcutaneous, TID with meals  lisinopril, 40 mg, oral, Daily  multivitamin with minerals, 1 tablet, oral, Daily  NIFEdipine ER, 30 mg, oral, Daily before breakfast  pantoprazole, 40 mg, intravenous, BID  polyethylene glycol, 17 g, oral, Daily  sennosides, 1 tablet, oral, BID  sertraline, 25 mg, oral, Daily  thiamine, 100 mg, oral, Daily      Continuous medications     PRN medications  PRN medications: acetaminophen **OR** acetaminophen **OR** acetaminophen, alum-mag " hydroxide-simeth, calcium carbonate, dextrose, dextrose, glucagon, glucagon, HYDROmorphone, melatonin, ondansetron  Results for orders placed or performed during the hospital encounter of 04/30/24 (from the past 24 hour(s))   POCT GLUCOSE   Result Value Ref Range    POCT Glucose 202 (H) 74 - 99 mg/dL   POCT GLUCOSE   Result Value Ref Range    POCT Glucose 255 (H) 74 - 99 mg/dL   POCT GLUCOSE   Result Value Ref Range    POCT Glucose 243 (H) 74 - 99 mg/dL   CBC   Result Value Ref Range    WBC 7.1 4.4 - 11.3 x10*3/uL    nRBC 0.0 0.0 - 0.0 /100 WBCs    RBC 3.63 (L) 4.00 - 5.20 x10*6/uL    Hemoglobin 9.7 (L) 12.0 - 16.0 g/dL    Hematocrit 29.6 (L) 36.0 - 46.0 %    MCV 82 80 - 100 fL    MCH 26.7 26.0 - 34.0 pg    MCHC 32.8 32.0 - 36.0 g/dL    RDW 12.3 11.5 - 14.5 %    Platelets 299 150 - 450 x10*3/uL   Renal function panel   Result Value Ref Range    Glucose 135 (H) 74 - 99 mg/dL    Sodium 141 136 - 145 mmol/L    Potassium 4.4 3.5 - 5.3 mmol/L    Chloride 107 98 - 107 mmol/L    Bicarbonate 25 21 - 32 mmol/L    Anion Gap 13 10 - 20 mmol/L    Urea Nitrogen 14 6 - 23 mg/dL    Creatinine 0.83 0.50 - 1.05 mg/dL    eGFR 87 >60 mL/min/1.73m*2    Calcium 8.2 (L) 8.6 - 10.6 mg/dL    Phosphorus 2.8 2.5 - 4.9 mg/dL    Albumin 3.0 (L) 3.4 - 5.0 g/dL   POCT GLUCOSE   Result Value Ref Range    POCT Glucose 185 (H) 74 - 99 mg/dL     No results found.                        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 Considering hyperglycemia but lack of metabolic acidosis, likely not in DKA but rather uncontrolled  diabetes, which could account for abdominal pain/vomiting. Considering normal pressures this morning, hypertensive urgency likely due to cocaine withdrawal/pain. Chest pain possibly due to repeated emesis vs coronary vasospasm iso crack/cocaine use (trops negative, EKG non-suggestive of acute coronary syndrome). Plan to initiate insulin therapy, consult podiatry for amputation evaluation, hydrate for likely pre-renal MOUSTAPHA (will obtain urine lytes to confirm), and obtain stool cultures (for diarrhea with possible colitis on CT).     Update 5/5:  - EGD tomorrow morning - NPO at midnight  - IV pantoprazole 40mg BID  - H pylori stool antigen pending  - Restart home lisinopril 40mg daily d/t hypertension  - Switched PRN analgesia from oxy to Dilaudid for now d/t poor pain control  - No changes to insulin regimen today as NPO after midnight and poor PO intake d/t pain and dysphagia    #Uncontrolled Diabetes type 2--Hba1c 13 5/2024  #Diabetic neuropathy   DKA precipitated by sepsis 2/2 possible diabetic foot osteomyelitis and non compliance on home insulin.   BS > 600 on admission. VBG: metabolic acidosis + resp alkalosis - pH 7.42, pCO2 29, Bicarb 18  ::S\P  1.5 L LR, calcium gluconate, 20 units insulin total. R/p BMP with improvement of glucose 366, K 4.2, gap closed. R/p VBG with pH 7.41, slight bump in lactate 2.7 from 1.1 Received 1.5L LR.   :: Regimen on 1/9/24 discharge: Glargine 25U, lispro 7u TID  - Basal: Insulin glargine 17 units QHS  - Bolus: Insulin lispro 2u TID  - ISS #1 with meals and at bedtime  - Increase home gabapentin 100 BID --> 300 TID  - Diabetic diet     #Gastritis  #Esophagitis  #Colitis\Constipation  CT findings as above  - GI following: EGD 5/6  - IV Pantoprazole 40mg BID  - Continue PEG and Doc Senna   - Mylanta  - Stool culture PCR, H pylori stool antigen  - Zofran PRN    #R plantar abscess  #S/p R lisfranc amputation  :: Admission for no-healing foot wound 11/23  :: S/p I&D R foot with  amputation of 4th and 5th digits (12/27/23) followed by debridement R foot with resection of 4th and 5th metatarsals (12/29/23) followed by open lisfranc disarticulation of R foot (1/2/24) followed by percutaneous tendo achilles lengthening right + wound debridement (1/5/24).    :: Completed fluconazole until 1/19, vanomycin intended course till 2/21, did not complete. Follow up with ID (Dr. Ayala).   :: Lactate 1.1 --> 2.7 --> 1.3  :: Bcx neg at 48hr  - Discontinue vanc/zosyn 5/3  - Podiatry signed off      #HTN urgency  Utox cocaine positive. she endorsed HA  BP on arrival 221/113, in the ED she was treated with home lisinopril and Nifedipine compliance is doubtful.  :: S\P 20 IV labetalol twice at ED  - C/h Nifedipine 30 Daily  - Restart home lisinopril 40mg daily  - Hold hydrochlorothiazide iso MOUSTAPHA  - Hold hydralazine (initiated on admission for HTN) for softer pressures  - avoid beta blockers in context of cocaine use     #Substance Use  Utox positive for cocaine  - Avoid B-blockers iso cocaine use  - Thiamine, folic acid, multivitamin     #MOUSTAPHA  :: On admission, Cr: 1.56 (baseline 0.6-0.9) --> resolved 0.83 (5/5)  :: Dehydration, S\P 2L EL at ED  :: S/p 2L IVF  :: FeNa: 0.6%  - Daily RFP  - Avoid nephrotoxics       #Anxiety  #Depression  :: Sertraline dose on discharge: 35mg daily  - sertraline 25mg daily       F S\P 2L RL, 1L RL 100ml\hr  E PRN, K >4  N Diabetic diet  A PIV  DVT Heparin  GI pantoprazole  Code: Full Code  NOK: friend Elva Quijano: [phone]          Ramila Kunz, MS4

## 2024-05-05 NOTE — CARE PLAN
The patient's goals for the shift include      The clinical goals for the shift include Pt will be HDS during the shift. Goal met. Pt was HDS during the shift. Pt pain was controlled with medication and repositioning. Pt was checked and monitored throughout the shift. Pt had no injuries or falls.       Problem: Respiratory  Goal: Clear secretions with interventions this shift  5/5/2024 1831 by Olga Gallegos RN  Outcome: Not met  5/5/2024 1830 by Olga Gallegos RN  Outcome: Progressing  Goal: Minimize anxiety/maximize coping throughout shift  5/5/2024 1831 by Olga Gallegos RN  Outcome: Not met  5/5/2024 1830 by Olga Gallegos RN  Outcome: Progressing  Goal: Minimal/no exertional discomfort or dyspnea this shift  5/5/2024 1831 by Olga Gallegos RN  Outcome: Not met  5/5/2024 1830 by Olga Gallegos RN  Outcome: Progressing  Goal: No signs of respiratory distress (eg. Use of accessory muscles. Peds grunting)  5/5/2024 1831 by Olga Gallegos RN  Outcome: Not met  5/5/2024 1830 by Olga Gallegos RN  Outcome: Progressing  Goal: Patent airway maintained this shift  5/5/2024 1831 by Olga Gallegos RN  Outcome: Not met  5/5/2024 1830 by Olga Gallegos RN  Outcome: Progressing  Goal: Tolerate pulmonary toileting this shift  5/5/2024 1831 by Olga Gallegos RN  Outcome: Not met  5/5/2024 1830 by Olga Gallegos RN  Outcome: Progressing  Goal: Verbalize decreased shortness of breath this shift  5/5/2024 1831 by Olga Gallegos RN  Outcome: Not met  5/5/2024 1830 by Olga Gallegos RN  Outcome: Progressing  Goal: Wean oxygen to maintain O2 saturation per order/standard this shift  5/5/2024 1831 by Olga Gallegos RN  Outcome: Not met  5/5/2024 1830 by Olga Gallegos RN  Outcome: Progressing  Goal: Increase self care and/or family involvement in next 24 hours  5/5/2024 1831 by Olga Gallegos RN  Outcome: Not met  5/5/2024 1830 by  Olga Gallegos RN  Outcome: Progressing     Problem: Skin  Goal: Decreased wound size/increased tissue granulation at next dressing change  5/5/2024 1831 by Olga Gallegos RN  Outcome: Not met  5/5/2024 1830 by Olga Gallegos RN  Outcome: Progressing  Flowsheets (Taken 5/5/2024 1830)  Decreased wound size/increased tissue granulation at next dressing change: Protective dressings over bony prominences  Goal: Participates in plan/prevention/treatment measures  5/5/2024 1831 by Olga Gallegos RN  Outcome: Not met  5/5/2024 1830 by Olga Gallegos RN  Outcome: Progressing  Flowsheets (Taken 5/5/2024 1830)  Participates in plan/prevention/treatment measures: Elevate heels  Goal: Prevent/manage excess moisture  5/5/2024 1831 by Olga Gallegos RN  Outcome: Not met  5/5/2024 1830 by Olga Gallegos RN  Outcome: Progressing  Flowsheets (Taken 5/5/2024 1830)  Prevent/manage excess moisture: Moisturize dry skin  Goal: Prevent/minimize sheer/friction injuries  5/5/2024 1831 by Olga Gallegos RN  Outcome: Not met  5/5/2024 1830 by Olga Gallegos RN  Outcome: Progressing  Flowsheets (Taken 5/5/2024 1830)  Prevent/minimize sheer/friction injuries:   HOB 30 degrees or less   Increase activity/out of bed for meals  Goal: Promote/optimize nutrition  5/5/2024 1831 by Olga Gallegos RN  Outcome: Not met  5/5/2024 1830 by Olga Gallegos RN  Outcome: Progressing  Flowsheets (Taken 5/5/2024 1830)  Promote/optimize nutrition: Consume > 50% meals/supplements  Goal: Promote skin healing  5/5/2024 1831 by Olga Gallegos RN  Outcome: Not met  5/5/2024 1830 by Olga Gallegos RN  Outcome: Progressing  Flowsheets (Taken 5/5/2024 1830)  Promote skin healing: Turn/reposition every 2 hours/use positioning/transfer devices     Problem: Pain  Goal: My pain/discomfort is manageable  5/5/2024 1831 by Olga Gallegos RN  Outcome: Not met  5/5/2024 1830 by Olga  ISAI Gallegos  Outcome: Progressing     Problem: Safety  Goal: Patient will be injury free during hospitalization  5/5/2024 1831 by Olga Gallegos RN  Outcome: Not met  5/5/2024 1830 by Olga Gallegos RN  Outcome: Progressing  Goal: I will remain free of falls  5/5/2024 1831 by Olga Gallegos RN  Outcome: Not met  5/5/2024 1830 by Olga Gallegos RN  Outcome: Progressing     Problem: Daily Care  Goal: Daily care needs are met  5/5/2024 1831 by Olga Gallegos RN  Outcome: Not met  5/5/2024 1830 by Olga Gallegos RN  Outcome: Progressing     Problem: Psychosocial Needs  Goal: Demonstrates ability to cope with hospitalization/illness  5/5/2024 1831 by Olga Gallegos RN  Outcome: Not met  5/5/2024 1830 by Olga Gallegos RN  Outcome: Progressing  Goal: Collaborate with me, my family, and caregiver to identify my specific goals  5/5/2024 1831 by Olga Gallegos RN  Outcome: Not met  5/5/2024 1830 by Olga Gallegos RN  Outcome: Progressing     Problem: Discharge Barriers  Goal: My discharge needs are met  5/5/2024 1831 by Olga Gallegos RN  Outcome: Not met  5/5/2024 1830 by Olag Gallegos RN  Outcome: Progressing

## 2024-05-06 ENCOUNTER — DOCUMENTATION (OUTPATIENT)
Dept: DIABETES SERVICES | Facility: HOSPITAL | Age: 49
End: 2024-05-06
Payer: COMMERCIAL
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
## 2024-05-06 NOTE — DOCUMENTATION CLARIFICATION NOTE
"    PATIENT:               ABDELRAHMAN CARBONE  ACCT #:                  [id number]  MRN:                       [id number]  :                       1975  ADMIT DATE:       2024 1:26 AM  DISCH DATE:        2024 7:59 PM  RESPONDING PROVIDER #:        [id number]          PROVIDER RESPONSE TEXT:    Sepsis with R plantar abscess, Diabetic foot infection was a differential diagnosis and ruled out after study    CDI QUERY TEXT:    Clarification        Instruction:  Based on your assessment of the patient and the clinical information, please provide the requested documentation by clicking on the appropriate radio button and enter any additional information if prompted.    Question: Sepsis with R plantar abscess, Diabetic foot infection was documented in the medical record. Based on the documentation and the clinical information, can the diagnosis be further clarified as      When answering this query, please exercise your independent professional judgment. The fact that a question is being asked, does not imply that any particular answer is desired or expected.    The patient's clinical indicators include:  Clinical Information: 48 year old female presented to ED with abd pain, nausea/vomiting.    Documented Diagnosis: Sepsis, R plantar abscess    Clinical Indicators:  -  H&P and subsequent IM PNs state, ?DKA precipitated by sepsis 2/2 possible diabetic foot osteomyelitis and non compliance on home insulin.?  -  Podiatry Consult states, ?No plan for surgical intervention. Very low suspicion for infection? and, ?Wound: right foot lateral incisional site...No marko-wound erythema. No evidence of ascending cellulitis or lymphangitis. No palpable fluctuance. Minimal malodor. No increased warmth\".  - 5/3 IM PN states, ?R plantar abscess?. Attending attestation states, ?On admission was on broad spectrum antibiotics given non completion of antibiotics from recent amputation, however after cultures were negative these " were discontinued?R foot exam dry with low concern of infection, however found to have cotton ball inbeded in wound with surrounding epithelization  Podiatry evaluated and debrided wound discontinue vanc/zosyn negative blood cultures, wound appears well.  - 4/30 WBC: 16.7  - 5/1 WBC: 11.3  - 4/30 Lactate per VBGs: 1.1, 2.7  - VS at the time of admission: HR 78-99, RR 16-18, //12, SpO2 97-99% on room air    Treatment:  - Initially on IV vanc/Zosyn: dc'd on 5/3  - IVFs  - Podiatry Consult    Risk Factors:  - Uncontrolled DM II  - s/p R foot amputation 2/2024 due to staph  Options provided:  -- Sepsis with R plantar abscess, Diabetic foot infection was a differential diagnosis and ruled out after study  -- Sepsis with R plantar abscess, Diabetic foot infection was ruled in  -- Other - I will add my own diagnosis  -- Refer to Clinical Documentation Reviewer    Query created by: Felicia Hobson on 5/4/2024 10:44 AM      Electronically signed by:  SONY ELIZABETH MD 5/5/2024 10:00 PM

## 2024-05-06 NOTE — DOCUMENTATION CLARIFICATION NOTE
"    PATIENT:               ABDELRAHMAN CARBONE  ACCT #:                  [id number]  MRN:                       [id number]  :                       1975  ADMIT DATE:       2024 1:26 AM  DISCH DATE:        2024 7:59 PM  RESPONDING PROVIDER #:        [id number]          PROVIDER RESPONSE TEXT:    Diabetes Type II with hyperglycemia without DKA    CDI QUERY TEXT:    Clarification        Instruction:    Based on your assessment of the patient and the clinical information, please provide the requested documentation by clicking on the appropriate radio button and enter any additional information if prompted.    Question: Based on your medical judgment, can you please clarify which of these conditions is the most clinically supported    When answering this query, please exercise your independent professional judgment. The fact that a question is being asked, does not imply that any particular answer is desired or expected.    The patient's clinical indicators include:  Clinical Information: There is conflicting documentation in the medical record which requires clarification.    -The diagnosis of \"Diabetic ketoacidosis without coma\" was documented in the  H&P and subsequent IM PNs.    -The documentation of \"likely not in DKA but rather uncontrolled diabetes\" was noted in the -5/3 IM PNs.    Clinical Indicators:  -  H&P states, \"beta hydroxy 1.86, hyperosmolar (331) and normal bicarbonate CMP Glu 614...Suspect early/mild DKA given UA positive for ketones and elevated beta-hydroxybutyrate (1.86). Patient received 1.5 L LR, calcium gluconate, 20 units insulin total.\"  - 5/3 IM PN states, ??presenting with Diabetic ketoacidosis without coma? and, ?Considering hyperglycemia but lack of metabolic acidosis, likely not in DKA but rather uncontrolled diabetes, which could account for abdominal pain/vomiting.? Under Assessment/Plan states, ?DKA precipitated by sepsis 2/2 possible diabetic foot osteomyelitis and non " compliance on home insulin.?  - 4/30 Chemistry: Glucose 614  Bicarb 25  Anion gap 27  - 4/30 VBGs: pH 7.42, 7.41  Lactate 1.1, 2.7  - 4/30 Beta-Hydroxybutyrate: 1.86    Treatment:  - IVFs  - Calcium gluconate  - Insulin    Risk Factors:  - DM II, non-compliance on home insulin  Options provided:  -- Diabetic ketoacidosis without coma  -- Diabetes Type II with hyperglycemia without DKA  -- Other - I will add my own diagnosis  -- Refer to Clinical Documentation Reviewer    Query created by: Felicia Hobson on 5/4/2024 10:44 AM      Electronically signed by:  SONY ELIZABETH MD 5/5/2024 10:00 PM

## 2024-05-06 NOTE — DOCUMENTATION CLARIFICATION NOTE
"    PATIENT:               ABDELRAHMAN CARBONE  ACCT #:                  [id number]  MRN:                       [id number]  :                       1975  ADMIT DATE:       2024 1:26 AM  DISCH DATE:        2024 7:59 PM  RESPONDING PROVIDER #:        [id number]          PROVIDER RESPONSE TEXT:    Moderate Protein Calorie Malnutrition    CDI QUERY TEXT:    Clarification        Instruction:    Based on your assessment of the patient and the clinical information, please provide the requested documentation by clicking on the appropriate radio button and enter any additional information if prompted.    Question: Please further clarify this patient nutritional status as      When answering this query, please exercise your independent professional judgment. The fact that a question is being asked, does not imply that any particular answer is desired or expected.    The patient's clinical indicators include:  Clinical Information: 48 year old female presented to ED with abdominal pain, nausea and vomiting.    Clinical Indicators:  -  Nutrition Initial Assessment states documents, ?Malnutrition Diagnosis: Moderate malnutrition related to chronic disease or condition As Evidenced by: suspect patient meeting <75% of estimated energy needs for >1 month; mild-moderate muscle and subcutaneous fat loss; patient report of 12.5% significant weight loss in   6 months.?  -  Note states, \"Pt reports she has had a decreased appetite for the past several months. Reports she is having difficulty eating food because she has pain when swallowing solids/liquids. Reports the pain is in her esophagus and stomach after ingestion of food. Endorses difficulty chewing/swallowing.\"  - BMI 24.02    Treatment:  - Nutrition Consult  - Ensure Clear BID  - Pro-Stat AWC once daily    Risk Factors:  - Uncontrolled DM II, presented with abd pain, nausea, vomiting  - Reported difficulty swallowing, pain in esophagus/stomach after ingestion of " food  Options provided:  -- Moderate Protein Calorie Malnutrition  -- Other - I will add my own diagnosis  -- Refer to Clinical Documentation Reviewer    Query created by: Felicia Hobson on 5/4/2024 10:44 AM      Electronically signed by:  SONY ELIZABETH MD 5/5/2024 10:00 PM

## 2025-03-31 ENCOUNTER — HOSPITAL ENCOUNTER (INPATIENT)
Facility: HOSPITAL | Age: 50
Discharge: NURSING, REHAB OR LONG-TERM CARE | End: 2025-03-31
Attending: EMERGENCY MEDICINE | Admitting: INTERNAL MEDICINE
Payer: COMMERCIAL

## 2025-03-31 ENCOUNTER — APPOINTMENT (OUTPATIENT)
Dept: RADIOLOGY | Facility: HOSPITAL | Age: 50
End: 2025-03-31
Payer: COMMERCIAL

## 2025-03-31 ENCOUNTER — APPOINTMENT (OUTPATIENT)
Dept: CARDIOLOGY | Facility: HOSPITAL | Age: 50
End: 2025-03-31
Payer: COMMERCIAL

## 2025-03-31 DIAGNOSIS — C25.9 MALIGNANT NEOPLASM OF PANCREAS, UNSPECIFIED LOCATION OF MALIGNANCY (MULTI): ICD-10-CM

## 2025-03-31 DIAGNOSIS — I63.9 CEREBROVASCULAR ACCIDENT (CVA), UNSPECIFIED MECHANISM (MULTI): Primary | ICD-10-CM

## 2025-03-31 DIAGNOSIS — R13.11 ORAL PHASE DYSPHAGIA: ICD-10-CM

## 2025-03-31 DIAGNOSIS — E08.10 DIABETIC KETOACIDOSIS WITHOUT COMA ASSOCIATED WITH DIABETES MELLITUS DUE TO UNDERLYING CONDITION: ICD-10-CM

## 2025-03-31 DIAGNOSIS — I10 PRIMARY HYPERTENSION: ICD-10-CM

## 2025-03-31 LAB
ALBUMIN SERPL BCP-MCNC: 3.5 G/DL (ref 3.4–5)
ALP SERPL-CCNC: 638 U/L (ref 33–110)
ALT SERPL W P-5'-P-CCNC: 37 U/L (ref 7–45)
ANION GAP SERPL CALC-SCNC: 10 MMOL/L (ref 10–20)
APTT PPP: 38 SECONDS (ref 26–36)
AST SERPL W P-5'-P-CCNC: 70 U/L (ref 9–39)
BASOPHILS # BLD AUTO: 0.05 X10*3/UL (ref 0–0.1)
BASOPHILS NFR BLD AUTO: 0.8 %
BILIRUB SERPL-MCNC: 0.3 MG/DL (ref 0–1.2)
BNP SERPL-MCNC: 321 PG/ML (ref 0–99)
BUN SERPL-MCNC: 13 MG/DL (ref 6–23)
CALCIUM SERPL-MCNC: 9.3 MG/DL (ref 8.6–10.3)
CARDIAC TROPONIN I PNL SERPL HS: 10 NG/L (ref 0–13)
CHLORIDE SERPL-SCNC: 107 MMOL/L (ref 98–107)
CHOLEST SERPL-MCNC: 155 MG/DL (ref 0–199)
CHOLESTEROL/HDL RATIO: 2.2
CO2 SERPL-SCNC: 29 MMOL/L (ref 21–32)
CREAT SERPL-MCNC: 1.06 MG/DL (ref 0.5–1.05)
EGFRCR SERPLBLD CKD-EPI 2021: 64 ML/MIN/1.73M*2
EOSINOPHIL # BLD AUTO: 0.56 X10*3/UL (ref 0–0.7)
EOSINOPHIL NFR BLD AUTO: 8.9 %
ERYTHROCYTE [DISTWIDTH] IN BLOOD BY AUTOMATED COUNT: 14.5 % (ref 11.5–14.5)
GLUCOSE BLD MANUAL STRIP-MCNC: 107 MG/DL (ref 74–99)
GLUCOSE SERPL-MCNC: 94 MG/DL (ref 74–99)
HCT VFR BLD AUTO: 28.7 % (ref 36–46)
HDLC SERPL-MCNC: 70.2 MG/DL
HGB BLD-MCNC: 8.9 G/DL (ref 12–16)
HOLD SPECIMEN: NORMAL
IMM GRANULOCYTES # BLD AUTO: 0.01 X10*3/UL (ref 0–0.7)
IMM GRANULOCYTES NFR BLD AUTO: 0.2 % (ref 0–0.9)
INR PPP: 1 (ref 0.9–1.1)
LDLC SERPL CALC-MCNC: 70 MG/DL
LYMPHOCYTES # BLD AUTO: 1.16 X10*3/UL (ref 1.2–4.8)
LYMPHOCYTES NFR BLD AUTO: 18.5 %
MCH RBC QN AUTO: 25.6 PG (ref 26–34)
MCHC RBC AUTO-ENTMCNC: 31 G/DL (ref 32–36)
MCV RBC AUTO: 83 FL (ref 80–100)
MONOCYTES # BLD AUTO: 0.41 X10*3/UL (ref 0.1–1)
MONOCYTES NFR BLD AUTO: 6.5 %
NEUTROPHILS # BLD AUTO: 4.07 X10*3/UL (ref 1.2–7.7)
NEUTROPHILS NFR BLD AUTO: 65.1 %
NON HDL CHOLESTEROL: 85 MG/DL (ref 0–149)
NRBC BLD-RTO: 0 /100 WBCS (ref 0–0)
PLATELET # BLD AUTO: 334 X10*3/UL (ref 150–450)
POTASSIUM SERPL-SCNC: 4.2 MMOL/L (ref 3.5–5.3)
PROT SERPL-MCNC: 6.2 G/DL (ref 6.4–8.2)
PROTHROMBIN TIME: 11.5 SECONDS (ref 9.8–12.4)
RBC # BLD AUTO: 3.48 X10*6/UL (ref 4–5.2)
SODIUM SERPL-SCNC: 142 MMOL/L (ref 136–145)
TRIGL SERPL-MCNC: 75 MG/DL (ref 0–149)
VLDL: 15 MG/DL (ref 0–40)
WBC # BLD AUTO: 6.3 X10*3/UL (ref 4.4–11.3)

## 2025-03-31 PROCEDURE — 85730 THROMBOPLASTIN TIME PARTIAL: CPT | Performed by: EMERGENCY MEDICINE

## 2025-03-31 PROCEDURE — 70498 CT ANGIOGRAPHY NECK: CPT | Performed by: RADIOLOGY

## 2025-03-31 PROCEDURE — 80061 LIPID PANEL: CPT | Performed by: INTERNAL MEDICINE

## 2025-03-31 PROCEDURE — 70450 CT HEAD/BRAIN W/O DYE: CPT | Performed by: RADIOLOGY

## 2025-03-31 PROCEDURE — 82947 ASSAY GLUCOSE BLOOD QUANT: CPT

## 2025-03-31 PROCEDURE — 99291 CRITICAL CARE FIRST HOUR: CPT | Mod: 25 | Performed by: EMERGENCY MEDICINE

## 2025-03-31 PROCEDURE — 70496 CT ANGIOGRAPHY HEAD: CPT | Performed by: RADIOLOGY

## 2025-03-31 PROCEDURE — 2500000001 HC RX 250 WO HCPCS SELF ADMINISTERED DRUGS (ALT 637 FOR MEDICARE OP): Performed by: INTERNAL MEDICINE

## 2025-03-31 PROCEDURE — 2500000001 HC RX 250 WO HCPCS SELF ADMINISTERED DRUGS (ALT 637 FOR MEDICARE OP): Performed by: EMERGENCY MEDICINE

## 2025-03-31 PROCEDURE — 70498 CT ANGIOGRAPHY NECK: CPT

## 2025-03-31 PROCEDURE — 84484 ASSAY OF TROPONIN QUANT: CPT | Performed by: EMERGENCY MEDICINE

## 2025-03-31 PROCEDURE — 72125 CT NECK SPINE W/O DYE: CPT

## 2025-03-31 PROCEDURE — 80053 COMPREHEN METABOLIC PANEL: CPT | Performed by: EMERGENCY MEDICINE

## 2025-03-31 PROCEDURE — 70450 CT HEAD/BRAIN W/O DYE: CPT

## 2025-03-31 PROCEDURE — 93005 ELECTROCARDIOGRAM TRACING: CPT

## 2025-03-31 PROCEDURE — 2550000001 HC RX 255 CONTRASTS: Performed by: EMERGENCY MEDICINE

## 2025-03-31 PROCEDURE — 85025 COMPLETE CBC W/AUTO DIFF WBC: CPT | Performed by: EMERGENCY MEDICINE

## 2025-03-31 PROCEDURE — 72125 CT NECK SPINE W/O DYE: CPT | Performed by: STUDENT IN AN ORGANIZED HEALTH CARE EDUCATION/TRAINING PROGRAM

## 2025-03-31 PROCEDURE — 85610 PROTHROMBIN TIME: CPT | Performed by: EMERGENCY MEDICINE

## 2025-03-31 PROCEDURE — 1100000001 HC PRIVATE ROOM DAILY

## 2025-03-31 PROCEDURE — 83880 ASSAY OF NATRIURETIC PEPTIDE: CPT | Performed by: INTERNAL MEDICINE

## 2025-03-31 RX ORDER — FUROSEMIDE 40 MG/1
40 TABLET ORAL 2 TIMES DAILY
Status: ON HOLD | COMMUNITY
Start: 2025-02-03 | End: 2025-04-05

## 2025-03-31 RX ORDER — INSULIN GLARGINE 100 [IU]/ML
30 INJECTION, SOLUTION SUBCUTANEOUS NIGHTLY
Status: ON HOLD | COMMUNITY
Start: 2025-03-06 | End: 2025-04-05

## 2025-03-31 RX ORDER — HYDRALAZINE HYDROCHLORIDE 20 MG/ML
10 INJECTION INTRAMUSCULAR; INTRAVENOUS EVERY 4 HOURS PRN
Status: DISPENSED | OUTPATIENT
Start: 2025-03-31

## 2025-03-31 RX ORDER — ASPIRIN 81 MG/1
81 TABLET ORAL DAILY
Status: DISPENSED | OUTPATIENT
Start: 2025-03-31

## 2025-03-31 RX ORDER — LABETALOL HYDROCHLORIDE 5 MG/ML
10 INJECTION, SOLUTION INTRAVENOUS EVERY 10 MIN PRN
Status: DISCONTINUED | OUTPATIENT
Start: 2025-03-31 | End: 2025-03-31

## 2025-03-31 RX ORDER — ATORVASTATIN CALCIUM 80 MG/1
80 TABLET, FILM COATED ORAL NIGHTLY
Status: DISCONTINUED | OUTPATIENT
Start: 2025-03-31 | End: 2025-04-02

## 2025-03-31 RX ORDER — INSULIN LISPRO 100 [IU]/ML
INJECTION, SOLUTION INTRAVENOUS; SUBCUTANEOUS
Status: ON HOLD | COMMUNITY
Start: 2025-03-06

## 2025-03-31 RX ORDER — LOSARTAN POTASSIUM 25 MG/1
25 TABLET ORAL
Status: ON HOLD | COMMUNITY
Start: 2025-03-07 | End: 2025-04-05

## 2025-03-31 RX ORDER — POLYETHYLENE GLYCOL 3350 17 G/17G
17 POWDER, FOR SOLUTION ORAL DAILY
Status: DISPENSED | OUTPATIENT
Start: 2025-03-31

## 2025-03-31 RX ORDER — HYDROXYZINE HYDROCHLORIDE 25 MG/1
25 TABLET, FILM COATED ORAL EVERY 6 HOURS PRN
Status: ON HOLD | COMMUNITY
Start: 2025-01-27

## 2025-03-31 RX ORDER — BUPRENORPHINE AND NALOXONE 2; .5 MG/1; MG/1
1 FILM, SOLUBLE BUCCAL; SUBLINGUAL 2 TIMES DAILY
Status: ON HOLD | COMMUNITY
Start: 2025-03-27 | End: 2025-04-03

## 2025-03-31 RX ORDER — OXYCODONE HYDROCHLORIDE 20 MG/1
20 TABLET ORAL EVERY 4 HOURS PRN
Status: ON HOLD | COMMUNITY
Start: 2025-03-27 | End: 2025-04-11

## 2025-03-31 RX ORDER — ATORVASTATIN CALCIUM 40 MG/1
1 TABLET, FILM COATED ORAL NIGHTLY
Status: ON HOLD | COMMUNITY
Start: 2025-01-02

## 2025-03-31 RX ORDER — AMOXICILLIN 250 MG
2 CAPSULE ORAL 2 TIMES DAILY
Status: ON HOLD | COMMUNITY
Start: 2025-03-27 | End: 2025-04-11

## 2025-03-31 RX ORDER — NIFEDIPINE 60 MG/1
60 TABLET, EXTENDED RELEASE ORAL
Status: ON HOLD | COMMUNITY
Start: 2025-03-07

## 2025-03-31 RX ORDER — SERTRALINE HYDROCHLORIDE 50 MG/1
50 TABLET, FILM COATED ORAL DAILY
Status: ON HOLD | COMMUNITY
Start: 2025-03-06

## 2025-03-31 RX ORDER — OXYCODONE HYDROCHLORIDE 5 MG/1
20 TABLET ORAL EVERY 4 HOURS PRN
Status: DISCONTINUED | OUTPATIENT
Start: 2025-03-31 | End: 2025-04-01

## 2025-03-31 RX ORDER — ACETAMINOPHEN 325 MG/1
650 TABLET ORAL EVERY 6 HOURS PRN
Status: ON HOLD | COMMUNITY
Start: 2024-12-18

## 2025-03-31 RX ORDER — GABAPENTIN 300 MG/1
CAPSULE ORAL
Status: ON HOLD | COMMUNITY
Start: 2025-03-27

## 2025-03-31 RX ORDER — NAPROXEN SODIUM 220 MG/1
81 TABLET, FILM COATED ORAL ONCE
Status: COMPLETED | OUTPATIENT
Start: 2025-03-31 | End: 2025-03-31

## 2025-03-31 RX ORDER — LIDOCAINE AND PRILOCAINE 25; 25 MG/G; MG/G
CREAM TOPICAL
Status: ON HOLD | COMMUNITY
Start: 2025-01-30

## 2025-03-31 RX ADMIN — OXYCODONE HYDROCHLORIDE 20 MG: 5 TABLET ORAL at 20:29

## 2025-03-31 RX ADMIN — ATORVASTATIN CALCIUM 80 MG: 80 TABLET, FILM COATED ORAL at 20:29

## 2025-03-31 RX ADMIN — ASPIRIN 81 MG: 81 TABLET, CHEWABLE ORAL at 16:30

## 2025-03-31 RX ADMIN — OXYCODONE HYDROCHLORIDE 20 MG: 5 TABLET ORAL at 16:30

## 2025-03-31 RX ADMIN — IOHEXOL 80 ML: 350 INJECTION, SOLUTION INTRAVENOUS at 13:00

## 2025-03-31 SDOH — SOCIAL STABILITY: SOCIAL INSECURITY
WITHIN THE LAST YEAR, HAVE YOU BEEN RAPED OR FORCED TO HAVE ANY KIND OF SEXUAL ACTIVITY BY YOUR PARTNER OR EX-PARTNER?: NO

## 2025-03-31 SDOH — HEALTH STABILITY: MENTAL HEALTH: HOW OFTEN DO YOU HAVE A DRINK CONTAINING ALCOHOL?: NEVER

## 2025-03-31 SDOH — SOCIAL STABILITY: SOCIAL INSECURITY: DO YOU FEEL UNSAFE GOING BACK TO THE PLACE WHERE YOU ARE LIVING?: NO

## 2025-03-31 SDOH — SOCIAL STABILITY: SOCIAL INSECURITY: HAVE YOU HAD THOUGHTS OF HARMING ANYONE ELSE?: NO

## 2025-03-31 SDOH — SOCIAL STABILITY: SOCIAL INSECURITY: WITHIN THE LAST YEAR, HAVE YOU BEEN AFRAID OF YOUR PARTNER OR EX-PARTNER?: NO

## 2025-03-31 SDOH — SOCIAL STABILITY: SOCIAL INSECURITY: HAS ANYONE EVER THREATENED TO HURT YOUR FAMILY OR YOUR PETS?: NO

## 2025-03-31 SDOH — SOCIAL STABILITY: SOCIAL INSECURITY
WITHIN THE LAST YEAR, HAVE YOU BEEN KICKED, HIT, SLAPPED, OR OTHERWISE PHYSICALLY HURT BY YOUR PARTNER OR EX-PARTNER?: NO

## 2025-03-31 SDOH — SOCIAL STABILITY: SOCIAL INSECURITY: WITHIN THE LAST YEAR, HAVE YOU BEEN HUMILIATED OR EMOTIONALLY ABUSED IN OTHER WAYS BY YOUR PARTNER OR EX-PARTNER?: NO

## 2025-03-31 SDOH — HEALTH STABILITY: MENTAL HEALTH: HOW MANY DRINKS CONTAINING ALCOHOL DO YOU HAVE ON A TYPICAL DAY WHEN YOU ARE DRINKING?: PATIENT DOES NOT DRINK

## 2025-03-31 SDOH — SOCIAL STABILITY: SOCIAL INSECURITY: DO YOU FEEL ANYONE HAS EXPLOITED OR TAKEN ADVANTAGE OF YOU FINANCIALLY OR OF YOUR PERSONAL PROPERTY?: NO

## 2025-03-31 SDOH — SOCIAL STABILITY: SOCIAL INSECURITY: ARE YOU OR HAVE YOU BEEN THREATENED OR ABUSED PHYSICALLY, EMOTIONALLY, OR SEXUALLY BY ANYONE?: NO

## 2025-03-31 SDOH — ECONOMIC STABILITY: FOOD INSECURITY
WITHIN THE PAST 12 MONTHS, YOU WORRIED THAT YOUR FOOD WOULD RUN OUT BEFORE YOU GOT THE MONEY TO BUY MORE.: SOMETIMES TRUE

## 2025-03-31 SDOH — ECONOMIC STABILITY: FOOD INSECURITY: WITHIN THE PAST 12 MONTHS, THE FOOD YOU BOUGHT JUST DIDN'T LAST AND YOU DIDN'T HAVE MONEY TO GET MORE.: SOMETIMES TRUE

## 2025-03-31 SDOH — ECONOMIC STABILITY: TRANSPORTATION INSECURITY: IN THE PAST 12 MONTHS, HAS LACK OF TRANSPORTATION KEPT YOU FROM MEDICAL APPOINTMENTS OR FROM GETTING MEDICATIONS?: YES

## 2025-03-31 SDOH — ECONOMIC STABILITY: INCOME INSECURITY: IN THE PAST 12 MONTHS HAS THE ELECTRIC, GAS, OIL, OR WATER COMPANY THREATENED TO SHUT OFF SERVICES IN YOUR HOME?: NO

## 2025-03-31 SDOH — HEALTH STABILITY: MENTAL HEALTH: HOW OFTEN DO YOU HAVE SIX OR MORE DRINKS ON ONE OCCASION?: NEVER

## 2025-03-31 SDOH — SOCIAL STABILITY: SOCIAL INSECURITY: DOES ANYONE TRY TO KEEP YOU FROM HAVING/CONTACTING OTHER FRIENDS OR DOING THINGS OUTSIDE YOUR HOME?: NO

## 2025-03-31 SDOH — SOCIAL STABILITY: SOCIAL INSECURITY: ABUSE: ADULT

## 2025-03-31 SDOH — SOCIAL STABILITY: SOCIAL INSECURITY: HAVE YOU HAD ANY THOUGHTS OF HARMING ANYONE ELSE?: NO

## 2025-03-31 SDOH — SOCIAL STABILITY: SOCIAL INSECURITY: ARE THERE ANY APPARENT SIGNS OF INJURIES/BEHAVIORS THAT COULD BE RELATED TO ABUSE/NEGLECT?: NO

## 2025-03-31 ASSESSMENT — COGNITIVE AND FUNCTIONAL STATUS - GENERAL
PERSONAL GROOMING: A LOT
EATING MEALS: A LITTLE
DAILY ACTIVITIY SCORE: 16
WALKING IN HOSPITAL ROOM: A LOT
MOVING TO AND FROM BED TO CHAIR: A LOT
DRESSING REGULAR UPPER BODY CLOTHING: A LITTLE
DRESSING REGULAR LOWER BODY CLOTHING: A LITTLE
STANDING UP FROM CHAIR USING ARMS: A LOT
TOILETING: A LOT
PATIENT BASELINE BEDBOUND: NO
CLIMB 3 TO 5 STEPS WITH RAILING: A LOT
MOBILITY SCORE: 16
HELP NEEDED FOR BATHING: A LITTLE

## 2025-03-31 ASSESSMENT — PAIN - FUNCTIONAL ASSESSMENT
PAIN_FUNCTIONAL_ASSESSMENT: 0-10
PAIN_FUNCTIONAL_ASSESSMENT: 0-10

## 2025-03-31 ASSESSMENT — PAIN SCALES - GENERAL
PAINLEVEL_OUTOF10: 7
PAINLEVEL_OUTOF10: 6
PAINLEVEL_OUTOF10: 10 - WORST POSSIBLE PAIN

## 2025-03-31 ASSESSMENT — ACTIVITIES OF DAILY LIVING (ADL)
BATHING: NEEDS ASSISTANCE
LACK_OF_TRANSPORTATION: YES
FEEDING YOURSELF: NEEDS ASSISTANCE
ASSISTIVE_DEVICE: WHEELCHAIR;WALKER
WALKS IN HOME: NEEDS ASSISTANCE
JUDGMENT_ADEQUATE_SAFELY_COMPLETE_DAILY_ACTIVITIES: YES
TOILETING: NEEDS ASSISTANCE
HEARING - RIGHT EAR: FUNCTIONAL
ADEQUATE_TO_COMPLETE_ADL: YES
HEARING - LEFT EAR: FUNCTIONAL
LACK_OF_TRANSPORTATION: PATIENT UNABLE TO ANSWER
PATIENT'S MEMORY ADEQUATE TO SAFELY COMPLETE DAILY ACTIVITIES?: YES
GROOMING: NEEDS ASSISTANCE
DRESSING YOURSELF: NEEDS ASSISTANCE

## 2025-03-31 ASSESSMENT — LIFESTYLE VARIABLES
SKIP TO QUESTIONS 9-10: 1
AUDIT-C TOTAL SCORE: 0

## 2025-03-31 ASSESSMENT — PAIN DESCRIPTION - ORIENTATION: ORIENTATION: LEFT;UPPER

## 2025-03-31 ASSESSMENT — PATIENT HEALTH QUESTIONNAIRE - PHQ9
1. LITTLE INTEREST OR PLEASURE IN DOING THINGS: MORE THAN HALF THE DAYS
2. FEELING DOWN, DEPRESSED OR HOPELESS: MORE THAN HALF THE DAYS
SUM OF ALL RESPONSES TO PHQ9 QUESTIONS 1 & 2: 4

## 2025-03-31 ASSESSMENT — PAIN DESCRIPTION - LOCATION
LOCATION: ABDOMEN
LOCATION: ABDOMEN

## 2025-03-31 ASSESSMENT — PAIN DESCRIPTION - PAIN TYPE: TYPE: ACUTE PAIN

## 2025-03-31 ASSESSMENT — PAIN DESCRIPTION - DESCRIPTORS: DESCRIPTORS: PRESSURE;SQUEEZING

## 2025-03-31 ASSESSMENT — PAIN DESCRIPTION - FREQUENCY: FREQUENCY: CONSTANT/CONTINUOUS

## 2025-03-31 NOTE — ED TRIAGE NOTES
Pt arrives to ED c/o new onset leftsided weakness starting at 6:30 am this morning. Patient reports that she has stage 4 pancreatic cancer but has been feeling weaker over the past few days. Pt reports recent admission to [facility] related to her cancer. Pt unable to move left arm or left leg but has full sensation on left side.

## 2025-03-31 NOTE — ED NOTES
Patient requesting pain medication for her chronic pain,  notified, awaiting orders at this time. Pts sister now at bedside. Updated on poc     Eleni Skiffey, RN  03/31/25 [id number]

## 2025-03-31 NOTE — PROGRESS NOTES
Transitional Care Coordination Progress Note:  Plan per Medical/Surgical team: treatment of left sided weakness with R/O CVA   Status: ED  Payor source: cares[employer] medicaid   Discharge disposition: Home with daughter Hayley  Potential Barriers: hx of pancreatic cancer on chemo   ADOD: 4/1/2025   DEBBIE Grigsby RN, BSN Transitional Care Coordinator ED# [phone]      03/31/25 145   Discharge Planning   Living Arrangements Children   Support Systems Children;Spouse/significant other;Parent   Assistance Needed CVA work up, pain management   Type of Residence Private residence   Number of Stairs to Enter Residence 3   Number of Stairs Within Residence 0   Home or Post Acute Services In home services   Type of Home Care Services  --    Expected Discharge Disposition Home   Does the patient need discharge transport arranged? Yes   RoundTrip coordination needed? Yes   Has discharge transport been arranged? No   Financial Resource Strain   How hard is it for you to pay for the very basics like food, housing, medical care, and heating? Pt Unable   Housing Stability   In the last 12 months, was there a time when you were not able to pay the mortgage or rent on time? Pt Unable   In the past 12 months, how many times have you moved where you were living? 1   At any time in the past 12 months, were you homeless or living in a shelter (including now)? Pt Unable   Transportation Needs   In the past 12 months, has lack of transportation kept you from medical appointments or from getting medications? Pt Unable   In the past 12 months, has lack of transportation kept you from meetings, work, or from getting things needed for daily living? Pt Unable   Stroke Family Assessment   Stroke Family Assessment Needed Yes   Primary Caregiver/Family After Discharge Adult Child   Additional Support if 24 hour Supervision Required daughter   Physical Layout of Home One Story   Caregiver/Family can provide assistance with ADL's Yes    Caregiver/Family can provide mobility assistance for transfers in and out of bed, chair or car Yes   Medications: Caregiver/Family can obtain prescriptions Yes   Medications: Caregiver/Family can assist with medication administration Yes   Medications: Caregiver/Family verbalizes understanding of medications Yes   Caregiver/Family agrees with discharge plan to home Yes   Caregiver/Family verbalizes capability of caring for patient at home Yes   Intensity of Service   Intensity of Service 0-30 min

## 2025-03-31 NOTE — H&P
PRIMARY CARE PHYSICIAN:  Shanthi Otero MD ADMITTING PHYSICIAN No admitting provider for patient encounter.   MRN# [id number]   Admission Date: 3/31/2025     Subjective   CHIEF COMPLAINT: Left-sided weakness    HISTORY OF PRESENT ILLNESS Barbara English is a 50 y.o. female with a history of stage IV metastatic pancreatic cancer presented with weakness of the left side of the body since she woke up this morning.  He was not able to walk.  She denies any headache difficulty no visual disturbance.  She was recently discharged from [facility] after treatment of diabetic ketoacidosis    Past Medical history     Type 1 diabetes mellitus pancreatic cancer stage IV   heart failure   gastric ulcer   gastroparesis   hypertension   history of substance abuse   depression     Past Surgical history  Past Surgical History:   Procedure Laterality Date    CT ANGIO CORONARY ART WITH HEARTFLOW IF SCORE >30%  11/17/2021    CT HEART CORONARY ANGIOGRAM 11/17/2021 [facility] CLINICAL LEGACY       No family history on file.    Social History     Socioeconomic History    Marital status: Single   Tobacco Use    Smoking status: Every Day     Types: Cigarettes     Social Drivers of Health     Financial Resource Strain: Patient Unable To Answer (3/31/2025)    Overall Financial Resource Strain (CARDIA)     Difficulty of Paying Living Expenses: Patient unable to answer   Food Insecurity: No Food Insecurity (3/21/2025)    Received from [facility]    Hunger Vital Sign     Worried About Running Out of Food in the Last Year: Never true     Ran Out of Food in the Last Year: Never true   Transportation Needs: Patient Unable To Answer (3/31/2025)    PRAPARE - Transportation     Lack of Transportation (Medical): Patient unable to answer     Lack of Transportation (Non-Medical): Patient unable to answer   Physical Activity: Inactive (4/7/2024)    [facility], [facility]    Exercise Vital Sign     Days  of Exercise per Week: 0 days     Minutes of Exercise per Session: 0 min   Stress: Stress Concern Present (4/7/2024)    Received from [facility]    [employer] [place] [employer] - Occupational Stress Questionnaire     Feeling of Stress : Very much   Social Connections: Moderately Integrated (4/7/2024)    Received from [facility]    Social Connection and Isolation Panel [NHANES]     Frequency of Communication with Friends and Family: Three times a week     Frequency of Social Gatherings with Friends and Family: Twice a week     Attends Orthodoxy Services: 1 to 4 times per year     Active Member of Clubs or Organizations: No     Attends Club or Organization Meetings: Never     Marital Status: Living with partner   Housing Stability: Patient Unable To Answer (3/31/2025)    Housing Stability Vital Sign     Unable to Pay for Housing in the Last Year: Patient unable to answer     Number of Times Moved in the Last Year: 1     Homeless in the Last Year: Patient unable to answer       Medications  (Not in a hospital admission)       Allergies   Allergen Reactions    Haloperidol Swelling    Lorazepam Swelling    Latex Itching and Rash    Lisinopril Other     High K - ?pancreatitis    Vancomycin Rash     Suspected red man syndrome with vancomycin infusion - improves with prolonged infusion times       Complete Review of symptoms  GENERAL: No or fever  HEENT: No, blurred vision, or hearing loss  CARDIAC: No chest pain, shortness of breath, palpitations or leg swelling  RESPIRATORY: No or hemoptysis  GI: No nausea, vomiting, diarrhea, or constipation  : No or dysuria  SKIN: no rash, itching discoloration, jaundice or rash  ENDOCRINE: no or cold intolerance  PSYCH: No anxiety, mood disorder hallucinations or psychiatric symptoms  NEURO: No, blurred vision, headache, or word finding difficulties    Objective     PHYSICAL EXAM:  Patient Vitals for the past 24 hrs:   BP  "Temp Temp src Pulse Resp SpO2 Height Weight   03/31/25 1600 (!) 193/109 -- -- 75 -- 99 % -- --   03/31/25 1507 (!) 175/96 -- -- 67 16 100 % 1.6 m (5' 3\") 76.6 kg (168 lb 14 oz)   03/31/25 1400 -- -- -- 72 -- 100 % -- --   03/31/25 1333 174/88 -- -- -- 18 -- -- --   03/31/25 1200 (!) 176/97 -- -- 62 16 99 % -- --   03/31/25 1133 (!) 181/97 -- -- -- 17 -- -- --   03/31/25 1131 (!) 175/96 -- -- -- -- -- -- --   03/31/25 1130 -- -- -- -- 16 -- -- --   03/31/25 1101 (!) 181/72 36.7 °C (98.1 °F) Oral 60 20 100 % -- 76.6 kg (168 lb 14 oz)   03/31/25 1100 159/87 -- -- 69 19 100 % -- --   03/31/25 1033 (!) 174/101 -- -- 64 16 99 % -- --     Blood pressure (!) 193/109, pulse 75, temperature 36.7 °C (98.1 °F), temperature source Oral, resp. rate 16, height 1.6 m (5' 3\"), weight 76.6 kg (168 lb 14 oz), SpO2 99%.  Patient Vitals for the past 24 hrs:   BP Temp Temp src Pulse Resp SpO2 Height Weight   03/31/25 1600 (!) 193/109 -- -- 75 -- 99 % -- --   03/31/25 1507 (!) 175/96 -- -- 67 16 100 % 1.6 m (5' 3\") 76.6 kg (168 lb 14 oz)   03/31/25 1400 -- -- -- 72 -- 100 % -- --   03/31/25 1333 174/88 -- -- -- 18 -- -- --   03/31/25 1200 (!) 176/97 -- -- 62 16 99 % -- --   03/31/25 1133 (!) 181/97 -- -- -- 17 -- -- --   03/31/25 1131 (!) 175/96 -- -- -- -- -- -- --   03/31/25 1130 -- -- -- -- 16 -- -- --   03/31/25 1101 (!) 181/72 36.7 °C (98.1 °F) Oral 60 20 100 % -- 76.6 kg (168 lb 14 oz)   03/31/25 1100 159/87 -- -- 69 19 100 % -- --   03/31/25 1033 (!) 174/101 -- -- 64 16 99 % -- --     Body mass index is 29.91 kg/m².  GENERAL: alert, cooperative, or no distress  SKIN: no rashes  HEENT Atraumatic, Normocephalic and Not pale, no icterus  NECK: supple, no thyromegaly, JVP within normal limits  LUNGS:  not in respiratory distress, respiratory rate normal, clear to auscultation  CARDIAC: rate regular and regular rhythm,, normal S1, S2, no murmur, rub, or gallop heard.  ABDOMEN: Soft, non-tender, normal bowel sounds; no bruits, " organomegaly or masses.  EXTREMETIES: No edema  NEURO: Alert and oriented x 3,  .Left sided weakness. Arm weakness more marked than LE. Left sided facial weakness.    DATA:   Diagnostic tests reviewed for today's visit:    Most recent  labs and imaging results  Results for orders placed or performed during the hospital encounter of 03/31/25 (from the past 96 hours)   POCT GLUCOSE   Result Value Ref Range    POCT Glucose 107 (H) 74 - 99 mg/dL   CBC and Auto Differential   Result Value Ref Range    WBC 6.3 4.4 - 11.3 x10*3/uL    nRBC 0.0 0.0 - 0.0 /100 WBCs    RBC 3.48 (L) 4.00 - 5.20 x10*6/uL    Hemoglobin 8.9 (L) 12.0 - 16.0 g/dL    Hematocrit 28.7 (L) 36.0 - 46.0 %    MCV 83 80 - 100 fL    MCH 25.6 (L) 26.0 - 34.0 pg    MCHC 31.0 (L) 32.0 - 36.0 g/dL    RDW 14.5 11.5 - 14.5 %    Platelets 334 150 - 450 x10*3/uL    Neutrophils % 65.1 40.0 - 80.0 %    Immature Granulocytes %, Automated 0.2 0.0 - 0.9 %    Lymphocytes % 18.5 13.0 - 44.0 %    Monocytes % 6.5 2.0 - 10.0 %    Eosinophils % 8.9 0.0 - 6.0 %    Basophils % 0.8 0.0 - 2.0 %    Neutrophils Absolute 4.07 1.20 - 7.70 x10*3/uL    Immature Granulocytes Absolute, Automated 0.01 0.00 - 0.70 x10*3/uL    Lymphocytes Absolute 1.16 (L) 1.20 - 4.80 x10*3/uL    Monocytes Absolute 0.41 0.10 - 1.00 x10*3/uL    Eosinophils Absolute 0.56 0.00 - 0.70 x10*3/uL    Basophils Absolute 0.05 0.00 - 0.10 x10*3/uL   Comprehensive metabolic panel   Result Value Ref Range    Glucose 94 74 - 99 mg/dL    Sodium 142 136 - 145 mmol/L    Potassium 4.2 3.5 - 5.3 mmol/L    Chloride 107 98 - 107 mmol/L    Bicarbonate 29 21 - 32 mmol/L    Anion Gap 10 10 - 20 mmol/L    Urea Nitrogen 13 6 - 23 mg/dL    Creatinine 1.06 (H) 0.50 - 1.05 mg/dL    eGFR 64 >60 mL/min/1.73m*2    Calcium 9.3 8.6 - 10.3 mg/dL    Albumin 3.5 3.4 - 5.0 g/dL    Alkaline Phosphatase 638 (H) 33 - 110 U/L    Total Protein 6.2 (L) 6.4 - 8.2 g/dL    AST 70 (H) 9 - 39 U/L    Bilirubin, Total 0.3 0.0 - 1.2 mg/dL    ALT 37 7 - 45 U/L    Troponin I, High Sensitivity   Result Value Ref Range    Troponin I, High Sensitivity 10 0 - 13 ng/L   Protime-INR   Result Value Ref Range    Protime 11.5 9.8 - 12.4 seconds    INR 1.0 0.9 - 1.1   APTT   Result Value Ref Range    aPTT 38 (H) 26 - 36 seconds   Green Top   Result Value Ref Range    Extra Tube Hold for add-ons.    Lavender Top   Result Value Ref Range    Extra Tube Hold for add-ons.    Gray Top   Result Value Ref Range    Extra Tube Hold for add-ons.    ECG 12 lead   Result Value Ref Range    Ventricular Rate 67 BPM    Atrial Rate 67 BPM    ID Interval 154 ms    QRS Duration 82 ms    QT Interval 406 ms    QTC Calculation(Bazett) 429 ms    P Axis 72 degrees    R Axis 90 degrees    T Axis -27 degrees    QRS Count 11 beats    Q Onset 224 ms    P Onset 147 ms    P Offset 197 ms    T Offset 427 ms    QTC Fredericia 421 ms        Imaging  CT cervical spine wo IV contrast    Result Date: 3/31/2025  No acute osseous abnormality is identified in the cervical spine.   MACRO: None   Signed by: Zechariah Cole 3/31/2025 5:37 PM Dictation workstation:   [id number]    CT brain attack angio head and neck W and WO IV contrast    Result Date: 3/31/2025  1. Short-segment of severely attenuated flow related enhancement at the origin of the M1 segment right middle cerebral artery, suspected focus of thrombus. 2. Nonspecific fusiform narrowing of the right internal carotid artery supraclinoid segment. This could be related to nonocclusive wall adherent thrombus, less likely fibrofatty atherosclerotic plaque. 3. Undulating attenuation of flow related enhancement in the right pericallosal artery of the anterior cerebral artery. This may represent thrombosis. 4. Multifocal endodontal and periodontal disease.   MACRO: Eladio Montoya discussed the significance and urgency of this critical finding epic chat with  SILVINO STRONG AND TISHA SANABRIA on 3/31/2025 at 1:32 pm.  (**-RCF-**) Findings:  See findings.   Signed by:  Eladio Montoya 3/31/2025 1:33 PM Dictation workstation:   [id number]    CT brain attack head wo IV contrast    Result Date: 3/31/2025  NEGATIVE BRAIN ATTACK PROTOCOL CT BRAIN:   NO ACUTE INTRACRANIAL HEMORRHAGE   NO ACUTE INTRACRANIAL MASS EFFECT   NO CT EVIDENCE OF A LARGE ACUTE TERRITORIAL INFARCT   I WAS ABLE TO COMMUNICATE THESE FINDINGS BY TELEPHONE TO DR STRONG AT 1059 HOURS, SAME DAY, 31 MARCH 2025   MACRO: Edilberto Sultana discussed the significance and urgency of this critical finding by telephone with  SILVINO STRONG on 3/31/2025 at 11:01 am. (**-RCF-**) Findings:  See findings.   Signed by: Edilberto Sultana 3/31/2025 11:01 AM Dictation workstation:   [id number]     Cardiology, Vascular, and Other Imaging  ECG 12 lead    Result Date: 3/31/2025  Normal sinus rhythm Rightward axis Septal infarct (cited on or before 30-APR-2024) Abnormal ECG When compared with ECG of 30-APR-2024 02:26, Questionable change in QRS axis T wave inversion now evident in Inferior leads Nonspecific T wave abnormality, worse in Lateral leads      [unfilled]   Medication and Non-Pharmacologic VTE Prophylaxis/Anticoagulants   Last Anticoag Admin            No anticoagulants administered    No unadministered anticoagulant orders found.            Assessment/Plan     Barbara English  has    Assessment & Plan  Acute CVA  Type 2 DM  HTN  Metastatic pancreatic cancer   ASA, Statins  Restart Insulin  Permissive HTN   Other Hospital problems        Abnormal findings not addressed during hospit  alization, but require out patient follow up. None        I spent 75 minutes taking history and examining Barbara English, reviewing the labs & imaging results, reviewing past hospital encounters,  speaking with & reviewing notes from emergency room physician.  SIGNATURE: Nicholas Norman MD PATIENT NAME: Barbara English   DATE: March 31, 2025 MRN: [id number]   TIME: 5:48 PM

## 2025-03-31 NOTE — ED NOTES
Community Resource Name: No primary care physician.  Phone Number:   Staff Member:  Judy Amor    Discussed the following topics on behalf of the patient:  []  Behavioral Health Assistance     []  Case Management  []   Assistance  []  Digital Equity Assistance  []  Dental Health Assistance  []  Education Assistance  []  Employment Assistance  []  Financial Strain Relief Assistance  []  Food Insecurity Assistance  [x]  Healthcare Coverage Assistance  []  Housing Stability Assistance  []  IP Violence Relief Assistance  []  Legal Assistance  []  Physical Activity Assistance  []  Social Connection Assistance  []  Stress Relief Assistance   []  Substance Abuse Assistance  []  Transportation Assistance  []  Utility Assistance  [x]  Other: [insert comment here]  Patient does have a PCP. CHW updated the patient chart.  Next Steps:         Judy Amor [facility]  Track patients for community healthcare services. CHW talked to patient regarding not having a primary care physician. Patient expressed that she does have a primary care physician named Dr. Shanthi Otero and [employer] insurance. CHW updated the patient chart with the physician chart added. Patient expressed appreciation.

## 2025-03-31 NOTE — ED NOTES
Pts daughter at bedside. Updated on Poc per patient request. All questions and concerns addressed. Pt states that her daughter is her POA.      Eleni Skiffey, RN  03/31/25 1400

## 2025-03-31 NOTE — PROGRESS NOTES
Pharmacy Medication History     Source of Information: PATIENT    Additional concerns with the patient's PTA list. ~~~~~PATIENT'S MEDICATION BAG WAS GIVEN TO PARAMEDICS AND SHE DOESN'T KNOW WHERE IT IS. PATIENT STATES SHE WILL NEED REFILLS ON ALL MEDICATIONS AND DIABETIC SUPPLIES~~~~    Notified Provider via Haiku : Yes    The following updates were made to the Prior to Admission medication list:     Medications ADDED:   OXYCODONE 20 MG  Medications CHANGED:  N/A  Medications REMOVED:   AMLODIPINE 10 MG  Hydrochlorothiazide 12.5 MG  LISINOPRIL 40 MG  GABAPENTIN 100 MG  Medications NOT TAKING:   AMLODIPINE 10 MG  Hydrochlorothiazide 12.5 MG  LISINOPRIL 40 MG  GABAPENTIN 100 MG    Allergy reviewed : Yes    Meds 2 Beds : Yes    Outpatient pharmacy confirmed and updated in chart : Yes    [facility]    The list below reflectives the updated PTA list. Please review each medication in order reconciliation for additional clarification and justification.    Prior to Admission Medications   Prescriptions Last Dose Informant     Lantus Solostar U-100 Insulin 100 unit/mL (3 mL) pen 3/30/2025      Sig: Inject 30 Units under the skin once daily at bedtime.   NIFEdipine XL 60 mg 24 hr tablet 3/30/2025      Sig: Take 1 tablet (60 mg) by mouth once daily.   acetaminophen (Tylenol) 325 mg tablet Past Week      Sig: Take 2 tablets (650 mg) by mouth every 6 hours if needed.                apixaban (Eliquis) 5 mg tablet Past Week      Sig: Take 1 tablet (5 mg) by mouth twice a day.   atorvastatin (Lipitor) 40 mg tablet Past Week      Sig: Take 1 tablet (40 mg) by mouth once daily at bedtime.   buprenorphine-naloxone (Suboxone) 2-0.5 mg per sublingual film 3/30/2025      Sig: Place 1 Film under the tongue twice a day.   furosemide (Lasix) 40 mg tablet 3/30/2025      Sig: Take 1 tablet (40 mg) by mouth twice a day.                gabapentin (Neurontin) 300 mg capsule 3/31/2025 Morning      Sig: Take by mouth. TAKE 1 CAPSULE  BY MOUTH EVERY MORNING AND 2 CAPSULES AT BEDTIME.   hydrOXYzine HCL (Atarax) 25 mg tablet       Sig: Take 1 tablet (25 mg) by mouth every 6 hours if needed.                insulin lispro (HumaLOG) 100 unit/mL pen 3/30/2025      Si unit(s) injectable 4 times a day (before meals and at bedtime) - 70 0 unit(s) if Blood Glucose is between 71 - 150 2 unit(s) if Blood Glucose is between 151 - 200 4 unit(s) if Blood Glucose is between 201 - 250 6 unit(s) if Blood Glucose is between 251 - 300 8 unit(s) if Blood Glucose is between 301 - 350 10 unit(s) if Blood Glucose is between 351 - 400 Notify Physician if Blood Glucose is greater than 400   lidocaine-prilocaine (Emla) 2.5-2.5 % cream Past Week      Sig: Apply topically. Apply to affected area as needed. Apply to port site 30-60 minutes prior to lab appointment on day of chemotherapy.                losartan (Cozaar) 25 mg tablet 3/30/2025      Sig: Take 1 tablet (25 mg) by mouth once daily.   oxyCODONE (Roxicodone) 20 mg immediate release tablet 3/31/2025      Sig: Take 1 tablet (20 mg) by mouth every 4 hours if needed.   pantoprazole (ProtoNix) 40 mg EC tablet 3/30/2025      Sig: Take 1 tablet (40 mg) by mouth 2 times a day. Do not crush, chew, or split.   sennosides-docusate sodium (Hayley-Colace) 8.6-50 mg tablet Past Week      Sig: Take 2 tablets by mouth twice a day.   sertraline (Zoloft) 50 mg tablet 3/30/2025      Sig: Take 1 tablet (50 mg) by mouth once daily.      Facility-Administered Medications: None       The list below reflectives the updated allergy list. Please review each documented allergy for additional clarification and justification.    Allergies   Allergen Reactions    Haloperidol Swelling    Lorazepam Swelling    Latex Itching and Rash    Lisinopril Other     High K - ?pancreatitis    Vancomycin Rash     Suspected red man syndrome with vancomycin infusion - improves with prolonged infusion times          25 at 2:01 PM - Diann Howard

## 2025-03-31 NOTE — PROGRESS NOTES
03/31/25 1456   [facility] Disability Status   Are you deaf or do you have serious difficulty hearing? N   Are you blind or do you have serious difficulty seeing, even when wearing glasses? N   Because of a physical, mental, or emotional condition, do you have serious difficulty concentrating, remembering, or making decisions? (5 years old or older) Y   Do you have serious difficulty walking or climbing stairs? Y   Do you have serious difficulty dressing or bathing? Y  (left side weakness)   Because of a physical, mental, or emotional condition, do you have serious difficulty doing errands alone such as visiting the doctor? Y

## 2025-03-31 NOTE — ED NOTES
Pt arrives to ED with her port accessed. Patient states that it has remained accessed from her previous stay at [facility]. Patient states that the nurse never de-accessed it after she was discharged. Dressing appears dirty and is half off, not covering accessed port. No drainage or redness of skin around or near port.      Eleni Skiffey, RN  03/31/25 8246

## 2025-03-31 NOTE — ED PROVIDER NOTES
HPI   Chief Complaint   Patient presents with    Extremity Weakness     Left sided weakness onset this A.M 6:30 a.m.        HPI: []  50-year-old  female history of metastatic stage IV pancreatic cancer on apixaban chemotherapy and immunotherapy comes in with fall and left-sided weakness.  She does Melone was last night.  Woke up this morning she was weak.  She tried to get up and stumbled and fell on the floor.  No headache.  Complains of left-sided weakness.  Denies any chest pain pressure any fever chills nausea vomit diarrhea cough congestion incontinence seizures syncope or syncope, no headache or vision changes.    Past history: Hypertension, dyslipidemia, stage IV pancreatic cancer  Social: Patient denies current tobacco alcohol drug use.  REVIEW OF SYSTEMS:    GENERAL.: No weight loss, fatigue, anorexia, insomnia, fever.    EYES: No vision loss, double vision, drainage, eye pain.    ENT: No pharyngitis, dry mouth.    CARDIOPULMONARY: No chest pain, palpitations, syncope, near syncope. No shortness of breath, cough, hemoptysis.    GI: No abdominal pain, change in bowel habits, melena, hematemesis, hematochezia, nausea, vomiting, diarrhea.    : No discharge, dysuria, frequency, urgency, hematuria.  Neuro: Positive left-sided weakness  MS: No limb pain, joint pain, joint swelling.    SKIN: No rashes.    PSYCH: No depression, anxiety, suicidality, homicidality.    Review of systems is otherwise negative unless stated above or in history of present illness.  Social history, family history, allergies reviewed.  PHYSICAL EXAM:    GENERAL: Vitals noted, no distress. Alert and oriented  x 3. Non-toxic.      EENT: TMs clear. Posterior oropharynx unremarkable. No meningismus. No LAD.     NECK: Supple. Nontender. No midline tenderness.     CARDIAC: Regular, rate, rhythm. No murmurs rubs or gallops. No JVD    PULMONARY: Lungs clear bilaterally with good aeration. No wheezes rales or rhonchi. No respiratory  distress.     ABDOMEN: Soft, nonsurgical. Nontender. No peritoneal signs. Normoactive bowel sounds. No pulsatile masses.     EXTREMITIES: No peripheral edema. Negative Homans bilaterally, no cords.  2+ bounding pulses well-perfused.    SKIN: No rash. Intact.     NEURO: Patient has left-sided weakness with a stroke scale of 7.. Cranial nerves normal as tested from II through XII.     MEDICAL DECISION MAKING:  EKG on my interpretation shows normal sinus rhythm normal axis rate mid 80s with no acute ischemic change.  CT head is negative, CT C-spine negative, CTA has some findings.  Labs including CBC with differential chemistry LFTs are within normal limits.    Treatment in the ED: Upon arrival patient was/by myself in the room we called a stroke alert, patient was Van negative, patient was suspected stroke attending they recommended aspirin and repeat CT Noncon within 24 hours.  Patient was also given her oxycodone for pain control.  Patient received aspirin as per recommendations stroke attending    ED course: Patient remained stable hemodynamic.    Impression: #1 acute CVA  Plan set MDM: 50-year-old female history of stage IV pancreatic cancer comes in with left-sided weakness last well-known was last night not a candidate for TNK and/or thrombectomy as per stroke attending, low suspicion for C-spine injury or spinal cord compression, or sepsis or dehydration, patient will be hospitalized for further care.                  Patient History   No past medical history on file.  Past Surgical History:   Procedure Laterality Date    CT ANGIO CORONARY ART WITH HEARTFLOW IF SCORE >30%  11/17/2021    CT HEART CORONARY ANGIOGRAM 11/17/2021 [facility] CLINICAL LEGACY     No family history on file.  Social History     Tobacco Use    Smoking status: Every Day     Types: Cigarettes    Smokeless tobacco: Not on file   Substance Use Topics    Alcohol use: Not on file    Drug use: Not on file       Physical Exam   ED Triage Vitals    Temperature Heart Rate Respirations BP   03/31/25 1101 03/31/25 1033 03/31/25 1033 03/31/25 1033   36.7 °C (98.1 °F) 64 16 (!) 174/101      Pulse Ox Temp Source Heart Rate Source Patient Position   03/31/25 1033 03/31/25 1101 -- --   99 % Oral        BP Location FiO2 (%)     -- --             Physical Exam      ED Course & MDM   ED Course as of 03/31/25 1919   Mon Mar 31, 2025   1918 CT head is negative, CTA head and findings, stroke attending advised aspirin, repeat CT head Noncon within 24 hours and further stroke workup. [MT]      ED Course User Index  [MT] Markel Cee MD         Diagnoses as of 03/31/25 1919   Cerebrovascular accident (CVA), unspecified mechanism (Multi)                 No data recorded     Clari Coma Scale Score: 15 (03/31/25 1230 : Eleni Skiffey, RN)       NIH Stroke Scale: 6 (03/31/25 1230 : Eleni Skiffey, RN)                   Medical Decision Making      Procedure  Critical Care    Performed by: Markel Cee MD  Authorized by: Markel Cee MD    Critical care provider statement:     Critical care time (minutes):  55    Critical care time was exclusive of:  Separately billable procedures and treating other patients    Critical care was necessary to treat or prevent imminent or life-threatening deterioration of the following conditions:  CNS failure or compromise    Critical care was time spent personally by me on the following activities:  Development of treatment plan with patient or surrogate, discussions with consultants, discussions with primary provider, evaluation of patient's response to treatment, examination of patient, review of old charts, re-evaluation of patient's condition, pulse oximetry, ordering and review of radiographic studies, ordering and review of laboratory studies, ordering and performing treatments and interventions and obtaining history from patient or surrogate    Care discussed with: admitting provider         Markel Cee MD  03/31/25  1924

## 2025-03-31 NOTE — CONSULTS
"Inpatient consult to Neuro TeleStroke  Consult performed by: Mony Briscoe MD  Consult ordered by: Markel Cee MD        Virtual Visit start time: 10.45am    History Of Present Illness:  Historian: Patient and ED Provider   Barbara English is a 50 y.o. female presenting with  sxs c/f stroke.    Pt is 51 yo with metastatic pancreatic Ca c/b splenic vein thrombosis (on eliquis, c/f non-compliance), anxiety/depression, cocaine use disorder, HTN, IDT2DM (last A1c 10.7% c/b gastroparesis and neuropathy and hx of DKA), Hx of R Plantar Abscess, Diabetic Foot Infxn/OM w/ MRSA c/b Sepsis s/p previously incomplete Vancomycin course    Pt went to bed 10pm, woke up 6.30am and tried to stand up to use the bathroom, but couldn't stand up and fell down. Stated that she couldn't use her left leg or arm, and had to move her left arm with right arm.     Pt is supposed to be on Eliquis for splenic vein thrombosis. Last dose was thought to be last Thursday (pt not entirely sure)    Last known well: 10pm  Had stroke symptoms resolved at time of presentation: No   Current antiplatelet/anticoagulant use: Apixaban (not taking per patient, last dose was 4 days ago and pt not entirely sure)    Prior Functional Status (Modified Unruly Scale):  0 The patient has no residual symptoms.    Stroke Risk Factors:  Hypertension, Diabetes Mellitus, and Hyperlipidemia    Last Recorded Vitals:  There were no vitals taken for this visit.    NIHSS     1A. Level of Consciousness: 0  1B. Ask Month and Age: 0  1C. Blink Eyes & Squeeze Hands: 0  2. Best Gaze: 0  3. Visual: 0  4. Facial Palsy: 1  5A. Motor - Left Arm: 4  5B. Motor - Right Arm: 0  6A. Motor - Left Le  6B. Motor - Right Le  7. Limb Ataxia: 0  8. Sensory Loss: 0  9. Best Language: 0  10. Dysarthria: 0  11. Extinction and Inattention: 0    NIH Stroke Scale: 7     Relevant Results:  LABS:  No results found for: \"GLUCOSE\", \"INR\"   Results for orders placed or performed during the " hospital encounter of 25 (from the past 24 hours)   POCT GLUCOSE   Result Value Ref Range    POCT Glucose 107 (H) 74 - 99 mg/dL        CT Head Imaging:  CTH imaging personally reviewed, showed no acute ischemic / hemorrhagic changes     CTA Head and Neck Imagin. Short-segment of severely attenuated flow related enhancement at the origin of the M1 segment right middle cerebral artery, suspected focus of thrombus. 2. Nonspecific fusiform narrowing of the right internal carotid artery supraclinoid segment. This could be related to nonocclusive wall adherent thrombus, less likely fibrofatty atherosclerotic plaque. 3. Undulating attenuation of flow related enhancement in the right pericallosal artery of the anterior cerebral artery. This may represent thrombosis.     CT Perfusion Imaging:  Not obtained    Assessment:  Supect Acute Ischemic Stroke (pt has multiple risk factors such as h/o splenic vein thrombosis not compliant with AC, poorly controled T2DM with end organ damage and A1C 10.7%, HDL, HTN, h/o cocaine use)    IV Thrombolysis IV Thrombolysis Checklist        IV Thrombolysis Given: No; Thrombolysis contraindication reason: Time from Last Known Well (or stroke onset) is >4.5 hours       Patient is a candidate for thrombectomy:  No    Additional Recommendations:  - Please obtain MRI Brain w/o contrast stroke protocol within 24 hours of sxs onset, or repeat CTH 24 hours after initial CTH if unable to get MRI  - ASA 81 mg daily for now.  - Pt will need to be back on anticoagulation as soon as safe. Recommend neurology consult for AC restart plan after MRI is obtained to assess possible stroke burden and decide on AC restart timeframe. If unable to obtain MRI, Please obtain CTH 24 hours from initial scan to assess possible stroke burden.     Lipid Goals: education on healthy diet and statin therapy to maintain or achieve goal LDL-cholesterol < 70mg. Atorvastatin 40mg..  Blood pressure goals: avoid  hypotension SBP <100 that could worsen cerebral perfusion. Ischemic stroke- early permissive hypertension SBP < 220 mmHg with cautious inpatient lowering..  Glucose Goals: early treatment of hyperglycemia to goal glucose 140-180 mg/dl with long-term goal A1c < 7%.  NPO until nurse bedside swallow assessment.  Consider focused stroke order set.  Smoking Cessation and Education.  Assessment for Rehabilitation needs.  Patient and family education on signs and symptoms of stroke, calling 911, healthy strategies for stroke prevention.        Disposition:  Patient will remain at referring facility for further evaluation and management.    Virtual or Telephone Consent    An interactive audio and video telecommunication system which permits real time communications between the patient (at the originating site) and provider (at the distant site) was utilized to provide this telehealth service.   Verbal consent was requested and obtained from Barbara English on this date, 03/31/25 for a telehealth visit.      Telestroke is covered in shift work. If there are further Neurological questions or concerns please contact your regional neurologist on call during daytime hours or contact the transfer center with an ADT20 order.      Pt seen and discussed with attending Dr. Francisco Briscoe MD  PGY-5 Vascular Neurology Fellow

## 2025-04-01 ENCOUNTER — APPOINTMENT (OUTPATIENT)
Dept: CARDIOLOGY | Facility: HOSPITAL | Age: 50
End: 2025-04-01
Payer: COMMERCIAL

## 2025-04-01 ENCOUNTER — APPOINTMENT (OUTPATIENT)
Dept: RADIOLOGY | Facility: HOSPITAL | Age: 50
End: 2025-04-01
Payer: COMMERCIAL

## 2025-04-01 LAB
GLUCOSE BLD MANUAL STRIP-MCNC: 171 MG/DL (ref 74–99)
GLUCOSE BLD MANUAL STRIP-MCNC: 212 MG/DL (ref 74–99)

## 2025-04-01 PROCEDURE — 82947 ASSAY GLUCOSE BLOOD QUANT: CPT

## 2025-04-01 PROCEDURE — 2500000001 HC RX 250 WO HCPCS SELF ADMINISTERED DRUGS (ALT 637 FOR MEDICARE OP): Performed by: INTERNAL MEDICINE

## 2025-04-01 PROCEDURE — 93306 TTE W/DOPPLER COMPLETE: CPT

## 2025-04-01 PROCEDURE — 2500000001 HC RX 250 WO HCPCS SELF ADMINISTERED DRUGS (ALT 637 FOR MEDICARE OP): Performed by: STUDENT IN AN ORGANIZED HEALTH CARE EDUCATION/TRAINING PROGRAM

## 2025-04-01 PROCEDURE — 70450 CT HEAD/BRAIN W/O DYE: CPT

## 2025-04-01 PROCEDURE — 99223 1ST HOSP IP/OBS HIGH 75: CPT | Performed by: INTERNAL MEDICINE

## 2025-04-01 PROCEDURE — 2500000001 HC RX 250 WO HCPCS SELF ADMINISTERED DRUGS (ALT 637 FOR MEDICARE OP): Performed by: EMERGENCY MEDICINE

## 2025-04-01 PROCEDURE — 93306 TTE W/DOPPLER COMPLETE: CPT | Performed by: INTERNAL MEDICINE

## 2025-04-01 PROCEDURE — 70450 CT HEAD/BRAIN W/O DYE: CPT | Performed by: RADIOLOGY

## 2025-04-01 PROCEDURE — 2500000002 HC RX 250 W HCPCS SELF ADMINISTERED DRUGS (ALT 637 FOR MEDICARE OP, ALT 636 FOR OP/ED): Performed by: INTERNAL MEDICINE

## 2025-04-01 PROCEDURE — 2500000004 HC RX 250 GENERAL PHARMACY W/ HCPCS (ALT 636 FOR OP/ED): Performed by: INTERNAL MEDICINE

## 2025-04-01 PROCEDURE — 1100000001 HC PRIVATE ROOM DAILY

## 2025-04-01 RX ORDER — NALOXONE HYDROCHLORIDE 0.4 MG/ML
0.2 INJECTION, SOLUTION INTRAMUSCULAR; INTRAVENOUS; SUBCUTANEOUS EVERY 5 MIN PRN
Status: ACTIVE | OUTPATIENT
Start: 2025-04-01

## 2025-04-01 RX ORDER — GABAPENTIN 300 MG/1
300 CAPSULE ORAL NIGHTLY
Status: DISPENSED | OUTPATIENT
Start: 2025-04-01

## 2025-04-01 RX ORDER — SERTRALINE HYDROCHLORIDE 50 MG/1
50 TABLET, FILM COATED ORAL DAILY
Status: DISPENSED | OUTPATIENT
Start: 2025-04-01

## 2025-04-01 RX ORDER — BUPRENORPHINE HYDROCHLORIDE AND NALOXONE HYDROCHLORIDE DIHYDRATE 2; .5 MG/1; MG/1
1 TABLET SUBLINGUAL 2 TIMES DAILY
Status: DISCONTINUED | OUTPATIENT
Start: 2025-04-01 | End: 2025-04-04

## 2025-04-01 RX ORDER — OXYCODONE HYDROCHLORIDE 5 MG/1
20 TABLET ORAL EVERY 4 HOURS PRN
Status: DISCONTINUED | OUTPATIENT
Start: 2025-04-01 | End: 2025-04-02

## 2025-04-01 RX ORDER — GABAPENTIN 300 MG/1
600 CAPSULE ORAL DAILY
Status: DISPENSED | OUTPATIENT
Start: 2025-04-01

## 2025-04-01 RX ORDER — OXYCODONE HYDROCHLORIDE 5 MG/1
20 TABLET ORAL 2 TIMES DAILY PRN
Status: DISCONTINUED | OUTPATIENT
Start: 2025-04-01 | End: 2025-04-04

## 2025-04-01 RX ORDER — SENNOSIDES 8.6 MG/1
2 TABLET ORAL 2 TIMES DAILY
Status: DISPENSED | OUTPATIENT
Start: 2025-04-01

## 2025-04-01 RX ADMIN — SENNOSIDES 17.2 MG: 8.6 TABLET ORAL at 21:29

## 2025-04-01 RX ADMIN — GABAPENTIN 600 MG: 300 CAPSULE ORAL at 11:48

## 2025-04-01 RX ADMIN — OXYCODONE HYDROCHLORIDE 20 MG: 5 TABLET ORAL at 14:24

## 2025-04-01 RX ADMIN — ASPIRIN 81 MG: 81 TABLET, COATED ORAL at 09:35

## 2025-04-01 RX ADMIN — OXYCODONE HYDROCHLORIDE 20 MG: 5 TABLET ORAL at 19:43

## 2025-04-01 RX ADMIN — APIXABAN 5 MG: 5 TABLET, FILM COATED ORAL at 21:29

## 2025-04-01 RX ADMIN — OXYCODONE HYDROCHLORIDE 20 MG: 5 TABLET ORAL at 09:36

## 2025-04-01 RX ADMIN — OXYCODONE HYDROCHLORIDE 20 MG: 5 TABLET ORAL at 00:19

## 2025-04-01 RX ADMIN — BUPRENORPHINE AND NALOXONE 1 TABLET: 2; .5 TABLET SUBLINGUAL at 21:29

## 2025-04-01 RX ADMIN — POLYETHYLENE GLYCOL 3350 17 G: 17 POWDER, FOR SOLUTION ORAL at 09:35

## 2025-04-01 RX ADMIN — SENNOSIDES 17.2 MG: 8.6 TABLET ORAL at 11:48

## 2025-04-01 RX ADMIN — GABAPENTIN 300 MG: 300 CAPSULE ORAL at 21:29

## 2025-04-01 RX ADMIN — BUPRENORPHINE AND NALOXONE 1 TABLET: 2; .5 TABLET SUBLINGUAL at 11:48

## 2025-04-01 RX ADMIN — ATORVASTATIN CALCIUM 80 MG: 80 TABLET, FILM COATED ORAL at 21:29

## 2025-04-01 RX ADMIN — SERTRALINE 50 MG: 50 TABLET, FILM COATED ORAL at 11:48

## 2025-04-01 ASSESSMENT — PAIN SCALES - GENERAL: PAINLEVEL_OUTOF10: 9

## 2025-04-01 NOTE — NURSING NOTE
Patient having elevated BP's of 165/105 automatic, 160/100 manual, called  As order for Hydralazine is PRN for SBP>200 for adjustment,  Says will not adjust for 24 hrs. No new orders, will continue to monitor, care ongoing.

## 2025-04-01 NOTE — NURSING NOTE
"Pt requesting this RN return in a few moments so she \"finish a phone call and rest for a little bit.\" DM education will consult when patient is willing to discuss.   "

## 2025-04-01 NOTE — CARE PLAN
The patient's goals for the shift include  control pain    The clinical goals for the shift include Patient Safety, and pain managment    Over the shift, the patient did not make progress toward the following goals. Barriers to progression include . Recommendations to address these barriers include .

## 2025-04-01 NOTE — PROGRESS NOTES
Occupational Therapy                 Therapy Communication Note    Patient Name: Barbara English  MRN: [id number]  Department: [place] [address]  Room: [address] [place] [address]  Today's Date: 4/1/2025     Discipline: Occupational Therapy          Missed Visit Reason: Missed Visit Reason:  (patient with elevated pain behaviors despite recent medication, elevated BP with PCNA and RN Nik notified that pt is requesting additional medication.  pt politely requested OT to return at another time.)    Missed Time: Attempt

## 2025-04-01 NOTE — PROGRESS NOTES
Occupational Therapy                 Therapy Communication Note    Patient Name: Barbara English  MRN: [id number]  Department: [place] [address]  Room: [address] [place] [address]  Today's Date: 4/1/2025     Discipline: Occupational Therapy          Missed Visit Reason: Missed Visit Reason: Patient in a medical procedure (per RN, pt receiving IV and needs an Echo and CT this PM, is not available for OT at this time.)    Missed Time: Attempt    Comment:

## 2025-04-01 NOTE — CONSULTS
Inpatient consult to Palliative Care  Consult performed by: Seth Rubin MD  Consult ordered by: Nicholas Norman MD        Reason For Consult  Reason for Consult: communication / medical decision making, symptom management, and patient/family support     History Of Present Illness  Barbara English is a 50 y.o. 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 She presented with a NIHSS score of 7pts. She had been recently admitted at OSH for an episode of DKA.   CT imaging with angiography revealing a short segment of severely attenuated flow in the M1 segment of RT MCA and nonocclusive segment of RT ICA. CT of c-spine without acute osseous findings.   Lab findings with chronic anemia at baseline of 8-9.   She was diagnosed during a 1/17/25 admission for repeat pancreatitis when CT  abdomen revealed mass and follow0-up MRI revealed a 3.9cm pancreatic tail mass with 2 liver lesions. CA-1-19 was 15K at that time.  She has received 1 cycle of FOLFIRINOX.   She has disease related epigastric pain and is on Oxycodone IR 20mg PO and hydromorphone 0.5mg IV her last admission. This was overlapped with suboxone which was increased to Suboxone 2-0.5mg films, which she had been tolerating well.     Total OME/24h: 50 OME  Morphine:   Oxycodone: 40mg   Hydromorphone:   Fentanyl:   Burprenorphine:            Symptoms (0 - 10, Best to Worst)  Dixon Symptom Assessment System  0-10 (Numeric) Pain Score: 7  She tells me that suboxone has been effective. She is in severe pain this morning. She reports it in her stomach and deep to her spine.      BM in last 48 hours? None since admission.       Personal/Social History    She reports that she has been smoking cigarettes. She does not have any smokeless tobacco history on file. No history on file for alcohol use and drug use.    Functional Status        Past Medical History  She has no past medical history on file.    Surgical History  She has a past surgical history that includes CT angio coronary art with heartflow if score >30% (11/17/2021).     Family History  No family history on file.  Allergies  Haloperidol, Lorazepam, Latex, Lisinopril, and Vancomycin    Review of Systems     Physical Exam  Constitutional:       General: She is in acute distress.      Appearance: She is ill-appearing.   HENT:      Head: Normocephalic and atraumatic.      Mouth/Throat:      Mouth: Mucous membranes are moist.   Eyes:      Extraocular Movements: Extraocular movements intact.      Pupils: Pupils are equal, round, and reactive to light.   Cardiovascular:      Rate and Rhythm: Normal rate and regular rhythm.   Pulmonary:      Effort: Pulmonary effort is normal.      Breath sounds: Normal breath sounds.   Abdominal:      General: Abdomen is flat. Bowel sounds are normal.      Palpations: Abdomen is soft.      Tenderness: There is abdominal tenderness. There is no rebound.      Comments: Involuntary guarding with palpation. Grimaces with repositioning.    Musculoskeletal:         General: No swelling. Normal range of motion.   Skin:     General: Skin is warm and dry.   Neurological:      Mental Status: She is alert and oriented to person, place, and time.      Comments: LT sided shoulder and arm weakness. Able to move fingers on that side.    Psychiatric:         Mood and Affect: Mood normal.         Thought Content: Thought content normal.         Judgment: Judgment normal.      Comments: In distress from pain.          Last Recorded Vitals  Blood pressure (!) 173/100, pulse 73, temperature 36.6 °C (97.9 °F), temperature  "source Temporal, resp. rate 18, height 1.6 m (5' 3\"), weight 76.6 kg (168 lb 14 oz), SpO2 99%.    Relevant Results  Results for orders placed or performed during the hospital encounter of 03/31/25 (from the past 24 hours)   POCT GLUCOSE   Result Value Ref Range    POCT Glucose 171 (H) 74 - 99 mg/dL       CT cervical spine wo IV contrast    Result Date: 3/31/2025  No acute osseous abnormality is identified in the cervical spine.   MACRO: None   Signed by: Zechariah Cole 3/31/2025 5:37 PM Dictation workstation:   [id number]    CT brain attack angio head and neck W and WO IV contrast    Result Date: 3/31/2025  1. Short-segment of severely attenuated flow related enhancement at the origin of the M1 segment right middle cerebral artery, suspected focus of thrombus. 2. Nonspecific fusiform narrowing of the right internal carotid artery supraclinoid segment. This could be related to nonocclusive wall adherent thrombus, less likely fibrofatty atherosclerotic plaque. 3. Undulating attenuation of flow related enhancement in the right pericallosal artery of the anterior cerebral artery. This may represent thrombosis. 4. Multifocal endodontal and periodontal disease.   MACRO: Eladio Montoya discussed the significance and urgency of this critical finding epic chat with  SILVINO STRONG AND TISHA SANABRIA on 3/31/2025 at 1:32 pm.  (**-RCF-**) Findings:  See findings.   Signed by: Eladio Montoya 3/31/2025 1:33 PM Dictation workstation:   [id number]    CT brain attack head wo IV contrast    Result Date: 3/31/2025  NEGATIVE BRAIN ATTACK PROTOCOL CT BRAIN:   NO ACUTE INTRACRANIAL HEMORRHAGE   NO ACUTE INTRACRANIAL MASS EFFECT   NO CT EVIDENCE OF A LARGE ACUTE TERRITORIAL INFARCT   I WAS ABLE TO COMMUNICATE THESE FINDINGS BY TELEPHONE TO DR STRONG AT 1059 HOURS, SAME DAY, 31 MARCH 2025   MACRO: Edilberto Sultana discussed the significance and urgency of this critical finding by telephone with  SILVINO STRONG on 3/31/2025 at 11:01 am. " (**-RCF-**) Findings:  See findings.   Signed by: Edilberto Sultana 3/31/2025 11:01 AM Dictation workstation:   [id number]    US renal complete    Result Date: 3/21/2025  IMPRESSION: No hydronephrosis. Nonspecific bladder debris, correlate with urinalysis. : [facility]   Transcribe Date/Time: Mar 21 2025  1:51P Dictated by : LORETTA ANTON MD This examination was interpreted and the report reviewed and electronically signed by: LORETTA ANTON MD on Mar 21 2025  1:52PM  EST    US gallbladder    Result Date: 3/20/2025  IMPRESSION: Known small bilobar hepatic metastases Cholelithiasis and nonspecific diffuse gallbladder wall thickening.   Nuclear medicine hepatobiliary imaging could be used if there is clinical concern for acute cholecystitis. Pancreatic tail mass not assessable on this exam. : [facility]   Transcribe Date/Time: Mar 20 2025  8:13A Dictated by : ROSITA ALVARADO MD This examination was interpreted and the report reviewed and electronically signed by: ROSITA ALVARADO MD on Mar 20 2025  8:17AM  EST    CT abdomen pelvis w IV contrast    Result Date: 3/19/2025  IMPRESSION: Stable pancreatic tail mass consistent with biopsy-proven pancreatic adenocarcinoma. Stable presumed hepatic metastases since 02/26/2025. 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 Nonspecific mild gallbladder wall thickening with adjacent trace pericholecystic fluid.  May consider right upper quadrant ultrasound to exclude acute cholecystitis if there is clinical concern. Redemonstrated wall thickening of the distal esophagus is nonspecific though may represent esophagitis. : [facility]   Transcribe Date/Time: Mar 19 2025  9:44P Dictated by : ORLIN ANTONIO DO This examination was interpreted and the report reviewed and electronically signed by: HINA DALY MD on Mar 19 2025 10:20PM  EST     Scheduled medications  aspirin, 81 mg, oral, Daily  atorvastatin, 80 mg, oral, Nightly  buprenorphine-naloxone, 1 tablet,  sublingual, BID  gabapentin, 300 mg, oral, Nightly  gabapentin, 600 mg, oral, Daily  polyethylene glycol, 17 g, oral, Daily  sennosides, 2 tablet, oral, BID  sertraline, 50 mg, oral, Daily      Continuous medications     PRN medications  PRN medications: hydrALAZINE, naloxone, oxyCODONE, oxygen    Current Outpatient Medications   Medication Instructions    acetaminophen (TYLENOL) 650 mg, Every 6 hours PRN    amLODIPine (NORVASC) 10 mg, Daily    apixaban (ELIQUIS) 5 mg, 2 times daily    atorvastatin (Lipitor) 40 mg tablet 1 tablet, Nightly    buprenorphine-naloxone (Suboxone) 2-0.5 mg per sublingual film 1 Film, 2 times daily    furosemide (LASIX) 40 mg, 2 times daily    gabapentin (Neurontin) 300 mg capsule oral, TAKE 1 CAPSULE BY MOUTH EVERY MORNING AND 2 CAPSULES AT BEDTIME.    gabapentin (NEURONTIN) 100 mg, 2 times daily    hydroCHLOROthiazide (MICROZIDE) 12.5 mg, Daily    hydrOXYzine HCL (ATARAX) 25 mg, Every 6 hours PRN    insulin lispro (HumaLOG) 100 unit/mL pen 1 unit(s) injectable 4 times a day (before meals and at bedtime) - 70 0 unit(s) if Blood Glucose is between 71 - 150 2 unit(s) if Blood Glucose is between 151 - 200 4 unit(s) if Blood Glucose is between 201 - 250 6 unit(s) if Blood Glucose is between 251 - 300 8 unit(s) if Blood Glucose is between 301 - 350 10 unit(s) if Blood Glucose is between 351 - 400 Notify Physician if Blood Glucose is greater than 400    Lantus Solostar U-100 Insulin 30 Units, Nightly    lidocaine-prilocaine (Emla) 2.5-2.5 % cream Apply topically. Apply to affected area as needed. Apply to port site 30-60 minutes prior to lab appointment on day of chemotherapy.    lisinopril 40 mg, Daily    losartan (COZAAR) 25 mg, Daily RT    NIFEdipine ER (ADALAT CC) 60 mg, Daily RT    oxyCODONE (ROXICODONE) 20 mg, Every 4 hours PRN    pantoprazole (PROTONIX) 40 mg, 2 times daily    sennosides-docusate sodium (Hayley-Colace) 8.6-50 mg tablet 2 tablets, 2 times daily    sertraline (ZOLOFT) 50 mg,  Daily          Assessment/Plan   IMP: 50-year-old female with history of type 1 diabetes melitis with multiple complications including neuropathy retinopathy DKA and peripheral angiopathy and notably newly diagnosed pancreatic tail adenocarcinoma.  She presented with stroke symptoms and was found to have an occlusion in her right MCA.  She is being treated conservatively for this and neurology is following.  Palliative was consulted for assistance with pain management and goals of care.    Diabetic neuropathy  Continue with gabapentin 600 mg in the morning and 300 mg at night    Cancer related epigastric pain  Metastatic Pancreatic tail adenocarcinoma   Continue with Suboxone 2-0.5 twice daily and oxycodone 20 mg every 4 as needed    Mood disorder:  Resume Sertraline 50mg daily    Bowel regimen:  Senna 2 tabs BID   Miralax Prn  Will monitor for bowel movement     GOC   Conversations ongoing      Provider estimate of survival: 1-6 months Hospice appropriate  Patient Prognostic Awareness: Yes - incongruent with provider estimation    Patient/proxy preference for information  Prefers full information    Goals of Care  Full Code    Is the patient hospice-eligible?   Yes  Was a discussion held re hospice services?   no  Was a decision made re hospice services?  No, conversations ongoing.    Other Palliative Support  Music therapy referral    I spent 70 minutes in the professional and overall care of this patient.      Seth Rubin MD

## 2025-04-01 NOTE — PROGRESS NOTES
"               [facility]          Admitting Provider: Nicholas Norman MD Admission Date: 3/31/2025.   Attending Provider: Nicholas Norman MD MRN: [id number]       Subjective   Barbara English is a 50 y.o. female on day 1 of admission presenting with Cerebrovascular accident (CVA), unspecified mechanism (Multi).  Interval History Admitted with Left sided weakness likely due to a stroke. Arm weakness more pronounced than leg. No significant improvement. Unable to have MRI ?due to rectal clips. BP was high last night. Acceptable range in the acute phase of stroke.     Objective   Physical Exam  Last Recorded Vitals: Blood pressure (!) 173/100, pulse 73, temperature 36.6 °C (97.9 °F), temperature source Temporal, resp. rate 18, height 1.6 m (5' 3\"), weight 76.6 kg (168 lb 14 oz), SpO2 99%.  Patient Vitals for the past 24 hrs:   BP Temp Temp src Pulse Resp SpO2 Height Weight   04/01/25 0746 (!) 173/100 36.6 °C (97.9 °F) Temporal -- -- 99 % -- --   04/01/25 0526 (!) 165/105 36.7 °C (98 °F) -- 73 18 98 % -- --   04/01/25 0400 -- -- -- -- -- 98 % -- --   04/01/25 0032 (!) 164/91 36.7 °C (98.1 °F) -- 73 18 97 % -- --   03/31/25 2156 (!) 164/99 36.9 °C (98.4 °F) -- 66 18 98 % -- --   03/31/25 2100 (!) 156/100 -- -- 78 -- -- -- --   03/31/25 2000 (!) 182/94 -- -- 69 (!) 22 -- -- --   03/31/25 1918 -- -- -- -- -- 98 % -- --   03/31/25 1800 (!) 196/94 36.2 °C (97.2 °F) Temporal 71 16 100 % -- --   03/31/25 1600 (!) 193/109 -- -- 75 -- 99 % -- --   03/31/25 1507 (!) 175/96 -- -- 67 16 100 % 1.6 m (5' 3\") 76.6 kg (168 lb 14 oz)   03/31/25 1400 -- -- -- 72 -- 100 % -- --   03/31/25 1333 174/88 -- -- -- 18 -- -- --   03/31/25 1200 (!) 176/97 -- -- 62 16 99 % -- --   03/31/25 1133 (!) 181/97 -- -- -- 17 -- -- --   03/31/25 1131 (!) 175/96 -- -- -- -- -- -- --   03/31/25 1130 -- -- -- -- 16 -- -- --   03/31/25 1101 (!) 181/72 36.7 °C (98.1 °F) Oral 60 20 100 % -- 76.6 kg (168 lb 14 oz)   03/31/25 1100 159/87 -- -- 69 " 19 100 % -- --   03/31/25 1033 (!) 174/101 -- -- 64 16 99 % -- --     Body mass index is 29.91 kg/m².  GENERAL: alert, cooperative, or no distress  SKIN: no rashes  HEENT Atraumatic, Normocephalic and Not pale, no icterus  NECK: supple, no thyromegaly, JVP within normal limits  LUNGS:  not in respiratory distress, respiratory rate normal, clear to auscultation  CARDIAC: regular rate and rhythm, normal S1 and S2, no murmur, rub, or gallop  ABDOMEN: Soft, non-tender, normal bowel sounds; no bruits, organomegaly or masses.  EXTREMITIES: No edema, Rt TMA  NEURO: Alert and oriented x 3 Left sided weakness more pronounced in UL  Intake/Output last 3 Shifts:  No intake/output data recorded.  DATA:   Diagnostic tests reviewed for today's visit:    Most recent Labs  Results for orders placed or performed during the hospital encounter of 03/31/25 (from the past 24 hours)   POCT GLUCOSE   Result Value Ref Range    POCT Glucose 107 (H) 74 - 99 mg/dL   CBC and Auto Differential   Result Value Ref Range    WBC 6.3 4.4 - 11.3 x10*3/uL    nRBC 0.0 0.0 - 0.0 /100 WBCs    RBC 3.48 (L) 4.00 - 5.20 x10*6/uL    Hemoglobin 8.9 (L) 12.0 - 16.0 g/dL    Hematocrit 28.7 (L) 36.0 - 46.0 %    MCV 83 80 - 100 fL    MCH 25.6 (L) 26.0 - 34.0 pg    MCHC 31.0 (L) 32.0 - 36.0 g/dL    RDW 14.5 11.5 - 14.5 %    Platelets 334 150 - 450 x10*3/uL    Neutrophils % 65.1 40.0 - 80.0 %    Immature Granulocytes %, Automated 0.2 0.0 - 0.9 %    Lymphocytes % 18.5 13.0 - 44.0 %    Monocytes % 6.5 2.0 - 10.0 %    Eosinophils % 8.9 0.0 - 6.0 %    Basophils % 0.8 0.0 - 2.0 %    Neutrophils Absolute 4.07 1.20 - 7.70 x10*3/uL    Immature Granulocytes Absolute, Automated 0.01 0.00 - 0.70 x10*3/uL    Lymphocytes Absolute 1.16 (L) 1.20 - 4.80 x10*3/uL    Monocytes Absolute 0.41 0.10 - 1.00 x10*3/uL    Eosinophils Absolute 0.56 0.00 - 0.70 x10*3/uL    Basophils Absolute 0.05 0.00 - 0.10 x10*3/uL   Comprehensive metabolic panel   Result Value Ref Range    Glucose 94 74 -  "99 mg/dL    Sodium 142 136 - 145 mmol/L    Potassium 4.2 3.5 - 5.3 mmol/L    Chloride 107 98 - 107 mmol/L    Bicarbonate 29 21 - 32 mmol/L    Anion Gap 10 10 - 20 mmol/L    Urea Nitrogen 13 6 - 23 mg/dL    Creatinine 1.06 (H) 0.50 - 1.05 mg/dL    eGFR 64 >60 mL/min/1.73m*2    Calcium 9.3 8.6 - 10.3 mg/dL    Albumin 3.5 3.4 - 5.0 g/dL    Alkaline Phosphatase 638 (H) 33 - 110 U/L    Total Protein 6.2 (L) 6.4 - 8.2 g/dL    AST 70 (H) 9 - 39 U/L    Bilirubin, Total 0.3 0.0 - 1.2 mg/dL    ALT 37 7 - 45 U/L   Troponin I, High Sensitivity   Result Value Ref Range    Troponin I, High Sensitivity 10 0 - 13 ng/L   Protime-INR   Result Value Ref Range    Protime 11.5 9.8 - 12.4 seconds    INR 1.0 0.9 - 1.1   APTT   Result Value Ref Range    aPTT 38 (H) 26 - 36 seconds   Green Top   Result Value Ref Range    Extra Tube Hold for add-ons.    Lavender Top   Result Value Ref Range    Extra Tube Hold for add-ons.    Gray Top   Result Value Ref Range    Extra Tube Hold for add-ons.    Lipid Panel   Result Value Ref Range    Cholesterol 155 0 - 199 mg/dL    HDL-Cholesterol 70.2 mg/dL    Cholesterol/HDL Ratio 2.2     LDL Calculated 70 <=99 mg/dL    VLDL 15 0 - 40 mg/dL    Triglycerides 75 0 - 149 mg/dL    Non HDL Cholesterol 85 0 - 149 mg/dL   B-Type Natriuretic Peptide   Result Value Ref Range     (H) 0 - 99 pg/mL   ECG 12 lead   Result Value Ref Range    Ventricular Rate 67 BPM    Atrial Rate 67 BPM    MN Interval 154 ms    QRS Duration 82 ms    QT Interval 406 ms    QTC Calculation(Bazett) 429 ms    P Axis 72 degrees    R Axis 90 degrees    T Axis -27 degrees    QRS Count 11 beats    Q Onset 224 ms    P Onset 147 ms    P Offset 197 ms    T Offset 427 ms    QTC Fredericia 421 ms     Blood Culture   Date Value Ref Range Status   04/30/2024 No growth at 4 days -  FINAL REPORT  Final    No results found for: \"RESPCULTSM\" No results found for: \"PERDIAFLDCUL\" No results found for: \"STERFLDCULSM\"   Imaging  CT cervical spine wo IV " contrast    Result Date: 3/31/2025  No acute osseous abnormality is identified in the cervical spine.   MACRO: None   Signed by: Zechariah Cole 3/31/2025 5:37 PM Dictation workstation:   [id number]    CT brain attack angio head and neck W and WO IV contrast    Result Date: 3/31/2025  1. Short-segment of severely attenuated flow related enhancement at the origin of the M1 segment right middle cerebral artery, suspected focus of thrombus. 2. Nonspecific fusiform narrowing of the right internal carotid artery supraclinoid segment. This could be related to nonocclusive wall adherent thrombus, less likely fibrofatty atherosclerotic plaque. 3. Undulating attenuation of flow related enhancement in the right pericallosal artery of the anterior cerebral artery. This may represent thrombosis. 4. Multifocal endodontal and periodontal disease.   MACRO: Eladio Montoya discussed the significance and urgency of this critical finding epic chat with  SILVNIO STRONG AND TISHA SANABRIA on 3/31/2025 at 1:32 pm.  (**-RCF-**) Findings:  See findings.   Signed by: Eladio Montoya 3/31/2025 1:33 PM Dictation workstation:   [id number]    CT brain attack head wo IV contrast    Result Date: 3/31/2025  NEGATIVE BRAIN ATTACK PROTOCOL CT BRAIN:   NO ACUTE INTRACRANIAL HEMORRHAGE   NO ACUTE INTRACRANIAL MASS EFFECT   NO CT EVIDENCE OF A LARGE ACUTE TERRITORIAL INFARCT   I WAS ABLE TO COMMUNICATE THESE FINDINGS BY TELEPHONE TO DR STRONG AT 1059 HOURS, SAME DAY, 31 MARCH 2025   MACRO: Edilberto Sultana discussed the significance and urgency of this critical finding by telephone with  SILVINO STRONG on 3/31/2025 at 11:01 am. (**-RCF-**) Findings:  See findings.   Signed by: Edilberto Sultana 3/31/2025 11:01 AM Dictation workstation:   [id number]     Cardiology, Vascular, and Other Imaging  ECG 12 lead    Result Date: 3/31/2025  Normal sinus rhythm Rightward axis Septal infarct (cited on or before 30-APR-2024) Abnormal ECG When compared with ECG of  30-APR-2024 02:26, Questionable change in QRS axis T wave inversion now evident in Inferior leads Nonspecific T wave abnormality, worse in Lateral leads     Current Facility-Administered Medications   Medication Dose Route Frequency Provider Last Rate Last Admin    aspirin EC tablet 81 mg  81 mg oral Daily Nicholas Norman MD        atorvastatin (Lipitor) tablet 80 mg  80 mg oral Nightly Nicholas Norman MD   80 mg at 03/31/25 2029    hydrALAZINE (Apresoline) injection 10 mg  10 mg intravenous q4h PRN Nicholas Norman MD        oxyCODONE (Roxicodone) immediate release tablet 20 mg  20 mg oral q4h PRN Markel Cee MD   20 mg at 04/01/25 0019    oxygen (O2) therapy   inhalation Continuous PRN - O2/gases Nicholas Norman MD        polyethylene glycol (Glycolax, Miralax) packet 17 g  17 g oral Daily Nicholas Norman MD         No current outpatient medications on file.   ..     Medication and Non-Pharmacologic VTE Prophylaxis/Anticoagulants      Last Anticoag Admin            No anticoagulants administered    No unadministered anticoagulant orders found.          Assessment/Plan   Barbara English has  Assessment & Plan  Cerebrovascular accident (CVA), unspecified mechanism (Multi)  Right middle cerebral artery thrombus? Not a candidate thrombectomy per neurology  Metastatic Pancreatic cancer     Unable to do MRI, Permissive HTN for 24-48 hours      Restart Eliquis ASAP per neurology.        Palliative care consult     Other Hospital problems        Abnormal findings not addressed during hospitalization, but require out patient follow up. None        I spent 35 minutes talking and examining Barbara English, reviewing the labs & medications, formulating plan of care & discussing with patient and nursing staff.    Nicholas Norman MD

## 2025-04-01 NOTE — PROGRESS NOTES
Physical Therapy                 Therapy Communication Note    Patient Name: Barbara English  MRN: [id number]  Department: [place] [address]  Room: [address] [place] [address]  Today's Date: 4/1/2025     Discipline: Physical Therapy          Missed Visit Reason: Missed Visit Reason: Patient in a medical procedure, Other (Comment) (per RN, patient was recieving IV and heading to Echo and CT scan in the afternoon; patient is not available for PT)    Missed Time: Attempt    Comment:

## 2025-04-01 NOTE — PROGRESS NOTES
Barbara English is a 50 y.o. female on day 1 of admission presenting with Cerebrovascular accident (CVA), unspecified mechanism (Multi).    Plan: continues treatment for left side weakness, awaiting CT head and TTE. Neurology consulted along with palliative care regarding metastatic pancreatic cancer.  Disposition: Home with Daughter  Barrier: Neurology consult, CT, TTE, Pal care consult  ADOD:  2-3 days       04/01/25 1037   Discharge Planning   Type of Residence Private residence   Home or Post Acute Services In home services   Type of Home Care Services Home OT;Home PT   Expected Discharge Disposition Home H   Does the patient need discharge transport arranged? Yes   RoundTrip coordination needed? Yes   Has discharge transport been arranged? No   What day is the transport expected? 04/04/25   Intensity of Service   Intensity of Service >30 min       Micaela Taylor RN

## 2025-04-01 NOTE — CARE PLAN
The patient's goals for the shift include      The clinical goals for the shift include Patient Safety, and pain managment    Over the shift, the patient did not make progress toward the following goals. Barriers to progression include Left sided weakness. Recommendations to address these barriers include physical therapy and occupational therapy.    Problem: Pain - Adult  Goal: Verbalizes/displays adequate comfort level or baseline comfort level  Outcome: Not Progressing     Problem: Safety - Adult  Goal: Free from fall injury  Outcome: Not Progressing     Problem: Discharge Planning  Goal: Discharge to home or other facility with appropriate resources  Outcome: Not Progressing     Problem: Chronic Conditions and Co-morbidities  Goal: Patient's chronic conditions and co-morbidity symptoms are monitored and maintained or improved  Outcome: Not Progressing     Problem: Nutrition  Goal: Nutrient intake appropriate for maintaining nutritional needs  Outcome: Not Progressing     Problem: Fall/Injury  Goal: Not fall by end of shift  Outcome: Not Progressing  Goal: Be free from injury by end of the shift  Outcome: Not Progressing  Goal: Verbalize understanding of personal risk factors for fall in the hospital  Outcome: Not Progressing  Goal: Verbalize understanding of risk factor reduction measures to prevent injury from fall in the home  Outcome: Not Progressing  Goal: Use assistive devices by end of the shift  Outcome: Not Progressing  Goal: Pace activities to prevent fatigue by end of the shift  Outcome: Not Progressing     Problem: Pain  Goal: Takes deep breaths with improved pain control throughout the shift  Outcome: Not Progressing  Goal: Turns in bed with improved pain control throughout the shift  Outcome: Not Progressing  Goal: Walks with improved pain control throughout the shift  Outcome: Not Progressing  Goal: Performs ADL's with improved pain control throughout shift  Outcome: Not Progressing  Goal:  Participates in PT with improved pain control throughout the shift  Outcome: Not Progressing  Goal: Free from opioid side effects throughout the shift  Outcome: Not Progressing  Goal: Free from acute confusion related to pain meds throughout the shift  Outcome: Not Progressing

## 2025-04-01 NOTE — PROGRESS NOTES
Physical Therapy                 Therapy Communication Note    Patient Name: Barbara English  MRN: [id number]  Department: [place] [address]  Room: [address] [place] [address]  Today's Date: 4/1/2025     Discipline: Physical Therapy    PT Missed Visit: Yes     Missed Visit Reason: Missed Visit Reason: Other (Comment) (patient with elevated pain behaviors despite recent medication, elevated BP with PCNA and RN Nik notified that pt is requesting additional medication.  pt politely requested PT to return at another time.)    Missed Time: Attempt    Comment:

## 2025-04-02 LAB
AORTIC VALVE MEAN GRADIENT: 4 MMHG
AORTIC VALVE PEAK VELOCITY: 1.37 M/S
ATRIAL RATE: 67 BPM
AV PEAK GRADIENT: 8 MMHG
AVA (PEAK VEL): 2.75 CM2
AVA (VTI): 2.86 CM2
EJECTION FRACTION APICAL 4 CHAMBER: 59
EJECTION FRACTION: 61 %
GLUCOSE BLD MANUAL STRIP-MCNC: 192 MG/DL (ref 74–99)
GLUCOSE BLD MANUAL STRIP-MCNC: 226 MG/DL (ref 74–99)
GLUCOSE BLD MANUAL STRIP-MCNC: 244 MG/DL (ref 74–99)
GLUCOSE BLD MANUAL STRIP-MCNC: 269 MG/DL (ref 74–99)
LEFT ATRIUM VOLUME AREA LENGTH INDEX BSA: 27.6 ML/M2
LEFT VENTRICLE INTERNAL DIMENSION DIASTOLE: 3.81 CM (ref 3.5–6)
LEFT VENTRICULAR OUTFLOW TRACT DIAMETER: 2 CM
MITRAL VALVE E/A RATIO: 0.83
P AXIS: 72 DEGREES
P OFFSET: 197 MS
P ONSET: 147 MS
PR INTERVAL: 154 MS
Q ONSET: 224 MS
QRS COUNT: 11 BEATS
QRS DURATION: 82 MS
QT INTERVAL: 406 MS
QTC CALCULATION(BAZETT): 429 MS
QTC FREDERICIA: 421 MS
R AXIS: 90 DEGREES
RIGHT VENTRICLE FREE WALL PEAK S': 12.9 CM/S
RIGHT VENTRICLE PEAK SYSTOLIC PRESSURE: 6.7 MMHG
T AXIS: -27 DEGREES
T OFFSET: 427 MS
TRICUSPID ANNULAR PLANE SYSTOLIC EXCURSION: 2.6 CM
VENTRICULAR RATE: 67 BPM

## 2025-04-02 PROCEDURE — 2500000001 HC RX 250 WO HCPCS SELF ADMINISTERED DRUGS (ALT 637 FOR MEDICARE OP): Performed by: INTERNAL MEDICINE

## 2025-04-02 PROCEDURE — 2500000001 HC RX 250 WO HCPCS SELF ADMINISTERED DRUGS (ALT 637 FOR MEDICARE OP): Performed by: STUDENT IN AN ORGANIZED HEALTH CARE EDUCATION/TRAINING PROGRAM

## 2025-04-02 PROCEDURE — 99233 SBSQ HOSP IP/OBS HIGH 50: CPT | Performed by: INTERNAL MEDICINE

## 2025-04-02 PROCEDURE — 97161 PT EVAL LOW COMPLEX 20 MIN: CPT | Mod: GP

## 2025-04-02 PROCEDURE — 97535 SELF CARE MNGMENT TRAINING: CPT | Mod: GO

## 2025-04-02 PROCEDURE — 97165 OT EVAL LOW COMPLEX 30 MIN: CPT | Mod: GO

## 2025-04-02 PROCEDURE — 99235 HOSP IP/OBS SAME DATE MOD 70: CPT | Performed by: STUDENT IN AN ORGANIZED HEALTH CARE EDUCATION/TRAINING PROGRAM

## 2025-04-02 PROCEDURE — 2500000004 HC RX 250 GENERAL PHARMACY W/ HCPCS (ALT 636 FOR OP/ED): Performed by: INTERNAL MEDICINE

## 2025-04-02 PROCEDURE — 82947 ASSAY GLUCOSE BLOOD QUANT: CPT

## 2025-04-02 PROCEDURE — 2500000002 HC RX 250 W HCPCS SELF ADMINISTERED DRUGS (ALT 637 FOR MEDICARE OP, ALT 636 FOR OP/ED): Performed by: INTERNAL MEDICINE

## 2025-04-02 PROCEDURE — 1100000001 HC PRIVATE ROOM DAILY

## 2025-04-02 RX ORDER — DEXTROSE 50 % IN WATER (D50W) INTRAVENOUS SYRINGE
12.5
Status: ACTIVE | OUTPATIENT
Start: 2025-04-02

## 2025-04-02 RX ORDER — ATORVASTATIN CALCIUM 40 MG/1
40 TABLET, FILM COATED ORAL NIGHTLY
Status: DISCONTINUED | OUTPATIENT
Start: 2025-04-02 | End: 2025-04-02

## 2025-04-02 RX ORDER — DEXTROSE 50 % IN WATER (D50W) INTRAVENOUS SYRINGE
25
Status: ACTIVE | OUTPATIENT
Start: 2025-04-02

## 2025-04-02 RX ORDER — PANTOPRAZOLE SODIUM 40 MG/1
40 TABLET, DELAYED RELEASE ORAL 2 TIMES DAILY
Status: DISPENSED | OUTPATIENT
Start: 2025-04-02

## 2025-04-02 RX ORDER — ATORVASTATIN CALCIUM 80 MG/1
80 TABLET, FILM COATED ORAL NIGHTLY
Status: DISPENSED | OUTPATIENT
Start: 2025-04-02

## 2025-04-02 RX ORDER — OXYCODONE HYDROCHLORIDE 5 MG/1
15 TABLET ORAL EVERY 4 HOURS PRN
Status: DISCONTINUED | OUTPATIENT
Start: 2025-04-02 | End: 2025-04-04

## 2025-04-02 RX ORDER — SERTRALINE HYDROCHLORIDE 50 MG/1
50 TABLET, FILM COATED ORAL DAILY
Status: DISCONTINUED | OUTPATIENT
Start: 2025-04-02 | End: 2025-04-02 | Stop reason: SDUPTHER

## 2025-04-02 RX ORDER — INSULIN LISPRO 100 [IU]/ML
0-5 INJECTION, SOLUTION INTRAVENOUS; SUBCUTANEOUS
Status: DISPENSED | OUTPATIENT
Start: 2025-04-02

## 2025-04-02 RX ORDER — LOSARTAN POTASSIUM 25 MG/1
25 TABLET ORAL DAILY
Status: DISCONTINUED | OUTPATIENT
Start: 2025-04-02 | End: 2025-04-05

## 2025-04-02 RX ORDER — NIFEDIPINE 30 MG/1
60 TABLET, FILM COATED, EXTENDED RELEASE ORAL DAILY
Status: DISPENSED | OUTPATIENT
Start: 2025-04-02

## 2025-04-02 RX ORDER — INSULIN GLARGINE 100 [IU]/ML
30 INJECTION, SOLUTION SUBCUTANEOUS NIGHTLY
Status: DISCONTINUED | OUTPATIENT
Start: 2025-04-02 | End: 2025-04-03

## 2025-04-02 RX ORDER — GABAPENTIN 100 MG/1
100 CAPSULE ORAL 2 TIMES DAILY
Status: DISCONTINUED | OUTPATIENT
Start: 2025-04-02 | End: 2025-04-02

## 2025-04-02 RX ADMIN — PANTOPRAZOLE SODIUM 40 MG: 40 TABLET, DELAYED RELEASE ORAL at 08:59

## 2025-04-02 RX ADMIN — ATORVASTATIN CALCIUM 80 MG: 80 TABLET, FILM COATED ORAL at 20:59

## 2025-04-02 RX ADMIN — APIXABAN 5 MG: 5 TABLET, FILM COATED ORAL at 08:59

## 2025-04-02 RX ADMIN — INSULIN LISPRO 3 UNITS: 100 INJECTION, SOLUTION INTRAVENOUS; SUBCUTANEOUS at 13:15

## 2025-04-02 RX ADMIN — ASPIRIN 81 MG: 81 TABLET, COATED ORAL at 08:59

## 2025-04-02 RX ADMIN — INSULIN LISPRO 2 UNITS: 100 INJECTION, SOLUTION INTRAVENOUS; SUBCUTANEOUS at 17:41

## 2025-04-02 RX ADMIN — OXYCODONE HYDROCHLORIDE 20 MG: 5 TABLET ORAL at 09:00

## 2025-04-02 RX ADMIN — GABAPENTIN 300 MG: 300 CAPSULE ORAL at 20:59

## 2025-04-02 RX ADMIN — OXYCODONE HYDROCHLORIDE 15 MG: 5 TABLET ORAL at 17:57

## 2025-04-02 RX ADMIN — BUPRENORPHINE AND NALOXONE 1 TABLET: 2; .5 TABLET SUBLINGUAL at 20:59

## 2025-04-02 RX ADMIN — BUPRENORPHINE AND NALOXONE 1 TABLET: 2; .5 TABLET SUBLINGUAL at 09:00

## 2025-04-02 RX ADMIN — OXYCODONE HYDROCHLORIDE 20 MG: 5 TABLET ORAL at 00:04

## 2025-04-02 RX ADMIN — SERTRALINE 50 MG: 50 TABLET, FILM COATED ORAL at 08:59

## 2025-04-02 RX ADMIN — POLYETHYLENE GLYCOL 3350 17 G: 17 POWDER, FOR SOLUTION ORAL at 08:59

## 2025-04-02 RX ADMIN — HYDRALAZINE HYDROCHLORIDE 10 MG: 20 INJECTION INTRAMUSCULAR; INTRAVENOUS at 01:49

## 2025-04-02 RX ADMIN — SENNOSIDES 17.2 MG: 8.6 TABLET ORAL at 08:59

## 2025-04-02 RX ADMIN — INSULIN GLARGINE 30 UNITS: 100 INJECTION, SOLUTION SUBCUTANEOUS at 20:58

## 2025-04-02 RX ADMIN — OXYCODONE HYDROCHLORIDE 15 MG: 5 TABLET ORAL at 13:26

## 2025-04-02 RX ADMIN — PANTOPRAZOLE SODIUM 40 MG: 40 TABLET, DELAYED RELEASE ORAL at 21:00

## 2025-04-02 RX ADMIN — GABAPENTIN 600 MG: 300 CAPSULE ORAL at 09:00

## 2025-04-02 RX ADMIN — NIFEDIPINE 60 MG: 30 TABLET, FILM COATED, EXTENDED RELEASE ORAL at 08:59

## 2025-04-02 RX ADMIN — APIXABAN 5 MG: 5 TABLET, FILM COATED ORAL at 20:59

## 2025-04-02 RX ADMIN — SENNOSIDES 17.2 MG: 8.6 TABLET ORAL at 20:56

## 2025-04-02 RX ADMIN — LOSARTAN POTASSIUM 25 MG: 25 TABLET, FILM COATED ORAL at 08:59

## 2025-04-02 ASSESSMENT — PAIN - FUNCTIONAL ASSESSMENT
PAIN_FUNCTIONAL_ASSESSMENT: 0-10

## 2025-04-02 ASSESSMENT — PAIN SCALES - GENERAL
PAINLEVEL_OUTOF10: 5 - MODERATE PAIN
PAINLEVEL_OUTOF10: 0 - NO PAIN
PAINLEVEL_OUTOF10: 8
PAINLEVEL_OUTOF10: 7
PAINLEVEL_OUTOF10: 8
PAINLEVEL_OUTOF10: 5 - MODERATE PAIN
PAINLEVEL_OUTOF10: 7
PAINLEVEL_OUTOF10: 9
PAINLEVEL_OUTOF10: 7
PAINLEVEL_OUTOF10: 0 - NO PAIN

## 2025-04-02 ASSESSMENT — COGNITIVE AND FUNCTIONAL STATUS - GENERAL
TURNING FROM BACK TO SIDE WHILE IN FLAT BAD: A LOT
HELP NEEDED FOR BATHING: A LOT
TOILETING: A LOT
STANDING UP FROM CHAIR USING ARMS: A LITTLE
DAILY ACTIVITIY SCORE: 14
MOVING FROM LYING ON BACK TO SITTING ON SIDE OF FLAT BED WITH BEDRAILS: A LITTLE
DRESSING REGULAR LOWER BODY CLOTHING: A LOT
DRESSING REGULAR UPPER BODY CLOTHING: A LOT
EATING MEALS: A LITTLE
MOBILITY SCORE: 13
PERSONAL GROOMING: A LITTLE
MOVING TO AND FROM BED TO CHAIR: A LITTLE
WALKING IN HOSPITAL ROOM: TOTAL
CLIMB 3 TO 5 STEPS WITH RAILING: TOTAL

## 2025-04-02 ASSESSMENT — PAIN DESCRIPTION - LOCATION
LOCATION: ABDOMEN
LOCATION: ABDOMEN

## 2025-04-02 ASSESSMENT — ACTIVITIES OF DAILY LIVING (ADL)
ADL_ASSISTANCE: INDEPENDENT
HOME_MANAGEMENT_TIME_ENTRY: 8
ADL_ASSISTANCE: INDEPENDENT
BATHING_ASSISTANCE: MODERATE

## 2025-04-02 ASSESSMENT — PAIN DESCRIPTION - ORIENTATION: ORIENTATION: MID

## 2025-04-02 ASSESSMENT — PAIN SCALES - WONG BAKER: WONGBAKER_NUMERICALRESPONSE: NO HURT

## 2025-04-02 NOTE — PROGRESS NOTES
"[facility]          Admitting Provider: Nicholas Norman MD Admission Date: 3/31/2025.   Attending Provider: Nicholsa Norman MD MRN: [id number]       Subjective   Barbara English is a 50 y.o. female on day 2 of admission presenting with Cerebrovascular accident (CVA), unspecified mechanism (Multi).  Interval History no new complaints. Still weak on left side.     Objective   Physical Exam  Last Recorded Vitals: Blood pressure 137/83, pulse 77, temperature 36.2 °C (97.2 °F), temperature source Temporal, resp. rate 16, height 1.6 m (5' 3\"), weight 76.6 kg (168 lb 14 oz), SpO2 97%.  Patient Vitals for the past 24 hrs:   BP Temp Temp src Pulse Resp SpO2   04/02/25 0722 137/83 36.2 °C (97.2 °F) Temporal -- -- 97 %   04/02/25 0300 156/71 36.5 °C (97.7 °F) Temporal -- 16 100 %   04/02/25 0015 (!) 212/100 -- -- -- -- --   04/02/25 0009 (!) 215/97 37 °C (98.6 °F) Oral -- 16 100 %   04/01/25 2038 (!) 173/97 37.2 °C (98.9 °F) -- 77 20 97 %   04/01/25 1430 (!) 180/100 36.5 °C (97.7 °F) Temporal -- -- 100 %   04/01/25 1126 (!) 194/108 36.6 °C (97.9 °F) Temporal -- -- 99 %     Body mass index is 29.91 kg/m².  GENERAL: alert, cooperative, or no distress  SKIN: no rashes  HEENT Atraumatic, Normocephalic and Not pale, no icterus  NECK: supple, no thyromegaly, JVP within normal limits  LUNGS:  not in respiratory distress, respiratory rate normal, clear to auscultation  CARDIAC: regular rate and rhythm, normal S1 and S2, no murmur, rub, or gallop  ABDOMEN: Soft, non-tender, normal bowel sounds; no bruits, organomegaly or masses.  EXTREMITIES: No edema  NEURO: Alert and oriented x 3, Left sided weakness (more weak on upper extremity.)    Intake/Output last 3 Shifts:  No intake/output data recorded.  DATA:   Diagnostic tests reviewed for today's visit:    Most recent Labs  Results for orders placed or performed during the hospital encounter of 03/31/25 (from the past 24 hours)   POCT GLUCOSE   Result Value " "Ref Range    POCT Glucose 212 (H) 74 - 99 mg/dL   Transthoracic Echo (TTE) Complete   Result Value Ref Range    BSA 1.85 m2   POCT GLUCOSE   Result Value Ref Range    POCT Glucose 244 (H) 74 - 99 mg/dL     Blood Culture   Date Value Ref Range Status   04/30/2024 No growth at 4 days -  FINAL REPORT  Final    No results found for: \"RESPCULTSM\" No results found for: \"PERDIAFLDCUL\" No results found for: \"STERFLDCULSM\"   Imaging  CT head wo IV contrast    Result Date: 4/1/2025  1. Negative head CT for acute change.     MACRO: None   Signed by: Raj Rubin 4/1/2025 6:12 PM Dictation workstation:   [id number]    CT cervical spine wo IV contrast    Result Date: 3/31/2025  No acute osseous abnormality is identified in the cervical spine.   MACRO: None   Signed by: Zechariah Cole 3/31/2025 5:37 PM Dictation workstation:   [id number]    CT brain attack angio head and neck W and WO IV contrast    Result Date: 3/31/2025  1. Short-segment of severely attenuated flow related enhancement at the origin of the M1 segment right middle cerebral artery, suspected focus of thrombus. 2. Nonspecific fusiform narrowing of the right internal carotid artery supraclinoid segment. This could be related to nonocclusive wall adherent thrombus, less likely fibrofatty atherosclerotic plaque. 3. Undulating attenuation of flow related enhancement in the right pericallosal artery of the anterior cerebral artery. This may represent thrombosis. 4. Multifocal endodontal and periodontal disease.   MACRO: Eladio Montoya discussed the significance and urgency of this critical finding epic chat with  SILVINO STRONG AND TISHA SANABRIA on 3/31/2025 at 1:32 pm.  (**-RCF-**) Findings:  See findings.   Signed by: Eladio Montoya 3/31/2025 1:33 PM Dictation workstation:   [id number]    CT brain attack head wo IV contrast    Result Date: 3/31/2025  NEGATIVE BRAIN ATTACK PROTOCOL CT BRAIN:   NO ACUTE INTRACRANIAL HEMORRHAGE   NO ACUTE INTRACRANIAL MASS " EFFECT   NO CT EVIDENCE OF A LARGE ACUTE TERRITORIAL INFARCT   I WAS ABLE TO COMMUNICATE THESE FINDINGS BY TELEPHONE TO DR STRONG AT 1059 HOURS, SAME DAY, 31 MARCH 2025   MACRO: Edilberto Sultana discussed the significance and urgency of this critical finding by telephone with  SILVINO STRONG on 3/31/2025 at 11:01 am. (**-RCF-**) Findings:  See findings.   Signed by: Edilberto Sultana 3/31/2025 11:01 AM Dictation workstation:   [id number]     Cardiology, Vascular, and Other Imaging  ECG 12 lead    Result Date: 3/31/2025  Normal sinus rhythm Rightward axis Septal infarct (cited on or before 30-APR-2024) Abnormal ECG When compared with ECG of 30-APR-2024 02:26, Questionable change in QRS axis T wave inversion now evident in Inferior leads Nonspecific T wave abnormality, worse in Lateral leads     Current Facility-Administered Medications   Medication Dose Route Frequency Provider Last Rate Last Admin    apixaban (Eliquis) tablet 5 mg  5 mg oral BID Aasef G Shaikh, MD PhD   5 mg at 04/01/25 2129    aspirin EC tablet 81 mg  81 mg oral Daily Nicholas Norman MD   81 mg at 04/01/25 0935    atorvastatin (Lipitor) tablet 80 mg  80 mg oral Nightly Nicholas Norman MD   80 mg at 04/01/25 2129    buprenorphine-naloxone (Suboxone) 2-0.5 mg per SL tablet 1 tablet  1 tablet sublingual BID Seth Rubin MD   1 tablet at 04/01/25 2129    gabapentin (Neurontin) capsule 300 mg  300 mg oral Nightly Seth Rubin MD   300 mg at 04/01/25 2129    gabapentin (Neurontin) capsule 600 mg  600 mg oral Daily Seth Rubin MD   600 mg at 04/01/25 1148    hydrALAZINE (Apresoline) injection 10 mg  10 mg intravenous q4h PRN Nicholas Norman MD   10 mg at 04/02/25 0149    naloxone (Narcan) injection 0.2 mg  0.2 mg intravenous q5 min PRN Seth Rubin MD        oxyCODONE (Roxicodone) immediate release tablet 20 mg  20 mg oral q4h PRN Seth Rubin MD   20 mg at 04/02/25 0004    oxyCODONE (Roxicodone) immediate release tablet 20 mg  20 mg oral BID  PRN Seth Rubin MD        oxygen (O2) therapy   inhalation Continuous PRN - O2/gases Nicholas Norman MD        polyethylene glycol (Glycolax, Miralax) packet 17 g  17 g oral Daily Nicholas Norman MD   17 g at 04/01/25 0935    sennosides (Senokot) tablet 17.2 mg  2 tablet oral BID Seth Rubin MD   17.2 mg at 04/01/25 2129    sertraline (Zoloft) tablet 50 mg  50 mg oral Daily Seth Rubin MD   50 mg at 04/01/25 1148     No current outpatient medications on file.   ..     Medication and Non-Pharmacologic VTE Prophylaxis/Anticoagulants      Last Anticoag Admin            apixaban (Eliquis) tablet 5 mg    Given 5 mg at 04/01/25 2129    Frequency: 2 times daily         No unadministered anticoagulant orders found.          Assessment/Plan   Barbara English has  Assessment & Plan  Cerebrovascular accident (CVA), unspecified mechanism (Multi)  Right middle cerebral artery thrombus? Not a candidate thrombectomy per neurology  HTN will adjust medications  Metastatic Pancreatic cancer         Unable to do MRI, will need rehabilitation      On Eliquis ASA, Statins.           Palliative care consult noted   Other Hospital problems        Abnormal findings not addressed during hospitalization, but require out patient follow up. None        I spent 35 minutes talking and examining Barbara English, reviewing the labs & medications, formulating plan of care & discussing with patient and nursing staff.    Nicholas Norman MD

## 2025-04-02 NOTE — PROGRESS NOTES
Music Therapy Note    Barbara English was referred by Dr. Seth Rubin    Therapy Session  Referral Type: New referral this admission  Visit Type: New visit  Session Start Time: 1255  Intervention Delivery: In-person  Conflict of Service: Asleep       Narrative  Assessment Detail: At the time of assessment pt was asleep with no family present at bedside.  Follow-up: MT will follow up with pt as able.    Education Documentation  No documentation found.

## 2025-04-02 NOTE — CARE PLAN
The patient's goals for the shift include  control pain    The clinical goals for the shift include Maintain Safety/Pain Control    Over the shift, the patient did not make progress toward the following goals. Barriers to progression include impulsive and non-compliant at times. Recommendations to address these barriers include hourly rounding, continue patient education; bed alarms.      Problem: Pain - Adult  Goal: Verbalizes/displays adequate comfort level or baseline comfort level  Outcome: Progressing     Problem: Safety - Adult  Goal: Free from fall injury  Outcome: Progressing     Problem: Discharge Planning  Goal: Discharge to home or other facility with appropriate resources  Outcome: Progressing     Problem: Chronic Conditions and Co-morbidities  Goal: Patient's chronic conditions and co-morbidity symptoms are monitored and maintained or improved  Outcome: Progressing     Problem: Nutrition  Goal: Nutrient intake appropriate for maintaining nutritional needs  Outcome: Progressing     Problem: Fall/Injury  Goal: Not fall by end of shift  Outcome: Progressing  Goal: Be free from injury by end of the shift  Outcome: Progressing  Goal: Verbalize understanding of personal risk factors for fall in the hospital  Outcome: Progressing  Goal: Verbalize understanding of risk factor reduction measures to prevent injury from fall in the home  Outcome: Progressing  Goal: Use assistive devices by end of the shift  Outcome: Progressing  Goal: Pace activities to prevent fatigue by end of the shift  Outcome: Progressing     Problem: Pain  Goal: Takes deep breaths with improved pain control throughout the shift  Outcome: Progressing  Goal: Turns in bed with improved pain control throughout the shift  Outcome: Progressing  Goal: Walks with improved pain control throughout the shift  Outcome: Progressing  Goal: Performs ADL's with improved pain control throughout shift  Outcome: Progressing  Goal: Participates in PT with  improved pain control throughout the shift  Outcome: Progressing  Goal: Free from opioid side effects throughout the shift  Outcome: Progressing  Goal: Free from acute confusion related to pain meds throughout the shift  Outcome: Progressing     Problem: Skin  Goal: Decreased wound size/increased tissue granulation at next dressing change  Outcome: Progressing  Goal: Participates in plan/prevention/treatment measures  Outcome: Progressing  Goal: Prevent/manage excess moisture  Outcome: Progressing  Goal: Prevent/minimize sheer/friction injuries  Outcome: Progressing  Goal: Promote/optimize nutrition  Outcome: Progressing  Goal: Promote skin healing  Outcome: Progressing     Problem: Recent hospitalization or complication while living with DM  Goal: Patient will effectively self-manage their DM disease process  Outcome: Progressing     Problem: Lack of Diabetes disease process knowledge  Goal: Increase knowledge/understanding of diabetes and how to reduce risk of complications  Outcome: Progressing     Problem: Lack of glucose monitoring and target numbers for before and after meals knowledge  Goal: Increase knowledge/understanding of monitoring devices and interpret data to assist with lifestyle change  Outcome: Progressing     Problem: Lack of knowledge on diet for diabetes  Goal: Discover best plan for balanced nutrition to manage diabetes  Outcome: Progressing     Problem: Address barriers to lifestyle change  Goal: Find workable solutions to meet health goals  Outcome: Progressing     Problem: Lack of knowledge on benefits of activity for meeting blood glucose targets and health goals  Goal: Discover best plan for being active and address any barriers  Outcome: Progressing     Problem: Lack of knowldge regarding diabetes medications  Goal: Increase knowledge via education  Outcome: Progressing     Problem: Lack of knowledge on where to find additional support for diabetes  Goal: Increase knowledge of where  support can be found to best address patient's need  Outcome: Progressing     Problem: Recent hospitalization or complication while living with CVA  Goal: Patient will effectively self-manage their CVA disease process  Outcome: Progressing

## 2025-04-02 NOTE — PROGRESS NOTES
Occupational Therapy    Evaluation/Treatment    Patient Name: Barbara English  MRN: [id number]  Department: [facility] [id number]  Room: [address] [place] [address]-  Today's Date: 04/02/25  Time Calculation  Start Time: 0856  Stop Time: 0920  Time Calculation (min): 24 min       Assessment:  OT Assessment: Pt presents with impairments in strength, balance, fxnl mob and self care. She requires 2 person assist for LB ADLs at this time due to balance needs and nonfunctional LUE at this time. Recommending pt have mod intensity OT at PA to maximize independence and safety with self care and functional mobility  Prognosis: Fair  Barriers to Discharge Home: Caregiver assistance, Physical needs  Caregiver Assistance: Caregiver assistance needed per identified barriers - however, level of patient's required assistance exceeds assistance available at home  Physical Needs: Stair navigation into home limited by function/safety, Stair navigation to access bed limited by function/safety, Stair navigation to access bath limited by function/safety, 24hr mobility assistance needed, 24hr ADL assistance needed, High falls risk due to function or environment  Evaluation/Treatment Tolerance: Patient tolerated treatment well  Medical Staff Made Aware: Yes  End of Session Communication: Bedside nurse  End of Session Patient Position: Bed, 3 rail up, Alarm on  OT Assessment Results: Decreased ADL status, Decreased upper extremity strength, Decreased endurance, Decreased functional mobility, Decreased IADLs, Decreased fine motor control  Prognosis: Fair  Barriers to Discharge: Decreased caregiver support, Inaccessible home environment  Evaluation/Treatment Tolerance: Patient tolerated treatment well  Medical Staff Made Aware: Yes  Strengths: Ability to acquire knowledge, Insight into problems, Support of extended family/friends  Barriers to Participation: Comorbidities, Housing layout  Plan:  Treatment Interventions: ADL retraining, Functional transfer training, Endurance  training, UE strengthening/ROM, Patient/family training, Equipment evaluation/education, Neuromuscular reeducation  OT Frequency: 4 times per week  OT Discharge Recommendations: Moderate intensity level of continued care  Equipment Recommended upon Discharge: Wheelchair (Matthieu Walker)  OT Recommended Transfer Status: Minimal assist  OT - OK to Discharge: Yes (per POC)  Treatment Interventions: ADL retraining, Functional transfer training, Endurance training, UE strengthening/ROM, Patient/family training, Equipment evaluation/education, Neuromuscular reeducation    Subjective   Current Problem:  1. Cerebrovascular accident (CVA), unspecified mechanism (Multi)  Transthoracic Echo (TTE) Complete    Transthoracic Echo (TTE) Complete        General:   OT Received On: 04/02/25  General  Reason for Referral: Pt is a 49 y/o F presenting with CVA. Pt also has a metastatic pancreactic CA dx.  Referred By: Nicholas Norman MD  Past Medical History Relevant to Rehab: No past medical history on file.  Missed Visit Reason: Patient in a medical procedure (per RN, pt receiving IV and needs an Echo and CT this PM, is not available for OT at this time.)  Family/Caregiver Present: No  Co-Treatment: PT  Co-Treatment Reason: to maximize patient safety, participation and outcomes  Prior to Session Communication: Bedside nurse  Patient Position Received: Bed, 3 rail up, Alarm on  Preferred Learning Style: auditory, visual, verbal  General Comment: Pt presents drowsy but agreeable to evaluation. Reports pain but RN present and medicating   Precautions:  Medical Precautions: Fall precautions    Pain:  Pain Assessment  0-10 (Numeric) Pain Score: 7  Pain Type: Chronic pain  Pain Location: Abdomen  Pain Interventions: Repositioned (therapy to tolerance, RN medicating at start of session)    Objective   Cognition:  Orientation Level: Oriented X4  Insight: Within function limits  Impulsive: Within functional limits     Home Living:  Home  Living Comments: Pt lives in a house with significant other with 6-10 JAYLAN, bed and bath upstairs. Has been staying with daughter in apartment with no JAYLAN and single floor setup with bathtub shower. Pt reports she is unsure where she will discharge as she does not want to be a burden to her daughter.  Prior Function:  Level of [place]: Independent with ADLs and functional transfers, Independent with homemaking with ambulation  Receives Help From: Family  ADL Assistance: Independent  Homemaking Assistance: Independent  Ambulatory Assistance: Independent  Hand Dominance: Right  IADL History:  Current License: No  ADL:  Eating Assistance: Stand by  Eating Deficit: Setup, Increased time to complete  Grooming Assistance: Minimal  Grooming Deficit: Wash/dry hands, Supervision/safety, Setup, Teeth care  Bathing Assistance: Moderate  Bathing Deficit: Right arm, Right lower leg including foot, Left lower leg including foot  UE Dressing Assistance: Moderate  UE Dressing Deficit: Thread LUE, Pull around back, Pull down in back  LE Dressing Assistance: Total  LE Dressing Deficit: Don/doff R sock, Don/doff L sock, Pull up over hips  Toileting Assistance with Device: Moderate  Toileting Deficit: Clothing management up, Clothing management down  Activities of Daily Living: LE Dressing  LE Dressing: Yes  Sock Level of Assistance: Dependent    Toileting  Toileting Level of Assistance: Moderate assistance  Where Assessed: Bedside commode  Toileting Comments: pt required assist to pull brief up/down, but completed marko care sitting on bedside commode with setup assistance. 1 person required for clothing mgmt with 1 person required for standing balance during clothing mgmt  Activity Tolerance:  Endurance: Tolerates 10 - 20 min exercise with multiple rests  Functional Standing Tolerance:     Bed Mobility/Transfers: Bed Mobility  Bed Mobility: Yes  Bed Mobility 1  Bed Mobility 1: Supine to sitting  Level of Assistance 1: Minimum  assistance  Bed Mobility Comments 1: HOB elevated, assist initiating with LLE, assist with trunk/ scooting  Bed Mobility 2  Bed Mobility  2: Sitting to supine  Level of Assistance 2: Close supervision  Bed Mobility Comments 2: no physical assist provided    Transfers  Transfer: Yes  Transfer 1  Transfer From 1: Bed to  Transfer to 1: Stand  Technique 1: Sit to stand, Stand to sit  Transfer Level of Assistance 1: Hand held assistance, Minimum assistance, +2  Trials/Comments 1: Min A x2 for STS with B HHA due to LUE flaccidity inability to safely use WW  Transfers 2  Transfer From 2: Bed to  Transfer to 2: Commode-standard  Technique 2: Stand pivot  Transfer Device 2: Gait belt  Transfer Level of Assistance 2: Minimum assistance      Functional Mobility:  Functional Mobility  Functional Mobility Performed: No  Sitting Balance:  Static Sitting Balance  Static Sitting-Balance Support: Feet supported  Static Sitting-Level of Assistance: Close supervision  Standing Balance:  Static Standing Balance  Static Standing-Balance Support: Bilateral upper extremity supported  Static Standing-Level of Assistance: Minimum assistance  Sensation:  Sensation Comment: Patient reports no deficits  Perception:  Inattention/Neglect: Appears intact  Coordination:  Movements are Fluid and Coordinated: No  Upper Body Coordination: LUE flaccid   Hand Function:  Hand Function  Gross Grasp: Impaired  Coordination: Impaired  Extremities: RUE   RUE : Within Functional Limits and LUE   LUE: Exceptions to WFL  LUE Strength  LUE Overall Strength: Deficits (pt presents with 3-/5 strength in L trapezius, able to facilitate scapular elevation, however no trace movements palpated distally)  L Shoulder Flexion: 0/5  L Shoulder Extension: 0/5  L Elbow Flexion: 0/5  L Elbow Extension: 0/5  L Wrist Flexion: 0/5  L Wrist Extension: 0/5  LUE Tone  LUE Tone: Flaccid    Outcome Measures: [facility] Daily Activity  Putting on and taking off regular lower body  clothing: A lot  Bathing (including washing, rinsing, drying): A lot  Putting on and taking off regular upper body clothing: A lot  Toileting, which includes using toilet, bedpan or urinal: A lot  Taking care of personal grooming such as brushing teeth: A little  Eating Meals: A little  Daily Activity - Total Score: 14      Education Documentation  Body Mechanics, taught by Faiza Ceja OT at 4/2/2025 10:37 AM.  Learner: Patient  Readiness: Acceptance  Method: Explanation  Response: Verbalizes Understanding  Comment: safe transfer and bed mob techniques    Precautions, taught by Faiza Ceja OT at 4/2/2025 10:37 AM.  Learner: Patient  Readiness: Acceptance  Method: Explanation  Response: Verbalizes Understanding  Comment: safe transfer and bed mob techniques    ADL Training, taught by Faiza Ceja OT at 4/2/2025 10:37 AM.  Learner: Patient  Readiness: Acceptance  Method: Explanation  Response: Verbalizes Understanding  Comment: safe transfer and bed mob techniques    Education Comments  No comments found.      Goals:  Encounter Problems       Encounter Problems (Active)       ADLs       Patient with complete upper body dressing with stand by assist level of assistance donning and doffing all UE clothes with PRN adaptive equipment while supported sitting       Start:  04/02/25    Expected End:  04/16/25            Patient with complete lower body dressing with minimal assist  level of assistance donning and doffing all LE clothes  with PRN adaptive equipment while supported sitting       Start:  04/02/25    Expected End:  04/16/25            Patient will complete daily grooming tasks brushing teeth and washing face/hair with stand by assist level of assistance and PRN adaptive equipment while supported sitting.       Start:  04/02/25    Expected End:  04/16/25            Patient will complete toileting including hygiene clothing management/hygiene with minimal assist  level of assistance and bedside commode.        Start:  04/02/25    Expected End:  04/16/25               TRANSFERS       Patient will perform bed mobility supervision level of assistance and bed rails in order to improve safety and independence with mobility       Start:  04/02/25    Expected End:  04/16/25            Patient will complete functional transfer to chair with arsms with least restrictive device with contact guard assist level of assistance.       Start:  04/02/25    Expected End:  04/16/25

## 2025-04-02 NOTE — NURSING NOTE
"Per patient she is unable to have an MRI D/T a clip in her \"colon\". Stated the last time they attempted an MRI, they had to stop the scan immediately when they found the clip.   "

## 2025-04-02 NOTE — PROGRESS NOTES
"Barbara English is a 50 y.o. female on day 2 of admission presenting with Cerebrovascular accident (CVA), unspecified mechanism (Multi).      Subjective   Please see neurology telestroke note for more detail.  This is a 50-year-old woman with history of stroke in setting of having had history of metastatic pancreatic cancer.  She also has a history of anxiety depression cocaine use hypertension dyslipidemia and diabetes mellitus and DKA.  She went to bed at 10 PM woke up at 6 AM tried to stand up could not stand up and fell down was found to have weak on the left arm leg and face.  She was supposed to be on apixaban but had not taken it since Thursday the week prior.  Came to [facility] subsequently telestroke was called.  The recommendations were to get the MRI she was not considered a candidate for tPA or TNK given that she had a window which was longer than 4.5 hours.  She was admitted for further stroke workup MRI could not be done because of the metal in the body per history.  Repeat head CT was unremarkable.  Restarted apixaban.  Echocardiogram remains unremarkable.  She remains clinically stable.       Objective     Last Recorded Vitals  Blood pressure 137/83, pulse 77, temperature 36.2 °C (97.2 °F), temperature source Temporal, resp. rate 16, height 1.6 m (5' 3\"), weight 76.6 kg (168 lb 14 oz), SpO2 97%.    Neurological Exam  She is alert and oriented x 4.  Cognitively intact.  Face is obviously asymmetric with left-sided lower face weakness.  Extraocular movement appears full.  Pupils reactive VOR intact.  Facial sensations are intact.  Power is flaccidity on left upper extremity right upper extremity intact.  Left lower extremity is at least 1 - does have some activation right lower extremity intact.  No abnormal involuntary limb movement sensations intact in both upper and lower extremity proximally and distally into tactile touch and sharps.  Reflexes are symmetric but overall reduced.  Gait " deferred.  Relevant Results  Scheduled medications  apixaban, 5 mg, oral, BID  aspirin, 81 mg, oral, Daily  atorvastatin, 80 mg, oral, Nightly  buprenorphine-naloxone, 1 tablet, sublingual, BID  gabapentin, 300 mg, oral, Nightly  gabapentin, 600 mg, oral, Daily  insulin glargine, 30 Units, subcutaneous, Nightly  insulin lispro, 0-5 Units, subcutaneous, TID AC  losartan, 25 mg, oral, Daily  NIFEdipine ER, 60 mg, oral, Daily  pantoprazole, 40 mg, oral, BID  polyethylene glycol, 17 g, oral, Daily  sennosides, 2 tablet, oral, BID  sertraline, 50 mg, oral, Daily      Continuous medications     PRN medications  PRN medications: dextrose, dextrose, glucagon, glucagon, hydrALAZINE, naloxone, oxyCODONE, oxyCODONE, oxygen    NIH Stroke Scale  1A. Level of Consciousness: Alert, Keenly Responsive  1B. Ask Month and Age: Both Questions Right  1C. Blink Eyes & Squeeze Hands: Performs Both Tasks  2. Best Gaze: Normal  3. Visual: No Visual Loss  4. Facial Palsy: Minor Paralysis  5A. Motor - Left Arm: No Movement  5B. Motor - Right Arm: No Drift  6A. Motor - Left Leg: No Effort Against Gravity  6B. Motor - Right Leg: No Drift  7. Limb Ataxia: Present in One Limb  8. Sensory Loss: Mild-to-Moderate Sensory Loss  9. Best Language: No Aphasia  10. Dysarthria: Mild-to-Moderate Dysarthria  11. Extinction and Inattention: Visual, Tactile, Auditory, Spatial, or Personal Inattention  NIH Stroke Scale: 12           Clari Coma Scale  Best Eye Response: Spontaneous  Best Verbal Response: Oriented  Best Motor Response: Follows commands  Clari Coma Scale Score: 15          Assessment/Plan   The patient is a 50-year-old woman with a history of vascular risk factors, pancreatic cancer, on apixaban had been noncompliant to it since Thursday presenting for left-sided arm face and leg weakness likely a left colon into the long white matter tracts CT unremarkable at presentation and repeat.  Presenting to neurology for further follow-up.  Reviewed  telestroke notes agree with the assessment.  Continue apixaban and aspirin at home dose.  Continue atorvastatin.  No additional neurological input please have her see outpatient stroke neurology.    I spent 60 minutes in the professional and overall care of this patient.      Aasef G Shaikh, MD PhD

## 2025-04-02 NOTE — PROGRESS NOTES
"Barbara English is a 50 y.o. female on day 2 of admission presenting with Cerebrovascular accident (CVA), unspecified mechanism (Multi).    Subjective   Symptoms (0 - 10, Best to Worst)  [place]sgood Symptom Assessment System  0-10 (Numeric) Pain Score: 7  She reports much better pain control, still with epigastric back pain that travels to her back. She moved her bowels this morning. Appetite is fair.   We agreed that the current dose of oxycodone is helpful but causing some excess sedation and that mobilizing and being awake during the day is important so we decided on decreasing dose to 15mg instead of 20mg. Frequency will remain the same.        Objective     Physical Exam  Constitutional:       General: She is not in acute distress.     Appearance: Normal appearance.      Comments: Drowsy this morning.   HENT:      Head: Normocephalic and atraumatic.      Mouth/Throat:      Mouth: Mucous membranes are moist.   Cardiovascular:      Rate and Rhythm: Normal rate and regular rhythm.   Pulmonary:      Effort: No respiratory distress.   Abdominal:      General: Bowel sounds are normal.      Tenderness: There is no abdominal tenderness. There is no guarding.      Comments: Somewhat firm but nontender abdomen.    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   Skin:     General: Skin is warm and dry.   Neurological:      Mental Status: She is alert and oriented to person, place, and time.   Psychiatric:         Thought Content: Thought content normal.         Judgment: Judgment normal.         Last Recorded Vitals  Blood pressure 137/83, pulse 77, temperature 36.2 °C (97.2 °F), temperature source Temporal, resp. rate 16, height 1.6 m (5' 3\"), weight 76.6 kg (168 lb 14 oz), SpO2 97%.  Intake/Output last 3 Shifts:  No intake/output data recorded.    Wt Readings from Last 10 Encounters:   03/31/25 76.6 kg (168 lb 14 oz)   1/18/2025([facility]) 61.2 kg  135 lbs   04/30/24 63.5 kg (140 lb)   05/09/22 " 55 kg (121 lb 4.1 oz)       Relevant Results    Scheduled medications  apixaban, 5 mg, oral, BID  aspirin, 81 mg, oral, Daily  atorvastatin, 80 mg, oral, Nightly  buprenorphine-naloxone, 1 tablet, sublingual, BID  gabapentin, 300 mg, oral, Nightly  gabapentin, 600 mg, oral, Daily  insulin glargine, 30 Units, subcutaneous, Nightly  insulin lispro, 0-5 Units, subcutaneous, TID AC  losartan, 25 mg, oral, Daily  NIFEdipine ER, 60 mg, oral, Daily  pantoprazole, 40 mg, oral, BID  polyethylene glycol, 17 g, oral, Daily  sennosides, 2 tablet, oral, BID  sertraline, 50 mg, oral, Daily      Continuous medications     PRN medications  PRN medications: dextrose, dextrose, glucagon, glucagon, hydrALAZINE, naloxone, oxyCODONE, oxyCODONE, oxygen    Results for orders placed or performed during the hospital encounter of 03/31/25 (from the past 24 hours)   POCT GLUCOSE   Result Value Ref Range    POCT Glucose 212 (H) 74 - 99 mg/dL   Transthoracic Echo (TTE) Complete   Result Value Ref Range    BSA 1.85 m2   POCT GLUCOSE   Result Value Ref Range    POCT Glucose 244 (H) 74 - 99 mg/dL       CT head wo IV contrast    Result Date: 4/1/2025  1. Negative head CT for acute change.     MACRO: None   Signed by: Raj Rubin 4/1/2025 6:12 PM Dictation workstation:   [id number]    CT cervical spine wo IV contrast    Result Date: 3/31/2025  No acute osseous abnormality is identified in the cervical spine.   MACRO: None   Signed by: Zechariah Cole 3/31/2025 5:37 PM Dictation workstation:   [id number]    CT brain attack angio head and neck W and WO IV contrast    Result Date: 3/31/2025  1. Short-segment of severely attenuated flow related enhancement at the origin of the M1 segment right middle cerebral artery, suspected focus of thrombus. 2. Nonspecific fusiform narrowing of the right internal carotid artery supraclinoid segment. This could be related to nonocclusive wall adherent thrombus, less likely fibrofatty atherosclerotic  plaque. 3. Undulating attenuation of flow related enhancement in the right pericallosal artery of the anterior cerebral artery. This may represent thrombosis. 4. Multifocal endodontal and periodontal disease.   MACRO: Eladio Montoya discussed the significance and urgency of this critical finding epic chat with  SILVINO STRONG AND TISHA SANABRIA on 3/31/2025 at 1:32 pm.  (**-RCF-**) Findings:  See findings.   Signed by: Eladio Montoya 3/31/2025 1:33 PM Dictation workstation:   [id number]    CT brain attack head wo IV contrast    Result Date: 3/31/2025  NEGATIVE BRAIN ATTACK PROTOCOL CT BRAIN:   NO ACUTE INTRACRANIAL HEMORRHAGE   NO ACUTE INTRACRANIAL MASS EFFECT   NO CT EVIDENCE OF A LARGE ACUTE TERRITORIAL INFARCT   I WAS ABLE TO COMMUNICATE THESE FINDINGS BY TELEPHONE TO DR STRONG AT 1059 HOURS, SAME DAY, 31 MARCH 2025   MACRO: Edilberto Sultana discussed the significance and urgency of this critical finding by telephone with  SILVINO STRONG on 3/31/2025 at 11:01 am. (**-RCF-**) Findings:  See findings.   Signed by: Edilberto Sultana 3/31/2025 11:01 AM Dictation workstation:   [id number]    US renal complete    Result Date: 3/21/2025  IMPRESSION: No hydronephrosis. Nonspecific bladder debris, correlate with urinalysis. : [facility]   Transcribe Date/Time: Mar 21 2025  1:51P Dictated by : LORETTA ANTON MD This examination was interpreted and the report reviewed and electronically signed by: LORETTA ANTON MD on Mar 21 2025  1:52PM  EST    US gallbladder    Result Date: 3/20/2025  IMPRESSION: Known small bilobar hepatic metastases Cholelithiasis and nonspecific diffuse gallbladder wall thickening.   Nuclear medicine hepatobiliary imaging could be used if there is clinical concern for acute cholecystitis. Pancreatic tail mass not assessable on this exam. : [facility]   Transcribe Date/Time: Mar 20 2025  8:13A Dictated by : ROSITA ALVARADO MD This examination was interpreted and the report reviewed and  electronically signed by: ROSITA ALVARADO MD on Mar 20 2025  8:17AM  EST    CT abdomen pelvis w IV contrast    Result Date: 3/19/2025  IMPRESSION: Stable pancreatic tail mass consistent with biopsy-proven pancreatic adenocarcinoma. Stable presumed hepatic metastases since 02/26/2025. 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 Nonspecific mild gallbladder wall thickening with adjacent trace pericholecystic fluid.  May consider right upper quadrant ultrasound to exclude acute cholecystitis if there is clinical concern. Redemonstrated wall thickening of the distal esophagus is nonspecific though may represent esophagitis. : [facility]   Transcribe Date/Time: Mar 19 2025  9:44P Dictated by : ORLIN ANTONIO DO This examination was interpreted and the report reviewed and electronically signed by: HINA DALY MD on Mar 19 2025 10:20PM  EST                  Assessment/Plan   IMP:  50-year-old female with history of type 1 diabetes melitis with multiple complications including neuropathy retinopathy DKA and peripheral angiopathy and notably newly diagnosed pancreatic tail adenocarcinoma.  She presented with stroke symptoms and was found to have an occlusion in her right MCA.  She is being treated conservatively for this and neurology is following.  Palliative was consulted for assistance with pain management and goals of care.    Total OME/24h: 100 OME +2mg Buprenorphine   Morphine:   Oxycodone: 80  Hydromorphone:   Fentanyl:   Suboxone: 4mg          Diabetic neuropathy  Continue with gabapentin 600 mg in the morning and 300 mg at night     Cancer related epigastric pain  Metastatic Pancreatic tail adenocarcinoma   Continue with Suboxone 2-0.5 twice daily  Decrease oxycodone 20mg to 15mg every 4 hours as needed due to excessive sedation.      Mood disorder:  C/wSertraline 50mg daily     Bowel regimen:  Senna 2 tabs BID   Miralax Prn  Will monitor for bowel movement   Probable de-escalation tomorrow     GOC    Conversations ongoing  Discussed her goals and chemotherapy. She is reluctant to try chemotherapy again and understands that this is not a resectable disease. She would prefer radiation therapy if it is an option for her pancreatic adenocarcinoma that will be offered by her cancer team.   Pain and symptom control are top priorities for her since she was already told her prognosis was likely less than 6 months.   I will discuss hospice with her tomorrow when she is less sedated. This will be purely for information as she wants to follow up with her specialists and see if there are options for treatment that will palliate her symptoms.         Provider estimate of survival: 1-6 months Hospice appropriate  Patient Prognostic Awareness: Yes - incongruent with provider estimation     Patient/proxy preference for information  Prefers full information     Goals of Care  Full Code     Is the patient hospice-eligible?   Yes  Was a discussion held re hospice services?   no  Was a decision made re hospice services?  No, conversations ongoing.     Other Palliative Support  Music therapy referral         I spent 50 minutes in the professional and overall care of this patient.      Seth Rubin MD

## 2025-04-02 NOTE — PROGRESS NOTES
Physical Therapy    Physical Therapy Evaluation    Patient Name: Barbara English  MRN: [id number]  Department: [place] [address]  Room: [address] [place] [address]  Today's Date: 4/2/2025   Time Calculation  Start Time: 0855  Stop Time: 0920  Time Calculation (min): 25 min    Assessment/Plan   PT Assessment  PT Assessment Results: Decreased strength, Decreased endurance, Impaired balance, Decreased mobility, Pain  Rehab Prognosis: Fair  Barriers to Discharge Home: Caregiver assistance, Physical needs  Caregiver Assistance: Caregiver assistance needed per identified barriers - however, level of patient's required assistance exceeds assistance available at home  Physical Needs: Stair navigation into home limited by function/safety, Stair navigation to access bed limited by function/safety, Stair navigation to access bath limited by function/safety, Ambulating household distances limited by function/safety, 24hr mobility assistance needed, 24hr ADL assistance needed, High falls risk due to function or environment  Evaluation/Treatment Tolerance: Patient tolerated treatment well  Medical Staff Made Aware: Yes  Strengths: Ability to acquire knowledge, Premorbid level of function  Barriers to Participation: Comorbidities  End of Session Communication: Bedside nurse, Care Coordinator  Assessment Comment: Pt presents today with decreased functional BLE strength, balance, and endurance. Currently, pt is below the reported PLOF requiring assistance with transfers and only tolerating 1-2 steps today. Pt would benefit from continued PT to address the above deficits to bring the pt closer to the PLOF while reducing fall risk. At D/C, pt is anticipated to benefit from moderate intensity therapy. Pt reports physical assistance available at home. If decision is for pt to go home, she wuld benefit from use of a wheelcahir and possibly a rocael-walker.  End of Session Patient Position: Bed, 3 rail up, Alarm on  IP OR SWING BED PT PLAN  Inpatient or Swing Bed:  Inpatient  PT Plan  Treatment/Interventions: Bed mobility, Transfer training, Gait training, Stair training, Balance training, Strengthening, Endurance training, Therapeutic exercise, Therapeutic activity, Home exercise program, Positioning, Postural re-education  PT Plan: Ongoing PT  PT Frequency: 4 times per week  PT Discharge Recommendations: Moderate intensity level of continued care, Other (comment) (Or low with family support, wheelchair, and rocael-cane)  Equipment Recommended upon Discharge: Wheelchair (Low intensity therapy as well as rocael-cane if home-going)  PT Recommended Transfer Status: Assist x1 (Stand-pivot to commode/chair using gait belt)  PT - OK to Discharge: Yes (PT POC initiated today)    Subjective   General Visit Information:  General  Reason for Referral: 50 y.o. F presenting with L-sided weakness adn difficulty walking.  Referred By: ANDREA GAUTAM)  Past Medical History Relevant to Rehab: DMT1, stage IV pancreatic caner, HTN, depression, toe amputations (R)  Past Surgical History:   Procedure Laterality Date    CT ANGIO CORONARY ART WITH HEARTFLOW IF SCORE >30%  11/17/2021    CT HEART CORONARY ANGIOGRAM 11/17/2021 [facility]      Family/Caregiver Present: No  Co-Treatment: OT  Co-Treatment Reason: Co-evaluation with OT to maximize pt safety, transfer ability, and participation.  Prior to Session Communication: Bedside nurse  Patient Position Received: Bed, 3 rail up, Alarm on  Preferred Learning Style: auditory, kinesthetic, visual  General Comment: Pt supine in bed upon PT arrival. Cleared to participate with RN, and agreeable to co-evaluation with PT/OT. Pt expresses desire to go home with therapy but would prefer not to D/C to daughter's apartment.  Home Living:  Home Living  Type of Home: Apartment  Home Adaptive Equipment: Cane, Walker rolling or standard  Home Layout: One level  Home Access: Level entry  Bathroom Shower/Tub: Tub/shower unit  Bathroom Equipment: None  Home  Living Comments: Pt reports living with S.O. but occasionally stays at daughters's apartment. Pt lives in a 2-level house with 6-10 JAYLAN, bed/bath on the second floor. Home set up information provided above is for daughter's home.  Prior Level of Function:  Prior Function Per Pt/Caregiver Report  Level of [place]: Independent with ADLs and functional transfers, Independent with homemaking with ambulation  ADL Assistance: Independent  Homemaking Assistance: Independent  Ambulatory Assistance: Independent (Pt reports use of cane and walker, but mostly cane)  Hand Dominance: Right  Precautions:  Precautions  Hearing/Visual Limitations: +Reading glasses  Medical Precautions: Fall precautions    Objective   Pain:  Pain Assessment  Pain Assessment: 0-10  0-10 (Numeric) Pain Score: 8  Pain Type: Acute pain  Pain Location: Abdomen (Back pain also reported)  Pain Interventions: Heat applied (RN notified of pt pain rating)  Cognition:  Cognition  Orientation Level: Oriented X4  Insight: Within function limits    General Assessments:  Activity Tolerance  Endurance: Tolerates 10 - 20 min exercise with multiple rests    Sensation  Light Touch: No apparent deficits    Coordination  Movements are Fluid and Coordinated: Yes    Postural Control  Head Control: Forward head posture    Static Sitting Balance  Static Sitting-Balance Support: Bilateral upper extremity supported, Feet supported  Static Sitting-Level of Assistance: Close supervision  Static Sitting-Comment/Number of Minutes: At the EOB  Dynamic Sitting Balance  Dynamic Sitting-Balance Support: Feet supported (Single LE support)  Dynamic Sitting-Level of Assistance: Contact guard  Dynamic Sitting-Balance: Forward lean  Dynamic Sitting-Comments: When adjusting brief seated on commode    Static Standing Balance  Static Standing-Balance Support: Bilateral upper extremity supported (No AD)  Static Standing-Level of Assistance: Minimum assistance (1-2 person)  Static  Standing-Comment/Number of Minutes: +Gait belt. Pt grasping therapist's arms for support  Dynamic Standing Balance  Dynamic Standing-Balance Support: Bilateral upper extremity supported (No AD)  Dynamic Standing-Level of Assistance: Minimum assistance (x1)  Dynamic Standing-Balance: Turning  Dynamic Standing-Comments: +Gait belt. Pt grasping therapist's arms for support  Functional Assessments:  Bed Mobility  Bed Mobility: Yes  Bed Mobility 1  Bed Mobility 1: Supine to sitting  Level of Assistance 1: Minimum assistance  Bed Mobility Comments 1: HOB elevated./ Assist provided with LLE progression off the EOB. Pt uses bed rail to assist trunk into upright sitting.  Bed Mobility 2  Bed Mobility  2:  (Sitting to long sitting)  Level of Assistance 2: Close supervision  Bed Mobility Comments 2: HOB flat. Pt able to lift BLE into bed maintaining upright positioning of trunk  in long sitting.  Bed Mobility 3  Bed Mobility 3: Scooting  Level of Assistance 3: Minimum assistance, +2  Bed Mobility Comments 3: Assist provided via chux pad/TAPS for scooting toward the HOB in long sitting. Pt able to participate with RUE/BLE push.    Transfers  Transfer: Yes  Transfer 1  Transfer From 1: Bed to  Transfer to 1: Stand  Technique 1: Sit to stand  Transfer Device 1: Gait belt  Transfer Level of Assistance 1: Minimum assistance, +2  Trials/Comments 1: Pt initiates UE push from the bed requiring assistance to reach full stand via gait belt. Pt grasping therapists' arms in standing for support.  Transfers 2  Transfer From 2: Stand to  Transfer to 2: Bed  Technique 2: Stand to sit  Transfer Device 2: Gait belt  Transfer Level of Assistance 2: Minimum assistance, +2  Trials/Comments 2: Assist provided with safe approximation of pt with bed. Pt unable to demonstrates UE reach for the bed to assist in controlled descent.  Transfers 3  Transfer From 3: Bed to  Transfer to 3: Commode-standard  Technique 3: Stand pivot  Transfer Device 3: Gait  belt  Transfer Level of Assistance 3: Minimum assistance  Trials/Comments 3: Pt grasps therapist's arms to assist in reaching standing. Pt able to pivot on L foot for positioning in front of the commode. Assist provided in controlled descent to the commode.  Transfers 4  Transfer From 4: Commode-standard to  Transfer to 4: Bed  Technique 4: Stand pivot  Transfer Device 4: Gait belt  Transfer Level of Assistance 4: Minimum assistance  Trials/Comments 4: Pt grasps therapist's arms to assist in reaching standing. Pt able to pivot on the L foot for positioning in front of the bed. Assist provided with controlled descent to the bed.    Ambulation/Gait Training  Ambulation/Gait Training Performed: Yes  Ambulation/Gait Training 1  Surface 1: Level tile  Device 1: No device  Gait Support Devices: Gait belt  Assistance 1: Minimum assistance (2-person)  Quality of Gait 1: Narrow base of support, Forward flexed posture  Comments/Distance (ft) 1: 1-2 sidesteps attempted along the EOB. Pt demonstates difficulty with weight bearing on the R ankle joint as well as forward flexed posture with decreasing balance leading to termination of trial.    Stairs  Stairs: No  Extremity/Trunk Assessments:  RLE   RLE : Exceptions to WFL  Strength RLE  R Hip Flexion: 3-/5  R Knee Flexion: 2/5  R Ankle Dorsiflexion:  (At least 2/5)  R Ankle Plantar Flexion:  (At elast 2/5)  LLE   LLE : Exceptions to WFL  Strength LLE  L Hip Flexion: 3-/5  L Hip ABduction:  (At least 2/5)  L Hip ADduction:  (At least 2/5)  L Knee Flexion: 2/5  L Ankle Dorsiflexion:  (At least 2/5)  L Ankle Plantar Flexion:  (At least 2/5)  Outcome Measures:  [facility] Basic Mobility  Turning from your back to your side while in a flat bed without using bedrails: A little  Moving from lying on your back to sitting on the side of a flat bed without using bedrails: A lot  Moving to and from bed to chair (including a wheelchair): A little  Standing up from a chair using your arms (e.g.  wheelchair or bedside chair): A little  To walk in hospital room: Total  Climbing 3-5 steps with railing: Total  Basic Mobility - Total Score: 13    Encounter Problems       Encounter Problems (Active)       Mobility       STG - Patient will ambulate       Start:  04/02/25    Expected End:  04/16/25       20 ft with CGA using rocael-walker            PT Transfers       STG - Patient will transfer from one surface to another       Start:  04/02/25    Expected End:  04/16/25       With CGA using rocael-walker            PT Transfers       STG - Patient to transfer to and from sit to supine       Start:  04/02/25    Expected End:  04/16/25       With CGA from a flat bed         STG - Patient will transfer sit to and from stand       Start:  04/02/25    Expected End:  04/16/25       With CGA using rocael-walker              Education Documentation  Body Mechanics, taught by Rubén Ray PT at 4/2/2025 10:50 AM.  Learner: Patient  Readiness: Acceptance  Method: Explanation, Demonstration  Response: Demonstrated Understanding    Mobility Training, taught by Rubén Ray PT at 4/2/2025 10:50 AM.  Learner: Patient  Readiness: Acceptance  Method: Explanation, Demonstration  Response: Demonstrated Understanding

## 2025-04-03 LAB
ANION GAP SERPL CALC-SCNC: 11 MMOL/L (ref 10–20)
BASOPHILS # BLD AUTO: 0.05 X10*3/UL (ref 0–0.1)
BASOPHILS NFR BLD AUTO: 0.7 %
BUN SERPL-MCNC: 15 MG/DL (ref 6–23)
CALCIUM SERPL-MCNC: 8.4 MG/DL (ref 8.6–10.3)
CHLORIDE SERPL-SCNC: 107 MMOL/L (ref 98–107)
CO2 SERPL-SCNC: 26 MMOL/L (ref 21–32)
CREAT SERPL-MCNC: 1.3 MG/DL (ref 0.5–1.05)
EGFRCR SERPLBLD CKD-EPI 2021: 50 ML/MIN/1.73M*2
EOSINOPHIL # BLD AUTO: 0.56 X10*3/UL (ref 0–0.7)
EOSINOPHIL NFR BLD AUTO: 7.7 %
ERYTHROCYTE [DISTWIDTH] IN BLOOD BY AUTOMATED COUNT: 14.5 % (ref 11.5–14.5)
GLUCOSE BLD MANUAL STRIP-MCNC: 105 MG/DL (ref 74–99)
GLUCOSE BLD MANUAL STRIP-MCNC: 112 MG/DL (ref 74–99)
GLUCOSE BLD MANUAL STRIP-MCNC: 130 MG/DL (ref 74–99)
GLUCOSE BLD MANUAL STRIP-MCNC: 87 MG/DL (ref 74–99)
GLUCOSE SERPL-MCNC: 111 MG/DL (ref 74–99)
HCT VFR BLD AUTO: 27.4 % (ref 36–46)
HGB BLD-MCNC: 8.4 G/DL (ref 12–16)
IMM GRANULOCYTES # BLD AUTO: 0.02 X10*3/UL (ref 0–0.7)
IMM GRANULOCYTES NFR BLD AUTO: 0.3 % (ref 0–0.9)
LYMPHOCYTES # BLD AUTO: 1.13 X10*3/UL (ref 1.2–4.8)
LYMPHOCYTES NFR BLD AUTO: 15.5 %
MAGNESIUM SERPL-MCNC: 1.79 MG/DL (ref 1.6–2.4)
MCH RBC QN AUTO: 24.8 PG (ref 26–34)
MCHC RBC AUTO-ENTMCNC: 30.7 G/DL (ref 32–36)
MCV RBC AUTO: 81 FL (ref 80–100)
MONOCYTES # BLD AUTO: 0.44 X10*3/UL (ref 0.1–1)
MONOCYTES NFR BLD AUTO: 6 %
NEUTROPHILS # BLD AUTO: 5.1 X10*3/UL (ref 1.2–7.7)
NEUTROPHILS NFR BLD AUTO: 69.8 %
NRBC BLD-RTO: 0 /100 WBCS (ref 0–0)
PLATELET # BLD AUTO: 293 X10*3/UL (ref 150–450)
POTASSIUM SERPL-SCNC: 4.2 MMOL/L (ref 3.5–5.3)
RBC # BLD AUTO: 3.39 X10*6/UL (ref 4–5.2)
SODIUM SERPL-SCNC: 140 MMOL/L (ref 136–145)
WBC # BLD AUTO: 7.3 X10*3/UL (ref 4.4–11.3)

## 2025-04-03 PROCEDURE — 80048 BASIC METABOLIC PNL TOTAL CA: CPT | Performed by: INTERNAL MEDICINE

## 2025-04-03 PROCEDURE — 2500000004 HC RX 250 GENERAL PHARMACY W/ HCPCS (ALT 636 FOR OP/ED): Performed by: INTERNAL MEDICINE

## 2025-04-03 PROCEDURE — 97530 THERAPEUTIC ACTIVITIES: CPT | Mod: GP,CQ

## 2025-04-03 PROCEDURE — 97112 NEUROMUSCULAR REEDUCATION: CPT | Mod: GO

## 2025-04-03 PROCEDURE — 2500000001 HC RX 250 WO HCPCS SELF ADMINISTERED DRUGS (ALT 637 FOR MEDICARE OP): Performed by: INTERNAL MEDICINE

## 2025-04-03 PROCEDURE — 99233 SBSQ HOSP IP/OBS HIGH 50: CPT | Performed by: INTERNAL MEDICINE

## 2025-04-03 PROCEDURE — 9420000001 HC RT PATIENT EDUCATION 5 MIN

## 2025-04-03 PROCEDURE — 92610 EVALUATE SWALLOWING FUNCTION: CPT | Mod: GN

## 2025-04-03 PROCEDURE — 2500000002 HC RX 250 W HCPCS SELF ADMINISTERED DRUGS (ALT 637 FOR MEDICARE OP, ALT 636 FOR OP/ED): Performed by: INTERNAL MEDICINE

## 2025-04-03 PROCEDURE — 85025 COMPLETE CBC W/AUTO DIFF WBC: CPT | Performed by: INTERNAL MEDICINE

## 2025-04-03 PROCEDURE — 1100000001 HC PRIVATE ROOM DAILY

## 2025-04-03 PROCEDURE — 82947 ASSAY GLUCOSE BLOOD QUANT: CPT

## 2025-04-03 PROCEDURE — 97112 NEUROMUSCULAR REEDUCATION: CPT | Mod: GP,CQ

## 2025-04-03 PROCEDURE — 99497 ADVNCD CARE PLAN 30 MIN: CPT | Performed by: INTERNAL MEDICINE

## 2025-04-03 PROCEDURE — 83735 ASSAY OF MAGNESIUM: CPT | Performed by: INTERNAL MEDICINE

## 2025-04-03 PROCEDURE — 2500000001 HC RX 250 WO HCPCS SELF ADMINISTERED DRUGS (ALT 637 FOR MEDICARE OP): Performed by: STUDENT IN AN ORGANIZED HEALTH CARE EDUCATION/TRAINING PROGRAM

## 2025-04-03 PROCEDURE — 97535 SELF CARE MNGMENT TRAINING: CPT | Mod: GO

## 2025-04-03 RX ORDER — SODIUM CHLORIDE AND POTASSIUM CHLORIDE 150; 900 MG/100ML; MG/100ML
100 INJECTION, SOLUTION INTRAVENOUS CONTINUOUS
Status: DISCONTINUED | OUTPATIENT
Start: 2025-04-03 | End: 2025-04-03

## 2025-04-03 RX ORDER — INSULIN GLARGINE 100 [IU]/ML
10 INJECTION, SOLUTION SUBCUTANEOUS NIGHTLY
Status: DISCONTINUED | OUTPATIENT
Start: 2025-04-04 | End: 2025-04-04

## 2025-04-03 RX ORDER — DEXTROSE MONOHYDRATE AND SODIUM CHLORIDE 5; .9 G/100ML; G/100ML
75 INJECTION, SOLUTION INTRAVENOUS CONTINUOUS
Status: ACTIVE | OUTPATIENT
Start: 2025-04-03 | End: 2025-04-04

## 2025-04-03 RX ADMIN — SENNOSIDES 17.2 MG: 8.6 TABLET ORAL at 20:09

## 2025-04-03 RX ADMIN — GABAPENTIN 600 MG: 300 CAPSULE ORAL at 09:02

## 2025-04-03 RX ADMIN — OXYCODONE HYDROCHLORIDE 15 MG: 5 TABLET ORAL at 09:03

## 2025-04-03 RX ADMIN — GABAPENTIN 300 MG: 300 CAPSULE ORAL at 20:09

## 2025-04-03 RX ADMIN — SERTRALINE 50 MG: 50 TABLET, FILM COATED ORAL at 09:03

## 2025-04-03 RX ADMIN — ASPIRIN 81 MG: 81 TABLET, COATED ORAL at 09:03

## 2025-04-03 RX ADMIN — ATORVASTATIN CALCIUM 80 MG: 80 TABLET, FILM COATED ORAL at 20:09

## 2025-04-03 RX ADMIN — OXYCODONE HYDROCHLORIDE 15 MG: 5 TABLET ORAL at 18:05

## 2025-04-03 RX ADMIN — NIFEDIPINE 60 MG: 30 TABLET, FILM COATED, EXTENDED RELEASE ORAL at 09:02

## 2025-04-03 RX ADMIN — DEXTROSE AND SODIUM CHLORIDE 75 ML/HR: 5; .9 INJECTION, SOLUTION INTRAVENOUS at 23:13

## 2025-04-03 RX ADMIN — SENNOSIDES 17.2 MG: 8.6 TABLET ORAL at 09:03

## 2025-04-03 RX ADMIN — PANTOPRAZOLE SODIUM 40 MG: 40 TABLET, DELAYED RELEASE ORAL at 09:03

## 2025-04-03 RX ADMIN — BUPRENORPHINE AND NALOXONE 1 TABLET: 2; .5 TABLET SUBLINGUAL at 09:13

## 2025-04-03 RX ADMIN — APIXABAN 5 MG: 5 TABLET, FILM COATED ORAL at 20:09

## 2025-04-03 RX ADMIN — BUPRENORPHINE AND NALOXONE 1 TABLET: 2; .5 TABLET SUBLINGUAL at 20:09

## 2025-04-03 RX ADMIN — PANTOPRAZOLE SODIUM 40 MG: 40 TABLET, DELAYED RELEASE ORAL at 20:09

## 2025-04-03 RX ADMIN — APIXABAN 5 MG: 5 TABLET, FILM COATED ORAL at 09:03

## 2025-04-03 RX ADMIN — LOSARTAN POTASSIUM 25 MG: 25 TABLET, FILM COATED ORAL at 09:03

## 2025-04-03 ASSESSMENT — COGNITIVE AND FUNCTIONAL STATUS - GENERAL
HELP NEEDED FOR BATHING: A LOT
MOVING TO AND FROM BED TO CHAIR: A LOT
STANDING UP FROM CHAIR USING ARMS: A LOT
EATING MEALS: A LITTLE
CLIMB 3 TO 5 STEPS WITH RAILING: TOTAL
DRESSING REGULAR LOWER BODY CLOTHING: A LOT
PERSONAL GROOMING: A LOT
DAILY ACTIVITIY SCORE: 16
TURNING FROM BACK TO SIDE WHILE IN FLAT BAD: A LOT
MOVING FROM LYING ON BACK TO SITTING ON SIDE OF FLAT BED WITH BEDRAILS: A LITTLE
DRESSING REGULAR LOWER BODY CLOTHING: A LITTLE
WALKING IN HOSPITAL ROOM: TOTAL
MOBILITY SCORE: 11
DRESSING REGULAR UPPER BODY CLOTHING: A LITTLE
TOILETING: A LOT
WALKING IN HOSPITAL ROOM: A LOT
STANDING UP FROM CHAIR USING ARMS: A LOT
MOVING TO AND FROM BED TO CHAIR: A LOT
MOBILITY SCORE: 16
TOILETING: A LOT
CLIMB 3 TO 5 STEPS WITH RAILING: A LOT
PERSONAL GROOMING: A LITTLE
DRESSING REGULAR UPPER BODY CLOTHING: A LOT
EATING MEALS: A LITTLE
DAILY ACTIVITIY SCORE: 14
HELP NEEDED FOR BATHING: A LITTLE
EATING MEALS: A LITTLE

## 2025-04-03 ASSESSMENT — PAIN SCALES - GENERAL
PAINLEVEL_OUTOF10: 9
PAINLEVEL_OUTOF10: 7
PAINLEVEL_OUTOF10: 6
PAINLEVEL_OUTOF10: 0 - NO PAIN
PAINLEVEL_OUTOF10: 5 - MODERATE PAIN
PAINLEVEL_OUTOF10: 5 - MODERATE PAIN
PAINLEVEL_OUTOF10: 6

## 2025-04-03 ASSESSMENT — PAIN - FUNCTIONAL ASSESSMENT
PAIN_FUNCTIONAL_ASSESSMENT: 0-10
PAIN_FUNCTIONAL_ASSESSMENT: 0-10

## 2025-04-03 ASSESSMENT — ACTIVITIES OF DAILY LIVING (ADL)
EFFECT OF PAIN ON DAILY ACTIVITIES: OT TO TOLERANCE
HOME_MANAGEMENT_TIME_ENTRY: 15

## 2025-04-03 ASSESSMENT — PAIN SCALES - WONG BAKER: WONGBAKER_NUMERICALRESPONSE: NO HURT

## 2025-04-03 NOTE — PROGRESS NOTES
"[facility]– [place]          Admitting Provider: Nicholas Norman MD Admission Date: 3/31/2025.   Attending Provider: Nicholas Norman MD MRN: [id number]       Subjective   Barbara English is a 50 y.o. female on day 3 of admission presenting with Cerebrovascular accident (CVA), unspecified mechanism (Multi).  Interval History no new complaints. Still has left sided weakness.     Objective   Physical Exam  Last Recorded Vitals: Blood pressure 143/87, pulse 73, temperature 36.5 °C (97.7 °F), temperature source Temporal, resp. rate 18, height 1.6 m (5' 3\"), weight 61.6 kg (135 lb 11.2 oz), SpO2 97%.  Patient Vitals for the past 24 hrs:   BP Temp Temp src Pulse Resp SpO2 Weight   04/03/25 0824 143/87 36.5 °C (97.7 °F) Temporal 73 18 97 % --   04/03/25 0432 128/77 36.6 °C (97.9 °F) Temporal 70 13 97 % --   04/02/25 2354 121/75 36.5 °C (97.7 °F) Temporal 90 -- 97 % --   04/02/25 1941 110/73 36.5 °C (97.7 °F) Temporal 79 -- 94 % --   04/02/25 1526 119/75 -- -- 82 11 98 % --   04/02/25 1326 -- -- -- -- -- -- 61.6 kg (135 lb 11.2 oz)   04/02/25 1247 130/77 36.3 °C (97.3 °F) Temporal -- -- 95 % --   04/02/25 1200 -- -- -- -- -- 96 % --     Body mass index is 24.04 kg/m².  GENERAL: alert, cooperative, or no distress  SKIN: no rashes  HEENT Atraumatic, Normocephalic and Not pale, no icterus  NECK: supple, no thyromegaly, JVP within normal limits  LUNGS:  not in respiratory distress, respiratory rate normal, clear to auscultation  CARDIAC: regular rate and rhythm, normal S1 and S2, no murmur, rub, or gallop  ABDOMEN: Soft, non-tender, normal bowel sounds; no bruits, organomegaly or masses.  EXTREMITIES: No edema  NEURO: Alert and oriented x 3 , gait normal ., reflexes normal and symmetric, strength and  sensation grossly normal  Intake/Output last 3 Shifts:  I/O last 3 completed shifts:  In: - (0 mL/kg)   Out: 200 (3.2 mL/kg) [Urine:200 (0.1 mL/kg/hr)]  Weight: 61.6 kg   DATA:   Diagnostic tests reviewed for " "today's visit:    Most recent Labs  Results for orders placed or performed during the hospital encounter of 03/31/25 (from the past 24 hours)   POCT GLUCOSE   Result Value Ref Range    POCT Glucose 269 (H) 74 - 99 mg/dL   POCT GLUCOSE   Result Value Ref Range    POCT Glucose 226 (H) 74 - 99 mg/dL   POCT GLUCOSE   Result Value Ref Range    POCT Glucose 192 (H) 74 - 99 mg/dL   POCT GLUCOSE   Result Value Ref Range    POCT Glucose 87 74 - 99 mg/dL     Blood Culture   Date Value Ref Range Status   04/30/2024 No growth at 4 days -  FINAL REPORT  Final    No results found for: \"RESPCULTSM\" No results found for: \"PERDIAFLDCUL\" No results found for: \"STERFLDCULSM\"   Imaging  CT head wo IV contrast    Result Date: 4/1/2025  1. Negative head CT for acute change.     MACRO: None   Signed by: Raj Rubin 4/1/2025 6:12 PM Dictation workstation:   [id number]     Cardiology, Vascular, and Other Imaging  Transthoracic Echo (TTE) Complete    Result Date: 4/2/2025   [facility], [address] [place] [address]              Tel [phone] and Fax [phone] TRANSTHORACIC ECHOCARDIOGRAM REPORT  Patient Name:       ABDELRAHMAN Weaver Physician:    [id number] Javier Gallagher DO Study Date:         4/1/2025            Ordering Provider:    [id number] OSMEL RICK MRN/PID:            [id number]            Fellow: Accession#:         [id number]        Nurse: Date of Birth/Age:  1975 / 50      Sonographer:          Raj harrington Gender assigned at  F                   Additional Staff: Birth: Height:             160.00 cm           Admit Date:           3/31/2025 Weight:             77.00 kg            Admission Status:     Inpatient -                                                               Routine BSA / BMI:          1.80 m2 / 30.08     Encounter#:       "     [id number]                     kg/m2 Blood Pressure:     194/108 mmHg        Department Location:  [facility] Non                                                               Invasive Study Type:    TRANSTHORACIC ECHO (TTE) COMPLETE Diagnosis/ICD: Cerebral Infarction, unspecified-I63.9 Indication:    Cerebrovascular Accident CPT Code:      Echo Complete w Full Doppler-[id number] Patient History: Diabetes:          Yes Pertinent History: CVA and NSTEMI. Study Detail: The following Echo studies were performed: 2D, M-Mode, Doppler and               color flow. Technically challenging study due to poor IV lines.               Agitated saline used as a contrast agent for intraseptal flow               evaluation.  PHYSICIAN INTERPRETATION: Left Ventricle: Left ventricular ejection fraction is normal, calculated by Schmidt's biplane at 61%. There is severely increased concentric left ventricular hypertrophy. There are no regional left ventricular wall motion abnormalities. The left ventricular cavity size is normal. There is mildly increased septal and moderately increased posterior left ventricular wall thickness. Spectral Doppler shows a Grade I (impaired relaxation pattern) of left ventricular diastolic filling with an elevated left atrial pressure. Left Atrium: The left atrial size is normal. A bubble study using agitated saline was technically inadequate to determine an atrial septal defect. Bubble Study X 2 were attempted but not completed to to IV failure. Right Ventricle: The right ventricle is normal in size. There is normal right ventricular global systolic function. Right Atrium: The right atrium is normal in size. Aortic Valve: The aortic valve is probably trileaflet. The aortic valve dimensionless index is 0.91. There is no evidence of aortic valve regurgitation. The peak instantaneous gradient of the aortic valve is 8 mmHg. The mean gradient of the aortic valve is 4 mmHg. Mitral Valve: The mitral valve is  normal in structure. There is trace mitral valve regurgitation. Tricuspid Valve: The tricuspid valve is structurally normal. There is trace tricuspid regurgitation. The Doppler estimated RVSP is within normal limits at 6.7 mmHg. Reported right ventricular systolic pressure may be underestimated due to incomplete or suboptimal Doppler envelope. Pulmonic Valve: The pulmonic valve is structurally normal. There is physiologic pulmonic valve regurgitation. Pericardium: There is no pericardial effusion noted. Aorta: The aortic root is normal. There is no dilatation of the ascending aorta. There is no dilatation of the aortic root. Systemic Veins: The inferior vena cava appears normal in size, with IVC inspiratory collapse greater than 50%. In comparison to the previous echocardiogram(s): Compared with study dated 11/17/2021,. The left ventricular function is unchanged. The left ventricular hypertrophy is unchanged. The left ventricular diastolic function is worse.  CONCLUSIONS:  1. Left ventricular ejection fraction is normal, calculated by Schmidt's biplane at 61%.  2. Spectral Doppler shows a Grade I (impaired relaxation pattern) of left ventricular diastolic filling with an elevated left atrial pressure.  3. There is moderately increased posterior left ventricular wall thickness.  4. There is severely increased concentric left ventricular hypertrophy.  5. There is normal right ventricular global systolic function.  6. Right ventricular systolic pressure is within normal limits. QUANTITATIVE DATA SUMMARY:  2D MEASUREMENTS:          Normal Ranges: LAs:             3.80 cm  (2.7-4.0cm) IVSd:            0.99 cm  (0.6-1.1cm) LVPWd:           1.38 cm  (0.6-1.1cm) LVIDd:           3.80 cm  (3.9-5.9cm) LVIDs:           3.11 cm LV Mass Index:   129 g/m2 LVEDV Index:     51 ml/m2 LV % FS          18.3 %  LEFT ATRIUM:                  Normal Ranges: LA Vol A4C:        53.0 ml    (22+/-6mL/m2) LA Vol A2C:        45.2 ml LA Vol BP:          49.8 ml LA Vol Index A4C:  29.4ml/m2 LA Vol Index A2C:  25.1 ml/m2 LA Vol Index BP:   27.6 ml/m2 LA Area A4C:       18.3 cm2 LA Area A2C:       16.6 cm2 LA Major Axis A4C: 5.4 cm LA Major Axis A2C: 5.2 cm LA Volume Index:   27.6 ml/m2  RIGHT ATRIUM:                 Normal Ranges: RA Vol A4C:        21.6 ml    (8.3-19.5ml) RA Vol Index A4C:  12.0 ml/m2 RA Area A4C:       10.7 cm2 RA Major Axis A4C: 4.5 cm  AORTA MEASUREMENTS:         Normal Ranges: Ao Sinus, d:        3.12 cm (2.1-3.5cm) Ao STJ, d:          2.96 cm (1.7-3.4cm) Asc Ao, d:          3.00 cm (2.1-3.4cm)  LV SYSTOLIC FUNCTION:                      Normal Ranges: EF-A4C View:    59 % (>=55%) EF-A2C View:    66 % EF-Biplane:     61 % LV EF Reported: 61 %  LV DIASTOLIC FUNCTION:             Normal Ranges: MV Peak E:             0.95 m/s    (0.7-1.2 m/s) MV Peak A:             1.14 m/s    (0.42-0.7 m/s) E/A Ratio:             0.83        (1.0-2.2) MV e'                  0.059 m/s   (>8.0) MV lateral e'          0.07 m/s MV medial e'           0.05 m/s E/e' Ratio:            16.12       (<8.0) PulmV Sys Teo:         50.60 cm/s PulmV Gonzalez Teo:        38.10 cm/s PulmV S/D Teo:         1.30 PulmV A Revs Teo:      24.00 cm/s PulmV A Revs Dur:      124.00 msec  MITRAL VALVE:          Normal Ranges: MV DT:        190 msec (150-240msec)  AORTIC VALVE:                     Normal Ranges: AoV Vmax:                1.37 m/s (<=1.7m/s) AoV Peak P.5 mmHg (<20mmHg) AoV Mean P.0 mmHg (1.7-11.5mmHg) LVOT Max Teo:            1.20 m/s (<=1.1m/s) AoV VTI:                 30.60 cm (18-25cm) LVOT VTI:                27.90 cm LVOT Diameter:           2.00 cm  (1.8-2.4cm) AoV Area, VTI:           2.86 cm2 (2.5-5.5cm2) AoV Area,Vmax:           2.75 cm2 (2.5-4.5cm2) AoV Dimensionless Index: 0.91  RIGHT VENTRICLE: RV Basal 3.14 cm RV Mid   2.29 cm RV Major 6.9 cm TAPSE:   25.7 mm RV s'    0.13 m/s  TRICUSPID VALVE/RVSP:          Normal Ranges:  Peak TR Velocity:     0.97 m/s Est. RA Pressure:     3 mmHg RV Syst Pressure:     7 mmHg   (< 30mmHg) IVC Diam:             1.71 cm  PULMONIC VALVE:          Normal Ranges: PV Accel Time:  153 msec (>120ms) PV Max Teo:     1.1 m/s  (0.6-0.9m/s) PV Max P.9 mmHg  PULMONARY VEINS: PulmV A Revs Dur: 124.00 msec PulmV A Revs Teo: 24.00 cm/s PulmV Gonzalez Teo:   38.10 cm/s PulmV S/D Teo:    1.30 PulmV Sys Teo:    50.60 cm/s  [id number] Javier Gallagher DO Electronically signed on 2025 at 4:04:37 PM  ** Final **      Current Facility-Administered Medications   Medication Dose Route Frequency Provider Last Rate Last Admin    apixaban (Eliquis) tablet 5 mg  5 mg oral BID Aasef G Shaikh, MD PhD   5 mg at 25    aspirin EC tablet 81 mg  81 mg oral Daily Nicholas Norman MD   81 mg at 25    atorvastatin (Lipitor) tablet 80 mg  80 mg oral Nightly Nicholas Norman MD   80 mg at 25    buprenorphine-naloxone (Suboxone) 2-0.5 mg per SL tablet 1 tablet  1 tablet sublingual BID Seth Rubin MD   1 tablet at 25    dextrose 50 % injection 12.5 g  12.5 g intravenous q15 min PRN Nicholas Norman MD        dextrose 50 % injection 25 g  25 g intravenous q15 min PRN Nicholas Norman MD        gabapentin (Neurontin) capsule 300 mg  300 mg oral Nightly Seth Rubin MD   300 mg at 25    gabapentin (Neurontin) capsule 600 mg  600 mg oral Daily Seth Rubin MD   600 mg at 25    glucagon (Glucagen) injection 1 mg  1 mg intramuscular q15 min PRN Nicholas Norman MD        glucagon (Glucagen) injection 1 mg  1 mg intramuscular q15 min PRN Nicholas Norman MD        hydrALAZINE (Apresoline) injection 10 mg  10 mg intravenous q4h PRN Nicholas Norman MD   10 mg at 25 0149    insulin glargine (Lantus) injection 30 Units  30 Units subcutaneous Nightly Nicholas Norman MD   30 Units at 25    insulin lispro injection 0-5 Units  0-5 Units  subcutaneous TID AC Nicholas Norman MD   2 Units at 04/02/25 1741    losartan (Cozaar) tablet 25 mg  25 mg oral Daily Nicholas Norman MD   25 mg at 04/03/25 0903    naloxone (Narcan) injection 0.2 mg  0.2 mg intravenous q5 min PRN Seth Rubin MD        NIFEdipine ER (Adalat CC) 24 hr tablet 60 mg  60 mg oral Daily Nicholas Norman MD   60 mg at 04/03/25 0902    oxyCODONE (Roxicodone) immediate release tablet 15 mg  15 mg oral q4h PRN Seth Rbuin MD   15 mg at 04/03/25 0903    oxyCODONE (Roxicodone) immediate release tablet 20 mg  20 mg oral BID PRN Seth Rubin MD        oxygen (O2) therapy   inhalation Continuous PRN - O2/gases Nicholas Norman MD        pantoprazole (ProtoNix) EC tablet 40 mg  40 mg oral BID Nicholas Norman MD   40 mg at 04/03/25 0903    polyethylene glycol (Glycolax, Miralax) packet 17 g  17 g oral Daily Nicholas Norman MD   17 g at 04/02/25 0859    sennosides (Senokot) tablet 17.2 mg  2 tablet oral BID Seth Rubin MD   17.2 mg at 04/03/25 0903    sertraline (Zoloft) tablet 50 mg  50 mg oral Daily Seth Rubin MD   50 mg at 04/03/25 0903     No current outpatient medications on file.   ..     Medication and Non-Pharmacologic VTE Prophylaxis/Anticoagulants      Last Anticoag Admin            apixaban (Eliquis) tablet 5 mg    Given 5 mg at 0903    Frequency: 2 times daily         There are additional administrations since 03/31/25 0909 that are not shown.    No unadministered anticoagulant orders found.          Assessment/Plan   Barbara English has  Assessment & Plan  Cerebrovascular accident (CVA), unspecified mechanism (Multi)  Right middle cerebral artery thrombus? Not a candidate thrombectomy per neurology  HTN will adjust medications  Metastatic Pancreatic cancer         needs rehabilitation      On Eliquis ASA, Statins.                 Palliative care consult noted   Other Hospital problems        Abnormal findings not addressed during hospitalization, but  require out patient follow up. None        I spent 40 minutes coordinating discharge of Barbara English, Including reconciling medications, reviewing the labs & formulating discharge plan. discussing with patient and nursing staff.  Nicholas Norman MD

## 2025-04-03 NOTE — PROGRESS NOTES
Speech-Language Pathology    Speech-Language Pathology  Adult Inpatient Clinical Bedside Swallow Evaluation    Patient Name: Barbara English  MRN: [id number]  Today's Date: 04/03/25   Time Calculation  Start Time: 1400  Stop Time: 1424  Time Calculation (min): 24 min        History of Present Illness: Barbara English is a 50 y.o. 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       Assessment:   Clinical bedside swallow evaluation completed. Patient seated upright in bed prior to presentation of po trials to assess swallowing tolerance. Flat affect noted with impulsivity. Vocal quality clear with dysarthria and OM weakness identified. Reduced lingual/labial strength and ROM noted with L>R weakness evident. Hypophonia noted. Unable to assess velar movement during phonation. Volitional cough response absent.  Patient consuming water via cup as SLP arrived to room. Delayed cough noted appearing weak in intensity. Patient proceeded to consume ice chips with large boluses consumed. Mastication of ice active with anterior loss of bolus from left side of oral cavity with no awareness by patient. Oral residue also remaining post swallow on left. Subsequent boluses thin liquid consumed. Patient unable to consume 3 oz continuously via straw. Although no cough exhibited, unable to r/o aspiration. Will need to complete MBSS to further assess. Patient expressed willingness to participate in exam and concerned about potential effects of aspiration on cancer and treatment. Currently palliative care to discuss GOC further with patient and family. Will proceed with exam if consistent with GOC  decision or if needed to facilitate decision making process.      Recommendations:  -NPO WITH CONSIDERATION FOR ALTERNATIVE NUTRITIONAL SUPPORT   VS GOC DISCUSSION      Short Term Goal:   Patient will participate in MBSS exam to assess oropharyngeal function and rule out aspiration.    Long Term Goal:  Patient will resume/maintain oral intake in order to promote optimal oropharyngeal swallow function and reduce risk for dehydration, malnutrition and weight loss.     Start Date: 4/3/25  End Date: 4/17/25  Status: Goal Initiated this date       Plan:  SLP Services Indicated: Yes  Frequency: TBD  Discussed POC with patient  SLP - OK to Discharge? : No    Pain:   Patient exhibits no s/s of pain or discomfort during exam.      Inpatient Education:  Extensive education provided to patient regarding current swallow function and recommendations/results of testing performed this date .      Consultations/Referrals/Coordination of Services:   N/A

## 2025-04-03 NOTE — PROGRESS NOTES
Occupational Therapy    OT Treatment    Patient Name: Barbara English  MRN: [id number]  Department: [place] [id number]  Room: [address] [place] [address]  Today's Date: 4/3/2025  Time Calculation  Start Time: 1518  Stop Time: 1552  Time Calculation (min): 34 min        Assessment:  OT Assessment: Pt presenting with fatigue, agreeable to sit EOB to facilitate weight shift and address deficits in trunk and L UE strength. Pt requiring assist to setup tray for feeding tasks.  Pt provided sling for OOB mobility for joint protection of the L UE.     Evaluation/Treatment Tolerance: Patient tolerated treatment well, Patient limited by fatigue  Medical Staff Made Aware: Yes  End of Session Communication: Bedside nurse  End of Session Patient Position: Bed, 3 rail up, Alarm on  Evaluation/Treatment Tolerance: Patient tolerated treatment well, Patient limited by fatigue  Medical Staff Made Aware: Yes  Plan:  Treatment Interventions: ADL retraining, Functional transfer training, UE strengthening/ROM, Endurance training, Compensatory technique education  OT Frequency: 4 times per week  OT Discharge Recommendations: Moderate intensity level of continued care  Equipment Recommended upon Discharge: Wheelchair, Other (comment)  OT Recommended Transfer Status: Minimal assist (x2 OOB)  OT - OK to Discharge: Yes  Treatment Interventions: ADL retraining, Functional transfer training, UE strengthening/ROM, Endurance training, Compensatory technique education    Subjective   Previous Visit Info:  OT Last Visit  OT Received On: 04/03/25  General:  General  Reason for Referral: Pt is a 49 y/o F presenting with CVA. Pt also has a metastatic pancreactic CA dx.  Referred By: Nicholas Norman MD  Past Medical History Relevant to Rehab: No past medical history on file.  Prior to Session Communication: Bedside nurse, Care Coordinator  Patient Position Received: Bed, 3 rail up, Alarm on  Preferred Learning Style: auditory, visual, verbal  General Comment: Pt agreeable to  OT  Precautions:  Hearing/Visual Limitations: +Reading glasses  Medical Precautions: Fall precautions  Precautions Comment: L rocael     Date/Time Vitals Session Patient Position Pulse Resp SpO2 BP MAP (mmHg)    04/03/25 1529 --  --  74  18  95 %  139/82  101           Pain:  Pain Assessment  Pain Assessment: 0-10  0-10 (Numeric) Pain Score: 5 - Moderate pain  Pain Type: Chronic pain  Pain Location: Back  Pain Orientation: Lower  Effect of Pain on Daily Activities: OT to tolerance  Pain Interventions: Repositioned, Heat applied    Objective    Cognition:  Cognition  Orientation Level: Oriented X4     Activities of Daily Living: Feeding  Feeding Level of Assistance: Minimum assistance, Setup  Feeding Where Assessed: Bed level  Feeding Comments: assist to cut food and setup tray, pt on side bed level     Bed Mobility/Transfers: Bed Mobility  Bed Mobility: Yes  Bed Mobility 1  Bed Mobility 1: Supine to sitting  Level of Assistance 1: Minimum assistance  Bed Mobility Comments 1: assist at L side, pt able to elevate trunk and bring R LE off bed  Bed Mobility 2  Bed Mobility  2: Sitting to supine  Level of Assistance 2: Moderate assistance  Bed Mobility Comments 2: assist at B LE's and L UE  Bed Mobility 3  Bed Mobility 3: Scooting  Level of Assistance 3: Minimum assistance  Bed Mobility Comments 3: scooting laterally EOB         Sitting Balance:  Static Sitting Balance  Static Sitting-Balance Support: Feet supported  Static Sitting-Level of Assistance: Close supervision    Sensory:  Sensory Integration: Yes  Proprioceptive Input: to L UE  Tactile Stimulation: to L UE         and LUE Strength  L Shoulder Flexion: 0/5  L Shoulder Extension: 0/5  L Elbow Flexion: 0/5  L Elbow Extension: 0/5    Outcome Measures:[facility] Daily Activity  Putting on and taking off regular lower body clothing: A lot  Bathing (including washing, rinsing, drying): A lot  Putting on and taking off regular upper body clothing: A lot  Toileting, which  includes using toilet, bedpan or urinal: A lot  Taking care of personal grooming such as brushing teeth: A little  Eating Meals: A little  Daily Activity - Total Score: 14    Education Documentation  Body Mechanics, taught by Shira Lubin OT at 4/3/2025  4:09 PM.  Learner: Patient  Readiness: Acceptance  Method: Explanation, Demonstration  Response: Verbalizes Understanding, Demonstrated Understanding, Needs Reinforcement    Precautions, taught by Shira Lubin OT at 4/3/2025  4:09 PM.  Learner: Patient  Readiness: Acceptance  Method: Explanation, Demonstration  Response: Verbalizes Understanding, Demonstrated Understanding, Needs Reinforcement    ADL Training, taught by Shira Lubin OT at 4/3/2025  4:09 PM.  Learner: Patient  Readiness: Acceptance  Method: Explanation, Demonstration  Response: Verbalizes Understanding, Demonstrated Understanding, Needs Reinforcement    Education Comments  No comments found.        OP EDUCATION:       Goals:  Encounter Problems       Encounter Problems (Active)       ADLs       Patient with complete upper body dressing with stand by assist level of assistance donning and doffing all UE clothes with PRN adaptive equipment while supported sitting (Progressing)       Start:  04/02/25    Expected End:  04/16/25            Patient with complete lower body dressing with minimal assist  level of assistance donning and doffing all LE clothes  with PRN adaptive equipment while supported sitting (Not Progressing)       Start:  04/02/25    Expected End:  04/16/25            Patient will complete daily grooming tasks brushing teeth and washing face/hair with stand by assist level of assistance and PRN adaptive equipment while supported sitting. (Progressing)       Start:  04/02/25    Expected End:  04/16/25            Patient will complete toileting including hygiene clothing management/hygiene with minimal assist  level of assistance and bedside commode. (Not Progressing)       Start:   04/02/25    Expected End:  04/16/25               TRANSFERS       Patient will perform bed mobility supervision level of assistance and bed rails in order to improve safety and independence with mobility (Progressing)       Start:  04/02/25    Expected End:  04/16/25            Patient will complete functional transfer to chair with arsms with least restrictive device with contact guard assist level of assistance. (Not Progressing)       Start:  04/02/25    Expected End:  04/16/25

## 2025-04-03 NOTE — PROGRESS NOTES
Physical Therapy    Physical Therapy Treatment    Patient Name: Barbara English  MRN: [id number]  Department: [facility] [id number]  Room: [address] [place] [address]  Today's Date: 4/3/2025  Time Calculation  Start Time: 1134  Stop Time: 1159  Time Calculation (min): 25 min         Assessment/Plan   PT Assessment  PT Assessment Results: Decreased strength, Decreased endurance, Impaired balance, Decreased mobility, Pain  Rehab Prognosis: Fair  Barriers to Discharge Home: Caregiver assistance, Physical needs  Caregiver Assistance: Caregiver assistance needed per identified barriers - however, level of patient's required assistance exceeds assistance available at home  Physical Needs: Stair navigation into home limited by function/safety, Stair navigation to access bed limited by function/safety, Stair navigation to access bath limited by function/safety, Ambulating household distances limited by function/safety, 24hr mobility assistance needed, 24hr ADL assistance needed, High falls risk due to function or environment  Evaluation/Treatment Tolerance: Patient limited by pain  Strengths: Ability to acquire knowledge, Premorbid level of function  Barriers to Participation: Comorbidities  End of Session Communication: Bedside nurse  Assessment Comment: Pt participatating fully though limited by back and abdominal pain. Pt required Mod A with sit/stand transfers. Pt declining gait training due to pain. Recommending +2 assist for safety with bed to chair transfers and gait training at this tme.  End of Session Patient Position: Bed, 3 rail up, Alarm on     PT Plan  Treatment/Interventions: Bed mobility, Transfer training, Gait training, Balance training, Neuromuscular re-education, Strengthening, Endurance training, Therapeutic activity  PT Plan: Ongoing PT  PT Frequency: 4 times per week  PT Discharge Recommendations: Moderate intensity level of continued care, Other (comment)  Equipment Recommended upon Discharge: Wheelchair, Other (comment) (rocael  walker)  PT Recommended Transfer Status: Assist x2  PT - OK to Discharge: Yes (per POC)      General Visit Information:   PT  Visit  PT Received On: 04/03/25  General  Reason for Referral: Pt is a 51 y/o F presenting with CVA. Pt also has a metastatic pancreactic CA dx.  Referred By: Nicholas Norman MD  Family/Caregiver Present: No  Prior to Session Communication: Bedside nurse  Patient Position Received: Bed, 3 rail up, Alarm on  Preferred Learning Style: auditory, visual, verbal    Subjective   Precautions:  Precautions  Hearing/Visual Limitations: +Reading glasses  Medical Precautions: Fall precautions     Date/Time Vitals Session Patient Position Pulse Resp SpO2 BP MAP (mmHg)    04/03/25 1132 --  --  --  18  100 %  162/90  114     04/03/25 1134 --  --  --  --  --  162/90  --                 Objective   Pain:  Pain Assessment  Pain Assessment: 0-10  0-10 (Numeric) Pain Score: 6  Pain Location: Back  Pain Orientation: Lower  Pain Interventions:  (Heating pad from home. RN had also given pain meds.)  Multiple Pain Sites: Two  Pain 2  Pain Score 2: 5 - Moderate pain  Pain Location 2: Abdomen  Cognition:  Cognition  Orientation Level: Oriented X4  Coordination:  Movements are Fluid and Coordinated: No  Coordination Comment: L hemiparisis  Postural Control:  Postural Control  Postural Control: Impaired  Static Sitting Balance  Static Sitting-Balance Support: Bilateral upper extremity supported, Feet supported  Static Sitting-Level of Assistance: Close supervision  Dynamic Sitting Balance  Dynamic Sitting-Balance Support: Feet supported  Dynamic Sitting-Level of Assistance: Contact guard  Dynamic Sitting-Balance: Forward lean  Static Standing Balance  Static Standing-Balance Support: Bilateral upper extremity supported  Static Standing-Level of Assistance: Minimum assistance  Static Standing-Comment/Number of Minutes: LBQC support on R side. with L knee block.  L knee buckle  with one trial.  Dynamic Standing  Balance  Dynamic Standing-Balance Support: Bilateral upper extremity supported  Dynamic Standing-Level of Assistance: Moderate assistance  Dynamic Standing-Balance:  (attempting side stepping toward R          side.)  Dynamic Standing-Comments: L knee buckling with attempted  lateral step.    Activity Tolerance:  Activity Tolerance  Endurance: Tolerates 10 - 20 min exercise with multiple rests  Treatments:       Balance/Neuromuscular Re-Education  Balance/Neuromuscular Re-Education Activity Performed: Yes  Balance/Neuromuscular Re-Education Activity 1: seated balance addressed with  focus on maintaining midline posture, L UE placement and use, left side neglect.  Pt able to hold corrected postures for short periods though not able to correct posture with out assist and cuing.  Standing balance/tolerance x 4 trials. Able to tolerate up to 20 to 30 seconds each before needing to sit due to pain.    Bed Mobility  Bed Mobility: Yes  Bed Mobility 1  Bed Mobility 1: Supine to sitting  Level of Assistance 1: Minimum assistance  Bed Mobility Comments 1: HOB elevated, assist needed to move L LE toward and off of R side of bed.  Bed Mobility 2  Bed Mobility  2: Sitting to supine  Level of Assistance 2: Minimum assistance  Bed Mobility Comments 2: assist needed to lift R LE onto bed this session. Increased time given for pt to complete task without assist but not able.    Ambulation/Gait Training  Ambulation/Gait Training Performed:  (Side stepping attempted. Knee block for  L LE provided due to knee buckle with standing trials. Pt unsteady and stating she was not able to complete task at this time due to elevated pain in back and abdomen.)  Transfers  Transfer: Yes  Transfer 1  Transfer From 1: Bed to  Transfer to 1: Bed  Technique 1: Sit to stand, Stand to sit  Transfer Device 1: Gait belt  Transfer Level of Assistance 1: Moderate assistance, Minimum assistance, Minimal verbal cues  Trials/Comments 1: x 4 trials    Outcome  Measures:  [facility] Basic Mobility  Turning from your back to your side while in a flat bed without using bedrails: A little  Moving from lying on your back to sitting on the side of a flat bed without using bedrails: A lot  Moving to and from bed to chair (including a wheelchair): A lot  Standing up from a chair using your arms (e.g. wheelchair or bedside chair): A lot  To walk in hospital room: Total  Climbing 3-5 steps with railing: Total  Basic Mobility - Total Score: 11    Education Documentation  Body Mechanics, taught by Mark Moy PTA at 4/3/2025 12:52 PM.  Learner: Patient  Readiness: Acceptance  Method: Explanation, Demonstration  Response: Needs Reinforcement    Mobility Training, taught by Mark Moy PTA at 4/3/2025 12:52 PM.  Learner: Patient  Readiness: Acceptance  Method: Explanation, Demonstration  Response: Needs Reinforcement    Education Comments  No comments found.        OP EDUCATION:       Encounter Problems       Encounter Problems (Active)       Mobility       STG - Patient will ambulate (Not Progressing)       Start:  04/02/25    Expected End:  04/16/25       20 ft with CGA using rocael-walker            PT Transfers       STG - Patient will transfer from one surface to another (Not Progressing)       Start:  04/02/25    Expected End:  04/16/25       With CGA using rocael-walker            PT Transfers       STG - Patient to transfer to and from sit to supine (Progressing)       Start:  04/02/25    Expected End:  04/16/25       With CGA from a flat bed         STG - Patient will transfer sit to and from stand (Progressing)       Start:  04/02/25    Expected End:  04/16/25       With CGA using rocael-walker            Pain - Adult          Safety       LTG - Patient will adhere to hip precautions during ADL's and transfers       Start:  04/01/25            LTG - Patient will demonstrate safety requirements appropriate to situation/environment       Start:  04/01/25            LTG - Patient  will utilize safety techniques       Start:  04/01/25            STG - Patient locks brakes on wheelchair       Start:  04/01/25            STG - Patient uses call light consistently to request assistance with transfers       Start:  04/01/25            STG - Patient uses gait belt during all transfers       Start:  04/01/25            Goal 1       Start:  04/01/25            Goal 2       Start:  04/01/25            Goal 3       Start:  04/01/25

## 2025-04-03 NOTE — PROGRESS NOTES
Barbara English is a 50 y.o. female on day 3 of admission presenting with Cerebrovascular accident (CVA), unspecified mechanism (Multi).    Interval History: Mild MOUSTAPHA, starting. Neurology following and recommended allowing for anticoagulation. Echo with bubble study was not able to be completed due to IV failure.     I began a GOC conversation regarding code status today. I told her that in the context of her cancer and CVA, I do not want her to end up in a place where she would not want to be after a cardiac arrest. We explored her values and she told me she would not want ot be ain a vegetative state and that her family would not want to be in one. She tells me that her daughters Miracle and Nannette are aware of this.   Miracle is visiting from [place] and her other dtr Nannette is local. We agreed we would meet tomorrow morning regarding GOC. I told her that we would be able to meet around 11 and I will confirm tomorrow morning.         Subjective   Symptoms (0 - 10, Best to Worst)  [place] Symptom Assessment System  0-10 (Numeric) Pain Score: 0 - No pain  Better pain control on less medications. Less sedated. She does complain of fatigue. She is moving her bowels. We will not adjust her pain medications today.         Objective     Physical Exam  Constitutional:       General: She is not in acute distress.     Appearance: Normal appearance.      Comments: Drowsy this morning.   HENT:      Head: Normocephalic and atraumatic.      Mouth/Throat:      Mouth: Mucous membranes are moist.   Cardiovascular:      Rate and Rhythm: Normal rate and regular rhythm.   Pulmonary:      Effort: No respiratory distress.   Abdominal:      General: Bowel sounds are normal.      Tenderness: There is no abdominal tenderness. There is no guarding.      Comments: Somewhat firm but nontender abdomen.    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   Skin:     General: Skin is warm and dry.  "  Neurological:      Mental Status: She is alert and oriented to person, place, and time.   Psychiatric:         Thought Content: Thought content normal.         Judgment: Judgment normal.       Last Recorded Vitals  Blood pressure 143/87, pulse 73, temperature 36.5 °C (97.7 °F), temperature source Temporal, resp. rate 18, height 1.6 m (5' 3\"), weight 61.6 kg (135 lb 11.2 oz), SpO2 97%.  Intake/Output last 3 Shifts:  I/O last 3 completed shifts:  In: - (0 mL/kg)   Out: 200 (3.2 mL/kg) [Urine:200 (0.1 mL/kg/hr)]  Weight: 61.6 kg     Wt Readings from Last 10 Encounters:   03/31/25 76.6 kg (168 lb 14 oz)   1/18/2025([facility]) 61.2 kg  135 lbs   04/30/24 63.5 kg (140 lb)   05/09/22 55 kg (121 lb 4.1 oz)       Relevant Results    Scheduled medications  apixaban, 5 mg, oral, BID  aspirin, 81 mg, oral, Daily  atorvastatin, 80 mg, oral, Nightly  buprenorphine-naloxone, 1 tablet, sublingual, BID  gabapentin, 300 mg, oral, Nightly  gabapentin, 600 mg, oral, Daily  insulin glargine, 30 Units, subcutaneous, Nightly  insulin lispro, 0-5 Units, subcutaneous, TID AC  losartan, 25 mg, oral, Daily  NIFEdipine ER, 60 mg, oral, Daily  pantoprazole, 40 mg, oral, BID  polyethylene glycol, 17 g, oral, Daily  sennosides, 2 tablet, oral, BID  sertraline, 50 mg, oral, Daily      Continuous medications     PRN medications  PRN medications: dextrose, dextrose, glucagon, glucagon, hydrALAZINE, naloxone, oxyCODONE, oxyCODONE, oxygen    Results for orders placed or performed during the hospital encounter of 03/31/25 (from the past 24 hours)   POCT GLUCOSE   Result Value Ref Range    POCT Glucose 269 (H) 74 - 99 mg/dL   POCT GLUCOSE   Result Value Ref Range    POCT Glucose 226 (H) 74 - 99 mg/dL   POCT GLUCOSE   Result Value Ref Range    POCT Glucose 192 (H) 74 - 99 mg/dL   POCT GLUCOSE   Result Value Ref Range    POCT Glucose 87 74 - 99 mg/dL       CT head wo IV contrast    Result Date: 4/1/2025  1. Negative head CT for acute change.     MACRO: None   " Signed by: Raj Rubin 4/1/2025 6:12 PM Dictation workstation:   [id number]    CT cervical spine wo IV contrast    Result Date: 3/31/2025  No acute osseous abnormality is identified in the cervical spine.   MACRO: None   Signed by: Zechariah Cole 3/31/2025 5:37 PM Dictation workstation:   [id number]    CT brain attack angio head and neck W and WO IV contrast    Result Date: 3/31/2025  1. Short-segment of severely attenuated flow related enhancement at the origin of the M1 segment right middle cerebral artery, suspected focus of thrombus. 2. Nonspecific fusiform narrowing of the right internal carotid artery supraclinoid segment. This could be related to nonocclusive wall adherent thrombus, less likely fibrofatty atherosclerotic plaque. 3. Undulating attenuation of flow related enhancement in the right pericallosal artery of the anterior cerebral artery. This may represent thrombosis. 4. Multifocal endodontal and periodontal disease.   MACRO: Eladio Montoya discussed the significance and urgency of this critical finding epic chat with  SILVINO STRONG AND TISHA SANABRIA on 3/31/2025 at 1:32 pm.  (**-RCF-**) Findings:  See findings.   Signed by: Eladio Montoya 3/31/2025 1:33 PM Dictation workstation:   [id number]    CT brain attack head wo IV contrast    Result Date: 3/31/2025  NEGATIVE BRAIN ATTACK PROTOCOL CT BRAIN:   NO ACUTE INTRACRANIAL HEMORRHAGE   NO ACUTE INTRACRANIAL MASS EFFECT   NO CT EVIDENCE OF A LARGE ACUTE TERRITORIAL INFARCT   I WAS ABLE TO COMMUNICATE THESE FINDINGS BY TELEPHONE TO DR STRONG AT 1059 HOURS, SAME DAY, 31 MARCH 2025   MACRO: Edilberto Sultana discussed the significance and urgency of this critical finding by telephone with  SILVINO STRONG on 3/31/2025 at 11:01 am. (**-RCF-**) Findings:  See findings.   Signed by: Edilberto Sultana 3/31/2025 11:01 AM Dictation workstation:   [id number]     renal complete    Result Date: 3/21/2025  IMPRESSION: No hydronephrosis. Nonspecific bladder  debris, correlate with urinalysis. : [facility]   Transcribe Date/Time: Mar 21 2025  1:51P Dictated by : LORETTA ANTON MD This examination was interpreted and the report reviewed and electronically signed by: LORETTA ANTON MD on Mar 21 2025  1:52PM  EST    US gallbladder    Result Date: 3/20/2025  IMPRESSION: Known small bilobar hepatic metastases Cholelithiasis and nonspecific diffuse gallbladder wall thickening.   Nuclear medicine hepatobiliary imaging could be used if there is clinical concern for acute cholecystitis. Pancreatic tail mass not assessable on this exam. : [facility]   Transcribe Date/Time: Mar 20 2025  8:13A Dictated by : ROSITA ALVARADO MD This examination was interpreted and the report reviewed and electronically signed by: ROSITA ALVARADO MD on Mar 20 2025  8:17AM  EST    CT abdomen pelvis w IV contrast    Result Date: 3/19/2025  IMPRESSION: Stable pancreatic tail mass consistent with biopsy-proven pancreatic adenocarcinoma. Stable presumed hepatic metastases since 02/26/2025. 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 Nonspecific mild gallbladder wall thickening with adjacent trace pericholecystic fluid.  May consider right upper quadrant ultrasound to exclude acute cholecystitis if there is clinical concern. Redemonstrated wall thickening of the distal esophagus is nonspecific though may represent esophagitis. : [facility]   Transcribe Date/Time: Mar 19 2025  9:44P Dictated by : ORLIN ANTONIO DO This examination was interpreted and the report reviewed and electronically signed by: HINA DALY MD on Mar 19 2025 10:20PM  EST                  Assessment/Plan   IMP:  50-year-old female with history of type 1 diabetes melitis with multiple complications including neuropathy retinopathy DKA and peripheral angiopathy and notably newly diagnosed pancreatic tail adenocarcinoma.  She presented with stroke symptoms and was found to have an occlusion in her right MCA.  She is  being treated conservatively for this and neurology is following.  Palliative was consulted for assistance with pain management and goals of care.    Total OME/24h: 63 OME+4mg Buprenorphine (200 - 400 OME)   Morphine:   Oxycodone: 50  Hydromorphone:   Fentanyl:   Suboxone: 4mg     CVA  Pancreatic Adenocarcinoma  [facility]   Plan for family meeting tomorrow. Left VM with Nannette. Patient will also be trying to contact them.   D/w primary team. I am concerned that her MOUSTAPHA is from poor intake i/s/o possible dysphagia. I would like to have her formerly evaluated b/c it will change my conversations moving forward. Aspiration precautions were already ordered.   Discussed her goals and chemotherapy. She is reluctant to try chemotherapy again and understands that this is not a resectable disease. She would prefer radiation therapy if it is an option for her pancreatic adenocarcinoma that will be offered by her cancer team.   Pain and symptom control are top priorities for her since she was already told her prognosis was likely less than 6 months.   I will discuss hospice with her tomorrow when she is less sedated. This will be purely for information as she wants to follow up with her specialists and see if there are options for treatment that will palliate her symptoms.   Incentive spirometer ordered.      Diabetic neuropathy  Continue with gabapentin 600 mg in the morning and 300 mg at night     Cancer related epigastric pain  Metastatic Pancreatic tail adenocarcinoma   Continue with Suboxone 2-0.5 twice daily  Decrease oxycodone 20mg to 15mg every 4 hours as needed due to excessive sedation. ---> Pain control is satisfactory here, her fatigue is likely from her recent CVA in my opinion.      Mood disorder:  C/w Sertraline 50mg daily     Bowel regimen:  Senna 2 tabs BID   Miralax Prn  Will keep as is for now.              Provider estimate of survival: 1-6 months Hospice appropriate  Patient Prognostic Awareness: Yes -  incongruent with provider estimation     Patient/proxy preference for information  Prefers full information     Goals of Care  Full Code     Is the patient hospice-eligible?   Yes  Was a discussion held re hospice services?   no  Was a decision made re hospice services?  No, conversations ongoing.     Other Palliative Support  Music therapy referral         I spent 50 minutes in the professional and overall care of this patient.  I spent 20 minutes in the professional and overall care of this patient related to ACP in addition to other time spent in assessment, chart review, and coordination of care        Seth Rubin MD

## 2025-04-03 NOTE — CARE PLAN
The patient's goals for the shift include  improve activity and mobility    The clinical goals for the shift include maintain pt safety    Over the shift, the patient did not make progress toward the following goals.     Problem: Pain - Adult  Goal: Verbalizes/displays adequate comfort level or baseline comfort level  Outcome: Progressing     Problem: Safety - Adult  Goal: Free from fall injury  Outcome: Progressing     Problem: Discharge Planning  Goal: Discharge to home or other facility with appropriate resources  Outcome: Progressing     Problem: Chronic Conditions and Co-morbidities  Goal: Patient's chronic conditions and co-morbidity symptoms are monitored and maintained or improved  Outcome: Progressing     Problem: Nutrition  Goal: Nutrient intake appropriate for maintaining nutritional needs  Outcome: Progressing     Problem: Fall/Injury  Goal: Not fall by end of shift  Outcome: Progressing  Goal: Be free from injury by end of the shift  Outcome: Progressing  Goal: Verbalize understanding of personal risk factors for fall in the hospital  Outcome: Progressing  Goal: Verbalize understanding of risk factor reduction measures to prevent injury from fall in the home  Outcome: Progressing  Goal: Use assistive devices by end of the shift  Outcome: Progressing  Goal: Pace activities to prevent fatigue by end of the shift  Outcome: Progressing     Problem: Pain  Goal: Takes deep breaths with improved pain control throughout the shift  Outcome: Progressing  Goal: Turns in bed with improved pain control throughout the shift  Outcome: Progressing  Goal: Walks with improved pain control throughout the shift  Outcome: Progressing  Goal: Performs ADL's with improved pain control throughout shift  Outcome: Progressing  Goal: Participates in PT with improved pain control throughout the shift  Outcome: Progressing  Goal: Free from opioid side effects throughout the shift  Outcome: Progressing  Goal: Free from acute  confusion related to pain meds throughout the shift  Outcome: Progressing     Problem: Skin  Goal: Decreased wound size/increased tissue granulation at next dressing change  Outcome: Progressing  Flowsheets (Taken 4/3/2025 1159)  Decreased wound size/increased tissue granulation at next dressing change:   Promote sleep for wound healing   Protective dressings over bony prominences  Goal: Participates in plan/prevention/treatment measures  Outcome: Progressing  Flowsheets (Taken 4/3/2025 1159)  Participates in plan/prevention/treatment measures:   Discuss with provider PT/OT consult   Elevate heels  Goal: Prevent/manage excess moisture  Outcome: Progressing  Flowsheets (Taken 4/3/2025 1159)  Prevent/manage excess moisture:   Cleanse incontinence/protect with barrier cream   Monitor for/manage infection if present   Moisturize dry skin  Goal: Prevent/minimize sheer/friction injuries  Outcome: Progressing  Flowsheets (Taken 4/3/2025 1159)  Prevent/minimize sheer/friction injuries:   Use pull sheet   Increase activity/out of bed for meals   HOB 30 degrees or less   Turn/reposition every 2 hours/use positioning/transfer devices  Goal: Promote/optimize nutrition  Outcome: Progressing  Flowsheets (Taken 4/3/2025 1159)  Promote/optimize nutrition:   Assist with feeding   Consume > 50% meals/supplements   Offer water/supplements/favorite foods   Monitor/record intake including meals  Goal: Promote skin healing  Outcome: Progressing  Flowsheets (Taken 4/3/2025 1159)  Promote skin healing:   Turn/reposition every 2 hours/use positioning/transfer devices   Protective dressings over bony prominences   Assess skin/pad under line(s)/device(s)   Rotate device position/do not position patient on device     Problem: Recent hospitalization or complication while living with DM  Goal: Patient will effectively self-manage their DM disease process  Outcome: Progressing     Problem: Lack of Diabetes disease process knowledge  Goal:  Increase knowledge/understanding of diabetes and how to reduce risk of complications  Outcome: Progressing     Problem: Lack of glucose monitoring and target numbers for before and after meals knowledge  Goal: Increase knowledge/understanding of monitoring devices and interpret data to assist with lifestyle change  Outcome: Progressing     Problem: Lack of knowledge on diet for diabetes  Goal: Discover best plan for balanced nutrition to manage diabetes  Outcome: Progressing     Problem: Address barriers to lifestyle change  Goal: Find workable solutions to meet health goals  Outcome: Progressing     Problem: Lack of knowledge on benefits of activity for meeting blood glucose targets and health goals  Goal: Discover best plan for being active and address any barriers  Outcome: Progressing     Problem: Lack of knowldge regarding diabetes medications  Goal: Increase knowledge via education  Outcome: Progressing     Problem: Lack of knowledge on where to find additional support for diabetes  Goal: Increase knowledge of where support can be found to best address patient's need  Outcome: Progressing     Problem: Recent hospitalization or complication while living with CVA  Goal: Patient will effectively self-manage their CVA disease process  Outcome: Progressing     Problem: Diabetes  Goal: Achieve decreasing blood glucose levels by end of shift  Outcome: Progressing  Flowsheets (Taken 4/3/2025 1159)  Achieve decreasing blood glucose levels by end of shift:   Med administration/monitoring of effect   Self monitor blood glucose with staff oversight  Goal: Increase stability of blood glucose readings by end of shift  Outcome: Progressing  Flowsheets (Taken 4/3/2025 1158)  Increase stability of blood glucose readings by end of shift: Med administration/monitoring of effect  Goal: Decrease in ketones present in urine by end of shift  Outcome: Progressing  Flowsheets (Taken 4/3/2025 1153)  Decrease in ketones present in urine  by end of shift: Med administration/monitoring of effect  Goal: Maintain electrolyte levels within acceptable range throughout shift  Outcome: Progressing  Flowsheets (Taken 4/3/2025 1159)  Maintain electrolyte levels within acceptable range throughout shift:   Med administration/monitoring of effect   Monitor urine output  Goal: Maintain glucose levels >70mg/dl to <250mg/dl throughout shift  Outcome: Progressing  Flowsheets (Taken 4/3/2025 1159)  Maintain glucose levels >70mg/dl to <250mg/dl throughout shift:   Med administration/monitoring of effect   Self monitor blood glucose with staff oversight  Goal: No changes in neurological exam by end of shift  Outcome: Progressing  Flowsheets (Taken 4/3/2025 1159)  No changes in neurological exam by end of shift: Complete frequent neurological assessments  Goal: Learn about and adhere to nutrition recommendations by end of shift  Outcome: Progressing  Flowsheets (Taken 4/3/2025 1159)  Learn about and adhere to nutrition recommendations by end of shift: Ensure/encourage compliance with appropriate diet  Goal: Vital signs within normal range for age by end of shift  Outcome: Progressing  Flowsheets (Taken 4/3/2025 1159)  Vital signs within normal range for age by end of shift: Med administration/monitoring of effect  Goal: Increase self care and/or family involovement by end of shift  Outcome: Progressing  Flowsheets (Taken 4/3/2025 1159)  Increase self care and/or family involovement by end of shift: Self monitor blood glucose with staff oversight  Goal: Receive DSME education by end of shift  Outcome: Progressing  Flowsheets (Taken 4/3/2025 1159)  Receive DSME education by end of shift: Provide patient centered education on Diabetic Self Management Education

## 2025-04-03 NOTE — CONSULTS
Reason For Consult  CVA; uncontrolled DM    Given pts prognosis with pancreatic cancer and on-going discussions with Dr. Rubin (palliative med), DM education is not indicated at this time. GOC conversation to take place between patient, her daughters and Dr. Rubin.     DM education will sign off at this point.         Feng Winkler RN

## 2025-04-03 NOTE — PROGRESS NOTES
4/3/2025 10:15 AM I met with patient. She said she has been staying with her daughter. She has six children. She said she was at [facility] in the past. I discussed home care will provide rehab 2 to 3 times a week. I discussed SNF. She wants to discuss with her daughter. I have left messages for family contacts. Corrie PATTERSON

## 2025-04-03 NOTE — PROGRESS NOTES
04/03/25 1547   Discharge Planning   Living Arrangements Children   Support Systems Children;Spouse/significant other   Assistance Needed per Palliative Medicine chart review;agreed patient and daughters would meet tomorrow morning regarding GOC. would be able to meet around 11 and I will confirm tomorrow morning. patient also treated by therapy; rec further rehab, TCC/SW will follow discharge plan per family request/choice   Expected Discharge Disposition Home H  (vs skilled nursing)   Does the patient need discharge transport arranged? Yes   RoundTrip coordination needed? Yes   Has discharge transport been arranged? No

## 2025-04-04 ENCOUNTER — APPOINTMENT (OUTPATIENT)
Dept: RADIOLOGY | Facility: HOSPITAL | Age: 50
End: 2025-04-04
Payer: COMMERCIAL

## 2025-04-04 LAB
ANION GAP SERPL CALC-SCNC: 10 MMOL/L (ref 10–20)
BUN SERPL-MCNC: 14 MG/DL (ref 6–23)
CALCIUM SERPL-MCNC: 8.3 MG/DL (ref 8.6–10.3)
CHLORIDE SERPL-SCNC: 107 MMOL/L (ref 98–107)
CO2 SERPL-SCNC: 26 MMOL/L (ref 21–32)
CREAT SERPL-MCNC: 1.02 MG/DL (ref 0.5–1.05)
EGFRCR SERPLBLD CKD-EPI 2021: 67 ML/MIN/1.73M*2
GLUCOSE BLD MANUAL STRIP-MCNC: 123 MG/DL (ref 74–99)
GLUCOSE BLD MANUAL STRIP-MCNC: 138 MG/DL (ref 74–99)
GLUCOSE BLD MANUAL STRIP-MCNC: 178 MG/DL (ref 74–99)
GLUCOSE BLD MANUAL STRIP-MCNC: 179 MG/DL (ref 74–99)
GLUCOSE BLD MANUAL STRIP-MCNC: 98 MG/DL (ref 74–99)
GLUCOSE SERPL-MCNC: 113 MG/DL (ref 74–99)
POTASSIUM SERPL-SCNC: 3.8 MMOL/L (ref 3.5–5.3)
SODIUM SERPL-SCNC: 139 MMOL/L (ref 136–145)

## 2025-04-04 PROCEDURE — 97535 SELF CARE MNGMENT TRAINING: CPT | Mod: GO

## 2025-04-04 PROCEDURE — 99497 ADVNCD CARE PLAN 30 MIN: CPT | Performed by: INTERNAL MEDICINE

## 2025-04-04 PROCEDURE — 99233 SBSQ HOSP IP/OBS HIGH 50: CPT | Performed by: INTERNAL MEDICINE

## 2025-04-04 PROCEDURE — 94760 N-INVAS EAR/PLS OXIMETRY 1: CPT

## 2025-04-04 PROCEDURE — 2500000002 HC RX 250 W HCPCS SELF ADMINISTERED DRUGS (ALT 637 FOR MEDICARE OP, ALT 636 FOR OP/ED): Performed by: INTERNAL MEDICINE

## 2025-04-04 PROCEDURE — 97530 THERAPEUTIC ACTIVITIES: CPT | Mod: GO

## 2025-04-04 PROCEDURE — 92526 ORAL FUNCTION THERAPY: CPT | Mod: GN

## 2025-04-04 PROCEDURE — 2500000001 HC RX 250 WO HCPCS SELF ADMINISTERED DRUGS (ALT 637 FOR MEDICARE OP): Performed by: STUDENT IN AN ORGANIZED HEALTH CARE EDUCATION/TRAINING PROGRAM

## 2025-04-04 PROCEDURE — 2500000001 HC RX 250 WO HCPCS SELF ADMINISTERED DRUGS (ALT 637 FOR MEDICARE OP): Performed by: INTERNAL MEDICINE

## 2025-04-04 PROCEDURE — 99498 ADVNCD CARE PLAN ADDL 30 MIN: CPT | Performed by: INTERNAL MEDICINE

## 2025-04-04 PROCEDURE — 82947 ASSAY GLUCOSE BLOOD QUANT: CPT

## 2025-04-04 PROCEDURE — 80048 BASIC METABOLIC PNL TOTAL CA: CPT | Performed by: INTERNAL MEDICINE

## 2025-04-04 PROCEDURE — 1100000001 HC PRIVATE ROOM DAILY

## 2025-04-04 PROCEDURE — 2500000004 HC RX 250 GENERAL PHARMACY W/ HCPCS (ALT 636 FOR OP/ED): Performed by: INTERNAL MEDICINE

## 2025-04-04 PROCEDURE — 97530 THERAPEUTIC ACTIVITIES: CPT | Mod: GP,CQ

## 2025-04-04 RX ORDER — BUPRENORPHINE HYDROCHLORIDE AND NALOXONE HYDROCHLORIDE DIHYDRATE 2; .5 MG/1; MG/1
1 TABLET SUBLINGUAL DAILY
Status: DISCONTINUED | OUTPATIENT
Start: 2025-04-05 | End: 2025-04-04

## 2025-04-04 RX ORDER — OXYCODONE HYDROCHLORIDE 5 MG/1
10 TABLET ORAL 2 TIMES DAILY PRN
Status: ACTIVE | OUTPATIENT
Start: 2025-04-04

## 2025-04-04 RX ORDER — INSULIN GLARGINE 100 [IU]/ML
10 INJECTION, SOLUTION SUBCUTANEOUS NIGHTLY
Status: DISPENSED | OUTPATIENT
Start: 2025-04-04

## 2025-04-04 RX ORDER — OXYCODONE HYDROCHLORIDE 5 MG/1
10 TABLET ORAL EVERY 4 HOURS PRN
Status: DISPENSED | OUTPATIENT
Start: 2025-04-04

## 2025-04-04 RX ORDER — BUPRENORPHINE HYDROCHLORIDE AND NALOXONE HYDROCHLORIDE DIHYDRATE 2; .5 MG/1; MG/1
1 TABLET SUBLINGUAL EVERY EVENING
Status: DISPENSED | OUTPATIENT
Start: 2025-04-05

## 2025-04-04 RX ORDER — BISACODYL 10 MG/1
10 SUPPOSITORY RECTAL DAILY PRN
Status: ACTIVE | OUTPATIENT
Start: 2025-04-04

## 2025-04-04 RX ADMIN — PANTOPRAZOLE SODIUM 40 MG: 40 TABLET, DELAYED RELEASE ORAL at 21:17

## 2025-04-04 RX ADMIN — POLYETHYLENE GLYCOL 3350 17 G: 17 POWDER, FOR SOLUTION ORAL at 10:42

## 2025-04-04 RX ADMIN — APIXABAN 5 MG: 5 TABLET, FILM COATED ORAL at 21:18

## 2025-04-04 RX ADMIN — BUPRENORPHINE AND NALOXONE 1 TABLET: 2; .5 TABLET SUBLINGUAL at 10:42

## 2025-04-04 RX ADMIN — LOSARTAN POTASSIUM 25 MG: 25 TABLET, FILM COATED ORAL at 10:42

## 2025-04-04 RX ADMIN — NIFEDIPINE 60 MG: 30 TABLET, FILM COATED, EXTENDED RELEASE ORAL at 10:42

## 2025-04-04 RX ADMIN — APIXABAN 5 MG: 5 TABLET, FILM COATED ORAL at 10:42

## 2025-04-04 RX ADMIN — GABAPENTIN 300 MG: 300 CAPSULE ORAL at 21:18

## 2025-04-04 RX ADMIN — GABAPENTIN 600 MG: 300 CAPSULE ORAL at 10:42

## 2025-04-04 RX ADMIN — OXYCODONE HYDROCHLORIDE 10 MG: 5 TABLET ORAL at 21:21

## 2025-04-04 RX ADMIN — ATORVASTATIN CALCIUM 80 MG: 80 TABLET, FILM COATED ORAL at 21:17

## 2025-04-04 RX ADMIN — INSULIN LISPRO 2 UNITS: 100 INJECTION, SOLUTION INTRAVENOUS; SUBCUTANEOUS at 18:21

## 2025-04-04 RX ADMIN — SENNOSIDES 17.2 MG: 8.6 TABLET ORAL at 21:17

## 2025-04-04 RX ADMIN — SERTRALINE 50 MG: 50 TABLET, FILM COATED ORAL at 10:59

## 2025-04-04 RX ADMIN — PANTOPRAZOLE SODIUM 40 MG: 40 TABLET, DELAYED RELEASE ORAL at 10:42

## 2025-04-04 RX ADMIN — ASPIRIN 81 MG: 81 TABLET, COATED ORAL at 10:42

## 2025-04-04 RX ADMIN — SENNOSIDES 17.2 MG: 8.6 TABLET ORAL at 10:42

## 2025-04-04 ASSESSMENT — COGNITIVE AND FUNCTIONAL STATUS - GENERAL
PERSONAL GROOMING: A LITTLE
MOVING FROM LYING ON BACK TO SITTING ON SIDE OF FLAT BED WITH BEDRAILS: A LITTLE
DRESSING REGULAR LOWER BODY CLOTHING: TOTAL
STANDING UP FROM CHAIR USING ARMS: A LOT
DRESSING REGULAR LOWER BODY CLOTHING: A LOT
DAILY ACTIVITIY SCORE: 14
TOILETING: A LOT
STANDING UP FROM CHAIR USING ARMS: A LOT
DRESSING REGULAR UPPER BODY CLOTHING: A LOT
DAILY ACTIVITIY SCORE: 13
EATING MEALS: A LITTLE
HELP NEEDED FOR BATHING: A LOT
MOVING TO AND FROM BED TO CHAIR: A LOT
CLIMB 3 TO 5 STEPS WITH RAILING: A LOT
WALKING IN HOSPITAL ROOM: TOTAL
HELP NEEDED FOR BATHING: A LOT
MOVING TO AND FROM BED TO CHAIR: TOTAL
PERSONAL GROOMING: A LITTLE
TOILETING: A LOT
TURNING FROM BACK TO SIDE WHILE IN FLAT BAD: A LOT
CLIMB 3 TO 5 STEPS WITH RAILING: TOTAL
MOBILITY SCORE: 10
WALKING IN HOSPITAL ROOM: A LOT
DRESSING REGULAR UPPER BODY CLOTHING: A LOT
MOBILITY SCORE: 16
EATING MEALS: A LITTLE

## 2025-04-04 ASSESSMENT — PAIN SCALES - GENERAL
PAINLEVEL_OUTOF10: 8
PAINLEVEL_OUTOF10: 5 - MODERATE PAIN
PAINLEVEL_OUTOF10: 0 - NO PAIN
PAINLEVEL_OUTOF10: 0 - NO PAIN

## 2025-04-04 ASSESSMENT — PAIN - FUNCTIONAL ASSESSMENT: PAIN_FUNCTIONAL_ASSESSMENT: 0-10

## 2025-04-04 ASSESSMENT — ACTIVITIES OF DAILY LIVING (ADL): HOME_MANAGEMENT_TIME_ENTRY: 10

## 2025-04-04 NOTE — PROGRESS NOTES
aPhysical Therapy    Physical Therapy Treatment    Patient Name: Barbara English  MRN: [id number]  Department: [place] [address]  Room: [address] [place] [address]  Today's Date: 4/4/2025  Time Calculation  Start Time: 1156  Stop Time: 1221  Time Calculation (min): 25 min         Assessment/Plan   PT Assessment  PT Assessment Results: Decreased strength, Decreased endurance, Impaired balance, Decreased mobility, Pain  Rehab Prognosis: Fair  Barriers to Discharge Home: Caregiver assistance, Physical needs  Caregiver Assistance: Caregiver assistance needed per identified barriers - however, level of patient's required assistance exceeds assistance available at home  Physical Needs: Stair navigation into home limited by function/safety, Stair navigation to access bed limited by function/safety, Stair navigation to access bath limited by function/safety, Ambulating household distances limited by function/safety, 24hr mobility assistance needed, 24hr ADL assistance needed, High falls risk due to function or environment  Evaluation/Treatment Tolerance: Patient limited by fatigue  Strengths: Ability to acquire knowledge, Support of extended family/friends  Barriers to Participation: Comorbidities  End of Session Communication: Bedside nurse  Assessment Comment: Pt demonstrates motivateion to participate in PT.  Pt required increase assist of 2 to complete transfers today - L knee buckling. Pt will benefit from ongoing PT to address balance, transfers, strength, and other deficits to reach PT goals.  End of Session Patient Position: Up in chair, Alarm on     PT Plan  Treatment/Interventions: Transfer training, Gait training, Balance training, Strengthening, Endurance training, Therapeutic activity  PT Plan: Ongoing PT  PT Frequency: 4 times per week  PT Discharge Recommendations: Moderate intensity level of continued care, Other (comment)  Equipment Recommended upon Discharge: Wheelchair, Other (comment)  PT Recommended Transfer Status: Assist x2,  Assistive device  PT - OK to Discharge: Yes (per POC)      General Visit Information:   PT  Visit  PT Received On: 04/04/25  General  Reason for Referral: Pt is a 51 y/o F presenting with CVA. Pt also has a metastatic pancreactic CA dx.  Referred By: Nicholas Norman MD  Family/Caregiver Present: Yes  Caregiver Feedback: 2 daughters,  supportive and open to instruction/ education for pt care at home.  Co-Treatment: OT (Partial cotx - with transfers.)  Co-Treatment Reason: to maximize patient safety, participation and outcomes  Prior to Session Communication: Bedside nurse, Care Coordinator  Patient Position Received: Bed, 3 rail up, Alarm on  Preferred Learning Style: auditory, visual, verbal    Subjective   Precautions:  Precautions  Hearing/Visual Limitations: +Reading glasses  LE Weight Bearing Status: Right Partial Weight Bearing  Medical Precautions: Fall precautions  Precautions Comment: L rocael            Objective   Pain:  Pain Assessment  Pain Assessment: 0-10  0-10 (Numeric) Pain Score: 0 - No pain  Cognition:  Cognition  Overall Cognitive Status: Within Functional Limits  Orientation Level: Oriented X4  Coordination:  Movements are Fluid and Coordinated: No  Lower Body Coordination: L LE hemiparisis  Coordination Comment: L hemiparisis  Postural Control:  Postural Control  Postural Control: Impaired  Static Sitting Balance  Static Sitting-Balance Support: Bilateral upper extremity supported, Feet supported  Static Sitting-Level of Assistance: Close supervision  Dynamic Sitting Balance  Dynamic Sitting-Balance Support: Feet supported  Dynamic Sitting-Level of Assistance: Contact guard  Dynamic Sitting-Balance: Forward lean  Static Standing Balance  Static Standing-Balance Support: Bilateral upper extremity supported  Static Standing-Level of Assistance: Maximum assistance (No AD used.)    Activity Tolerance:  Activity Tolerance  Endurance: Tolerates 10 - 20 min exercise with multiple  rests  Treatments:  Therapeutic Activity  Therapeutic Activity Performed: Yes  Therapeutic Activity 1: Pt's daughters educated in correcting pt's sitting posture after transfer to chair. towel roll, blankets, and pillows used to support right side of body to correct right lean - head, UE  and trunk. Daughter verbalizing understanding of teaching and reasoning behind it.         Ambulation/Gait Training  Ambulation/Gait Training Performed: Yes  Ambulation/Gait Training 1  Surface 1: Level tile  Device 1: No device  Gait Support Devices: Gait belt  Assistance 1: Moderate assistance, Minimal verbal cues (+2)  Quality of Gait 1: Soft knee(s), Knee(s) buckle  Comments/Distance (ft) 1: side stepping toward right, with transfers to BSC and chair. L knee buckling. B LE soft knees. Cues needed for sequencing.  Transfers  Transfer: Yes  Transfer 1  Transfer From 1: Bed to  Transfer to 1: Commode-standard  Technique 1: Sit to stand  Transfer Device 1: Gait belt  Transfer Level of Assistance 1: Moderate assistance, +2, Minimal verbal cues, Minimal tactile cues (Right hand on commode arm rest)  Trials/Comments 1: Soft knees, requiring increased assist.  Transfers 2  Transfer From 2: Commode-standard to  Transfer to 2: Chair with arms  Technique 2: Stand pivot (and side stepping)  Transfer Device 2: Gait belt  Transfer Level of Assistance 2: Moderate assistance, +2  Trials/Comments 2: Pt's L knee buckling with transfer. Increased difficulty - required increased assist this session.    Outcome Measures:  [facility] Basic Mobility  Turning from your back to your side while in a flat bed without using bedrails: A little  Moving from lying on your back to sitting on the side of a flat bed without using bedrails: A lot  Moving to and from bed to chair (including a wheelchair): Total  Standing up from a chair using your arms (e.g. wheelchair or bedside chair): A lot  To walk in hospital room: Total  Climbing 3-5 steps with railing:  Total  Basic Mobility - Total Score: 10    Education Documentation  Body Mechanics, taught by Mark Moy PTA at 4/4/2025  2:06 PM.  Learner: Patient  Readiness: Acceptance  Method: Explanation, Demonstration  Response: Demonstrated Understanding, Needs Reinforcement    Mobility Training, taught by Mark Moy PTA at 4/4/2025  2:06 PM.  Learner: Patient  Readiness: Acceptance  Method: Explanation, Demonstration  Response: Demonstrated Understanding, Needs Reinforcement    Education Comments  No comments found.        OP EDUCATION:       Encounter Problems       Encounter Problems (Active)       Mobility       STG - Patient will ambulate (Not Progressing)       Start:  04/02/25    Expected End:  04/16/25       20 ft with CGA using rocael-walker            PT Transfers       STG - Patient will transfer from one surface to another (Not Progressing)       Start:  04/02/25    Expected End:  04/16/25       With CGA using rocael-walker            PT Transfers       STG - Patient to transfer to and from sit to supine (Progressing)       Start:  04/02/25    Expected End:  04/16/25       With CGA from a flat bed         STG - Patient will transfer sit to and from stand (Progressing)       Start:  04/02/25    Expected End:  04/16/25       With CGA using rocael-walker            Pain - Adult          Safety       LTG - Patient will adhere to hip precautions during ADL's and transfers       Start:  04/01/25            LTG - Patient will demonstrate safety requirements appropriate to situation/environment       Start:  04/01/25            LTG - Patient will utilize safety techniques       Start:  04/01/25            STG - Patient locks brakes on wheelchair       Start:  04/01/25            STG - Patient uses call light consistently to request assistance with transfers       Start:  04/01/25            STG - Patient uses gait belt during all transfers       Start:  04/01/25            Goal 1       Start:  04/01/25             Goal 2       Start:  04/01/25            Goal 3       Start:  04/01/25

## 2025-04-04 NOTE — PROGRESS NOTES
PALLIATIVE CARE SOCIAL WORK NOTE     Hospice order received this morning. Pt is a 50 yr old female with a new stroke, history of pancreatic adenocarcinoma. Pt/family is interested in an informational meeting with hospice, but their plan is for her to go to SNF at time of discharge from the hospital. This is for an informational meeting so that they have the information for if/when they wish to involve hospice at some point in the future. [facility]erral has been [facility] to [facility] via [facility]. [facility] IS[facility] Membreno will be meeting with them tomorrow. Thank you.    JONATAN Drummond

## 2025-04-04 NOTE — PROGRESS NOTES
04/04/25 1659   Discharge Planning   Expected Discharge Disposition SNF  ([facility]-will need auth; Attending signature requested will need GF signed to request auth)   Does the patient need discharge transport arranged? Yes   RoundTrip coordination needed? Yes   Has discharge transport been arranged? No

## 2025-04-04 NOTE — PROGRESS NOTES
4/4/2025 12:09 PM I met with patient's daughter Nannette. I have provided a SNF list and asked for three choices. She said patient would like [facility] because her mother is there. Corrie MELGAR

## 2025-04-04 NOTE — PROGRESS NOTES
4/4/2025 1:28 PM Daughter requests referrals to [facility] and [facility]. Corrie Tellez [facility]

## 2025-04-04 NOTE — PROGRESS NOTES
Speech-Language Pathology    SLP Adult Inpatient  Speech-Language Pathology Treatment     Patient Name: Barbara English  MRN: [id number]  Today's Date: 4/4/2025  Time Calculation  Start Time: 1034  Stop Time: 1104  Time Calculation (min): 30 min       Recommendations:   Puree diet  Aspiration precautions      Long Term Goal: established at eval 4/3/25 (see below for details re: progress toward goals)   Patient will resume/maintain oral intake in order to promote optimal oropharyngeal swallow function and reduce risk for dehydration, malnutrition and weight loss.     Short-term Dysphagia Goal(s): Established 4/4/25  - Pt will demo independent use of safe swallow strategies with 95% acc  - Pt will trial advancing PO consistency trials with SLP ONLY  - Pt/family will demo comprehension of education   - Pt will participate in swallowing exercises to improve diet safety and PO intake      SLP Assessment:  SLP TX Intervention Outcome: Making Progress Towards Goals  Treatment Tolerance: Patient tolerated treatment well  Medical Staff Made Aware: Yes  Education Provided: Yes       Plan:  Inpatient/Swing Bed or Outpatient: Inpatient  SLP TX Plan: Goals Adjusted  SLP Plan: Skilled SLP  SLP Frequency: 3x per week  Duration: 2 weeks  SLP Discharge Recommendations: Continue skilled speech therapy services post discharge, Continue home program upon D/C  Discussed POC: Patient, Caregiver/family  Discussed Risks/Benefits: Yes  Patient/Caregiver Agreeable: Yes  SLP - OK to Discharge: Yes      Subjective     General Visit Information:   Patient Seen During This Visit: Yes  Prior to Session Communication: Bedside nurse, Physician  Pt seen bedside for dysphagia tx. Pt's jessica Brunson present for latter half of session. Dr Rubin from Palliative Care team also present for latter portion of tx session.    MBSS was scheduled for this date, however after Dr Rubin d/w Pt regarding GOC, Pt decided to not participate in MBSS with educated aim to  eat PO despite risks of airway aspiration.    Pt/dtr report Pt takes meds dry, with no liquid wash, as baseline.      Baseline Assessment:  Respiratory Status: Room air  Behavior/Cognition: Alert, Cooperative, Pleasant mood, Requires cueing  Patient Positioning: Upright in Bed       Pain Assessment:   Pt did not report or demo evidence of pain or discomfort during session.       Objective     Therapeutic Swallow:  Therapeutic Swallow Intervention : Caregiver Education, PO Trials, Compensatory Strategies, Pharyngeal Strengthening Techniques  Pharyngeal Strengthening Techniques: Effortful Swallow  Solid Diet Recommendations: Pureed/extremely thick  (IDDSI Level 4)  Liquid Diet Recommendations: Thin (IDDSI Level 0)    PO trials completed, Safe swallow strategies discussed and practiced, and introduction of swallow exercises provided.    Pt trialed: 3oz Swallow Protocol (passed this date)  1tsp and 4 sips by straw of puree  Approx 4-5 goldfish crackers (1 bolus)  Additional approx 8oz thin/water  Meds provided by nsg (whole with liquid wash, individual pills) - approx 10 pills    Oral residue noted with solids, particularly on left (weak) side of face; Pt demo's unable to sense residue  Liquid wash generally effective to clear residue  PO trials presented to Right side of mouth effective   No overt s/s aspiration noted during PO trials    Introduced Effortful Swallow, and Pt performed 2 sets of 4 independently, but with approx 80% acc.     Pt tended to be easily distracted from task, but repeated back information with 100% accuracy.      Patient Education:  Pt/family reports comprehension of all information discussed, including aspiration risks/precautions (including concern for pneumonia), safe swallow strategies, and diet rec'ds. Handout reviewed and provided at bedside regarding all above information and rec'ds. Pt/dtr asked appropriate questions.   Pt to make decision regarding diet texture during course of GOC  discussion with Palliative team this date.

## 2025-04-04 NOTE — PROGRESS NOTES
"[facility]          Admitting Provider: Nicholas Norman MD Admission Date: 3/31/2025.   Attending Provider: Nicholas Norman MD MRN: [id number]       Subjective   Barbara English is a 50 y.o. female on day 4 of admission presenting with Cerebrovascular accident (CVA), unspecified mechanism (Multi).  Interval History She is NPO except for medications.     Objective   Physical Exam  Last Recorded Vitals: Blood pressure 155/90, pulse 81, temperature 36.7 °C (98.1 °F), temperature source Temporal, resp. rate 17, height 1.6 m (5' 3\"), weight 61.6 kg (135 lb 11.2 oz), SpO2 90%.  Patient Vitals for the past 24 hrs:   BP Temp Temp src Pulse Resp SpO2   04/04/25 0500 155/90 36.7 °C (98.1 °F) Temporal -- -- 90 %   04/04/25 0416 146/88 36 °C (96.8 °F) Temporal 81 17 96 %   04/04/25 0000 -- -- -- -- -- 98 %   04/03/25 2012 133/79 35.6 °C (96.1 °F) Temporal 75 18 99 %   04/03/25 1529 139/82 36.9 °C (98.5 °F) Temporal 74 18 95 %   04/03/25 1134 162/90 -- -- -- -- --   04/03/25 1132 162/90 36.7 °C (98.1 °F) Temporal -- 18 100 %   04/03/25 0824 143/87 36.5 °C (97.7 °F) Temporal 73 18 97 %     Body mass index is 24.04 kg/m².  GENERAL: alert, cooperative, or no distress  SKIN: no rashes  HEENT Atraumatic, Normocephalic and Not pale, no icterus  NECK: supple, no thyromegaly, JVP within normal limits  LUNGS:  not in respiratory distress, respiratory rate normal, clear to auscultation  CARDIAC: regular rate and rhythm, normal S1 and S2, no murmur, rub, or gallop  ABDOMEN: Soft, non-tender, normal bowel sounds; no bruits, organomegaly or masses.  EXTREMITIES: No edema  NEURO: Alert and oriented x 2 Left hemiplegia  Intake/Output last 3 Shifts:  I/O last 3 completed shifts:  In: 410 (6.7 mL/kg) [I.V.:410 (6.7 mL/kg)]  Out: 550 (8.9 mL/kg) [Urine:550 (0.2 mL/kg/hr)]  Weight: 61.6 kg   DATA:   Diagnostic tests reviewed for today's visit:    Most recent Labs  Results for orders placed or performed during the " hospital encounter of 03/31/25 (from the past 24 hours)   POCT GLUCOSE   Result Value Ref Range    POCT Glucose 87 74 - 99 mg/dL   CBC and Auto Differential   Result Value Ref Range    WBC 7.3 4.4 - 11.3 x10*3/uL    nRBC 0.0 0.0 - 0.0 /100 WBCs    RBC 3.39 (L) 4.00 - 5.20 x10*6/uL    Hemoglobin 8.4 (L) 12.0 - 16.0 g/dL    Hematocrit 27.4 (L) 36.0 - 46.0 %    MCV 81 80 - 100 fL    MCH 24.8 (L) 26.0 - 34.0 pg    MCHC 30.7 (L) 32.0 - 36.0 g/dL    RDW 14.5 11.5 - 14.5 %    Platelets 293 150 - 450 x10*3/uL    Neutrophils % 69.8 40.0 - 80.0 %    Immature Granulocytes %, Automated 0.3 0.0 - 0.9 %    Lymphocytes % 15.5 13.0 - 44.0 %    Monocytes % 6.0 2.0 - 10.0 %    Eosinophils % 7.7 0.0 - 6.0 %    Basophils % 0.7 0.0 - 2.0 %    Neutrophils Absolute 5.10 1.20 - 7.70 x10*3/uL    Immature Granulocytes Absolute, Automated 0.02 0.00 - 0.70 x10*3/uL    Lymphocytes Absolute 1.13 (L) 1.20 - 4.80 x10*3/uL    Monocytes Absolute 0.44 0.10 - 1.00 x10*3/uL    Eosinophils Absolute 0.56 0.00 - 0.70 x10*3/uL    Basophils Absolute 0.05 0.00 - 0.10 x10*3/uL   Basic Metabolic Panel   Result Value Ref Range    Glucose 111 (H) 74 - 99 mg/dL    Sodium 140 136 - 145 mmol/L    Potassium 4.2 3.5 - 5.3 mmol/L    Chloride 107 98 - 107 mmol/L    Bicarbonate 26 21 - 32 mmol/L    Anion Gap 11 10 - 20 mmol/L    Urea Nitrogen 15 6 - 23 mg/dL    Creatinine 1.30 (H) 0.50 - 1.05 mg/dL    eGFR 50 (L) >60 mL/min/1.73m*2    Calcium 8.4 (L) 8.6 - 10.3 mg/dL   Magnesium   Result Value Ref Range    Magnesium 1.79 1.60 - 2.40 mg/dL   POCT GLUCOSE   Result Value Ref Range    POCT Glucose 112 (H) 74 - 99 mg/dL   POCT GLUCOSE   Result Value Ref Range    POCT Glucose 105 (H) 74 - 99 mg/dL   POCT GLUCOSE   Result Value Ref Range    POCT Glucose 130 (H) 74 - 99 mg/dL   POCT GLUCOSE   Result Value Ref Range    POCT Glucose 98 74 - 99 mg/dL   Basic Metabolic Panel   Result Value Ref Range    Glucose 113 (H) 74 - 99 mg/dL    Sodium 139 136 - 145 mmol/L    Potassium 3.8  "3.5 - 5.3 mmol/L    Chloride 107 98 - 107 mmol/L    Bicarbonate 26 21 - 32 mmol/L    Anion Gap 10 10 - 20 mmol/L    Urea Nitrogen 14 6 - 23 mg/dL    Creatinine 1.02 0.50 - 1.05 mg/dL    eGFR 67 >60 mL/min/1.73m*2    Calcium 8.3 (L) 8.6 - 10.3 mg/dL     Blood Culture   Date Value Ref Range Status   04/30/2024 No growth at 4 days -  FINAL REPORT  Final    No results found for: \"RESPCULTSM\" No results found for: \"PERDIAFLDCUL\" No results found for: \"STERFLDCULSM\"   Imaging  No results found.    Cardiology, Vascular, and Other Imaging  No other imaging results found for the past 2 days    Current Facility-Administered Medications   Medication Dose Route Frequency Provider Last Rate Last Admin    apixaban (Eliquis) tablet 5 mg  5 mg oral BID Aasef G Shaikh, MD PhD   5 mg at 04/03/25 2009    aspirin EC tablet 81 mg  81 mg oral Daily Nicholas Norman MD   81 mg at 04/03/25 0903    atorvastatin (Lipitor) tablet 80 mg  80 mg oral Nightly Nicholas Norman MD   80 mg at 04/03/25 2009    buprenorphine-naloxone (Suboxone) 2-0.5 mg per SL tablet 1 tablet  1 tablet sublingual BID Seth Rubin MD   1 tablet at 04/03/25 2009    D5 % and 0.9 % sodium chloride infusion  75 mL/hr intravenous Continuous Nicholas Norman MD 75 mL/hr at 04/04/25 0441 75 mL/hr at 04/04/25 0441    dextrose 50 % injection 12.5 g  12.5 g intravenous q15 min PRN Nicholas Norman MD        dextrose 50 % injection 25 g  25 g intravenous q15 min PRN Nicholas Norman MD        gabapentin (Neurontin) capsule 300 mg  300 mg oral Nightly Seth Rubin MD   300 mg at 04/03/25 2009    gabapentin (Neurontin) capsule 600 mg  600 mg oral Daily Seth Rubin MD   600 mg at 04/03/25 0902    glucagon (Glucagen) injection 1 mg  1 mg intramuscular q15 min PRN Nicholas Norman MD        glucagon (Glucagen) injection 1 mg  1 mg intramuscular q15 min PRN Nicholas Norman MD        hydrALAZINE (Apresoline) injection 10 mg  10 mg intravenous q4h PRN " Nicholas Norman MD   10 mg at 04/02/25 0149    insulin glargine (Lantus) injection 10 Units  10 Units subcutaneous Nightly Billy Petty PharmD        insulin lispro injection 0-5 Units  0-5 Units subcutaneous TID AC Nicholas Norman MD   2 Units at 04/02/25 1741    losartan (Cozaar) tablet 25 mg  25 mg oral Daily Nicholas Norman MD   25 mg at 04/03/25 0903    naloxone (Narcan) injection 0.2 mg  0.2 mg intravenous q5 min PRN Seth Rubin MD        NIFEdipine ER (Adalat CC) 24 hr tablet 60 mg  60 mg oral Daily Nicholas Norman MD   60 mg at 04/03/25 0902    oxyCODONE (Roxicodone) immediate release tablet 15 mg  15 mg oral q4h PRN Seth Rubin MD   15 mg at 04/03/25 1805    oxyCODONE (Roxicodone) immediate release tablet 20 mg  20 mg oral BID PRN Seth Rubin MD        oxygen (O2) therapy   inhalation Continuous PRN - O2/gases Nicholas Norman MD        pantoprazole (ProtoNix) EC tablet 40 mg  40 mg oral BID Nicholas Norman MD   40 mg at 04/03/25 2009    polyethylene glycol (Glycolax, Miralax) packet 17 g  17 g oral Daily Nicholas Norman MD   17 g at 04/02/25 0859    sennosides (Senokot) tablet 17.2 mg  2 tablet oral BID Seth Rubin MD   17.2 mg at 04/03/25 2009    sertraline (Zoloft) tablet 50 mg  50 mg oral Daily Seth Rubin MD   50 mg at 04/03/25 0903     No current outpatient medications on file.   ..     Medication and Non-Pharmacologic VTE Prophylaxis/Anticoagulants      Last Anticoag Admin            apixaban (Eliquis) tablet 5 mg    Given 5 mg at 04/03/25 2009    Frequency: 2 times daily         There are additional administrations since 04/01/25 0711 that are not shown.    No unadministered anticoagulant orders found.          Assessment/Plan   Barbara English has  Assessment & Plan  Cerebrovascular accident (CVA), unspecified mechanism (Multi)  Right middle cerebral artery thrombus? Not a candidate thrombectomy per neurology  HTN will adjust medications  Metastatic  Pancreatic cancer      needs rehabilitation    NPO for now.  On Eliquis ASA, Statins.                    Palliative care consult noted   Other Hospital problems        Abnormal findings not addressed during hospitalization, but require out patient follow up. None        I spent 35 minutes talking and examining Barbara English, reviewing the labs & medications, formulating plan of care & discussing with patient and nursing staff.    Nicholas Norman MD

## 2025-04-04 NOTE — PROGRESS NOTES
"Barbara English is a 50 y.o. female on day 4 of admission presenting with Cerebrovascular accident (CVA), unspecified mechanism (Multi).    Interval History: MOUSTAPHA resolved with IVF overnight. Was seen by SLP and they offered MBSS for evaluation of suspected aspiration.           Subjective   Symptoms (0 - 10, Best to Worst)  [place] Symptom Assessment System  0-10 (Numeric) Pain Score: 0 - No pain  Somewhat sedated. Discussed reducing dose of Oxycodone to 10mg. She is agreeable w/ this. Pain is more MSK now. Her epigastric and \"back\" pain from her pancreatic cancer is tolerable w/ current treatment.     Patient had a mother who passed from complications from CVA but when we spoke about aspiration risk, she tells me that food is important to her and she would not want a PEG tube. WE discussed that aspiration can increase risk of morbidity and mortality eg can precipitate cardiac arrest. We had previously discussed that resuscitation after cardiac arrest is not always successful and can lead to her being in a vegetative state, which is something she's told me and her family she would never want to be in.   Her family will be visiting after 10AM and we agreed to discuss this together again.   For the time being she would like to remain full code and learn strategies to mitigate some of her aspiration risk.     Family meeting with patient and daughter Nannette:   We discussed code status and agreed that resuscitation would be cause more problems that it solves since patient does not want to be in a vegetative state ever.  We agreed to DNR.   Regarding aspiration risk, they were coached on how to reduce risk. She loves crunch foods so we spoke about trying pureed while she is acutely weak from CVA, hoping that it improves.   They are hopeful for some progress with CVA deficits resolving and I told them this was reasonable and that we should plan for everything while being hopeful. They agreed to hospice informational " "visit with preferred agency, [facility]. THIS IS PURELY INFO MEETING ONLY.   We discussed pain medications and difficulty of pancreatic cancer pain and also not being sedated. We will halve suboxone and keep oxycodone on board.        Objective     Physical Exam  Constitutional:       General: She is not in acute distress.     Appearance: Normal appearance.      Comments: Appears drowsy and has some loss of speech clarity but is mentally sharper than she appears.    HENT:      Head: Normocephalic and atraumatic.      Mouth/Throat:      Mouth: Mucous membranes are moist.   Cardiovascular:      Rate and Rhythm: Normal rate and regular rhythm.   Pulmonary:      Effort: No respiratory distress.   Abdominal:      General: Bowel sounds are normal.      Tenderness: There is no abdominal tenderness. There is no guarding.      Comments: Somewhat firm but nontender abdomen.    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   Skin:     General: Skin is warm and dry.   Neurological:      Mental Status: She is alert and oriented to person, place, and time.   Psychiatric:         Thought Content: Thought content normal.         Judgment: Judgment normal.         Last Recorded Vitals  Blood pressure 156/82, pulse 77, temperature 36.5 °C (97.7 °F), resp. rate 17, height 1.6 m (5' 3\"), weight 61.6 kg (135 lb 11.2 oz), SpO2 99%.  Intake/Output last 3 Shifts:  I/O last 3 completed shifts:  In: 410 (6.7 mL/kg) [I.V.:410 (6.7 mL/kg)]  Out: 550 (8.9 mL/kg) [Urine:550 (0.2 mL/kg/hr)]  Weight: 61.6 kg     Wt Readings from Last 10 Encounters:   03/31/25 76.6 kg (168 lb 14 oz)   1/18/2025([facility]) 61.2 kg  135 lbs   04/30/24 63.5 kg (140 lb)   05/09/22 55 kg (121 lb 4.1 oz)       Relevant Results    Scheduled medications  apixaban, 5 mg, oral, BID  aspirin, 81 mg, oral, Daily  atorvastatin, 80 mg, oral, Nightly  buprenorphine-naloxone, 1 tablet, sublingual, BID  gabapentin, 300 mg, oral, Nightly  gabapentin, 600 mg, " oral, Daily  insulin glargine, 10 Units, subcutaneous, Nightly  insulin lispro, 0-5 Units, subcutaneous, TID AC  losartan, 25 mg, oral, Daily  NIFEdipine ER, 60 mg, oral, Daily  pantoprazole, 40 mg, oral, BID  polyethylene glycol, 17 g, oral, Daily  sennosides, 2 tablet, oral, BID  sertraline, 50 mg, oral, Daily      Continuous medications  D5 % and 0.9 % sodium chloride, 75 mL/hr, Last Rate: 75 mL/hr (04/04/25 0441)      PRN medications  PRN medications: dextrose, dextrose, glucagon, glucagon, hydrALAZINE, naloxone, oxyCODONE, oxyCODONE, oxygen    Results for orders placed or performed during the hospital encounter of 03/31/25 (from the past 24 hours)   CBC and Auto Differential   Result Value Ref Range    WBC 7.3 4.4 - 11.3 x10*3/uL    nRBC 0.0 0.0 - 0.0 /100 WBCs    RBC 3.39 (L) 4.00 - 5.20 x10*6/uL    Hemoglobin 8.4 (L) 12.0 - 16.0 g/dL    Hematocrit 27.4 (L) 36.0 - 46.0 %    MCV 81 80 - 100 fL    MCH 24.8 (L) 26.0 - 34.0 pg    MCHC 30.7 (L) 32.0 - 36.0 g/dL    RDW 14.5 11.5 - 14.5 %    Platelets 293 150 - 450 x10*3/uL    Neutrophils % 69.8 40.0 - 80.0 %    Immature Granulocytes %, Automated 0.3 0.0 - 0.9 %    Lymphocytes % 15.5 13.0 - 44.0 %    Monocytes % 6.0 2.0 - 10.0 %    Eosinophils % 7.7 0.0 - 6.0 %    Basophils % 0.7 0.0 - 2.0 %    Neutrophils Absolute 5.10 1.20 - 7.70 x10*3/uL    Immature Granulocytes Absolute, Automated 0.02 0.00 - 0.70 x10*3/uL    Lymphocytes Absolute 1.13 (L) 1.20 - 4.80 x10*3/uL    Monocytes Absolute 0.44 0.10 - 1.00 x10*3/uL    Eosinophils Absolute 0.56 0.00 - 0.70 x10*3/uL    Basophils Absolute 0.05 0.00 - 0.10 x10*3/uL   Basic Metabolic Panel   Result Value Ref Range    Glucose 111 (H) 74 - 99 mg/dL    Sodium 140 136 - 145 mmol/L    Potassium 4.2 3.5 - 5.3 mmol/L    Chloride 107 98 - 107 mmol/L    Bicarbonate 26 21 - 32 mmol/L    Anion Gap 11 10 - 20 mmol/L    Urea Nitrogen 15 6 - 23 mg/dL    Creatinine 1.30 (H) 0.50 - 1.05 mg/dL    eGFR 50 (L) >60 mL/min/1.73m*2    Calcium 8.4  (L) 8.6 - 10.3 mg/dL   Magnesium   Result Value Ref Range    Magnesium 1.79 1.60 - 2.40 mg/dL   POCT GLUCOSE   Result Value Ref Range    POCT Glucose 112 (H) 74 - 99 mg/dL   POCT GLUCOSE   Result Value Ref Range    POCT Glucose 105 (H) 74 - 99 mg/dL   POCT GLUCOSE   Result Value Ref Range    POCT Glucose 130 (H) 74 - 99 mg/dL   POCT GLUCOSE   Result Value Ref Range    POCT Glucose 98 74 - 99 mg/dL   Basic Metabolic Panel   Result Value Ref Range    Glucose 113 (H) 74 - 99 mg/dL    Sodium 139 136 - 145 mmol/L    Potassium 3.8 3.5 - 5.3 mmol/L    Chloride 107 98 - 107 mmol/L    Bicarbonate 26 21 - 32 mmol/L    Anion Gap 10 10 - 20 mmol/L    Urea Nitrogen 14 6 - 23 mg/dL    Creatinine 1.02 0.50 - 1.05 mg/dL    eGFR 67 >60 mL/min/1.73m*2    Calcium 8.3 (L) 8.6 - 10.3 mg/dL       CT head wo IV contrast    Result Date: 4/1/2025  1. Negative head CT for acute change.     MACRO: None   Signed by: Raj Rubin 4/1/2025 6:12 PM Dictation workstation:   [id number]    CT cervical spine wo IV contrast    Result Date: 3/31/2025  No acute osseous abnormality is identified in the cervical spine.   MACRO: None   Signed by: Zechariah Cole 3/31/2025 5:37 PM Dictation workstation:   [id number]    CT brain attack angio head and neck W and WO IV contrast    Result Date: 3/31/2025  1. Short-segment of severely attenuated flow related enhancement at the origin of the M1 segment right middle cerebral artery, suspected focus of thrombus. 2. Nonspecific fusiform narrowing of the right internal carotid artery supraclinoid segment. This could be related to nonocclusive wall adherent thrombus, less likely fibrofatty atherosclerotic plaque. 3. Undulating attenuation of flow related enhancement in the right pericallosal artery of the anterior cerebral artery. This may represent thrombosis. 4. Multifocal endodontal and periodontal disease.   MACRO: Eladio Montoya discussed the significance and urgency of this critical finding epic chat  with  SILVINO STRONG AND AMYELEONORA DANIELE on 3/31/2025 at 1:32 pm.  (**-RCF-**) Findings:  See findings.   Signed by: Eladio Montoya 3/31/2025 1:33 PM Dictation workstation:   [id number]    CT brain attack head wo IV contrast    Result Date: 3/31/2025  NEGATIVE BRAIN ATTACK PROTOCOL CT BRAIN:   NO ACUTE INTRACRANIAL HEMORRHAGE   NO ACUTE INTRACRANIAL MASS EFFECT   NO CT EVIDENCE OF A LARGE ACUTE TERRITORIAL INFARCT   I WAS ABLE TO COMMUNICATE THESE FINDINGS BY TELEPHONE TO DR STRONG AT 1059 HOURS, SAME DAY, 31 MARCH 2025   MACRO: Edilberto Sultana discussed the significance and urgency of this critical finding by telephone with  SILVINO STRONG on 3/31/2025 at 11:01 am. (**-RCF-**) Findings:  See findings.   Signed by: Edilberto Sultana 3/31/2025 11:01 AM Dictation workstation:   [id number]    US renal complete    Result Date: 3/21/2025  IMPRESSION: No hydronephrosis. Nonspecific bladder debris, correlate with urinalysis. : [facility]   Transcribe Date/Time: Mar 21 2025  1:51P Dictated by : LORETTA ANTON MD This examination was interpreted and the report reviewed and electronically signed by: LORETTA ANTON MD on Mar 21 2025  1:52PM  EST    US gallbladder    Result Date: 3/20/2025  IMPRESSION: Known small bilobar hepatic metastases Cholelithiasis and nonspecific diffuse gallbladder wall thickening.   Nuclear medicine hepatobiliary imaging could be used if there is clinical concern for acute cholecystitis. Pancreatic tail mass not assessable on this exam. : [facility]   Transcribe Date/Time: Mar 20 2025  8:13A Dictated by : ROSITA ALVARADO MD This examination was interpreted and the report reviewed and electronically signed by: ROSITA ALVARADO MD on Mar 20 2025  8:17AM  EST    CT abdomen pelvis w IV contrast    Result Date: 3/19/2025  IMPRESSION: Stable pancreatic tail mass consistent with biopsy-proven pancreatic adenocarcinoma. Stable presumed hepatic metastases since 02/26/2025. 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 Nonspecific mild gallbladder wall thickening with adjacent trace pericholecystic fluid.  May consider right upper quadrant ultrasound to exclude acute cholecystitis if there is clinical concern. Redemonstrated wall thickening of the distal esophagus is nonspecific though may represent esophagitis. : [facility]   Transcribe Date/Time: Mar 19 2025  9:44P Dictated by : ORLIN ANTONIO DO This examination was interpreted and the report reviewed and electronically signed by: HINA DALY MD on Mar 19 2025 10:20PM  EST                  Assessment/Plan   IMP:  50-year-old female with history of type 1 diabetes melitis with multiple complications including neuropathy retinopathy DKA and peripheral angiopathy and notably newly diagnosed pancreatic tail adenocarcinoma.  She presented with stroke symptoms and was found to have an occlusion in her right MCA.  She is being treated conservatively for this and neurology is following.  Palliative was consulted for assistance with pain management and goals of care. She is rehab oriented but is hesitant about cancer treatment. She understands her prognosis. Will be offered hospice.     Total OME/24h: 38 OME+4mg Buprenorphine (200 - 400 OME)   Morphine:   Oxycodone: 30  Hydromorphone:   Fentanyl:   Suboxone: 4mg     CVA  Pancreatic Adenocarcinoma  [facility]   Family meeting updates: They want to attempt rehab at SNF. DNR. Pureed diet for now unless patient requests return to regular. Hospice INFORMATIONAL VISIT ONLY  Does not want PEG. Does not want to give up eating as it is one of the few things that give her pleasure anymore.   Discussed her goals and chemotherapy. She is reluctant to try chemotherapy again and understands that this is not a resectable disease. She would prefer radiation therapy if it is an option for her pancreatic adenocarcinoma that will be offered by her cancer team.   Pain and symptom control are top priorities for her since she was already  told her prognosis was likely less than 6 months.   Will discuss hospice with family ton 4/4/2025  Incentive spirometer ordered. Providing instruction.      Diabetic neuropathy  Continue with gabapentin 600 mg in the morning and 300 mg at night     Cancer related epigastric pain  Metastatic Pancreatic tail adenocarcinoma   Trial decreasing Suboxone 2-0.5 to ONCE daily  Further decrease of oxycodone to 10mg q4h prn as pain is tolerable now and she is still somewhat drowsy.   Also with oxycodone 10mg q12h prn for breakthrough.      Mood disorder:  C/w Sertraline 50mg daily     Bowel regimen:  Senna 2 tabs BID   Miralax Prn  Will keep as is for now.              Provider estimate of survival: 1-6 months Hospice appropriate  Patient Prognostic Awareness: Yes - incongruent with provider estimation     Patient/proxy preference for information  Prefers full information     Goals of Care  Full Code     Is the patient hospice-eligible?   Yes  Was a discussion held re hospice services?   no  Was a decision made re hospice services?  No, conversations ongoing.     Other Palliative Support  Music therapy referral         I spent 50 minutes in the professional and overall care of this patient.  I spent 60 minutes in the professional and overall care of this patient related to ACP in addition to other time spent in assessment, chart review, and coordination of care        Seth Rubin MD

## 2025-04-04 NOTE — PROGRESS NOTES
Occupational Therapy    OT Treatment    Patient Name: Barbara English  MRN: [id number]  Department: [facility] [id number]  Room: [address] [place] [address]  Today's Date: 4/4/2025  Time Calculation  Start Time: 1143  Stop Time: 1215  Time Calculation (min): 32 min        Assessment:  OT Assessment: Patient required increased assistance for transfers and balance this date, however tolerated getting to chair. Limited by pain and fatigue. Would benefit from mod intensity OT to maximize independence.  Prognosis: Fair  Barriers to Discharge Home: Caregiver assistance, Physical needs  Caregiver Assistance: Caregiver assistance needed per identified barriers - however, level of patient's required assistance exceeds assistance available at home  Physical Needs: Stair navigation into home limited by function/safety, Stair navigation to access bed limited by function/safety, Stair navigation to access bath limited by function/safety, 24hr mobility assistance needed, 24hr ADL assistance needed, High falls risk due to function or environment  Evaluation/Treatment Tolerance: Patient limited by fatigue, Patient limited by pain  Medical Staff Made Aware: Yes  End of Session Communication: Bedside nurse  End of Session Patient Position: Up in chair, Alarm on (handed off to PT)  OT Assessment Results: Decreased ADL status, Decreased upper extremity strength, Decreased endurance, Decreased functional mobility, Decreased IADLs, Decreased fine motor control  Prognosis: Fair  Barriers to Discharge: Decreased caregiver support, Inaccessible home environment  Evaluation/Treatment Tolerance: Patient limited by fatigue, Patient limited by pain  Medical Staff Made Aware: Yes  Strengths: Ability to acquire knowledge, Support of Caregivers, Support and attitude of living partners  Barriers to Participation: Comorbidities  Plan:  Treatment Interventions: ADL retraining, Functional transfer training, UE strengthening/ROM, Endurance training, Compensatory technique education  OT  Frequency: 4 times per week  OT Discharge Recommendations: Moderate intensity level of continued care  Equipment Recommended upon Discharge: Wheeled walker (Matthieu Cane)  OT Recommended Transfer Status: Assist of 2, Moderate assist  OT - OK to Discharge: Yes (per pOC)  Treatment Interventions: ADL retraining, Functional transfer training, UE strengthening/ROM, Endurance training, Compensatory technique education    Subjective   Previous Visit Info:  OT Last Visit  OT Received On: 04/04/25  General:  General  Reason for Referral: Pt is a 51 y/o F presenting with CVA. Pt also has a metastatic pancreactic CA dx.  Referred By: Nicholas Norman MD  Past Medical History Relevant to Rehab: No past medical history on file.  OT Missed Visit: Yes  Missed Visit Reason: Other (Comment) (per RN, pt and family currently in [facility] meeting with palliative medicine. OT tx not completed at this time.)  Family/Caregiver Present: Yes  Caregiver Feedback: 2 daughters present and supportive  Co-Treatment: PT  Co-Treatment Reason: partial cotx/ dovetail to maximize pt safety with transfers  Prior to Session Communication: Bedside nurse  Patient Position Received: Bed, 3 rail up, Alarm on  Preferred Learning Style: auditory, visual, verbal  General Comment: agreeable to OT, family encouraging, pt reporting fatigue but agreeable to participate  Precautions:  Hearing/Visual Limitations: +Reading glasses  Medical Precautions: Fall precautions  Precautions Comment: L matthieu    Pain:  Pain Assessment  0-10 (Numeric) Pain Score: 5 - Moderate pain  Pain Type: Acute pain  Pain Location: Abdomen    Objective    Cognition:  Cognition  Orientation Level: Oriented X4  Activities of Daily Living: Grooming  Grooming Level of Assistance: Contact guard  Grooming Where Assessed: Sitting sinkside  Grooming Comments: sitting on BSC, washing face with CGA for seated balance    Toileting  Toileting Level of Assistance: Moderate assistance  Where Assessed: Bedside  commode  Toileting Comments: assist for pants up/ down, able to clean marko area while seated on commode  Functional Standing Tolerance:  Time: <1 min during clothing mgmt  Bed Mobility/Transfers: Bed Mobility 1  Bed Mobility 1: Supine to sitting  Level of Assistance 1: Moderate assistance  Bed Mobility Comments 1: assist with LLE and LUE, pulling with RUE on OT UE for trunk mgmt    Transfer 1  Transfer From 1: Bed to  Transfer to 1: Commode-standard  Technique 1: Stand pivot  Transfer Device 1: Gait belt  Transfer Level of Assistance 1: Moderate assistance, +2  Trials/Comments 1: cues for sequencing/ hand placement, knee block on LLE  Transfers 2  Transfer From 2: Commode-standard to  Transfer to 2: Chair with arms  Technique 2: Stand pivot  Transfer Device 2: Gait belt  Transfer Level of Assistance 2: Moderate assistance, +2  Trials/Comments 2: L knee block, cues for sequencing and safety    Sitting Balance:  Static Sitting Balance  Static Sitting-Balance Support: Feet supported  Static Sitting-Level of Assistance: Contact guard  Static Sitting-Comment/Number of Minutes: L neglect noted and intermittent LOB  Standing Balance:  Static Standing Balance  Static Standing-Balance Support: Bilateral upper extremity supported  Static Standing-Level of Assistance: Moderate assistance  Static Standing-Comment/Number of Minutes: L knee block required due to flaccidity  Therapy/Activity: Therapeutic Activity  Therapeutic Activity 1: Educated pt and daughters on L neglect, encouraing cervical ROM and visual scanning for contracture prevention and engagement with environment. educated on need to support LUE and keep within light of sight and elevated for edema mgmt. Daughters receptive to education, pt very fatigued at this point of tx and starting to fall asleep  Vision:  Visual Field Cut Comments: presenting with worsening L neglect today vs previous session    Outcome Measures:[facility] Daily Activity  Putting on and taking off  regular lower body clothing: Total  Bathing (including washing, rinsing, drying): A lot  Putting on and taking off regular upper body clothing: A lot  Toileting, which includes using toilet, bedpan or urinal: A lot  Taking care of personal grooming such as brushing teeth: A little  Eating Meals: A little  Daily Activity - Total Score: 13      Education Documentation  Body Mechanics, taught by Faiza Ceja OT at 4/4/2025  2:08 PM.  Learner: Family, Patient  Readiness: Acceptance  Method: Explanation, Demonstration  Response: Verbalizes Understanding  Comment: education with pt and 2 daughters on transfers, positioning, visual scanning techniques    Precautions, taught by Faiza Ceja OT at 4/4/2025  2:08 PM.  Learner: Family, Patient  Readiness: Acceptance  Method: Explanation, Demonstration  Response: Verbalizes Understanding  Comment: education with pt and 2 daughters on transfers, positioning, visual scanning techniques    ADL Training, taught by Faiza Ceja OT at 4/4/2025  2:08 PM.  Learner: Family, Patient  Readiness: Acceptance  Method: Explanation, Demonstration  Response: Verbalizes Understanding  Comment: education with pt and 2 daughters on transfers, positioning, visual scanning techniques    Education Comments  No comments found.        OP EDUCATION:       Goals:  Encounter Problems       Encounter Problems (Active)       ADLs       Patient with complete upper body dressing with stand by assist level of assistance donning and doffing all UE clothes with PRN adaptive equipment while supported sitting (Progressing)       Start:  04/02/25    Expected End:  04/16/25            Patient with complete lower body dressing with minimal assist  level of assistance donning and doffing all LE clothes  with PRN adaptive equipment while supported sitting (Not Progressing)       Start:  04/02/25    Expected End:  04/16/25            Patient will complete daily grooming tasks brushing teeth and washing  face/hair with stand by assist level of assistance and PRN adaptive equipment while supported sitting. (Progressing)       Start:  04/02/25    Expected End:  04/16/25            Patient will complete toileting including hygiene clothing management/hygiene with minimal assist  level of assistance and bedside commode. (Progressing)       Start:  04/02/25    Expected End:  04/16/25               TRANSFERS       Patient will perform bed mobility supervision level of assistance and bed rails in order to improve safety and independence with mobility (Progressing)       Start:  04/02/25    Expected End:  04/16/25            Patient will complete functional transfer to chair with arsms with least restrictive device with contact guard assist level of assistance. (Progressing)       Start:  04/02/25    Expected End:  04/16/25

## 2025-04-04 NOTE — NURSING NOTE
Held order for 30 units of Lantus at 2100 4/3/2025 due to NPO status and decreased appetite blood glucose was 130,  followed up with MD for the hold and confirmed, patients current blood glucose level is 98 at 0023 4/4/2025. Patient due to start 10 units of lantus at 2100 4/4/2025.

## 2025-04-04 NOTE — PROGRESS NOTES
Occupational Therapy                 Therapy Communication Note    Patient Name: Barbara English  MRN: [id number]  Department: [facility] [place] [id number]  Room: [address] [place] [address]  Today's Date: 4/4/2025     Discipline: Occupational Therapy    OT Missed Visit: Yes     Missed Visit Reason: Missed Visit Reason: Other (Comment) (per RN, pt and family currently in [facility] meeting with palliative medicine. OT tx not completed at this time.)    Missed Time: Attempt    Comment:

## 2025-04-04 NOTE — NURSING NOTE
Held 2100 4/3/2025 due dose of lantus (30units) due to NPO status and decrease appetite, Notified MD to viraj hold, patient current glucose level is 98 at 0021 4/4/2025 will start 10 units of lantus 4/4/2025 @2100

## 2025-04-05 ENCOUNTER — APPOINTMENT (OUTPATIENT)
Dept: RADIOLOGY | Facility: HOSPITAL | Age: 50
End: 2025-04-05
Payer: COMMERCIAL

## 2025-04-05 LAB
GLUCOSE BLD MANUAL STRIP-MCNC: 193 MG/DL (ref 74–99)
GLUCOSE BLD MANUAL STRIP-MCNC: 215 MG/DL (ref 74–99)
GLUCOSE BLD MANUAL STRIP-MCNC: 216 MG/DL (ref 74–99)
GLUCOSE BLD MANUAL STRIP-MCNC: 274 MG/DL (ref 74–99)

## 2025-04-05 PROCEDURE — 2500000002 HC RX 250 W HCPCS SELF ADMINISTERED DRUGS (ALT 637 FOR MEDICARE OP, ALT 636 FOR OP/ED): Performed by: INTERNAL MEDICINE

## 2025-04-05 PROCEDURE — 70450 CT HEAD/BRAIN W/O DYE: CPT

## 2025-04-05 PROCEDURE — 2500000001 HC RX 250 WO HCPCS SELF ADMINISTERED DRUGS (ALT 637 FOR MEDICARE OP): Performed by: INTERNAL MEDICINE

## 2025-04-05 PROCEDURE — 70450 CT HEAD/BRAIN W/O DYE: CPT | Performed by: RADIOLOGY

## 2025-04-05 PROCEDURE — 2500000004 HC RX 250 GENERAL PHARMACY W/ HCPCS (ALT 636 FOR OP/ED): Performed by: INTERNAL MEDICINE

## 2025-04-05 PROCEDURE — 1100000001 HC PRIVATE ROOM DAILY

## 2025-04-05 PROCEDURE — 82947 ASSAY GLUCOSE BLOOD QUANT: CPT

## 2025-04-05 PROCEDURE — 2500000001 HC RX 250 WO HCPCS SELF ADMINISTERED DRUGS (ALT 637 FOR MEDICARE OP): Performed by: STUDENT IN AN ORGANIZED HEALTH CARE EDUCATION/TRAINING PROGRAM

## 2025-04-05 RX ORDER — ASPIRIN 81 MG/1
81 TABLET ORAL DAILY
Start: 2025-04-05 | End: 2025-05-05

## 2025-04-05 RX ORDER — LOSARTAN POTASSIUM 25 MG/1
25 TABLET ORAL ONCE
Status: COMPLETED | OUTPATIENT
Start: 2025-04-05 | End: 2025-04-05

## 2025-04-05 RX ORDER — LOSARTAN POTASSIUM 25 MG/1
50 TABLET ORAL
Start: 2025-04-05

## 2025-04-05 RX ORDER — LOSARTAN POTASSIUM 50 MG/1
50 TABLET ORAL DAILY
Status: DISPENSED | OUTPATIENT
Start: 2025-04-05

## 2025-04-05 RX ORDER — INSULIN GLARGINE 100 [IU]/ML
10 INJECTION, SOLUTION SUBCUTANEOUS NIGHTLY
Start: 2025-04-05 | End: 2025-04-05 | Stop reason: SDUPTHER

## 2025-04-05 RX ORDER — FUROSEMIDE 40 MG/1
20 TABLET ORAL DAILY
Start: 2025-04-05

## 2025-04-05 RX ORDER — INSULIN GLARGINE 100 [IU]/ML
10 INJECTION, SOLUTION SUBCUTANEOUS NIGHTLY
Start: 2025-04-05

## 2025-04-05 RX ADMIN — HYDRALAZINE HYDROCHLORIDE 10 MG: 20 INJECTION INTRAMUSCULAR; INTRAVENOUS at 21:08

## 2025-04-05 RX ADMIN — ATORVASTATIN CALCIUM 80 MG: 80 TABLET, FILM COATED ORAL at 22:21

## 2025-04-05 RX ADMIN — ASPIRIN 81 MG: 81 TABLET, COATED ORAL at 09:33

## 2025-04-05 RX ADMIN — INSULIN LISPRO 2 UNITS: 100 INJECTION, SOLUTION INTRAVENOUS; SUBCUTANEOUS at 16:45

## 2025-04-05 RX ADMIN — APIXABAN 5 MG: 5 TABLET, FILM COATED ORAL at 22:21

## 2025-04-05 RX ADMIN — INSULIN LISPRO 3 UNITS: 100 INJECTION, SOLUTION INTRAVENOUS; SUBCUTANEOUS at 09:54

## 2025-04-05 RX ADMIN — PANTOPRAZOLE SODIUM 40 MG: 40 TABLET, DELAYED RELEASE ORAL at 09:32

## 2025-04-05 RX ADMIN — BUPRENORPHINE AND NALOXONE 1 TABLET: 2; .5 TABLET SUBLINGUAL at 22:21

## 2025-04-05 RX ADMIN — SENNOSIDES 17.2 MG: 8.6 TABLET ORAL at 09:32

## 2025-04-05 RX ADMIN — NIFEDIPINE 60 MG: 30 TABLET, FILM COATED, EXTENDED RELEASE ORAL at 09:32

## 2025-04-05 RX ADMIN — PANTOPRAZOLE SODIUM 40 MG: 40 TABLET, DELAYED RELEASE ORAL at 22:20

## 2025-04-05 RX ADMIN — GABAPENTIN 600 MG: 300 CAPSULE ORAL at 09:32

## 2025-04-05 RX ADMIN — LOSARTAN POTASSIUM 50 MG: 50 TABLET, FILM COATED ORAL at 09:33

## 2025-04-05 RX ADMIN — INSULIN LISPRO 1 UNITS: 100 INJECTION, SOLUTION INTRAVENOUS; SUBCUTANEOUS at 13:37

## 2025-04-05 RX ADMIN — LOSARTAN POTASSIUM 25 MG: 25 TABLET, FILM COATED ORAL at 13:43

## 2025-04-05 RX ADMIN — SERTRALINE 50 MG: 50 TABLET, FILM COATED ORAL at 09:33

## 2025-04-05 RX ADMIN — SENNOSIDES 17.2 MG: 8.6 TABLET ORAL at 22:20

## 2025-04-05 RX ADMIN — POLYETHYLENE GLYCOL 3350 17 G: 17 POWDER, FOR SOLUTION ORAL at 09:32

## 2025-04-05 RX ADMIN — GABAPENTIN 300 MG: 300 CAPSULE ORAL at 22:20

## 2025-04-05 RX ADMIN — INSULIN GLARGINE 10 UNITS: 100 INJECTION, SOLUTION SUBCUTANEOUS at 22:21

## 2025-04-05 RX ADMIN — APIXABAN 5 MG: 5 TABLET, FILM COATED ORAL at 09:33

## 2025-04-05 ASSESSMENT — COGNITIVE AND FUNCTIONAL STATUS - GENERAL
MOVING TO AND FROM BED TO CHAIR: A LOT
PERSONAL GROOMING: A LOT
MOVING FROM LYING ON BACK TO SITTING ON SIDE OF FLAT BED WITH BEDRAILS: A LOT
TOILETING: A LOT
DRESSING REGULAR LOWER BODY CLOTHING: A LOT
DRESSING REGULAR UPPER BODY CLOTHING: A LOT
DAILY ACTIVITIY SCORE: 12
HELP NEEDED FOR BATHING: A LOT
DAILY ACTIVITIY SCORE: 14
DRESSING REGULAR LOWER BODY CLOTHING: A LOT
EATING MEALS: A LOT
CLIMB 3 TO 5 STEPS WITH RAILING: A LOT
MOVING TO AND FROM BED TO CHAIR: A LOT
TOILETING: A LOT
WALKING IN HOSPITAL ROOM: A LOT
DRESSING REGULAR UPPER BODY CLOTHING: A LOT
EATING MEALS: A LITTLE
TURNING FROM BACK TO SIDE WHILE IN FLAT BAD: A LITTLE
WALKING IN HOSPITAL ROOM: A LOT
PERSONAL GROOMING: A LITTLE
STANDING UP FROM CHAIR USING ARMS: A LOT
CLIMB 3 TO 5 STEPS WITH RAILING: A LOT
STANDING UP FROM CHAIR USING ARMS: A LOT
MOBILITY SCORE: 16
HELP NEEDED FOR BATHING: A LOT
MOBILITY SCORE: 13

## 2025-04-05 ASSESSMENT — PAIN SCALES - WONG BAKER: WONGBAKER_NUMERICALRESPONSE: HURTS LITTLE BIT

## 2025-04-05 NOTE — CARE PLAN
The patient's goals for the shift include      The clinical goals for the shift include patient pain will be managed    Over the shift, the patient did not make progress toward the following goals. Barriers to progression include . Recommendations to address these barriers include .  Problem: Pain - Adult  Goal: Verbalizes/displays adequate comfort level or baseline comfort level  Outcome: Progressing     Problem: Safety - Adult  Goal: Free from fall injury  Outcome: Progressing

## 2025-04-05 NOTE — CARE PLAN
The patient's goals for the shift include pain control     The clinical goals for the shift include maintain pt safety    Over the shift, the patient did not make progress toward the following goals.   Problem: Chronic Conditions and Co-morbidities  Goal: Patient's chronic conditions and co-morbidity symptoms are monitored and maintained or improved  Outcome: Not Progressing  Flowsheets (Taken 4/5/2025 1232)  Care Plan - Patient's Chronic Conditions and Co-Morbidity Symptoms are Monitored and Maintained or Improved:   Collaborate with multidisciplinary team to address chronic and comorbid conditions and prevent exacerbation or deterioration   Update acute care plan with appropriate goals if chronic or comorbid symptoms are exacerbated and prevent overall improvement and discharge   Monitor and assess patient's chronic conditions and comorbid symptoms for stability, deterioration, or improvement     Problem: Nutrition  Goal: Nutrient intake appropriate for maintaining nutritional needs  Outcome: Not Progressing     Problem: Pain  Goal: Turns in bed with improved pain control throughout the shift  Outcome: Not Progressing  Goal: Walks with improved pain control throughout the shift  Outcome: Not Progressing  Goal: Performs ADL's with improved pain control throughout shift  Outcome: Not Progressing     Problem: Diabetes  Goal: Achieve decreasing blood glucose levels by end of shift  Outcome: Not Progressing  Flowsheets (Taken 4/5/2025 1232)  Achieve decreasing blood glucose levels by end of shift: Med administration/monitoring of effect  Goal: Increase stability of blood glucose readings by end of shift  Outcome: Not Progressing  Flowsheets (Taken 4/5/2025 1232)  Increase stability of blood glucose readings by end of shift: Med administration/monitoring of effect  Goal: Decrease in ketones present in urine by end of shift  Outcome: Not Progressing  Flowsheets (Taken 4/5/2025 1232)  Decrease in ketones present in urine  by end of shift:   Med administration/monitoring of effect   Monitor urine output  Goal: Maintain electrolyte levels within acceptable range throughout shift  Outcome: Not Progressing  Flowsheets (Taken 4/5/2025 1232)  Maintain electrolyte levels within acceptable range throughout shift:   Med administration/monitoring of effect   Monitor urine output  Goal: Maintain glucose levels >70mg/dl to <250mg/dl throughout shift  Outcome: Not Progressing  Flowsheets (Taken 4/5/2025 1232)  Maintain glucose levels >70mg/dl to <250mg/dl throughout shift:   Med administration/monitoring of effect   Self monitor blood glucose with staff oversight  Goal: No changes in neurological exam by end of shift  Outcome: Not Progressing  Flowsheets (Taken 4/5/2025 1232)  No changes in neurological exam by end of shift: Complete frequent neurological assessments  Goal: Learn about and adhere to nutrition recommendations by end of shift  Outcome: Not Progressing  Flowsheets (Taken 4/5/2025 1232)  Learn about and adhere to nutrition recommendations by end of shift: Ensure/encourage compliance with appropriate diet  Goal: Vital signs within normal range for age by end of shift  Outcome: Not Progressing  Flowsheets (Taken 4/5/2025 1232)  Vital signs within normal range for age by end of shift: Med administration/monitoring of effect  Goal: Increase self care and/or family involovement by end of shift  Outcome: Not Progressing  Flowsheets (Taken 4/5/2025 1232)  Increase self care and/or family involovement by end of shift: Self monitor blood glucose with staff oversight  Goal: Receive DSME education by end of shift  Outcome: Not Progressing  Flowsheets (Taken 4/5/2025 1232)  Receive DSME education by end of shift: Provide patient centered education on Diabetic Self Management Education     Problem: General Stroke  Goal: Establish a mutual long term goal with patient by discharge  Outcome: Not Progressing  Flowsheets (Taken 4/5/2025  1232)  Establish a mutual long term goal with patient by discharge:   Attend medical follow-up appointments   Engage in physical activity   Monitor blood pressure   Monitor blood glucose  Goal: Demonstrate improvement in neurological exam throughout the shift  Outcome: Not Progressing  Goal: Maintain BP within ordered limits throughout shift  Outcome: Not Progressing  Goal: Participate in treatment (ie., meds, therapy) throughout shift  Outcome: Not Progressing  Goal: No symptoms of aspiration throughout shift  Outcome: Not Progressing  Goal: No symptoms of hemorrhage throughout shift  Outcome: Not Progressing  Goal: Tolerate enteral feeding throughout shift  Outcome: Not Progressing  Goal: Decreased nausea/vomiting throughout shift  Outcome: Not Progressing  Goal: Controlled blood glucose throughout shift  Outcome: Not Progressing  Goal: Out of bed three times today  Outcome: Not Progressing     Problem: ICU Stroke  Goal: Maintain ICP within ordered limits throughout shift  Outcome: Not Progressing  Goal: Tolerate EVD clamping trial throughout shift  Outcome: Not Progressing  Goal: Tolerate ventilator weaning trial during shift  Outcome: Not Progressing  Goal: Maintain patent airway throughout shift  Outcome: Not Progressing  Goal: Achieve/maintain targeted sodium level throughout shift  Outcome: Not Progressing       Problem: Chronic Conditions and Co-morbidities  Goal: Patient's chronic conditions and co-morbidity symptoms are monitored and maintained or improved  Outcome: Not Progressing  Flowsheets (Taken 4/5/2025 1232)  Care Plan - Patient's Chronic Conditions and Co-Morbidity Symptoms are Monitored and Maintained or Improved:   Collaborate with multidisciplinary team to address chronic and comorbid conditions and prevent exacerbation or deterioration   Update acute care plan with appropriate goals if chronic or comorbid symptoms are exacerbated and prevent overall improvement and discharge   Monitor and  assess patient's chronic conditions and comorbid symptoms for stability, deterioration, or improvement     Problem: Nutrition  Goal: Nutrient intake appropriate for maintaining nutritional needs  Outcome: Not Progressing     Problem: Pain  Goal: Turns in bed with improved pain control throughout the shift  Outcome: Not Progressing  Goal: Walks with improved pain control throughout the shift  Outcome: Not Progressing  Goal: Performs ADL's with improved pain control throughout shift  Outcome: Not Progressing     Problem: Diabetes  Goal: Achieve decreasing blood glucose levels by end of shift  Outcome: Not Progressing  Flowsheets (Taken 4/5/2025 1232)  Achieve decreasing blood glucose levels by end of shift: Med administration/monitoring of effect  Goal: Increase stability of blood glucose readings by end of shift  Outcome: Not Progressing  Flowsheets (Taken 4/5/2025 1232)  Increase stability of blood glucose readings by end of shift: Med administration/monitoring of effect  Goal: Decrease in ketones present in urine by end of shift  Outcome: Not Progressing  Flowsheets (Taken 4/5/2025 1232)  Decrease in ketones present in urine by end of shift:   Med administration/monitoring of effect   Monitor urine output  Goal: Maintain electrolyte levels within acceptable range throughout shift  Outcome: Not Progressing  Flowsheets (Taken 4/5/2025 1232)  Maintain electrolyte levels within acceptable range throughout shift:   Med administration/monitoring of effect   Monitor urine output  Goal: Maintain glucose levels >70mg/dl to <250mg/dl throughout shift  Outcome: Not Progressing  Flowsheets (Taken 4/5/2025 1232)  Maintain glucose levels >70mg/dl to <250mg/dl throughout shift:   Med administration/monitoring of effect   Self monitor blood glucose with staff oversight  Goal: No changes in neurological exam by end of shift  Outcome: Not Progressing  Flowsheets (Taken 4/5/2025 1232)  No changes in neurological exam by end of shift:  Complete frequent neurological assessments  Goal: Learn about and adhere to nutrition recommendations by end of shift  Outcome: Not Progressing  Flowsheets (Taken 4/5/2025 1232)  Learn about and adhere to nutrition recommendations by end of shift: Ensure/encourage compliance with appropriate diet  Goal: Vital signs within normal range for age by end of shift  Outcome: Not Progressing  Flowsheets (Taken 4/5/2025 1232)  Vital signs within normal range for age by end of shift: Med administration/monitoring of effect  Goal: Increase self care and/or family involovement by end of shift  Outcome: Not Progressing  Flowsheets (Taken 4/5/2025 1232)  Increase self care and/or family involovement by end of shift: Self monitor blood glucose with staff oversight  Goal: Receive DSME education by end of shift  Outcome: Not Progressing  Flowsheets (Taken 4/5/2025 1232)  Receive DSME education by end of shift: Provide patient centered education on Diabetic Self Management Education     Problem: General Stroke  Goal: Establish a mutual long term goal with patient by discharge  Outcome: Not Progressing  Flowsheets (Taken 4/5/2025 1232)  Establish a mutual long term goal with patient by discharge:   Attend medical follow-up appointments   Engage in physical activity   Monitor blood pressure   Monitor blood glucose  Goal: Demonstrate improvement in neurological exam throughout the shift  Outcome: Not Progressing  Goal: Maintain BP within ordered limits throughout shift  Outcome: Not Progressing  Goal: Participate in treatment (ie., meds, therapy) throughout shift  Outcome: Not Progressing  Goal: No symptoms of aspiration throughout shift  Outcome: Not Progressing  Goal: No symptoms of hemorrhage throughout shift  Outcome: Not Progressing  Goal: Tolerate enteral feeding throughout shift  Outcome: Not Progressing  Goal: Decreased nausea/vomiting throughout shift  Outcome: Not Progressing  Goal: Controlled blood glucose throughout  shift  Outcome: Not Progressing  Goal: Out of bed three times today  Outcome: Not Progressing     Problem: ICU Stroke  Goal: Maintain ICP within ordered limits throughout shift  Outcome: Not Progressing  Goal: Tolerate EVD clamping trial throughout shift  Outcome: Not Progressing  Goal: Tolerate ventilator weaning trial during shift  Outcome: Not Progressing  Goal: Maintain patent airway throughout shift  Outcome: Not Progressing  Goal: Achieve/maintain targeted sodium level throughout shift  Outcome: Not Progressing

## 2025-04-05 NOTE — NURSING NOTE
Had telephone meeting with Dtr Nannette.  Per Nannette, she is POA for her mother, Dr Rubin was provided the paperwork.   She states the pt S/O Zehra Renae is also on as POA, but currently he is hospitalized and incapacitated.   We discussed the pts DX and current condition  She states the pt and family are aware her life is limited, they want to honor their mothers wishes and respect her decision making regarding her treatment plan.  They also want her to be potentiated before she goes home.   They did make pt a DNR, so she can eat what she wants. She verbalized understanding, an aspiration could have negative consequences, the family accepts that, they want Mom to experience the pleasure of eating.   Nannette states Mom would like to try rehab, she states she is aware she will most likely fail, but if she fails at least that will provide her mother with better understanding of her situation and will help her move into comfort care. She states if mom fails, she is going into hospice care.  We discussed Navigator program V Hospice Program.   She wants to enroll the pt in Navigator and get that started ASAP, so she has it in place when she goes to rehab. She is aware and agreeable Navigator will then support a move to hospice once the pt is ready.   In addition, after rehab, the pt will be going to Nannette's house rather than [facility] home, so she can get the 24 hr care she needs.    [facility] will follow pt and enroll in appropriate program when DC.

## 2025-04-05 NOTE — PROGRESS NOTES
04/05/25 1028   Discharge Planning   Who is requesting discharge planning? Provider   Home or Post Acute Services Post acute facilities (Rehab/SNF/etc)   Type of Post Acute Facility Services Skilled nursing   Expected Discharge Disposition SNF  ([facility])   Does the patient need discharge transport arranged? Yes   RoundTrip coordination needed? Yes   Has discharge transport been arranged? No   Intensity of Service   Intensity of Service 0-30 min     4/5/25 1029  Auth started for [facility].  Merly Ritter RN [facility]

## 2025-04-05 NOTE — PROGRESS NOTES
"[facility]          Admitting Provider: Nicholas Norman MD Admission Date: 3/31/2025.   Attending Provider: Nicholas Norman MD MRN: [id number]       Subjective   Barbara English is a 50 y.o. female on day 5 of admission presenting with Cerebrovascular accident (CVA), unspecified mechanism (Multi).  Interval History No complaints. Failed swallow evaluation     Objective   Physical Exam  Last Recorded Vitals: Blood pressure (!) 183/97, pulse 84, temperature 36 °C (96.8 °F), temperature source Temporal, resp. rate 18, height 1.6 m (5' 3\"), weight 61.6 kg (135 lb 11.2 oz), SpO2 97%.  Patient Vitals for the past 24 hrs:   BP Temp Temp src Pulse Resp SpO2   04/05/25 0810 (!) 183/97 36 °C (96.8 °F) Temporal 84 18 97 %   04/05/25 0436 (!) 180/99 36.7 °C (98.1 °F) Temporal -- -- --   04/05/25 0034 (!) 189/96 -- -- -- -- --   04/04/25 2326 (!) 186/91 36.4 °C (97.6 °F) -- 85 -- 99 %   04/04/25 2028 167/85 36.5 °C (97.7 °F) -- 79 -- 100 %   04/04/25 1424 (!) 157/92 36.5 °C (97.7 °F) -- 71 17 97 %   04/04/25 1140 (!) 173/93 36.5 °C (97.7 °F) -- 75 17 98 %   04/04/25 0847 156/82 36.5 °C (97.7 °F) -- 77 17 99 %     Body mass index is 24.04 kg/m².  GENERAL: cooperative, no distress, or sleepy easily arousable.  SKIN: no rashes  HEENT Atraumatic, Normocephalic and Not pale, no icterus  NECK: supple, no thyromegaly, JVP within normal limits  LUNGS:  not in respiratory distress, respiratory rate normal, clear to auscultation  CARDIAC: regular rate and rhythm, normal S1 and S2, no murmur, rub, or gallop  ABDOMEN: Soft, non-tender, normal bowel sounds; no bruits, organomegaly or masses.  EXTREMITIES: No edema  NEURO: Alert and oriented x 1-2, Left hemiplegia.  Intake/Output last 3 Shifts:  I/O last 3 completed shifts:  In: 410 (6.7 mL/kg) [I.V.:410 (6.7 mL/kg)]  Out: 1100 (17.9 mL/kg) [Urine:1100 (0.5 mL/kg/hr)]  Weight: 61.6 kg   DATA:   Diagnostic tests reviewed for today's visit:    Most recent " "Labs  Results for orders placed or performed during the hospital encounter of 03/31/25 (from the past 24 hours)   POCT GLUCOSE   Result Value Ref Range    POCT Glucose 123 (H) 74 - 99 mg/dL   POCT GLUCOSE   Result Value Ref Range    POCT Glucose 138 (H) 74 - 99 mg/dL   POCT GLUCOSE   Result Value Ref Range    POCT Glucose 179 (H) 74 - 99 mg/dL   POCT GLUCOSE   Result Value Ref Range    POCT Glucose 178 (H) 74 - 99 mg/dL   POCT GLUCOSE   Result Value Ref Range    POCT Glucose 274 (H) 74 - 99 mg/dL     Blood Culture   Date Value Ref Range Status   04/30/2024 No growth at 4 days -  FINAL REPORT  Final    No results found for: \"RESPCULTSM\" No results found for: \"PERDIAFLDCUL\" No results found for: \"STERFLDCULSM\"   Imaging  No results found.    Cardiology, Vascular, and Other Imaging  No other imaging results found for the past 2 days    Current Facility-Administered Medications   Medication Dose Route Frequency Provider Last Rate Last Admin    apixaban (Eliquis) tablet 5 mg  5 mg oral BID Aasef G Shaikh, MD PhD   5 mg at 04/04/25 2118    aspirin EC tablet 81 mg  81 mg oral Daily Nicholas Norman MD   81 mg at 04/04/25 1042    atorvastatin (Lipitor) tablet 80 mg  80 mg oral Nightly Nicholas Norman MD   80 mg at 04/04/25 2117    bisacodyl (Dulcolax) suppository 10 mg  10 mg rectal Daily PRN Seth Rubin MD        buprenorphine-naloxone (Suboxone) 2-0.5 mg per SL tablet 1 tablet  1 tablet sublingual q PM Seth Rubin MD        dextrose 50 % injection 12.5 g  12.5 g intravenous q15 min PRN Nicholas Norman MD        dextrose 50 % injection 25 g  25 g intravenous q15 min PRN Nicholas Norman MD        gabapentin (Neurontin) capsule 300 mg  300 mg oral Nightly Seth Rubin MD   300 mg at 04/04/25 2118    gabapentin (Neurontin) capsule 600 mg  600 mg oral Daily Seth Rubin MD   600 mg at 04/04/25 1042    glucagon (Glucagen) injection 1 mg  1 mg intramuscular q15 min PRN Nicholas Norman MD        " glucagon (Glucagen) injection 1 mg  1 mg intramuscular q15 min PRN Nicholas Norman MD        hydrALAZINE (Apresoline) injection 10 mg  10 mg intravenous q4h PRN Nicholas Norman MD   10 mg at 04/02/25 0149    [Held by provider] insulin glargine (Lantus) injection 10 Units  10 Units subcutaneous Nightly Billy Petty, Shanique        insulin lispro injection 0-5 Units  0-5 Units subcutaneous TID AC Nicholas Norman MD   2 Units at 04/04/25 1821    losartan (Cozaar) tablet 25 mg  25 mg oral Daily Nicholas Norman MD   25 mg at 04/04/25 1042    naloxone (Narcan) injection 0.2 mg  0.2 mg intravenous q5 min PRN Seth Rubin MD        NIFEdipine ER (Adalat CC) 24 hr tablet 60 mg  60 mg oral Daily Nicholas Norman MD   60 mg at 04/04/25 1042    oxyCODONE (Roxicodone) immediate release tablet 10 mg  10 mg oral BID PRN Seth Rubin MD        oxyCODONE (Roxicodone) immediate release tablet 10 mg  10 mg oral q4h PRN Seth Rubin MD   10 mg at 04/04/25 2121    oxygen (O2) therapy   inhalation Continuous PRN - O2/gases Nicholas Norman MD        pantoprazole (ProtoNix) EC tablet 40 mg  40 mg oral BID Nicholas Norman MD   40 mg at 04/04/25 2117    polyethylene glycol (Glycolax, Miralax) packet 17 g  17 g oral Daily Nicholas Norman MD   17 g at 04/04/25 1042    sennosides (Senokot) tablet 17.2 mg  2 tablet oral BID Seth Rubin MD   17.2 mg at 04/04/25 2117    sertraline (Zoloft) tablet 50 mg  50 mg oral Daily Seth Rubin MD   50 mg at 04/04/25 1059     Current Outpatient Medications   Medication Sig Dispense Refill    aspirin 81 mg EC tablet Take 1 tablet (81 mg) by mouth once daily.      furosemide (Lasix) 40 mg tablet Take 0.5 tablets (20 mg) by mouth once daily.      glucagon (Glucagen) 1 mg injection Inject 1 mg into the muscle every 15 minutes if needed for low blood sugar - see comments (BS less jeison 50 if patient unable take glucose by mouth).      Lantus Solostar U-100 Insulin 100  unit/mL (3 mL) pen Inject 10 Units under the skin once daily at bedtime.     ..     Medication and Non-Pharmacologic VTE Prophylaxis/Anticoagulants      Last Anticoag Admin            apixaban (Eliquis) tablet 5 mg    Given 5 mg at 04/04/25 2118    Frequency: 2 times daily         There are additional administrations since 04/02/25 0838 that are not shown.    No unadministered anticoagulant orders found.          Assessment/Plan   Barbara English has  Assessment & Plan  Cerebrovascular accident (CVA), unspecified mechanism (Multi)  Right middle cerebral artery thrombus? Not a candidate thrombectomy per neurology  HTN will adjust medications  Metastatic Pancreatic cancer   needs rehabilitation    Patient does not want to give up eating despite risk of aspiration.. Does not want a PEG tube.  On Eliquis ASA, Statins.                    Palliative care consult noted   Other Hospital problems        Abnormal findings not addressed during hospitalization, but require out patient follow up. None        I spent 35 minutes talking and examining Barbara English, reviewing the labs & medications, formulating plan of care & discussing with patient and nursing staff.    Nciholas Norman MD

## 2025-04-06 VITALS
SYSTOLIC BLOOD PRESSURE: 133 MMHG | TEMPERATURE: 98.4 F | HEIGHT: 63 IN | OXYGEN SATURATION: 99 % | DIASTOLIC BLOOD PRESSURE: 74 MMHG | RESPIRATION RATE: 18 BRPM | BODY MASS INDEX: 24.04 KG/M2 | HEART RATE: 85 BPM | WEIGHT: 135.7 LBS

## 2025-04-06 LAB
GLUCOSE BLD MANUAL STRIP-MCNC: 206 MG/DL (ref 74–99)
GLUCOSE BLD MANUAL STRIP-MCNC: 234 MG/DL (ref 74–99)
GLUCOSE BLD MANUAL STRIP-MCNC: 269 MG/DL (ref 74–99)
GLUCOSE BLD MANUAL STRIP-MCNC: 308 MG/DL (ref 74–99)
GLUCOSE BLD MANUAL STRIP-MCNC: 335 MG/DL (ref 74–99)

## 2025-04-06 PROCEDURE — 2500000002 HC RX 250 W HCPCS SELF ADMINISTERED DRUGS (ALT 637 FOR MEDICARE OP, ALT 636 FOR OP/ED): Performed by: INTERNAL MEDICINE

## 2025-04-06 PROCEDURE — 2500000001 HC RX 250 WO HCPCS SELF ADMINISTERED DRUGS (ALT 637 FOR MEDICARE OP): Performed by: INTERNAL MEDICINE

## 2025-04-06 PROCEDURE — 82947 ASSAY GLUCOSE BLOOD QUANT: CPT

## 2025-04-06 PROCEDURE — 94760 N-INVAS EAR/PLS OXIMETRY 1: CPT

## 2025-04-06 PROCEDURE — 2500000004 HC RX 250 GENERAL PHARMACY W/ HCPCS (ALT 636 FOR OP/ED): Performed by: INTERNAL MEDICINE

## 2025-04-06 PROCEDURE — 2500000001 HC RX 250 WO HCPCS SELF ADMINISTERED DRUGS (ALT 637 FOR MEDICARE OP): Performed by: STUDENT IN AN ORGANIZED HEALTH CARE EDUCATION/TRAINING PROGRAM

## 2025-04-06 PROCEDURE — 1200000002 HC GENERAL ROOM WITH TELEMETRY DAILY

## 2025-04-06 RX ADMIN — INSULIN GLARGINE 10 UNITS: 100 INJECTION, SOLUTION SUBCUTANEOUS at 22:16

## 2025-04-06 RX ADMIN — PANTOPRAZOLE SODIUM 40 MG: 40 TABLET, DELAYED RELEASE ORAL at 09:19

## 2025-04-06 RX ADMIN — INSULIN LISPRO 4 UNITS: 100 INJECTION, SOLUTION INTRAVENOUS; SUBCUTANEOUS at 17:20

## 2025-04-06 RX ADMIN — APIXABAN 5 MG: 5 TABLET, FILM COATED ORAL at 22:08

## 2025-04-06 RX ADMIN — BUPRENORPHINE AND NALOXONE 1 TABLET: 2; .5 TABLET SUBLINGUAL at 22:08

## 2025-04-06 RX ADMIN — ASPIRIN 81 MG: 81 TABLET, COATED ORAL at 09:20

## 2025-04-06 RX ADMIN — GABAPENTIN 600 MG: 300 CAPSULE ORAL at 09:19

## 2025-04-06 RX ADMIN — POLYETHYLENE GLYCOL 3350 17 G: 17 POWDER, FOR SOLUTION ORAL at 09:19

## 2025-04-06 RX ADMIN — LOSARTAN POTASSIUM 50 MG: 50 TABLET, FILM COATED ORAL at 09:20

## 2025-04-06 RX ADMIN — ATORVASTATIN CALCIUM 80 MG: 80 TABLET, FILM COATED ORAL at 22:08

## 2025-04-06 RX ADMIN — NIFEDIPINE 60 MG: 30 TABLET, FILM COATED, EXTENDED RELEASE ORAL at 09:19

## 2025-04-06 RX ADMIN — SENNOSIDES 17.2 MG: 8.6 TABLET ORAL at 09:19

## 2025-04-06 RX ADMIN — INSULIN LISPRO 2 UNITS: 100 INJECTION, SOLUTION INTRAVENOUS; SUBCUTANEOUS at 09:32

## 2025-04-06 RX ADMIN — SERTRALINE 50 MG: 50 TABLET, FILM COATED ORAL at 09:19

## 2025-04-06 RX ADMIN — SENNOSIDES 17.2 MG: 8.6 TABLET ORAL at 22:09

## 2025-04-06 RX ADMIN — PANTOPRAZOLE SODIUM 40 MG: 40 TABLET, DELAYED RELEASE ORAL at 22:08

## 2025-04-06 RX ADMIN — GABAPENTIN 300 MG: 300 CAPSULE ORAL at 22:08

## 2025-04-06 RX ADMIN — INSULIN LISPRO 2 UNITS: 100 INJECTION, SOLUTION INTRAVENOUS; SUBCUTANEOUS at 12:06

## 2025-04-06 RX ADMIN — APIXABAN 5 MG: 5 TABLET, FILM COATED ORAL at 09:20

## 2025-04-06 ASSESSMENT — COGNITIVE AND FUNCTIONAL STATUS - GENERAL
DRESSING REGULAR UPPER BODY CLOTHING: TOTAL
DAILY ACTIVITIY SCORE: 7
MOVING TO AND FROM BED TO CHAIR: A LOT
TOILETING: TOTAL
WALKING IN HOSPITAL ROOM: TOTAL
STANDING UP FROM CHAIR USING ARMS: TOTAL
EATING MEALS: A LOT
TURNING FROM BACK TO SIDE WHILE IN FLAT BAD: A LOT
DRESSING REGULAR LOWER BODY CLOTHING: TOTAL
PERSONAL GROOMING: TOTAL
CLIMB 3 TO 5 STEPS WITH RAILING: TOTAL
MOVING FROM LYING ON BACK TO SITTING ON SIDE OF FLAT BED WITH BEDRAILS: A LOT
HELP NEEDED FOR BATHING: TOTAL
MOBILITY SCORE: 9

## 2025-04-06 ASSESSMENT — PAIN SCALES - GENERAL
PAINLEVEL_OUTOF10: 0 - NO PAIN
PAINLEVEL_OUTOF10: 0 - NO PAIN

## 2025-04-06 ASSESSMENT — PAIN SCALES - WONG BAKER: WONGBAKER_NUMERICALRESPONSE: NO HURT

## 2025-04-06 NOTE — PROGRESS NOTES
"               [facility] [place]          Admitting Provider: Nicholas Norman MD Admission Date: 3/31/2025.   Attending Provider: Nicholas Norman MD MRN: [id number]       Subjective   Barbara English is a 50 y.o. female on day 6 of admission presenting with Cerebrovascular accident (CVA), unspecified mechanism (Multi).  Interval History nurse was concerned yesterday that her left weakness was worse yesterday when she evaluated her at the beginning of the shift.      Objective   Physical Exam  Last Recorded Vitals: Blood pressure (!) 173/109, pulse 92, temperature 36.5 °C (97.7 °F), temperature source Temporal, resp. rate 18, height 1.6 m (5' 3\"), weight 61.6 kg (135 lb 11.2 oz), SpO2 99%.  Patient Vitals for the past 24 hrs:   BP Temp Temp src Pulse Resp SpO2   04/06/25 0811 (!) 173/109 36.5 °C (97.7 °F) Temporal -- -- 99 %   04/06/25 0725 -- -- -- -- -- 98 %   04/06/25 0428 (!) 171/99 37.1 °C (98.8 °F) Temporal 92 18 97 %   04/06/25 0006 (!) 171/92 36.5 °C (97.7 °F) Temporal 95 18 96 %   04/05/25 2003 (!) 205/107 36.8 °C (98.2 °F) Temporal 92 18 96 %   04/05/25 1544 (!) 199/97 36.7 °C (98 °F) Temporal 90 18 95 %   04/05/25 1318 (!) 188/92 36 °C (96.8 °F) Temporal 91 18 95 %     Body mass index is 24.04 kg/m².  GENERAL: alert, cooperative, or no distress  SKIN: no rashes  HEENT Atraumatic, Normocephalic and Not pale, no icterus  NECK: supple, no thyromegaly, JVP within normal limits  LUNGS:  not in respiratory distress, respiratory rate normal, clear to auscultation  CARDIAC: regular rate and rhythm, normal S1 and S2, no murmur, rub, or gallop  ABDOMEN: Soft, non-tender, normal bowel sounds; no bruits, organomegaly or masses.  EXTREMITIES: No edema  NEURO: Alert and oriented x 2. Left sided weakness strength on left LE slightly better still 2/5. Left arm weakness, Left facial droop.    Intake/Output last 3 Shifts:  I/O last 3 completed shifts:  In: 590 (9.6 mL/kg) [I.V.:590 (9.6 mL/kg)]  Out: 400 (6.5 " "mL/kg) [Urine:400 (0.2 mL/kg/hr)]  Weight: 61.6 kg   DATA:   Diagnostic tests reviewed for today's visit:    Most recent  labs and imaging results  Results for orders placed or performed during the hospital encounter of 03/31/25 (from the past 24 hours)   POCT GLUCOSE   Result Value Ref Range    POCT Glucose 193 (H) 74 - 99 mg/dL   POCT GLUCOSE   Result Value Ref Range    POCT Glucose 216 (H) 74 - 99 mg/dL   POCT GLUCOSE   Result Value Ref Range    POCT Glucose 215 (H) 74 - 99 mg/dL     Blood Culture   Date Value Ref Range Status   04/30/2024 No growth at 4 days -  FINAL REPORT  Final    No results found for: \"RESPCULTSM\" No results found for: \"PERDIAFLDCUL\" No results found for: \"STERFLDCULSM\"   Imaging  CT head wo IV contrast    Result Date: 4/5/2025    Since the prior exam there is new hypodensity in the posterior right frontal and right parietal lobes suggesting likely subacute infarct. No midline shift. No acute hemorrhage. Clinical correlation and follow-up is recommended.   MACRO: None   Signed by: Cathy Clinton 4/5/2025 10:36 AM Dictation workstation:   [id number]     Cardiology, Vascular, and Other Imaging  No other imaging results found for the past 2 days    Current Facility-Administered Medications   Medication Dose Route Frequency Provider Last Rate Last Admin    apixaban (Eliquis) tablet 5 mg  5 mg oral BID Aasef G Shaikh, MD PhD   5 mg at 04/05/25 2221    aspirin EC tablet 81 mg  81 mg oral Daily Nicholas Norman MD   81 mg at 04/05/25 0933    atorvastatin (Lipitor) tablet 80 mg  80 mg oral Nightly Nicholas Norman MD   80 mg at 04/05/25 2221    bisacodyl (Dulcolax) suppository 10 mg  10 mg rectal Daily PRN Seth Rubin MD        buprenorphine-naloxone (Suboxone) 2-0.5 mg per SL tablet 1 tablet  1 tablet sublingual q PM Seth Rubin MD   1 tablet at 04/05/25 2221    dextrose 50 % injection 12.5 g  12.5 g intravenous q15 min PRN Nicholas Norman MD        dextrose 50 % injection 25 g  25 g " intravenous q15 min PRN Nicholas Norman MD        gabapentin (Neurontin) capsule 300 mg  300 mg oral Nightly Seth Rubin MD   300 mg at 04/05/25 2220    gabapentin (Neurontin) capsule 600 mg  600 mg oral Daily Seth Rubin MD   600 mg at 04/05/25 0932    glucagon (Glucagen) injection 1 mg  1 mg intramuscular q15 min PRN Nicholas Norman MD        glucagon (Glucagen) injection 1 mg  1 mg intramuscular q15 min PRN Nicholas Norman MD        hydrALAZINE (Apresoline) injection 10 mg  10 mg intravenous q4h PRN Nicholas Norman MD   10 mg at 04/05/25 2108    insulin glargine (Lantus) injection 10 Units  10 Units subcutaneous Nightly Nicholas Norman MD   10 Units at 04/05/25 2221    insulin lispro injection 0-5 Units  0-5 Units subcutaneous TID AC Nicholas Norman MD   2 Units at 04/05/25 1645    losartan (Cozaar) tablet 50 mg  50 mg oral Daily Nicholas Norman MD   50 mg at 04/05/25 0933    naloxone (Narcan) injection 0.2 mg  0.2 mg intravenous q5 min PRN Seth Rubin MD        NIFEdipine ER (Adalat CC) 24 hr tablet 60 mg  60 mg oral Daily Nicholas Norman MD   60 mg at 04/05/25 0932    oxyCODONE (Roxicodone) immediate release tablet 10 mg  10 mg oral BID PRN Seth Rubin MD        oxyCODONE (Roxicodone) immediate release tablet 10 mg  10 mg oral q4h PRN Seth Rubin MD   10 mg at 04/04/25 2121    oxygen (O2) therapy   inhalation Continuous PRN - O2/gases Nicholas Norman MD        pantoprazole (ProtoNix) EC tablet 40 mg  40 mg oral BID Nicholas Norman MD   40 mg at 04/05/25 2220    polyethylene glycol (Glycolax, Miralax) packet 17 g  17 g oral Daily Nicholas Norman MD   17 g at 04/05/25 0932    sennosides (Senokot) tablet 17.2 mg  2 tablet oral BID Seth Rubin MD   17.2 mg at 04/05/25 2220    sertraline (Zoloft) tablet 50 mg  50 mg oral Daily Seth Rubin MD   50 mg at 04/05/25 0985     Current Outpatient Medications   Medication Sig Dispense Refill    aspirin 81 mg EC  tablet Take 1 tablet (81 mg) by mouth once daily.      furosemide (Lasix) 40 mg tablet Take 0.5 tablets (20 mg) by mouth once daily.      glucagon (Glucagen) 1 mg injection Inject 1 mg into the muscle every 15 minutes if needed for low blood sugar - see comments (BS less jeison 50 if patient unable take glucose by mouth).      insulin glargine (Lantus) 100 unit/mL injection Inject 10 Units under the skin once daily at bedtime. Take as directed per insulin instructions.      losartan (Cozaar) 25 mg tablet Take 2 tablets (50 mg) by mouth once daily.     ..     Medication and Non-Pharmacologic VTE Prophylaxis/Anticoagulants      Last Anticoag Admin            apixaban (Eliquis) tablet 5 mg    Given 5 mg at 04/05/25 2221    Frequency: 2 times daily         There are additional administrations since 04/03/25 0915 that are not shown.    No unadministered anticoagulant orders found.          Assessment/Plan   Barbara English has  Assessment & Plan  Cerebrovascular accident (CVA), unspecified mechanism (Multi)  Right middle cerebral artery thrombus? Not a candidate thrombectomy per neurology  HTN will adjust medications  Metastatic Pancreatic cancer     needs rehabilitation    Patient does not want to give up eating despite risk of aspiration.. Does not want a PEG tube.  On Eliquis ASA, Statins.  New finding on CT new stroke vs evolution of old. Will ask neurology to see. BP high monitor for 24 hrs.                    Palliative care consult noted   Other Hospital problems        Abnormal findings not addressed during hospitalization, but require out patient follow up. None        I spent 40 minutes talking and examining Barbara English, reviewing the labs & medications, formulating plan of care & discussing with patient and nursing staff.    Nicholas Norman MD

## 2025-04-06 NOTE — CARE PLAN
The patient's goals for the shift include      The clinical goals for the shift include Mqintain pt safety      Problem: Pain - Adult  Goal: Verbalizes/displays adequate comfort level or baseline comfort level  Outcome: Progressing     Problem: Safety - Adult  Goal: Free from fall injury  Outcome: Progressing     Problem: Chronic Conditions and Co-morbidities  Goal: Patient's chronic conditions and co-morbidity symptoms are monitored and maintained or improved  Outcome: Progressing

## 2025-04-06 NOTE — PROGRESS NOTES
04/06/25 1003   Discharge Planning   Expected Discharge Disposition SNF  (Plan on discharge is [facility]/ auth is pedning started 4/5)     Per notes plan on discharge to [facility], auth started on 4/5 looks like auth is still pending, will continue to monitor for discharge planing.

## 2025-04-07 LAB
ANION GAP SERPL CALC-SCNC: 12 MMOL/L (ref 10–20)
BASOPHILS # BLD AUTO: 0.07 X10*3/UL (ref 0–0.1)
BASOPHILS NFR BLD AUTO: 0.8 %
BUN SERPL-MCNC: 30 MG/DL (ref 6–23)
CALCIUM SERPL-MCNC: 8.6 MG/DL (ref 8.6–10.3)
CHLORIDE SERPL-SCNC: 110 MMOL/L (ref 98–107)
CO2 SERPL-SCNC: 24 MMOL/L (ref 21–32)
CREAT SERPL-MCNC: 1.54 MG/DL (ref 0.5–1.05)
EGFRCR SERPLBLD CKD-EPI 2021: 41 ML/MIN/1.73M*2
EOSINOPHIL # BLD AUTO: 0.29 X10*3/UL (ref 0–0.7)
EOSINOPHIL NFR BLD AUTO: 3.4 %
ERYTHROCYTE [DISTWIDTH] IN BLOOD BY AUTOMATED COUNT: 14.7 % (ref 11.5–14.5)
GLUCOSE BLD MANUAL STRIP-MCNC: 150 MG/DL (ref 74–99)
GLUCOSE BLD MANUAL STRIP-MCNC: 158 MG/DL (ref 74–99)
GLUCOSE BLD MANUAL STRIP-MCNC: 163 MG/DL (ref 74–99)
GLUCOSE BLD MANUAL STRIP-MCNC: 166 MG/DL (ref 74–99)
GLUCOSE SERPL-MCNC: 166 MG/DL (ref 74–99)
HCT VFR BLD AUTO: 27.9 % (ref 36–46)
HGB BLD-MCNC: 8.5 G/DL (ref 12–16)
IMM GRANULOCYTES # BLD AUTO: 0.02 X10*3/UL (ref 0–0.7)
IMM GRANULOCYTES NFR BLD AUTO: 0.2 % (ref 0–0.9)
LYMPHOCYTES # BLD AUTO: 1.31 X10*3/UL (ref 1.2–4.8)
LYMPHOCYTES NFR BLD AUTO: 15.6 %
MCH RBC QN AUTO: 24.6 PG (ref 26–34)
MCHC RBC AUTO-ENTMCNC: 30.5 G/DL (ref 32–36)
MCV RBC AUTO: 81 FL (ref 80–100)
MONOCYTES # BLD AUTO: 0.46 X10*3/UL (ref 0.1–1)
MONOCYTES NFR BLD AUTO: 5.5 %
NEUTROPHILS # BLD AUTO: 6.26 X10*3/UL (ref 1.2–7.7)
NEUTROPHILS NFR BLD AUTO: 74.5 %
NRBC BLD-RTO: 0 /100 WBCS (ref 0–0)
PLATELET # BLD AUTO: 269 X10*3/UL (ref 150–450)
POTASSIUM SERPL-SCNC: 4.2 MMOL/L (ref 3.5–5.3)
RBC # BLD AUTO: 3.45 X10*6/UL (ref 4–5.2)
SODIUM SERPL-SCNC: 142 MMOL/L (ref 136–145)
WBC # BLD AUTO: 8.4 X10*3/UL (ref 4.4–11.3)

## 2025-04-07 PROCEDURE — 1200000002 HC GENERAL ROOM WITH TELEMETRY DAILY

## 2025-04-07 PROCEDURE — 82947 ASSAY GLUCOSE BLOOD QUANT: CPT

## 2025-04-07 PROCEDURE — 2500000002 HC RX 250 W HCPCS SELF ADMINISTERED DRUGS (ALT 637 FOR MEDICARE OP, ALT 636 FOR OP/ED): Performed by: INTERNAL MEDICINE

## 2025-04-07 PROCEDURE — 99233 SBSQ HOSP IP/OBS HIGH 50: CPT | Performed by: INTERNAL MEDICINE

## 2025-04-07 PROCEDURE — 2500000001 HC RX 250 WO HCPCS SELF ADMINISTERED DRUGS (ALT 637 FOR MEDICARE OP): Performed by: INTERNAL MEDICINE

## 2025-04-07 PROCEDURE — 97530 THERAPEUTIC ACTIVITIES: CPT | Mod: GP,CQ | Performed by: PHYSICAL THERAPY ASSISTANT

## 2025-04-07 PROCEDURE — 2500000001 HC RX 250 WO HCPCS SELF ADMINISTERED DRUGS (ALT 637 FOR MEDICARE OP): Performed by: STUDENT IN AN ORGANIZED HEALTH CARE EDUCATION/TRAINING PROGRAM

## 2025-04-07 PROCEDURE — 85025 COMPLETE CBC W/AUTO DIFF WBC: CPT | Performed by: INTERNAL MEDICINE

## 2025-04-07 PROCEDURE — 80048 BASIC METABOLIC PNL TOTAL CA: CPT | Performed by: INTERNAL MEDICINE

## 2025-04-07 PROCEDURE — 2500000004 HC RX 250 GENERAL PHARMACY W/ HCPCS (ALT 636 FOR OP/ED): Performed by: INTERNAL MEDICINE

## 2025-04-07 RX ADMIN — PANTOPRAZOLE SODIUM 40 MG: 40 TABLET, DELAYED RELEASE ORAL at 21:06

## 2025-04-07 RX ADMIN — INSULIN GLARGINE 10 UNITS: 100 INJECTION, SOLUTION SUBCUTANEOUS at 21:16

## 2025-04-07 RX ADMIN — OXYCODONE HYDROCHLORIDE 10 MG: 5 TABLET ORAL at 17:55

## 2025-04-07 RX ADMIN — SERTRALINE 50 MG: 50 TABLET, FILM COATED ORAL at 09:42

## 2025-04-07 RX ADMIN — APIXABAN 5 MG: 5 TABLET, FILM COATED ORAL at 09:41

## 2025-04-07 RX ADMIN — LOSARTAN POTASSIUM 50 MG: 50 TABLET, FILM COATED ORAL at 09:42

## 2025-04-07 RX ADMIN — BUPRENORPHINE AND NALOXONE 1 TABLET: 2; .5 TABLET SUBLINGUAL at 21:11

## 2025-04-07 RX ADMIN — PANTOPRAZOLE SODIUM 40 MG: 40 TABLET, DELAYED RELEASE ORAL at 09:42

## 2025-04-07 RX ADMIN — GABAPENTIN 600 MG: 300 CAPSULE ORAL at 09:41

## 2025-04-07 RX ADMIN — POLYETHYLENE GLYCOL 3350 17 G: 17 POWDER, FOR SOLUTION ORAL at 09:59

## 2025-04-07 RX ADMIN — NIFEDIPINE 60 MG: 30 TABLET, FILM COATED, EXTENDED RELEASE ORAL at 09:42

## 2025-04-07 RX ADMIN — ATORVASTATIN CALCIUM 80 MG: 80 TABLET, FILM COATED ORAL at 21:06

## 2025-04-07 RX ADMIN — APIXABAN 5 MG: 5 TABLET, FILM COATED ORAL at 21:06

## 2025-04-07 RX ADMIN — SENNOSIDES 17.2 MG: 8.6 TABLET ORAL at 09:59

## 2025-04-07 RX ADMIN — ASPIRIN 81 MG: 81 TABLET, COATED ORAL at 09:41

## 2025-04-07 RX ADMIN — INSULIN LISPRO 1 UNITS: 100 INJECTION, SOLUTION INTRAVENOUS; SUBCUTANEOUS at 09:42

## 2025-04-07 RX ADMIN — INSULIN LISPRO 1 UNITS: 100 INJECTION, SOLUTION INTRAVENOUS; SUBCUTANEOUS at 12:38

## 2025-04-07 RX ADMIN — GABAPENTIN 300 MG: 300 CAPSULE ORAL at 21:06

## 2025-04-07 ASSESSMENT — PAIN DESCRIPTION - DESCRIPTORS: DESCRIPTORS: ACHING

## 2025-04-07 ASSESSMENT — PAIN - FUNCTIONAL ASSESSMENT
PAIN_FUNCTIONAL_ASSESSMENT: 0-10

## 2025-04-07 ASSESSMENT — COGNITIVE AND FUNCTIONAL STATUS - GENERAL
EATING MEALS: TOTAL
TURNING FROM BACK TO SIDE WHILE IN FLAT BAD: A LOT
TOILETING: TOTAL
HELP NEEDED FOR BATHING: TOTAL
MOBILITY SCORE: 9
PERSONAL GROOMING: TOTAL
MOVING FROM LYING ON BACK TO SITTING ON SIDE OF FLAT BED WITH BEDRAILS: A LOT
CLIMB 3 TO 5 STEPS WITH RAILING: TOTAL
STANDING UP FROM CHAIR USING ARMS: A LOT
MOBILITY SCORE: 10
MOVING FROM LYING ON BACK TO SITTING ON SIDE OF FLAT BED WITH BEDRAILS: A LOT
DAILY ACTIVITIY SCORE: 6
DAILY ACTIVITIY SCORE: 7
HELP NEEDED FOR BATHING: TOTAL
DRESSING REGULAR UPPER BODY CLOTHING: TOTAL
WALKING IN HOSPITAL ROOM: TOTAL
STANDING UP FROM CHAIR USING ARMS: TOTAL
CLIMB 3 TO 5 STEPS WITH RAILING: TOTAL
DRESSING REGULAR UPPER BODY CLOTHING: TOTAL
MOVING TO AND FROM BED TO CHAIR: A LOT
MOVING FROM LYING ON BACK TO SITTING ON SIDE OF FLAT BED WITH BEDRAILS: A LOT
WALKING IN HOSPITAL ROOM: TOTAL
MOVING TO AND FROM BED TO CHAIR: A LOT
EATING MEALS: A LOT
DRESSING REGULAR LOWER BODY CLOTHING: TOTAL
TOILETING: TOTAL
TURNING FROM BACK TO SIDE WHILE IN FLAT BAD: A LOT
MOBILITY SCORE: 8
DRESSING REGULAR LOWER BODY CLOTHING: TOTAL
STANDING UP FROM CHAIR USING ARMS: TOTAL
CLIMB 3 TO 5 STEPS WITH RAILING: TOTAL
TURNING FROM BACK TO SIDE WHILE IN FLAT BAD: A LOT
MOVING TO AND FROM BED TO CHAIR: TOTAL
WALKING IN HOSPITAL ROOM: TOTAL
PERSONAL GROOMING: TOTAL

## 2025-04-07 ASSESSMENT — PAIN SCALES - GENERAL
PAINLEVEL_OUTOF10: 7
PAINLEVEL_OUTOF10: 5 - MODERATE PAIN
PAINLEVEL_OUTOF10: 0 - NO PAIN

## 2025-04-07 ASSESSMENT — PAIN DESCRIPTION - LOCATION: LOCATION: GENERALIZED

## 2025-04-07 NOTE — PROGRESS NOTES
"[facility]          Admitting Provider: Nicholas Norman MD Admission Date: 3/31/2025.   Attending Provider: Nicholas Norman MD MRN: [id number]       Subjective   Barbara English is a 50 y.o. female on day 7 of admission presenting with Cerebrovascular accident (CVA), unspecified mechanism (Multi).  Interval History no complaints.     Objective   Physical Exam  Last Recorded Vitals: Blood pressure 123/71, pulse 81, temperature 36.1 °C (97 °F), temperature source Temporal, resp. rate 16, height 1.6 m (5' 3\"), weight 61.6 kg (135 lb 11.2 oz), SpO2 95%.  Patient Vitals for the past 24 hrs:   BP Temp Temp src Pulse Resp SpO2   04/07/25 0756 123/71 36.1 °C (97 °F) Temporal 81 16 95 %   04/07/25 0753 -- -- -- 71 15 --   04/07/25 0700 -- -- -- 75 -- --   04/07/25 0511 122/74 37.1 °C (98.8 °F) Temporal 74 16 98 %   04/07/25 0134 133/84 36.7 °C (98.1 °F) Temporal 77 16 98 %   04/06/25 2222 133/74 36.9 °C (98.4 °F) Temporal 85 -- 99 %   04/06/25 1644 127/73 36.9 °C (98.4 °F) Temporal -- -- 96 %   04/06/25 1147 146/86 36.6 °C (97.9 °F) Temporal -- -- 98 %     Body mass index is 24.04 kg/m².  GENERAL: alert, cooperative, or no distress  SKIN: no rashes  HEENT Atraumatic, Normocephalic and Not pale, no icterus  NECK: supple, no thyromegaly, JVP within normal limits  LUNGS:  not in respiratory distress, respiratory rate normal, clear to auscultation  CARDIAC: regular rate and rhythm, normal S1 and S2, no murmur, rub, or gallop  ABDOMEN: Soft, non-tender, normal bowel sounds; no bruits, organomegaly or masses.  EXTREMITIES: No edema  NEURO: Alert and oriented x 2, left hemiplegia, UL 0-1/5, LE 1-2 proximally, 0/5 distally. Left facial droop  Intake/Output last 3 Shifts:  I/O last 3 completed shifts:  In: 550 (8.9 mL/kg) [P.O.:550]  Out: 600 (9.7 mL/kg) [Urine:600 (0.3 mL/kg/hr)]  Weight: 61.6 kg   DATA:   Diagnostic tests reviewed for today's visit:    Most recent  labs and imaging results  Results " "for orders placed or performed during the hospital encounter of 03/31/25 (from the past 24 hours)   POCT GLUCOSE   Result Value Ref Range    POCT Glucose 206 (H) 74 - 99 mg/dL   POCT GLUCOSE   Result Value Ref Range    POCT Glucose 234 (H) 74 - 99 mg/dL   POCT GLUCOSE   Result Value Ref Range    POCT Glucose 308 (H) 74 - 99 mg/dL   POCT GLUCOSE   Result Value Ref Range    POCT Glucose 335 (H) 74 - 99 mg/dL   POCT GLUCOSE   Result Value Ref Range    POCT Glucose 269 (H) 74 - 99 mg/dL   POCT GLUCOSE   Result Value Ref Range    POCT Glucose 158 (H) 74 - 99 mg/dL     Blood Culture   Date Value Ref Range Status   04/30/2024 No growth at 4 days -  FINAL REPORT  Final    No results found for: \"RESPCULTSM\" No results found for: \"PERDIAFLDCUL\" No results found for: \"STERFLDCULSM\"   Imaging  CT head wo IV contrast    Result Date: 4/5/2025    Since the prior exam there is new hypodensity in the posterior right frontal and right parietal lobes suggesting likely subacute infarct. No midline shift. No acute hemorrhage. Clinical correlation and follow-up is recommended.   MACRO: None   Signed by: Cathy Clinton 4/5/2025 10:36 AM Dictation workstation:   [id number]     Cardiology, Vascular, and Other Imaging  No other imaging results found for the past 2 days    Current Facility-Administered Medications   Medication Dose Route Frequency Provider Last Rate Last Admin    apixaban (Eliquis) tablet 5 mg  5 mg oral BID Aasef G Shaikh, MD PhD   5 mg at 04/06/25 2208    aspirin EC tablet 81 mg  81 mg oral Daily Nicholas Norman MD   81 mg at 04/06/25 0920    atorvastatin (Lipitor) tablet 80 mg  80 mg oral Nightly Nicholas Norman MD   80 mg at 04/06/25 2208    bisacodyl (Dulcolax) suppository 10 mg  10 mg rectal Daily PRN Seth Rubin MD        buprenorphine-naloxone (Suboxone) 2-0.5 mg per SL tablet 1 tablet  1 tablet sublingual q PM Seth Rubin MD   1 tablet at 04/06/25 2208    dextrose 50 % injection 12.5 g  12.5 g intravenous " q15 min PRN Nicholas Norman MD        dextrose 50 % injection 25 g  25 g intravenous q15 min PRN Nicholas Norman MD        gabapentin (Neurontin) capsule 300 mg  300 mg oral Nightly Seth Rubin MD   300 mg at 04/06/25 2208    gabapentin (Neurontin) capsule 600 mg  600 mg oral Daily Seth Rubin MD   600 mg at 04/06/25 0919    glucagon (Glucagen) injection 1 mg  1 mg intramuscular q15 min PRN Nicholas Norman MD        glucagon (Glucagen) injection 1 mg  1 mg intramuscular q15 min PRN Nicholas Norman MD        hydrALAZINE (Apresoline) injection 10 mg  10 mg intravenous q4h PRN Nicholas Norman MD   10 mg at 04/05/25 2108    insulin glargine (Lantus) injection 10 Units  10 Units subcutaneous Nightly Nicholas Norman MD   10 Units at 04/06/25 2216    insulin lispro injection 0-5 Units  0-5 Units subcutaneous TID AC Nicholas Norman MD   4 Units at 04/06/25 1720    losartan (Cozaar) tablet 50 mg  50 mg oral Daily Nicholas Norman MD   50 mg at 04/06/25 0920    naloxone (Narcan) injection 0.2 mg  0.2 mg intravenous q5 min PRN Seth Rubin MD        NIFEdipine ER (Adalat CC) 24 hr tablet 60 mg  60 mg oral Daily Nicholas Norman MD   60 mg at 04/06/25 0919    oxyCODONE (Roxicodone) immediate release tablet 10 mg  10 mg oral BID PRN Seth Rubin MD        oxyCODONE (Roxicodone) immediate release tablet 10 mg  10 mg oral q4h PRN Seth Rubin MD   10 mg at 04/04/25 2121    oxygen (O2) therapy   inhalation Continuous PRN - O2/gases Nicholas Norman MD        pantoprazole (ProtoNix) EC tablet 40 mg  40 mg oral BID Nicholas Norman MD   40 mg at 04/06/25 2208    polyethylene glycol (Glycolax, Miralax) packet 17 g  17 g oral Daily Nicholas Norman MD   17 g at 04/06/25 0919    sennosides (Senokot) tablet 17.2 mg  2 tablet oral BID Seth Rubin MD   17.2 mg at 04/06/25 2209    sertraline (Zoloft) tablet 50 mg  50 mg oral Daily Seth Rubin MD   50 mg at 04/06/25 0919      Current Outpatient Medications   Medication Sig Dispense Refill    aspirin 81 mg EC tablet Take 1 tablet (81 mg) by mouth once daily.      furosemide (Lasix) 40 mg tablet Take 0.5 tablets (20 mg) by mouth once daily.      glucagon (Glucagen) 1 mg injection Inject 1 mg into the muscle every 15 minutes if needed for low blood sugar - see comments (BS less jeison 50 if patient unable take glucose by mouth).      insulin glargine (Lantus) 100 unit/mL injection Inject 10 Units under the skin once daily at bedtime. Take as directed per insulin instructions.      losartan (Cozaar) 25 mg tablet Take 2 tablets (50 mg) by mouth once daily.     ..     Medication and Non-Pharmacologic VTE Prophylaxis/Anticoagulants      Last Anticoag Admin            apixaban (Eliquis) tablet 5 mg    Given 5 mg at 04/06/25 2208    Frequency: 2 times daily         There are additional administrations since 04/04/25 0829 that are not shown.    No unadministered anticoagulant orders found.          Assessment/Plan   Barbara English has  Assessment & Plan  Cerebrovascular accident (CVA), unspecified mechanism (Multi)  Right middle cerebral artery thrombus? Not a candidate thrombectomy per neurology  HTN   Metastatic Pancreatic cancer      needs rehabilitation    Patient does not want to give up eating despite risk of aspiration.. Does not want a PEG tube.  On Eliquis ASA, Statins.  New finding on CT new stroke vs evolution of old. Will ask neurology to see. BP improved.   Other Hospital problems        Abnormal findings not addressed during hospitalization, but require out patient follow up. None        I spent 35 minutes talking and examining Barbara English, reviewing the labs & medications, formulating plan of care & discussing with patient and nursing staff.    Nicholas Norman MD

## 2025-04-07 NOTE — PROGRESS NOTES
Physical Therapy    Physical Therapy Treatment    Patient Name: Barbara English  MRN: [id number]  Department: [facility] [id number]  Room: [address] [place] [address]  Today's Date: 4/7/2025  Time Calculation  Start Time: 0944  Stop Time: 1010  Time Calculation (min): 26 min         Assessment/Plan   PT Assessment  Rehab Prognosis: Fair  Barriers to Discharge Home: Caregiver assistance  End of Session Communication: Bedside nurse  Assessment Comment:  (tp dmeo fair sujata to activity, unsetady at times,req increased cues to complete sequencing.)  End of Session Patient Position: Up in chair, Alarm on  PT Plan  Inpatient/Swing Bed or Outpatient: Inpatient  PT Plan  Treatment/Interventions: Bed mobility, Transfer training, Therapeutic activity  PT Plan: Ongoing PT  PT Frequency: 4 times per week  PT Discharge Recommendations: Moderate intensity level of continued care, Other (comment)  Equipment Recommended upon Discharge: Wheelchair, Other (comment)  PT Recommended Transfer Status: Assist x2, Assistive device  PT - OK to Discharge: Yes (per PT POC)      General Visit Information:   PT  Visit  PT Received On: 04/07/25  General  Reason for Referral: Pt is a 49 y/o F presenting with CVA. Pt also has a metastatic pancreactic CA dx.  Referred By: Nicholas Norman MD  Prior to Session Communication: Bedside nurse  Patient Position Received: Bed, 3 rail up, Alarm on  Preferred Learning Style: auditory, visual, verbal  General Comment:  (pt agreeable to tx.)    Subjective   Precautions:  Precautions  LE Weight Bearing Status: Right Partial Weight Bearing  Medical Precautions: Fall precautions  Precautions Comment: L rocael     Date/Time Vitals Session Patient Position Pulse Resp SpO2 BP MAP (mmHg)    04/07/25 1200 --  --  69  --  --  --  --     04/07/25 1224 --  --  68  16  98 %  105/69  81                 Objective   Pain:  Pain Assessment  Pain Assessment: 0-10  0-10 (Numeric) Pain Score: 0 - No pain  Cognition:  Cognition  Orientation Level: Disoriented to  situation       Postural Control:  Static Sitting Balance  Static Sitting-Balance Support: Bilateral upper extremity supported, Feet supported  Static Sitting-Level of Assistance: Contact guard  Static Sitting-Comment/Number of Minutes:  (pt inconsistently keeping trunk balance)  Dynamic Sitting Balance  Dynamic Sitting-Balance Support: Feet supported, Bilateral upper extremity supported  Dynamic Sitting-Level of Assistance: Minimum assistance  Dynamic Sitting-Balance: Lateral lean, Forward lean, Trunk control activities  Dynamic Sitting-Comments:  (pt dmeo increased L sided neglect and leaning)      Activity Tolerance:  Activity Tolerance  Endurance: Decreased tolerance for upright activites  Treatments:       Bed Mobility  Bed Mobility: Yes  Bed Mobility 1  Bed Mobility 1: Supine to sitting  Level of Assistance 1: Moderate assistance  Bed Mobility Comments 1:  (pt req increased cues and assistance to mobilize BLE's)  Bed Mobility 2  Bed Mobility  2: Rolling right, Rolling left  Level of Assistance 2: Moderate assistance  Bed Mobility Comments 2:  (pt req increased cues for hand placement and sequencing)       Transfers  Transfer: Yes  Transfer 1  Transfer From 1: Bed to  Transfer to 1: Chair with drop arm  Technique 1: Squat pivot  Transfer Device 1: Gait belt  Transfer Level of Assistance 1: Maximum assistance, Moderate verbal cues, Moderate tactile cues  Trials/Comments 1: 3 (pt req increased cues for proper hand placement and sequencing,slow mobility.)    Outcome Measures:  [facility] Basic Mobility  Turning from your back to your side while in a flat bed without using bedrails: A lot  Moving from lying on your back to sitting on the side of a flat bed without using bedrails: A lot  Moving to and from bed to chair (including a wheelchair): A lot  Standing up from a chair using your arms (e.g. wheelchair or bedside chair): A lot  To walk in hospital room: Total  Climbing 3-5 steps with railing: Total  Basic Mobility  - Total Score: 10    Education Documentation  Body Mechanics, taught by Kelvin Hinkle PTA at 4/7/2025  1:14 PM.  Learner: Patient  Readiness: Acceptance  Method: Explanation  Response: Needs Reinforcement    Mobility Training, taught by Kelvin Hinkle PTA at 4/7/2025  1:14 PM.  Learner: Patient  Readiness: Acceptance  Method: Explanation  Response: Needs Reinforcement    Education Comments  No comments found.        OP EDUCATION:       Encounter Problems       Encounter Problems (Active)       Mobility       STG - Patient will ambulate (Not Progressing)       Start:  04/02/25    Expected End:  04/16/25       20 ft with CGA using rocael-walker            PT Transfers       STG - Patient will transfer from one surface to another (Progressing)       Start:  04/02/25    Expected End:  04/16/25       With CGA using rocael-walker            PT Transfers       STG - Patient to transfer to and from sit to supine (Progressing)       Start:  04/02/25    Expected End:  04/16/25       With CGA from a flat bed         STG - Patient will transfer sit to and from stand (Progressing)       Start:  04/02/25    Expected End:  04/16/25       With CGA using rocael-walker            Pain - Adult          Safety       LTG - Patient will adhere to hip precautions during ADL's and transfers       Start:  04/01/25            LTG - Patient will demonstrate safety requirements appropriate to situation/environment       Start:  04/01/25            LTG - Patient will utilize safety techniques       Start:  04/01/25            STG - Patient locks brakes on wheelchair       Start:  04/01/25            STG - Patient uses call light consistently to request assistance with transfers       Start:  04/01/25            STG - Patient uses gait belt during all transfers       Start:  04/01/25            Goal 1       Start:  04/01/25            Goal 2       Start:  04/01/25            Goal 3       Start:  04/01/25

## 2025-04-07 NOTE — PROGRESS NOTES
Music Therapy Note    Barbara English was referred by ARTUR Rubin MD    Therapy Session  Referral Type: New referral this admission  Visit Type: New visit  Session Start Time: 1427  Intervention Delivery: In-person  Conflict of Service: Asleep     Narrative  Follow-up: Will follow up as able.    Education Documentation  No documentation found.

## 2025-04-07 NOTE — CARE PLAN
Problem: Pain - Adult  Goal: Verbalizes/displays adequate comfort level or baseline comfort level  Outcome: Progressing     Problem: Safety - Adult  Goal: Free from fall injury  Outcome: Progressing     Problem: Discharge Planning  Goal: Discharge to home or other facility with appropriate resources  Outcome: Progressing     Problem: Chronic Conditions and Co-morbidities  Goal: Patient's chronic conditions and co-morbidity symptoms are monitored and maintained or improved  Outcome: Progressing     Problem: Nutrition  Goal: Nutrient intake appropriate for maintaining nutritional needs  Outcome: Not Progressing     Problem: Fall/Injury  Goal: Not fall by end of shift  Outcome: Progressing  Goal: Be free from injury by end of the shift  Outcome: Progressing  Goal: Verbalize understanding of personal risk factors for fall in the hospital  Outcome: Progressing  Goal: Verbalize understanding of risk factor reduction measures to prevent injury from fall in the home  Outcome: Progressing  Goal: Use assistive devices by end of the shift  Outcome: Progressing  Goal: Pace activities to prevent fatigue by end of the shift  Outcome: Progressing     Problem: Pain  Goal: Takes deep breaths with improved pain control throughout the shift  Outcome: Progressing  Goal: Turns in bed with improved pain control throughout the shift  Outcome: Progressing  Goal: Walks with improved pain control throughout the shift  Outcome: Progressing  Goal: Performs ADL's with improved pain control throughout shift  Outcome: Progressing  Goal: Participates in PT with improved pain control throughout the shift  Outcome: Progressing  Goal: Free from opioid side effects throughout the shift  Outcome: Progressing  Goal: Free from acute confusion related to pain meds throughout the shift  Outcome: Progressing     Problem: Skin  Goal: Decreased wound size/increased tissue granulation at next dressing change  Outcome: Progressing  Flowsheets (Taken 4/7/2025  0320)  Decreased wound size/increased tissue granulation at next dressing change: Promote sleep for wound healing  Goal: Participates in plan/prevention/treatment measures  Outcome: Progressing  Flowsheets (Taken 4/7/2025 1330)  Participates in plan/prevention/treatment measures: Elevate heels  Goal: Prevent/manage excess moisture  Outcome: Progressing  Flowsheets (Taken 4/7/2025 1330)  Prevent/manage excess moisture: Moisturize dry skin  Goal: Prevent/minimize sheer/friction injuries  Outcome: Progressing  Flowsheets (Taken 4/7/2025 1330)  Prevent/minimize sheer/friction injuries: Turn/reposition every 2 hours/use positioning/transfer devices  Goal: Promote/optimize nutrition  Outcome: Not Progressing  Flowsheets (Taken 4/7/2025 1330)  Promote/optimize nutrition: Consume > 50% meals/supplements  Goal: Promote skin healing  Outcome: Progressing  Flowsheets (Taken 4/7/2025 1330)  Promote skin healing: Turn/reposition every 2 hours/use positioning/transfer devices     Problem: Diabetes  Goal: Achieve decreasing blood glucose levels by end of shift  Outcome: Progressing  Goal: No changes in neurological exam by end of shift  Outcome: Progressing     Problem: General Stroke  Goal: Maintain BP within ordered limits throughout shift  Outcome: Progressing  Goal: Participate in treatment (ie., meds, therapy) throughout shift  Outcome: Progressing  Goal: No symptoms of aspiration throughout shift  Outcome: Progressing  Goal: Out of bed three times today  Outcome: Progressing   The patient's goals for the shift include wants to dc home    The clinical goals for the shift include patient will remain safe throuhgout this shift      Problem: Nutrition  Goal: Nutrient intake appropriate for maintaining nutritional needs  Outcome: Not Progressing     Problem: Skin  Goal: Promote/optimize nutrition  Outcome: Not Progressing  Flowsheets (Taken 4/7/2025 1330)  Promote/optimize nutrition: Consume > 50% meals/supplements

## 2025-04-07 NOTE — PROGRESS NOTES
04/07/25 3945   Discharge Planning   Assistance Needed just met with patient's daughter; the plan is home now w/Home Health Care will need medical necessity note for Hospital Bed and Wheel chair Attending updated via secure chat   Home or Post Acute Services In home services   Type of Home Care Services Home OT;Home PT;Home health aide;Home nursing visits   Expected Discharge Disposition Home H   Does the patient need discharge transport arranged? Yes   RoundTrip coordination needed? Yes   Has discharge transport been arranged? No

## 2025-04-07 NOTE — CARE PLAN
The patient's goals for the shift include  maintain safety    The clinical goals for the shift include safety    Problem: Pain - Adult  Goal: Verbalizes/displays adequate comfort level or baseline comfort level  Outcome: Progressing     Problem: Safety - Adult  Goal: Free from fall injury  Outcome: Progressing     Problem: Chronic Conditions and Co-morbidities  Goal: Patient's chronic conditions and co-morbidity symptoms are monitored and maintained or improved  Outcome: Progressing

## 2025-04-07 NOTE — PROGRESS NOTES
"Barbara English is a 50 y.o. female on day 7 of admission presenting with Cerebrovascular accident (CVA), unspecified mechanism (Multi).      Interval History: Family is opting for home PT. They were offered a venue in a neighborhood where she had bad experiences in her past so they are opting to have her care at home. They will need some DME.  CT head on 4/5 w/ findings of subacute RT front and parietal infarcts.       Subjective   Symptoms (0 - 10, Best to Worst)  [place] Symptom Assessment System  0-10 (Numeric) Pain Score: 0 - No pain  Reports pain is controlled w/ current dosing regimen. Has not required PRN oxycodone. Still fatigued. No improvement in her LT sided weakness.     Objective     Physical Exam  Constitutional:       General: She is not in acute distress.     Appearance: Normal appearance.      Comments: Somewhat drowsy with LT sided facial droop. Favors RT side.   HENT:      Head: Normocephalic and atraumatic.      Mouth/Throat:      Mouth: Mucous membranes are moist.   Cardiovascular:      Rate and Rhythm: Normal rate and regular rhythm.   Pulmonary:      Effort: No respiratory distress.   Abdominal:      General: Bowel sounds are normal.      Tenderness: There is no abdominal tenderness. There is no guarding.     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   Skin:     General: Skin is warm and dry.   Neurological:      Mental Status: She is alert and oriented to person, place, and time.   Comments: No activation on RT UE. RT hip with some preserved plantar flexion and hip extension.   Psychiatric:         Thought Content: Thought content normal.         Judgment: Judgment normal.         Last Recorded Vitals  Blood pressure 156/82, pulse 77, temperature 36.5 °C (97.7 °F), resp. rate 17, height 1.6 m (5' 3\"), weight 61.6 kg (135 lb 11.2 oz), SpO2 99%.  Intake/Output last 3 Shifts:  I/O last 3 completed shifts:  In: 410 (6.7 mL/kg) [I.V.:410 (6.7 mL/kg)]  Out: " 550 (8.9 mL/kg) [Urine:550 (0.2 mL/kg/hr)]  Weight: 61.6 kg     Wt Readings from Last 10 Encounters:   03/31/25 76.6 kg (168 lb 14 oz)   1/18/2025([facility]) 61.2 kg  135 lbs   04/30/24 63.5 kg (140 lb)   05/09/22 55 kg (121 lb 4.1 oz)       Relevant Results    Scheduled medications  apixaban, 5 mg, oral, BID  aspirin, 81 mg, oral, Daily  atorvastatin, 80 mg, oral, Nightly  buprenorphine-naloxone, 1 tablet, sublingual, BID  gabapentin, 300 mg, oral, Nightly  gabapentin, 600 mg, oral, Daily  insulin glargine, 10 Units, subcutaneous, Nightly  insulin lispro, 0-5 Units, subcutaneous, TID AC  losartan, 25 mg, oral, Daily  NIFEdipine ER, 60 mg, oral, Daily  pantoprazole, 40 mg, oral, BID  polyethylene glycol, 17 g, oral, Daily  sennosides, 2 tablet, oral, BID  sertraline, 50 mg, oral, Daily      Continuous medications  D5 % and 0.9 % sodium chloride, 75 mL/hr, Last Rate: 75 mL/hr (04/04/25 0441)      PRN medications  PRN medications: dextrose, dextrose, glucagon, glucagon, hydrALAZINE, naloxone, oxyCODONE, oxyCODONE, oxygen    Results for orders placed or performed during the hospital encounter of 03/31/25 (from the past 24 hours)   POCT GLUCOSE   Result Value Ref Range    POCT Glucose 308 (H) 74 - 99 mg/dL   POCT GLUCOSE   Result Value Ref Range    POCT Glucose 335 (H) 74 - 99 mg/dL   POCT GLUCOSE   Result Value Ref Range    POCT Glucose 269 (H) 74 - 99 mg/dL   POCT GLUCOSE   Result Value Ref Range    POCT Glucose 158 (H) 74 - 99 mg/dL   POCT GLUCOSE   Result Value Ref Range    POCT Glucose 166 (H) 74 - 99 mg/dL       CT head wo IV contrast    Result Date: 4/1/2025  1. Negative head CT for acute change.     MACRO: None   Signed by: Raj Rubin 4/1/2025 6:12 PM Dictation workstation:   [id number]    CT cervical spine wo IV contrast    Result Date: 3/31/2025  No acute osseous abnormality is identified in the cervical spine.   MACRO: None   Signed by: Zechariah Cole 3/31/2025 5:37 PM Dictation workstation:    [id number]    CT brain attack angio head and neck W and WO IV contrast    Result Date: 3/31/2025  1. Short-segment of severely attenuated flow related enhancement at the origin of the M1 segment right middle cerebral artery, suspected focus of thrombus. 2. Nonspecific fusiform narrowing of the right internal carotid artery supraclinoid segment. This could be related to nonocclusive wall adherent thrombus, less likely fibrofatty atherosclerotic plaque. 3. Undulating attenuation of flow related enhancement in the right pericallosal artery of the anterior cerebral artery. This may represent thrombosis. 4. Multifocal endodontal and periodontal disease.   MACRO: Eladio Montoya discussed the significance and urgency of this critical finding epic chat with  SILVINO STRONG AND TISHA SANABRIA on 3/31/2025 at 1:32 pm.  (**-RCF-**) Findings:  See findings.   Signed by: Eladio Montoya 3/31/2025 1:33 PM Dictation workstation:   [id number]    CT brain attack head wo IV contrast    Result Date: 3/31/2025  NEGATIVE BRAIN ATTACK PROTOCOL CT BRAIN:   NO ACUTE INTRACRANIAL HEMORRHAGE   NO ACUTE INTRACRANIAL MASS EFFECT   NO CT EVIDENCE OF A LARGE ACUTE TERRITORIAL INFARCT   I WAS ABLE TO COMMUNICATE THESE FINDINGS BY TELEPHONE TO DR STRONG AT 1059 HOURS, SAME DAY, 31 MARCH 2025   MACRO: Edilberto Sultana discussed the significance and urgency of this critical finding by telephone with  SILVINO STRONG on 3/31/2025 at 11:01 am. (**-RCF-**) Findings:  See findings.   Signed by: Edilberto Sultana 3/31/2025 11:01 AM Dictation workstation:   [id number]    US renal complete    Result Date: 3/21/2025  IMPRESSION: No hydronephrosis. Nonspecific bladder debris, correlate with urinalysis. : [facility]   Transcribe Date/Time: Mar 21 2025  1:51P Dictated by : LORETTA ANTON MD This examination was interpreted and the report reviewed and electronically signed by: LORETTA ANTON MD on Mar 21 2025  1:52PM  EST    US gallbladder    Result Date:  3/20/2025  IMPRESSION: Known small bilobar hepatic metastases Cholelithiasis and nonspecific diffuse gallbladder wall thickening.   Nuclear medicine hepatobiliary imaging could be used if there is clinical concern for acute cholecystitis. Pancreatic tail mass not assessable on this exam. : [facility]   Transcribe Date/Time: Mar 20 2025  8:13A Dictated by : ROSITA ALVARADO MD This examination was interpreted and the report reviewed and electronically signed by: ROSITA ALVARADO MD on Mar 20 2025  8:17AM  EST    CT abdomen pelvis w IV contrast    Result Date: 3/19/2025  IMPRESSION: Stable pancreatic tail mass consistent with biopsy-proven pancreatic adenocarcinoma. Stable presumed hepatic metastases since 02/26/2025. 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 Nonspecific mild gallbladder wall thickening with adjacent trace pericholecystic fluid.  May consider right upper quadrant ultrasound to exclude acute cholecystitis if there is clinical concern. Redemonstrated wall thickening of the distal esophagus is nonspecific though may represent esophagitis. : [facility]   Transcribe Date/Time: Mar 19 2025  9:44P Dictated by : ORLIN ANTONIO DO This examination was interpreted and the report reviewed and electronically signed by: HINA DALY MD on Mar 19 2025 10:20PM  EST                  Assessment/Plan   IMP:  50-year-old female with history of type 1 diabetes melitis with multiple complications including neuropathy retinopathy DKA and peripheral angiopathy and notably newly diagnosed pancreatic tail adenocarcinoma.  She presented with stroke symptoms and was found to have an occlusion in her right MCA.  She is being treated conservatively for this and neurology is following.  Palliative was consulted for assistance with pain management and goals of care. She is rehab oriented but is hesitant about cancer treatment. She understands her prognosis. Will be offered hospice.     Total OME/24h: 100  OME  Morphine:   Oxycodone: 0  Hydromorphone:   Fentanyl:   Suboxone: 2mg     CVA  Pancreatic Adenocarcinoma  [facility]   Family meeting updates: They want to attempt rehab at SNF. DNR. Pureed diet for now unless patient requests return to regular. Hospice INFORMATIONAL VISIT ONLY  Does not want PEG. Does not want to give up eating as it is one of the few things that give her pleasure anymore.   Discussed her goals and chemotherapy. She is reluctant to try chemotherapy again and understands that this is not a resectable disease. She would prefer radiation therapy if it is an option for her pancreatic adenocarcinoma that will be offered by her cancer team.   Pain and symptom control are top priorities for her since she was already told her prognosis was likely less than 6 months.   Incentive spirometer ordered. Providing instruction.      Diabetic neuropathy  Continue with gabapentin 600 mg in the morning and 300 mg at night     Cancer related epigastric pain  Metastatic Pancreatic tail adenocarcinoma   C/w Suboxone 2-0.5 to ONCE daily  C/w oxycodone to 10mg q4h prn as pain  Also with oxycodone 10mg q12h prn for breakthrough.      Mood disorder:  C/w Sertraline 50mg daily     Bowel regimen:  Senna 2 tabs BID   Miralax Prn  Will keep as is for now.         Provider estimate of survival: 1-6 months Hospice appropriate  Patient Prognostic Awareness: Yes - incongruent with provider estimation     Patient/proxy preference for information  Prefers full information     Goals of Care  DNR as of 4/4/25     Is the patient hospice-eligible?   Yes  Was a discussion held re hospice services?   no  Was a decision made re hospice services?  No, conversations ongoing.     Other Palliative Support  Music therapy referral        I spent 50 minutes in the professional and overall care of this patient.        Seth Rubin MD

## 2025-04-08 LAB
GLUCOSE BLD MANUAL STRIP-MCNC: 127 MG/DL (ref 74–99)
GLUCOSE BLD MANUAL STRIP-MCNC: 145 MG/DL (ref 74–99)
GLUCOSE BLD MANUAL STRIP-MCNC: 185 MG/DL (ref 74–99)
GLUCOSE BLD MANUAL STRIP-MCNC: 190 MG/DL (ref 74–99)

## 2025-04-08 PROCEDURE — 99233 SBSQ HOSP IP/OBS HIGH 50: CPT | Performed by: INTERNAL MEDICINE

## 2025-04-08 PROCEDURE — 2500000001 HC RX 250 WO HCPCS SELF ADMINISTERED DRUGS (ALT 637 FOR MEDICARE OP): Performed by: INTERNAL MEDICINE

## 2025-04-08 PROCEDURE — 97530 THERAPEUTIC ACTIVITIES: CPT | Mod: GP,CQ | Performed by: PHYSICAL THERAPY ASSISTANT

## 2025-04-08 PROCEDURE — 2500000002 HC RX 250 W HCPCS SELF ADMINISTERED DRUGS (ALT 637 FOR MEDICARE OP, ALT 636 FOR OP/ED): Performed by: INTERNAL MEDICINE

## 2025-04-08 PROCEDURE — 97535 SELF CARE MNGMENT TRAINING: CPT | Mod: GO

## 2025-04-08 PROCEDURE — 97112 NEUROMUSCULAR REEDUCATION: CPT | Mod: GP,CQ | Performed by: PHYSICAL THERAPY ASSISTANT

## 2025-04-08 PROCEDURE — 2500000001 HC RX 250 WO HCPCS SELF ADMINISTERED DRUGS (ALT 637 FOR MEDICARE OP): Performed by: STUDENT IN AN ORGANIZED HEALTH CARE EDUCATION/TRAINING PROGRAM

## 2025-04-08 PROCEDURE — 1200000002 HC GENERAL ROOM WITH TELEMETRY DAILY

## 2025-04-08 PROCEDURE — 99418 PROLNG IP/OBS E/M EA 15 MIN: CPT | Performed by: INTERNAL MEDICINE

## 2025-04-08 PROCEDURE — 82947 ASSAY GLUCOSE BLOOD QUANT: CPT

## 2025-04-08 PROCEDURE — 2500000004 HC RX 250 GENERAL PHARMACY W/ HCPCS (ALT 636 FOR OP/ED): Performed by: INTERNAL MEDICINE

## 2025-04-08 RX ORDER — SODIUM CHLORIDE 9 MG/ML
125 INJECTION, SOLUTION INTRAVENOUS CONTINUOUS
Status: DISCONTINUED | OUTPATIENT
Start: 2025-04-08 | End: 2025-04-10 | Stop reason: HOSPADM

## 2025-04-08 RX ADMIN — PANTOPRAZOLE SODIUM 40 MG: 40 TABLET, DELAYED RELEASE ORAL at 08:23

## 2025-04-08 RX ADMIN — POLYETHYLENE GLYCOL 3350 17 G: 17 POWDER, FOR SOLUTION ORAL at 08:24

## 2025-04-08 RX ADMIN — SERTRALINE 50 MG: 50 TABLET, FILM COATED ORAL at 08:23

## 2025-04-08 RX ADMIN — INSULIN GLARGINE 10 UNITS: 100 INJECTION, SOLUTION SUBCUTANEOUS at 20:56

## 2025-04-08 RX ADMIN — SENNOSIDES 17.2 MG: 8.6 TABLET ORAL at 20:56

## 2025-04-08 RX ADMIN — OXYCODONE HYDROCHLORIDE 10 MG: 5 TABLET ORAL at 21:11

## 2025-04-08 RX ADMIN — GABAPENTIN 300 MG: 300 CAPSULE ORAL at 20:56

## 2025-04-08 RX ADMIN — SODIUM CHLORIDE 125 ML/HR: 9 INJECTION, SOLUTION INTRAVENOUS at 10:13

## 2025-04-08 RX ADMIN — APIXABAN 5 MG: 5 TABLET, FILM COATED ORAL at 20:56

## 2025-04-08 RX ADMIN — LOSARTAN POTASSIUM 50 MG: 50 TABLET, FILM COATED ORAL at 08:23

## 2025-04-08 RX ADMIN — BUPRENORPHINE AND NALOXONE 1 TABLET: 2; .5 TABLET SUBLINGUAL at 21:17

## 2025-04-08 RX ADMIN — GABAPENTIN 600 MG: 300 CAPSULE ORAL at 08:23

## 2025-04-08 RX ADMIN — SENNOSIDES 17.2 MG: 8.6 TABLET ORAL at 08:23

## 2025-04-08 RX ADMIN — ATORVASTATIN CALCIUM 80 MG: 80 TABLET, FILM COATED ORAL at 20:56

## 2025-04-08 RX ADMIN — OXYCODONE HYDROCHLORIDE 10 MG: 5 TABLET ORAL at 15:22

## 2025-04-08 RX ADMIN — NIFEDIPINE 60 MG: 30 TABLET, FILM COATED, EXTENDED RELEASE ORAL at 08:23

## 2025-04-08 RX ADMIN — ASPIRIN 81 MG: 81 TABLET, COATED ORAL at 08:23

## 2025-04-08 RX ADMIN — APIXABAN 5 MG: 5 TABLET, FILM COATED ORAL at 08:23

## 2025-04-08 RX ADMIN — INSULIN LISPRO 1 UNITS: 100 INJECTION, SOLUTION INTRAVENOUS; SUBCUTANEOUS at 08:22

## 2025-04-08 RX ADMIN — INSULIN LISPRO 1 UNITS: 100 INJECTION, SOLUTION INTRAVENOUS; SUBCUTANEOUS at 12:35

## 2025-04-08 RX ADMIN — PANTOPRAZOLE SODIUM 40 MG: 40 TABLET, DELAYED RELEASE ORAL at 20:56

## 2025-04-08 ASSESSMENT — COGNITIVE AND FUNCTIONAL STATUS - GENERAL
CLIMB 3 TO 5 STEPS WITH RAILING: TOTAL
MOVING TO AND FROM BED TO CHAIR: A LOT
DRESSING REGULAR LOWER BODY CLOTHING: TOTAL
STANDING UP FROM CHAIR USING ARMS: TOTAL
WALKING IN HOSPITAL ROOM: TOTAL
TURNING FROM BACK TO SIDE WHILE IN FLAT BAD: A LOT
HELP NEEDED FOR BATHING: A LOT
MOVING FROM LYING ON BACK TO SITTING ON SIDE OF FLAT BED WITH BEDRAILS: A LOT
EATING MEALS: A LITTLE
DAILY ACTIVITIY SCORE: 6
MOVING TO AND FROM BED TO CHAIR: A LOT
DAILY ACTIVITIY SCORE: 12
WALKING IN HOSPITAL ROOM: TOTAL
PERSONAL GROOMING: TOTAL
DRESSING REGULAR LOWER BODY CLOTHING: TOTAL
TOILETING: TOTAL
STANDING UP FROM CHAIR USING ARMS: A LOT
MOBILITY SCORE: 9
DRESSING REGULAR UPPER BODY CLOTHING: TOTAL
HELP NEEDED FOR BATHING: TOTAL
MOVING FROM LYING ON BACK TO SITTING ON SIDE OF FLAT BED WITH BEDRAILS: A LOT
PERSONAL GROOMING: A LITTLE
EATING MEALS: TOTAL
TOILETING: TOTAL
TOILETING: TOTAL
TURNING FROM BACK TO SIDE WHILE IN FLAT BAD: A LOT
MOBILITY SCORE: 10
TURNING FROM BACK TO SIDE WHILE IN FLAT BAD: A LOT
DRESSING REGULAR UPPER BODY CLOTHING: A LOT
STANDING UP FROM CHAIR USING ARMS: TOTAL
MOBILITY SCORE: 9
CLIMB 3 TO 5 STEPS WITH RAILING: TOTAL
EATING MEALS: TOTAL
WALKING IN HOSPITAL ROOM: TOTAL
CLIMB 3 TO 5 STEPS WITH RAILING: TOTAL
DAILY ACTIVITIY SCORE: 6
DRESSING REGULAR UPPER BODY CLOTHING: TOTAL
MOVING TO AND FROM BED TO CHAIR: A LOT
MOVING FROM LYING ON BACK TO SITTING ON SIDE OF FLAT BED WITH BEDRAILS: A LOT
DRESSING REGULAR LOWER BODY CLOTHING: TOTAL
HELP NEEDED FOR BATHING: TOTAL
PERSONAL GROOMING: TOTAL

## 2025-04-08 ASSESSMENT — PAIN SCALES - PAIN ASSESSMENT IN ADVANCED DEMENTIA (PAINAD): TOTALSCORE: MEDICATION (SEE MAR)

## 2025-04-08 ASSESSMENT — PAIN SCALES - GENERAL
PAINLEVEL_OUTOF10: 8
PAINLEVEL_OUTOF10: 0 - NO PAIN
PAINLEVEL_OUTOF10: 7
PAINLEVEL_OUTOF10: 0 - NO PAIN
PAINLEVEL_OUTOF10: 7
PAINLEVEL_OUTOF10: 0 - NO PAIN
PAINLEVEL_OUTOF10: 0 - NO PAIN

## 2025-04-08 ASSESSMENT — PAIN - FUNCTIONAL ASSESSMENT
PAIN_FUNCTIONAL_ASSESSMENT: 0-10
PAIN_FUNCTIONAL_ASSESSMENT: 0-10

## 2025-04-08 ASSESSMENT — ACTIVITIES OF DAILY LIVING (ADL): HOME_MANAGEMENT_TIME_ENTRY: 14

## 2025-04-08 ASSESSMENT — PAIN DESCRIPTION - ORIENTATION: ORIENTATION: RIGHT

## 2025-04-08 ASSESSMENT — PAIN SCALES - WONG BAKER: WONGBAKER_NUMERICALRESPONSE: NO HURT

## 2025-04-08 NOTE — PROGRESS NOTES
Barbara English is a 50 y.o. female on day 8 of admission presenting with Cerebrovascular accident (CVA), unspecified mechanism (Multi).    Subjective   Symptoms (0 - 10, Best to Worst)  Barbara English is a 50 y.o. female on day 8 of admission presenting with Cerebrovascular accident (CVA), unspecified mechanism (Multi).      Interval History: Discussed MOUSTAPHA i/s/o of likely inadequate fluid intake. Her urine is dark and appears concentrated. I told her that if this were to continue, it would add another issue to her list of life-limiting conditions.   I told her that her prognosis would be on the order of weeks to a couple of months and that PT might prove difficult. She wants to muscle on and tells me she is working with PT. I encouraged her to continue this as long as she finds benefit and that hospice might become her best option for her medical care.       Subjective   Symptoms (0 - 10, Best to Worst)  [place] Symptom Assessment System  0-10 (Numeric) Pain Score: 0 - No pain      Objective     Physical Exam  Constitutional:       General: She is not in acute distress.     Appearance: Normal appearance.      Comments: Somewhat drowsy with LT sided facial droop. Favors RT side.   HENT:      Head: Normocephalic and atraumatic.      Mouth/Throat:      Mouth: Mucous membranes are moist.   Cardiovascular:      Rate and Rhythm: Normal rate and regular rhythm.   Pulmonary:      Effort: No respiratory distress.   Abdominal:      General: Bowel sounds are normal.      Tenderness: There is no abdominal tenderness. There is no guarding.     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   Skin:     General: Skin is warm and dry.   Neurological:      Mental Status: She is alert and oriented to person, place, and time.   Comments: No activation on RT UE. RT hip with some preserved plantar flexion and hip extension.   Psychiatric:         Thought Content: Thought content normal.          "Judgment: Judgment normal.         Last Recorded Vitals  Blood pressure 156/82, pulse 77, temperature 36.5 °C (97.7 °F), resp. rate 17, height 1.6 m (5' 3\"), weight 61.6 kg (135 lb 11.2 oz), SpO2 99%.  Intake/Output last 3 Shifts:  I/O last 3 completed shifts:  In: 410 (6.7 mL/kg) [I.V.:410 (6.7 mL/kg)]  Out: 550 (8.9 mL/kg) [Urine:550 (0.2 mL/kg/hr)]  Weight: 61.6 kg     Wt Readings from Last 10 Encounters:   03/31/25 76.6 kg (168 lb 14 oz)   1/18/2025([facility]) 61.2 kg  135 lbs   04/30/24 63.5 kg (140 lb)   05/09/22 55 kg (121 lb 4.1 oz)       Relevant Results    Scheduled medications  apixaban, 5 mg, oral, BID  aspirin, 81 mg, oral, Daily  atorvastatin, 80 mg, oral, Nightly  buprenorphine-naloxone, 1 tablet, sublingual, BID  gabapentin, 300 mg, oral, Nightly  gabapentin, 600 mg, oral, Daily  insulin glargine, 10 Units, subcutaneous, Nightly  insulin lispro, 0-5 Units, subcutaneous, TID AC  losartan, 25 mg, oral, Daily  NIFEdipine ER, 60 mg, oral, Daily  pantoprazole, 40 mg, oral, BID  polyethylene glycol, 17 g, oral, Daily  sennosides, 2 tablet, oral, BID  sertraline, 50 mg, oral, Daily      Continuous medications  D5 % and 0.9 % sodium chloride, 75 mL/hr, Last Rate: 75 mL/hr (04/04/25 0441)      PRN medications  PRN medications: dextrose, dextrose, glucagon, glucagon, hydrALAZINE, naloxone, oxyCODONE, oxyCODONE, oxygen    Results for orders placed or performed during the hospital encounter of 03/31/25 (from the past 24 hours)   POCT GLUCOSE   Result Value Ref Range    POCT Glucose 166 (H) 74 - 99 mg/dL   POCT GLUCOSE   Result Value Ref Range    POCT Glucose 150 (H) 74 - 99 mg/dL   CBC and Auto Differential   Result Value Ref Range    WBC 8.4 4.4 - 11.3 x10*3/uL    nRBC 0.0 0.0 - 0.0 /100 WBCs    RBC 3.45 (L) 4.00 - 5.20 x10*6/uL    Hemoglobin 8.5 (L) 12.0 - 16.0 g/dL    Hematocrit 27.9 (L) 36.0 - 46.0 %    MCV 81 80 - 100 fL    MCH 24.6 (L) 26.0 - 34.0 pg    MCHC 30.5 (L) 32.0 - 36.0 g/dL    RDW 14.7 (H) 11.5 - 14.5 " %    Platelets 269 150 - 450 x10*3/uL    Neutrophils % 74.5 40.0 - 80.0 %    Immature Granulocytes %, Automated 0.2 0.0 - 0.9 %    Lymphocytes % 15.6 13.0 - 44.0 %    Monocytes % 5.5 2.0 - 10.0 %    Eosinophils % 3.4 0.0 - 6.0 %    Basophils % 0.8 0.0 - 2.0 %    Neutrophils Absolute 6.26 1.20 - 7.70 x10*3/uL    Immature Granulocytes Absolute, Automated 0.02 0.00 - 0.70 x10*3/uL    Lymphocytes Absolute 1.31 1.20 - 4.80 x10*3/uL    Monocytes Absolute 0.46 0.10 - 1.00 x10*3/uL    Eosinophils Absolute 0.29 0.00 - 0.70 x10*3/uL    Basophils Absolute 0.07 0.00 - 0.10 x10*3/uL   Basic Metabolic Panel   Result Value Ref Range    Glucose 166 (H) 74 - 99 mg/dL    Sodium 142 136 - 145 mmol/L    Potassium 4.2 3.5 - 5.3 mmol/L    Chloride 110 (H) 98 - 107 mmol/L    Bicarbonate 24 21 - 32 mmol/L    Anion Gap 12 10 - 20 mmol/L    Urea Nitrogen 30 (H) 6 - 23 mg/dL    Creatinine 1.54 (H) 0.50 - 1.05 mg/dL    eGFR 41 (L) >60 mL/min/1.73m*2    Calcium 8.6 8.6 - 10.3 mg/dL   POCT GLUCOSE   Result Value Ref Range    POCT Glucose 163 (H) 74 - 99 mg/dL   POCT GLUCOSE   Result Value Ref Range    POCT Glucose 190 (H) 74 - 99 mg/dL       CT head wo IV contrast    Result Date: 4/1/2025  1. Negative head CT for acute change.     MACRO: None   Signed by: Raj Rubin 4/1/2025 6:12 PM Dictation workstation:   [id number]    CT cervical spine wo IV contrast    Result Date: 3/31/2025  No acute osseous abnormality is identified in the cervical spine.   MACRO: None   Signed by: Zechariah Cole 3/31/2025 5:37 PM Dictation workstation:   [id number]    CT brain attack angio head and neck W and WO IV contrast    Result Date: 3/31/2025  1. Short-segment of severely attenuated flow related enhancement at the origin of the M1 segment right middle cerebral artery, suspected focus of thrombus. 2. Nonspecific fusiform narrowing of the right internal carotid artery supraclinoid segment. This could be related to nonocclusive wall adherent thrombus,  less likely fibrofatty atherosclerotic plaque. 3. Undulating attenuation of flow related enhancement in the right pericallosal artery of the anterior cerebral artery. This may represent thrombosis. 4. Multifocal endodontal and periodontal disease.   MACRO: Eladio Montoya discussed the significance and urgency of this critical finding epic chat with  SILVINO STRONG AND TISHA SANABRIA on 3/31/2025 at 1:32 pm.  (**-RCF-**) Findings:  See findings.   Signed by: Eladio Montoya 3/31/2025 1:33 PM Dictation workstation:   [id number]    CT brain attack head wo IV contrast    Result Date: 3/31/2025  NEGATIVE BRAIN ATTACK PROTOCOL CT BRAIN:   NO ACUTE INTRACRANIAL HEMORRHAGE   NO ACUTE INTRACRANIAL MASS EFFECT   NO CT EVIDENCE OF A LARGE ACUTE TERRITORIAL INFARCT   I WAS ABLE TO COMMUNICATE THESE FINDINGS BY TELEPHONE TO DR STRONG AT 1059 HOURS, SAME DAY, 31 MARCH 2025   MACRO: Edilberto Sultana discussed the significance and urgency of this critical finding by telephone with  SILVINO STRONG on 3/31/2025 at 11:01 am. (**-RCF-**) Findings:  See findings.   Signed by: Edilberto Sultana 3/31/2025 11:01 AM Dictation workstation:   [id number]    US renal complete    Result Date: 3/21/2025  IMPRESSION: No hydronephrosis. Nonspecific bladder debris, correlate with urinalysis. : [facility]   Transcribe Date/Time: Mar 21 2025  1:51P Dictated by : LORETTA ANTON MD This examination was interpreted and the report reviewed and electronically signed by: LORETTA ANTON MD on Mar 21 2025  1:52PM  EST    US gallbladder    Result Date: 3/20/2025  IMPRESSION: Known small bilobar hepatic metastases Cholelithiasis and nonspecific diffuse gallbladder wall thickening.   Nuclear medicine hepatobiliary imaging could be used if there is clinical concern for acute cholecystitis. Pancreatic tail mass not assessable on this exam. : [facility]   Transcribe Date/Time: Mar 20 2025  8:13A Dictated by : ROSITA ALVARADO MD This examination was interpreted  and the report reviewed and electronically signed by: ROSITA ALVARADO MD on Mar 20 2025  8:17AM  EST    CT abdomen pelvis w IV contrast    Result Date: 3/19/2025  IMPRESSION: Stable pancreatic tail mass consistent with biopsy-proven pancreatic adenocarcinoma. Stable presumed hepatic metastases since 02/26/2025. 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 Nonspecific mild gallbladder wall thickening with adjacent trace pericholecystic fluid.  May consider right upper quadrant ultrasound to exclude acute cholecystitis if there is clinical concern. Redemonstrated wall thickening of the distal esophagus is nonspecific though may represent esophagitis. : [facility]   Transcribe Date/Time: Mar 19 2025  9:44P Dictated by : ORLIN ANTONIO,  This examination was interpreted and the report reviewed and electronically signed by: HINA DALY MD on Mar 19 2025 10:20PM  EST                  Assessment/Plan   IMP:  50-year-old female with history of type 1 diabetes melitis with multiple complications including neuropathy retinopathy DKA and peripheral angiopathy and notably newly diagnosed pancreatic tail adenocarcinoma.  She presented with stroke symptoms and was found to have an occlusion in her right MCA.  She is being treated conservatively for this and neurology is following.  Palliative was consulted for assistance with pain management and goals of care. She is rehab oriented but is hesitant about cancer treatment. She understands her prognosis. She is going home with PT. She has excellent support from her daughter. They are    Total OME/24h: 100 OME  Morphine:   Oxycodone: 0  Hydromorphone:   Fentanyl:   Suboxone: 2mg     Disposition:  Home with PT and [facility] Navigator in place. They know to sign onto hospice once PT is no longer beneficial.   They are awaiting DME since she needs a hospital bed d/t CVA.   D/w patient, family, TCC, primary team.     CVA  Pancreatic Adenocarcinoma  [facility]   Family meeting updates:  They want to attempt rehab at SNF. DNR. Pureed diet for now unless patient requests return to regular. Hospice INFORMATIONAL VISIT ONLY  Does not want PEG. Does not want to give up eating as it is one of the few things that give her pleasure anymore.   Discussed her goals and chemotherapy. She is reluctant to try chemotherapy again and understands that this is not a resectable disease. She would prefer radiation therapy if it is an option for her pancreatic adenocarcinoma that will be offered by her cancer team.   Pain and symptom control are top priorities for her since she was already told her prognosis was likely less than 6 months.   Incentive spirometer ordered. Providing instruction.     MOUSTAPHA: Likely related to intake      Diabetic neuropathy  Continue with gabapentin 600 mg in the morning and 300 mg at night     Cancer related epigastric pain  Metastatic Pancreatic tail adenocarcinoma   C/w Suboxone 2-0.5 to ONCE daily  C/w oxycodone to 10mg q4h prn as pain  Also with oxycodone 10mg q12h prn for breakthrough.        Mood disorder:  C/w Sertraline 50mg daily     Bowel regimen:  Senna 2 tabs BID   Miralax Prn  Will keep as is for now.         Provider estimate of survival: 1-6 months Hospice appropriate  Patient Prognostic Awareness: Yes - incongruent with provider estimation     Patient/proxy preference for information  Prefers full information     Goals of Care  DNR as of 4/4/25     Is the patient hospice-eligible?   Yes  Was a discussion held re hospice services?   no  Was a decision made re hospice services?  No, conversations ongoing.     Other Palliative Support  Music therapy referral      I spent 70 minutes in the professional and overall care of this patient.      Seth Rubin MD

## 2025-04-08 NOTE — CARE PLAN
The patient's goals for the shift include wants to dc home    The clinical goals for the shift include patient will remain free from falls    Over the shift, the patient did not make progress toward the following goals. Barriers to progression include . Recommendations to address these barriers include .  Problem: Pain - Adult  Goal: Verbalizes/displays adequate comfort level or baseline comfort level  Outcome: Progressing     Problem: Safety - Adult  Goal: Free from fall injury  Outcome: Progressing

## 2025-04-08 NOTE — PROGRESS NOTES
"[facility]          Admitting Provider: Nicholas Norman MD Admission Date: 3/31/2025.   Attending Provider: Nicholas Norman MD MRN: [id number]       Subjective   Barbara English is a 50 y.o. female on day 8 of admission presenting with Cerebrovascular accident (CVA), unspecified mechanism (Multi).  Interval History no complaints.     Objective   Physical Exam  Last Recorded Vitals: Blood pressure 134/85, pulse 70, temperature 36.9 °C (98.4 °F), temperature source Temporal, resp. rate 16, height 1.6 m (5' 3\"), weight 61.6 kg (135 lb 11.2 oz), SpO2 98%.  Patient Vitals for the past 24 hrs:   BP Temp Temp src Pulse Resp SpO2   04/08/25 0719 134/85 36.9 °C (98.4 °F) Temporal 70 16 98 %   04/08/25 0332 116/70 36.4 °C (97.6 °F) Oral 68 16 92 %   04/07/25 2328 127/84 37 °C (98.6 °F) Oral 64 15 97 %   04/07/25 1942 111/72 36.7 °C (98.1 °F) Oral 66 17 94 %   04/07/25 1700 -- -- -- 75 -- --   04/07/25 1601 113/77 36.4 °C (97.5 °F) Temporal 67 17 95 %   04/07/25 1500 -- -- -- 68 -- --   04/07/25 1224 105/69 36 °C (96.8 °F) Temporal 68 16 98 %   04/07/25 1200 -- -- -- 69 -- --   04/07/25 1100 -- -- -- 69 15 --   04/07/25 0900 -- -- -- 67 -- --     Body mass index is 24.04 kg/m².  GENERAL: alert, cooperative, or no distress  SKIN: no rashes  HEENT Atraumatic, Normocephalic and Not pale, no icterus  NECK: supple, no thyromegaly, JVP within normal limits  LUNGS:  not in respiratory distress, respiratory rate normal, clear to auscultation  CARDIAC: regular rate and rhythm, normal S1 and S2, no murmur, rub, or gallop  ABDOMEN: Soft, non-tender, normal bowel sounds; no bruits, organomegaly or masses.  EXTREMITIES: No edema  NEURO: Alert and oriented x 2-3 Dense left hemiplegia, & facial droop.  Intake/Output last 3 Shifts:  I/O last 3 completed shifts:  In: 800 (13 mL/kg) [P.O.:800]  Out: - (0 mL/kg)   Weight: 61.6 kg   DATA:   Diagnostic tests reviewed for today's visit:    Most recent Labs  Results " "for orders placed or performed during the hospital encounter of 03/31/25 (from the past 24 hours)   POCT GLUCOSE   Result Value Ref Range    POCT Glucose 166 (H) 74 - 99 mg/dL   POCT GLUCOSE   Result Value Ref Range    POCT Glucose 150 (H) 74 - 99 mg/dL   CBC and Auto Differential   Result Value Ref Range    WBC 8.4 4.4 - 11.3 x10*3/uL    nRBC 0.0 0.0 - 0.0 /100 WBCs    RBC 3.45 (L) 4.00 - 5.20 x10*6/uL    Hemoglobin 8.5 (L) 12.0 - 16.0 g/dL    Hematocrit 27.9 (L) 36.0 - 46.0 %    MCV 81 80 - 100 fL    MCH 24.6 (L) 26.0 - 34.0 pg    MCHC 30.5 (L) 32.0 - 36.0 g/dL    RDW 14.7 (H) 11.5 - 14.5 %    Platelets 269 150 - 450 x10*3/uL    Neutrophils % 74.5 40.0 - 80.0 %    Immature Granulocytes %, Automated 0.2 0.0 - 0.9 %    Lymphocytes % 15.6 13.0 - 44.0 %    Monocytes % 5.5 2.0 - 10.0 %    Eosinophils % 3.4 0.0 - 6.0 %    Basophils % 0.8 0.0 - 2.0 %    Neutrophils Absolute 6.26 1.20 - 7.70 x10*3/uL    Immature Granulocytes Absolute, Automated 0.02 0.00 - 0.70 x10*3/uL    Lymphocytes Absolute 1.31 1.20 - 4.80 x10*3/uL    Monocytes Absolute 0.46 0.10 - 1.00 x10*3/uL    Eosinophils Absolute 0.29 0.00 - 0.70 x10*3/uL    Basophils Absolute 0.07 0.00 - 0.10 x10*3/uL   Basic Metabolic Panel   Result Value Ref Range    Glucose 166 (H) 74 - 99 mg/dL    Sodium 142 136 - 145 mmol/L    Potassium 4.2 3.5 - 5.3 mmol/L    Chloride 110 (H) 98 - 107 mmol/L    Bicarbonate 24 21 - 32 mmol/L    Anion Gap 12 10 - 20 mmol/L    Urea Nitrogen 30 (H) 6 - 23 mg/dL    Creatinine 1.54 (H) 0.50 - 1.05 mg/dL    eGFR 41 (L) >60 mL/min/1.73m*2    Calcium 8.6 8.6 - 10.3 mg/dL   POCT GLUCOSE   Result Value Ref Range    POCT Glucose 163 (H) 74 - 99 mg/dL   POCT GLUCOSE   Result Value Ref Range    POCT Glucose 190 (H) 74 - 99 mg/dL     Blood Culture   Date Value Ref Range Status   04/30/2024 No growth at 4 days -  FINAL REPORT  Final    No results found for: \"RESPCULTSM\" No results found for: \"PERDIAFLDCUL\" No results found for: \"STERFLDCULSM\" "   Imaging  No results found.    Cardiology, Vascular, and Other Imaging  No other imaging results found for the past 2 days    Current Facility-Administered Medications   Medication Dose Route Frequency Provider Last Rate Last Admin    apixaban (Eliquis) tablet 5 mg  5 mg oral BID Aasef G Shaikh, MD PhD   5 mg at 04/07/25 2106    aspirin EC tablet 81 mg  81 mg oral Daily Nicholas Norman MD   81 mg at 04/07/25 0941    atorvastatin (Lipitor) tablet 80 mg  80 mg oral Nightly Nicholas Norman MD   80 mg at 04/07/25 2106    bisacodyl (Dulcolax) suppository 10 mg  10 mg rectal Daily PRN Seth Rubin MD        buprenorphine-naloxone (Suboxone) 2-0.5 mg per SL tablet 1 tablet  1 tablet sublingual q PM Seth Rubin MD   1 tablet at 04/07/25 2111    dextrose 50 % injection 12.5 g  12.5 g intravenous q15 min PRN Nicholas Norman MD        dextrose 50 % injection 25 g  25 g intravenous q15 min PRN Nicholas Norman MD        gabapentin (Neurontin) capsule 300 mg  300 mg oral Nightly Seth Rubin MD   300 mg at 04/07/25 2106    gabapentin (Neurontin) capsule 600 mg  600 mg oral Daily Seth Rubin MD   600 mg at 04/07/25 0941    glucagon (Glucagen) injection 1 mg  1 mg intramuscular q15 min PRN Nicholas Norman MD        glucagon (Glucagen) injection 1 mg  1 mg intramuscular q15 min PRN Nicholas Norman MD        hydrALAZINE (Apresoline) injection 10 mg  10 mg intravenous q4h PRN Nicholas Norman MD   10 mg at 04/05/25 2108    insulin glargine (Lantus) injection 10 Units  10 Units subcutaneous Nightly Nicholas Norman MD   10 Units at 04/07/25 2116    insulin lispro injection 0-5 Units  0-5 Units subcutaneous TID AC Nicholas Norman MD   1 Units at 04/07/25 1238    losartan (Cozaar) tablet 50 mg  50 mg oral Daily Nicholas Norman MD   50 mg at 04/07/25 0942    naloxone (Narcan) injection 0.2 mg  0.2 mg intravenous q5 min PRN Seth Rubin MD        NIFEdipine ER (Adalat CC) 24 hr tablet 60  mg  60 mg oral Daily Nicholas Norman MD   60 mg at 04/07/25 0942    oxyCODONE (Roxicodone) immediate release tablet 10 mg  10 mg oral BID PRN Seth Rubin MD        oxyCODONE (Roxicodone) immediate release tablet 10 mg  10 mg oral q4h PRN Seth Rubin MD   10 mg at 04/07/25 1755    oxygen (O2) therapy   inhalation Continuous PRN - O2/gases Nicholas Norman MD        pantoprazole (ProtoNix) EC tablet 40 mg  40 mg oral BID Nicholas Norman MD   40 mg at 04/07/25 2106    polyethylene glycol (Glycolax, Miralax) packet 17 g  17 g oral Daily Nicholas Norman MD   17 g at 04/07/25 0959    sennosides (Senokot) tablet 17.2 mg  2 tablet oral BID Seth Rubin MD   17.2 mg at 04/07/25 0959    sertraline (Zoloft) tablet 50 mg  50 mg oral Daily Seth Rubin MD   50 mg at 04/07/25 0942     Current Outpatient Medications   Medication Sig Dispense Refill    aspirin 81 mg EC tablet Take 1 tablet (81 mg) by mouth once daily.      furosemide (Lasix) 40 mg tablet Take 0.5 tablets (20 mg) by mouth once daily.      glucagon (Glucagen) 1 mg injection Inject 1 mg into the muscle every 15 minutes if needed for low blood sugar - see comments (BS less jeison 50 if patient unable take glucose by mouth).      insulin glargine (Lantus) 100 unit/mL injection Inject 10 Units under the skin once daily at bedtime. Take as directed per insulin instructions.      losartan (Cozaar) 25 mg tablet Take 2 tablets (50 mg) by mouth once daily.     ..     Medication and Non-Pharmacologic VTE Prophylaxis/Anticoagulants      Last Anticoag Admin            apixaban (Eliquis) tablet 5 mg    Given 5 mg at 04/07/25 2106    Frequency: 2 times daily         There are additional administrations since 04/05/25 0818 that are not shown.    No unadministered anticoagulant orders found.          Assessment/Plan   Barbara English has  Assessment & Plan  Cerebrovascular accident (CVA), unspecified mechanism (Multi)  Right middle cerebral artery thrombus? Not  a candidate thrombectomy per neurology  HTN   Metastatic Pancreatic cancer  MOUSTAPHA likely due to poor fluid intake       needs rehabilitation    Patient does not want to give up eating despite risk of aspiration.. Does not want a PEG tube.  On Eliquis ASA, Statins.  New finding on CT new stroke vs evolution of old. Will ask neurology to see. BP improved.      Questionable if patient is able to sustain hydration with her dysphagia   Other Hospital problems        Abnormal findings not addressed during hospitalization, but require out patient follow up. None        I spent 40 minutes talking and examining Barbara English, reviewing the labs & medications, formulating plan of care & discussing with patient and nursing staff.    Nicholas Norman MD

## 2025-04-08 NOTE — PROGRESS NOTES
Physical Therapy    Physical Therapy Treatment    Patient Name: Barbara English  MRN: [id number]  Department: [facility] [id number]  Room: [address] [place] [address]  Today's Date: 4/8/2025  Time Calculation  Start Time: 1100  Stop Time: 1131  Time Calculation (min): 31 min         Assessment/Plan   PT Assessment  Rehab Prognosis: Fair  Barriers to Discharge Home: Caregiver assistance  Caregiver Assistance: Caregiver assistance needed per identified barriers - however, level of patient's required assistance exceeds assistance available at home  End of Session Communication: Bedside nurse  Assessment Comment:  (pt demo fair sujata to activity)  End of Session Patient Position: Up in chair, Alarm on  PT Plan  Inpatient/Swing Bed or Outpatient: Inpatient  PT Plan  Treatment/Interventions: Bed mobility, Transfer training, Therapeutic activity, Neuromuscular re-education  PT Plan: Ongoing PT  PT Frequency: 4 times per week  PT Discharge Recommendations: Moderate intensity level of continued care, Other (comment)  Equipment Recommended upon Discharge: Wheelchair, Other (comment)  PT Recommended Transfer Status: Assist x2, Assistive device  PT - OK to Discharge: Yes (per PT POC)      General Visit Information:   PT  Visit  PT Received On: 04/08/25  General  Reason for Referral: Pt is a 51 y/o F presenting with CVA. Pt also has a metastatic pancreactic CA dx.  Referred By: Nicholas Norman MD  Prior to Session Communication: Bedside nurse  Patient Position Received: Bed, 3 rail up, Alarm on  Preferred Learning Style: auditory, visual, verbal  General Comment:  (pt agreeable to tx.)    Subjective   Precautions:  Precautions  LE Weight Bearing Status: Right Partial Weight Bearing  Medical Precautions: Fall precautions            Objective   Pain:  Pain Assessment  Pain Assessment: 0-10  0-10 (Numeric) Pain Score: 0 - No pain  Pain 2  Pain Score 2: 0 - No pain  Israel-Mauricio FACES Pain Rating 2: No hurt  Cognition:  Cognition  Orientation Level: Disoriented to  situation       Postural Control:  Static Sitting Balance  Static Sitting-Balance Support: Bilateral upper extremity supported, Feet supported  Static Sitting-Level of Assistance: Contact guard  Dynamic Sitting Balance  Dynamic Sitting-Balance Support: Feet supported, Bilateral upper extremity supported  Dynamic Sitting-Level of Assistance: Minimum assistance  Dynamic Sitting-Balance: Lateral lean, Forward lean, Trunk control activities      Activity Tolerance:  Activity Tolerance  Endurance: Decreased tolerance for upright activites  Treatments:                 Bed Mobility  Bed Mobility: Yes  Bed Mobility 1  Bed Mobility 1: Supine to sitting  Level of Assistance 1: Moderate assistance  Bed Mobility Comments 1:  (increased time and cues needed to complete transfer.)       Transfers  Transfer: Yes  Transfer 1  Transfer From 1: Bed to  Transfer to 1: Chair with drop arm  Technique 1: Squat pivot  Transfer Device 1: Gait belt  Transfer Level of Assistance 1: Maximum assistance, Moderate verbal cues, Moderate tactile cues  Trials/Comments 1: 3 (pt req increased cues for proper hand placement and sequencing to complete task)         Outcome Measures:  [facility] Basic Mobility  Turning from your back to your side while in a flat bed without using bedrails: A lot  Moving from lying on your back to sitting on the side of a flat bed without using bedrails: A lot  Moving to and from bed to chair (including a wheelchair): A lot  Standing up from a chair using your arms (e.g. wheelchair or bedside chair): A lot  To walk in hospital room: Total  Climbing 3-5 steps with railing: Total  Basic Mobility - Total Score: 10    Education Documentation  Body Mechanics, taught by Kelvin Hinkle PTA at 4/8/2025  1:28 PM.  Learner: Patient  Readiness: Acceptance  Method: Explanation  Response: Needs Reinforcement    Mobility Training, taught by Kelvin Hinkle PTA at 4/8/2025  1:28 PM.  Learner: Patient  Readiness:  Acceptance  Method: Explanation  Response: Needs Reinforcement    Education Comments  No comments found.        OP EDUCATION:       Encounter Problems       Encounter Problems (Active)       Mobility       STG - Patient will ambulate (Not Progressing)       Start:  04/02/25    Expected End:  04/16/25       20 ft with CGA using rocael-walker            PT Transfers       STG - Patient will transfer from one surface to another (Progressing)       Start:  04/02/25    Expected End:  04/16/25       With CGA using rocael-walker            PT Transfers       STG - Patient to transfer to and from sit to supine (Progressing)       Start:  04/02/25    Expected End:  04/16/25       With CGA from a flat bed         STG - Patient will transfer sit to and from stand (Progressing)       Start:  04/02/25    Expected End:  04/16/25       With CGA using rocael-walker            Pain - Adult          Safety       LTG - Patient will adhere to hip precautions during ADL's and transfers       Start:  04/01/25            LTG - Patient will demonstrate safety requirements appropriate to situation/environment       Start:  04/01/25            LTG - Patient will utilize safety techniques       Start:  04/01/25            STG - Patient locks brakes on wheelchair       Start:  04/01/25            STG - Patient uses call light consistently to request assistance with transfers       Start:  04/01/25            STG - Patient uses gait belt during all transfers       Start:  04/01/25            Goal 1       Start:  04/01/25            Goal 2       Start:  04/01/25            Goal 3       Start:  04/01/25

## 2025-04-08 NOTE — TREATMENT PLAN
Barbara English is a 50 years old female with metastatic pancreatic carcinoma presented to the hospital with acute stroke.  She has dense left hemiplegia and dysphagia.  She is bedbound and unable to use left side of the body.  She is nonambulatory and dependent on caregiver 24 hours a day for various aspects of self-care.  Patient also has dysphagia and is at risk of aspiration patient requires frequent body changes to prevent contractures skin breakdown and aspiration she also requires the head of the bed to be elevated greater than 30 degrees for her to needs safely and minimize her risk of aspiration use of pillows and wedges will not be adequate to position her to be able to eat her meals therefore I recommend that the patient be provided with hospital bed to be able to adequately care for her at home  Thank you

## 2025-04-08 NOTE — PROGRESS NOTES
04/08/25 1608   Discharge Planning   Support Systems Children  (Hayley)   Assistance Needed TCC met with patient's daughter follow up discharge plan; Attending signed [facility] form, medical necessity note sent to [facility];   Expected Discharge Disposition Home H   Does the patient need discharge transport arranged? Yes   RoundTrip coordination needed? Yes   Has discharge transport been arranged? No     Notified by intake; [facility] patient had a wheelchair 2023, therefore patient will not qualify for a wheelchair as insurance will not pay for wheelchair only every 5 years. D/t patient's medicaid insurance patient will need certificate of medical necessity form signed ; Attending, supervisor care transitions and CMO updated about hospital bed process. Patient's daughter clarified patient has never had a hospital or BSC before, [facility] will confirm with patient's daughter to assume financial responsibility for DME, prior to  insurance pre-auth

## 2025-04-08 NOTE — PROGRESS NOTES
Music Therapy Note    Barbara English was referred by ARTUR Rubin MD    Therapy Session  Referral Type: New referral this admission  Visit Type: New visit  Session Start Time: 1311  Intervention Delivery: In-person  Conflict of Service: Asleep       Narrative  Follow-up: Will follow up as able.    Education Documentation  No documentation found.

## 2025-04-08 NOTE — PROGRESS NOTES
Occupational Therapy    OT Treatment    Patient Name: Barbara English  MRN: [id number]  Department: [facility] [place] [address]  Room: [address] [place] [address]  Today's Date: 4/8/2025  Time Calculation  Start Time: 1317  Stop Time: 1331  Time Calculation (min): 14 min        Assessment:  OT Assessment: Pt very fatigued this date, requiring increased assist with  ADLs and fxnl transfers.Pt appearing more confused, wantign to go to her grandmother's house today. Pt would benefit from mod intensity OT at [facility].  Prognosis: Fair  Barriers to Discharge Home: Caregiver assistance, Physical needs  Caregiver Assistance: Caregiver assistance needed per identified barriers - however, level of patient's required assistance exceeds assistance available at home  Physical Needs: Stair navigation into home limited by function/safety, Stair navigation to access bed limited by function/safety, Stair navigation to access bath limited by function/safety, 24hr mobility assistance needed, 24hr ADL assistance needed, High falls risk due to function or environment  Evaluation/Treatment Tolerance: Patient limited by fatigue, Patient limited by pain  Medical Staff Made Aware: Yes  End of Session Communication: Bedside nurse  End of Session Patient Position: Bed, 3 rail up, Alarm on  Prognosis: Fair  Evaluation/Treatment Tolerance: Patient limited by fatigue, Patient limited by pain  Medical Staff Made Aware: Yes  Plan:  Treatment Interventions: ADL retraining, Functional transfer training, UE strengthening/ROM, Endurance training, Compensatory technique education  OT Frequency: 4 times per week  OT Discharge Recommendations: Moderate intensity level of continued care  Equipment Recommended upon Discharge: Wheelchair, Other (comment) (lift? rocael cane, hospital bed)  OT Recommended Transfer Status: Maximum assist  OT - OK to Discharge: Yes (per POC)  Treatment Interventions: ADL retraining, Functional transfer training, UE strengthening/ROM, Endurance training, Compensatory  technique education    Subjective   Previous Visit Info:  OT Last Visit  OT Received On: 04/08/25  General:  General  Reason for Referral: Pt is a 51 y/o F presenting with CVA. Pt also has a metastatic pancreactic CA dx.  Referred By: Nicholas Norman MD  Past Medical History Relevant to Rehab: No past medical history on file.  Family/Caregiver Present: No  Prior to Session Communication: Bedside nurse  Patient Position Received: Up in chair, Alarm on  Preferred Learning Style: auditory, visual, verbal  General Comment: pt asleep in chair with R lateral lean, agreeable to return to bed for safety  Precautions:  LE Weight Bearing Status: Right Partial Weight Bearing  Medical Precautions: Fall precautions  Splinting: LUE sling donned during transfers     Date/Time Vitals Session Patient Position Pulse Resp SpO2 BP MAP (mmHg)    04/08/25 1217 --  --  75  15  96 %  120/77  91           Pain:  Pain Assessment  0-10 (Numeric) Pain Score: 0 - No pain    Objective    Cognition:  Cognition  Orientation Level: Disoriented to situation (Otherwise, pt is oriented. Possibly more confused today, keeps stating she needs to go see her grandparents tonight)  Coordination:  Movements are Fluid and Coordinated: No  Upper Body Coordination: LUE flaccid  Lower Body Coordination: L LE hemiparisis  Activities of Daily Living: Grooming  Grooming Level of Assistance: Minimum assistance  Grooming Where Assessed: Chair  Grooming Comments: washed face with setup, min A for oral care, pt very fatigued and falling asleep during oral care, OT completed remainder of oral care for safety  Bed Mobility/Transfers: Bed Mobility 1  Bed Mobility 1: Sitting to supine  Level of Assistance 1: Maximum assistance  Bed Mobility Comments 1: pt very fatigued ,requires increased assist, falling asleep at EOB    Transfer 1  Transfer From 1: Chair with drop arm to  Transfer to 1: Bed  Technique 1: Squat pivot  Transfer Level of Assistance 1: Maximum assistance,  Moderate verbal cues, Moderate tactile cues  Trials/Comments 1: increased assist, to R side, very fatigued. Increased cues for hand placement/ sequencing.    Sitting Balance:  Static Sitting Balance  Static Sitting-Balance Support: Feet supported  Static Sitting-Level of Assistance: Minimum assistance    Outcome Measures:[facility] Daily Activity  Putting on and taking off regular lower body clothing: Total  Bathing (including washing, rinsing, drying): A lot  Putting on and taking off regular upper body clothing: A lot  Toileting, which includes using toilet, bedpan or urinal: Total  Taking care of personal grooming such as brushing teeth: A little  Eating Meals: A little  Daily Activity - Total Score: 12      Education Documentation  Body Mechanics, taught by Faiza Ceja OT at 4/8/2025  1:45 PM.  Learner: Patient  Readiness: Acceptance  Method: Explanation  Response: No Evidence of Learning  Comment: very fatigued today, requires increased cues but does not demonstrate carryover    Precautions, taught by Faiza Ceja OT at 4/8/2025  1:45 PM.  Learner: Patient  Readiness: Acceptance  Method: Explanation  Response: No Evidence of Learning  Comment: very fatigued today, requires increased cues but does not demonstrate carryover    ADL Training, taught by Faiza Ceja OT at 4/8/2025  1:45 PM.  Learner: Patient  Readiness: Acceptance  Method: Explanation  Response: No Evidence of Learning  Comment: very fatigued today, requires increased cues but does not demonstrate carryover    Education Comments  No comments found.        OP EDUCATION:       Goals:  Encounter Problems       Encounter Problems (Active)       ADLs       Patient with complete upper body dressing with stand by assist level of assistance donning and doffing all UE clothes with PRN adaptive equipment while supported sitting (Progressing)       Start:  04/02/25    Expected End:  04/16/25            Patient with complete lower body dressing with  minimal assist  level of assistance donning and doffing all LE clothes  with PRN adaptive equipment while supported sitting (Not Progressing)       Start:  04/02/25    Expected End:  04/16/25            Patient will complete daily grooming tasks brushing teeth and washing face/hair with stand by assist level of assistance and PRN adaptive equipment while supported sitting. (Progressing)       Start:  04/02/25    Expected End:  04/16/25            Patient will complete toileting including hygiene clothing management/hygiene with minimal assist  level of assistance and bedside commode. (Not Progressing)       Start:  04/02/25    Expected End:  04/16/25               TRANSFERS       Patient will perform bed mobility supervision level of assistance and bed rails in order to improve safety and independence with mobility (Progressing)       Start:  04/02/25    Expected End:  04/16/25            Patient will complete functional transfer to chair with arsms with least restrictive device with contact guard assist level of assistance. (Progressing)       Start:  04/02/25    Expected End:  04/16/25

## 2025-04-09 ENCOUNTER — HOME HEALTH ADMISSION (OUTPATIENT)
Dept: HOME HEALTH SERVICES | Facility: HOME HEALTH | Age: 50
End: 2025-04-09
Payer: COMMERCIAL

## 2025-04-09 ENCOUNTER — DOCUMENTATION (OUTPATIENT)
Dept: HOME HEALTH SERVICES | Facility: HOME HEALTH | Age: 50
End: 2025-04-09
Payer: COMMERCIAL

## 2025-04-09 ENCOUNTER — PHARMACY VISIT (OUTPATIENT)
Dept: PHARMACY | Facility: CLINIC | Age: 50
End: 2025-04-09
Payer: MEDICAID

## 2025-04-09 LAB
ANION GAP SERPL CALC-SCNC: 11 MMOL/L (ref 10–20)
BASOPHILS # BLD AUTO: 0.06 X10*3/UL (ref 0–0.1)
BASOPHILS NFR BLD AUTO: 0.8 %
BUN SERPL-MCNC: 19 MG/DL (ref 6–23)
CALCIUM SERPL-MCNC: 8.1 MG/DL (ref 8.6–10.3)
CHLORIDE SERPL-SCNC: 111 MMOL/L (ref 98–107)
CO2 SERPL-SCNC: 23 MMOL/L (ref 21–32)
CREAT SERPL-MCNC: 0.89 MG/DL (ref 0.5–1.05)
EGFRCR SERPLBLD CKD-EPI 2021: 79 ML/MIN/1.73M*2
EOSINOPHIL # BLD AUTO: 0.27 X10*3/UL (ref 0–0.7)
EOSINOPHIL NFR BLD AUTO: 3.6 %
ERYTHROCYTE [DISTWIDTH] IN BLOOD BY AUTOMATED COUNT: 14.6 % (ref 11.5–14.5)
GLUCOSE BLD MANUAL STRIP-MCNC: 104 MG/DL (ref 74–99)
GLUCOSE BLD MANUAL STRIP-MCNC: 136 MG/DL (ref 74–99)
GLUCOSE BLD MANUAL STRIP-MCNC: 145 MG/DL (ref 74–99)
GLUCOSE BLD MANUAL STRIP-MCNC: 156 MG/DL (ref 74–99)
GLUCOSE SERPL-MCNC: 145 MG/DL (ref 74–99)
HCT VFR BLD AUTO: 29.2 % (ref 36–46)
HGB BLD-MCNC: 8.9 G/DL (ref 12–16)
IMM GRANULOCYTES # BLD AUTO: 0.02 X10*3/UL (ref 0–0.7)
IMM GRANULOCYTES NFR BLD AUTO: 0.3 % (ref 0–0.9)
LYMPHOCYTES # BLD AUTO: 0.95 X10*3/UL (ref 1.2–4.8)
LYMPHOCYTES NFR BLD AUTO: 12.7 %
MCH RBC QN AUTO: 24.7 PG (ref 26–34)
MCHC RBC AUTO-ENTMCNC: 30.5 G/DL (ref 32–36)
MCV RBC AUTO: 81 FL (ref 80–100)
MONOCYTES # BLD AUTO: 0.49 X10*3/UL (ref 0.1–1)
MONOCYTES NFR BLD AUTO: 6.5 %
NEUTROPHILS # BLD AUTO: 5.7 X10*3/UL (ref 1.2–7.7)
NEUTROPHILS NFR BLD AUTO: 76.1 %
NRBC BLD-RTO: 0 /100 WBCS (ref 0–0)
PLATELET # BLD AUTO: 243 X10*3/UL (ref 150–450)
POTASSIUM SERPL-SCNC: 3.8 MMOL/L (ref 3.5–5.3)
RBC # BLD AUTO: 3.61 X10*6/UL (ref 4–5.2)
SODIUM SERPL-SCNC: 141 MMOL/L (ref 136–145)
WBC # BLD AUTO: 7.5 X10*3/UL (ref 4.4–11.3)

## 2025-04-09 PROCEDURE — 99233 SBSQ HOSP IP/OBS HIGH 50: CPT | Performed by: INTERNAL MEDICINE

## 2025-04-09 PROCEDURE — 2500000004 HC RX 250 GENERAL PHARMACY W/ HCPCS (ALT 636 FOR OP/ED): Performed by: INTERNAL MEDICINE

## 2025-04-09 PROCEDURE — 2500000001 HC RX 250 WO HCPCS SELF ADMINISTERED DRUGS (ALT 637 FOR MEDICARE OP): Performed by: INTERNAL MEDICINE

## 2025-04-09 PROCEDURE — 82947 ASSAY GLUCOSE BLOOD QUANT: CPT

## 2025-04-09 PROCEDURE — 2500000001 HC RX 250 WO HCPCS SELF ADMINISTERED DRUGS (ALT 637 FOR MEDICARE OP): Performed by: STUDENT IN AN ORGANIZED HEALTH CARE EDUCATION/TRAINING PROGRAM

## 2025-04-09 PROCEDURE — 94760 N-INVAS EAR/PLS OXIMETRY 1: CPT

## 2025-04-09 PROCEDURE — RXMED WILLOW AMBULATORY MEDICATION CHARGE

## 2025-04-09 PROCEDURE — 2500000002 HC RX 250 W HCPCS SELF ADMINISTERED DRUGS (ALT 637 FOR MEDICARE OP, ALT 636 FOR OP/ED): Performed by: INTERNAL MEDICINE

## 2025-04-09 PROCEDURE — 2500000005 HC RX 250 GENERAL PHARMACY W/O HCPCS: Performed by: INTERNAL MEDICINE

## 2025-04-09 PROCEDURE — 1100000001 HC PRIVATE ROOM DAILY

## 2025-04-09 PROCEDURE — 82374 ASSAY BLOOD CARBON DIOXIDE: CPT | Performed by: INTERNAL MEDICINE

## 2025-04-09 PROCEDURE — 85025 COMPLETE CBC W/AUTO DIFF WBC: CPT | Performed by: INTERNAL MEDICINE

## 2025-04-09 RX ORDER — METOPROLOL SUCCINATE 25 MG/1
25 TABLET, EXTENDED RELEASE ORAL DAILY
Qty: 30 TABLET | Refills: 1 | Status: ON HOLD | OUTPATIENT
Start: 2025-04-09

## 2025-04-09 RX ORDER — BLOOD-GLUCOSE SENSOR
EACH MISCELLANEOUS
Qty: 2 EACH | Refills: 3 | Status: ON HOLD | OUTPATIENT
Start: 2025-04-09

## 2025-04-09 RX ORDER — INSULIN LISPRO 100 [IU]/ML
INJECTION, SOLUTION INTRAVENOUS; SUBCUTANEOUS
Qty: 15 ML | Refills: 2 | Status: ON HOLD | OUTPATIENT
Start: 2025-04-09

## 2025-04-09 RX ORDER — NIFEDIPINE 30 MG/1
30 TABLET, FILM COATED, EXTENDED RELEASE ORAL ONCE
Status: COMPLETED | OUTPATIENT
Start: 2025-04-09 | End: 2025-04-09

## 2025-04-09 RX ORDER — HYDRALAZINE HYDROCHLORIDE 10 MG/1
10 TABLET, FILM COATED ORAL 3 TIMES DAILY
Status: DISCONTINUED | OUTPATIENT
Start: 2025-04-10 | End: 2025-04-10 | Stop reason: HOSPADM

## 2025-04-09 RX ORDER — BLOOD-GLUCOSE,RECEIVER,CONT
EACH MISCELLANEOUS
Qty: 1 EACH | Refills: 0 | Status: ON HOLD | OUTPATIENT
Start: 2025-04-09

## 2025-04-09 RX ORDER — NIFEDIPINE 30 MG/1
30 TABLET, FILM COATED, EXTENDED RELEASE ORAL ONCE
Status: DISCONTINUED | OUTPATIENT
Start: 2025-04-09 | End: 2025-04-09

## 2025-04-09 RX ORDER — PEN NEEDLE, DIABETIC 30 GX3/16"
NEEDLE, DISPOSABLE MISCELLANEOUS
Qty: 100 EACH | Refills: 0 | OUTPATIENT
Start: 2025-04-09

## 2025-04-09 RX ORDER — NIFEDIPINE 30 MG/1
90 TABLET, FILM COATED, EXTENDED RELEASE ORAL DAILY
Status: DISCONTINUED | OUTPATIENT
Start: 2025-04-10 | End: 2025-04-10 | Stop reason: HOSPADM

## 2025-04-09 RX ORDER — INSULIN GLARGINE 100 [IU]/ML
10 INJECTION, SOLUTION SUBCUTANEOUS NIGHTLY
Qty: 15 ML | Refills: 1 | Status: ON HOLD | OUTPATIENT
Start: 2025-04-09

## 2025-04-09 RX ORDER — METOPROLOL SUCCINATE 25 MG/1
25 TABLET, EXTENDED RELEASE ORAL DAILY
Status: DISCONTINUED | OUTPATIENT
Start: 2025-04-09 | End: 2025-04-10 | Stop reason: HOSPADM

## 2025-04-09 RX ORDER — LIDOCAINE 560 MG/1
1 PATCH PERCUTANEOUS; TOPICAL; TRANSDERMAL DAILY
Qty: 30 PATCH | Refills: 0 | Status: ON HOLD | OUTPATIENT
Start: 2025-04-09

## 2025-04-09 RX ORDER — FLUCONAZOLE 100 MG/1
150 TABLET ORAL ONCE
Status: COMPLETED | OUTPATIENT
Start: 2025-04-10 | End: 2025-04-10

## 2025-04-09 RX ORDER — LIDOCAINE 560 MG/1
1 PATCH PERCUTANEOUS; TOPICAL; TRANSDERMAL DAILY
Status: DISCONTINUED | OUTPATIENT
Start: 2025-04-09 | End: 2025-04-10 | Stop reason: HOSPADM

## 2025-04-09 RX ADMIN — PANTOPRAZOLE SODIUM 40 MG: 40 TABLET, DELAYED RELEASE ORAL at 20:00

## 2025-04-09 RX ADMIN — SENNOSIDES 17.2 MG: 8.6 TABLET ORAL at 08:13

## 2025-04-09 RX ADMIN — SENNOSIDES 17.2 MG: 8.6 TABLET ORAL at 20:00

## 2025-04-09 RX ADMIN — OXYCODONE HYDROCHLORIDE 10 MG: 5 TABLET ORAL at 10:34

## 2025-04-09 RX ADMIN — ATORVASTATIN CALCIUM 80 MG: 80 TABLET, FILM COATED ORAL at 20:00

## 2025-04-09 RX ADMIN — GABAPENTIN 300 MG: 300 CAPSULE ORAL at 20:00

## 2025-04-09 RX ADMIN — NIFEDIPINE 30 MG: 30 TABLET, FILM COATED, EXTENDED RELEASE ORAL at 16:34

## 2025-04-09 RX ADMIN — LIDOCAINE 4% 1 PATCH: 40 PATCH TOPICAL at 14:08

## 2025-04-09 RX ADMIN — METOPROLOL SUCCINATE 25 MG: 25 TABLET, EXTENDED RELEASE ORAL at 10:34

## 2025-04-09 RX ADMIN — OXYCODONE HYDROCHLORIDE 10 MG: 5 TABLET ORAL at 19:59

## 2025-04-09 RX ADMIN — NIFEDIPINE 60 MG: 30 TABLET, FILM COATED, EXTENDED RELEASE ORAL at 08:14

## 2025-04-09 RX ADMIN — SODIUM CHLORIDE 125 ML/HR: 9 INJECTION, SOLUTION INTRAVENOUS at 22:38

## 2025-04-09 RX ADMIN — OXYCODONE HYDROCHLORIDE 10 MG: 5 TABLET ORAL at 06:26

## 2025-04-09 RX ADMIN — BUPRENORPHINE AND NALOXONE 1 TABLET: 2; .5 TABLET SUBLINGUAL at 20:00

## 2025-04-09 RX ADMIN — SERTRALINE 50 MG: 50 TABLET, FILM COATED ORAL at 08:13

## 2025-04-09 RX ADMIN — NIFEDIPINE 30 MG: 30 TABLET, FILM COATED, EXTENDED RELEASE ORAL at 14:08

## 2025-04-09 RX ADMIN — PANTOPRAZOLE SODIUM 40 MG: 40 TABLET, DELAYED RELEASE ORAL at 08:13

## 2025-04-09 RX ADMIN — GABAPENTIN 600 MG: 300 CAPSULE ORAL at 08:13

## 2025-04-09 RX ADMIN — HYDRALAZINE HYDROCHLORIDE 10 MG: 20 INJECTION INTRAMUSCULAR; INTRAVENOUS at 19:58

## 2025-04-09 RX ADMIN — APIXABAN 5 MG: 5 TABLET, FILM COATED ORAL at 20:00

## 2025-04-09 RX ADMIN — INSULIN LISPRO 1 UNITS: 100 INJECTION, SOLUTION INTRAVENOUS; SUBCUTANEOUS at 08:39

## 2025-04-09 RX ADMIN — INSULIN GLARGINE 10 UNITS: 100 INJECTION, SOLUTION SUBCUTANEOUS at 21:55

## 2025-04-09 RX ADMIN — APIXABAN 5 MG: 5 TABLET, FILM COATED ORAL at 08:13

## 2025-04-09 RX ADMIN — ASPIRIN 81 MG: 81 TABLET, COATED ORAL at 08:13

## 2025-04-09 ASSESSMENT — PAIN - FUNCTIONAL ASSESSMENT
PAIN_FUNCTIONAL_ASSESSMENT: 0-10
PAIN_FUNCTIONAL_ASSESSMENT: 0-10

## 2025-04-09 ASSESSMENT — PAIN SCALES - GENERAL
PAINLEVEL_OUTOF10: 10 - WORST POSSIBLE PAIN
PAINLEVEL_OUTOF10: 9
PAINLEVEL_OUTOF10: 10 - WORST POSSIBLE PAIN

## 2025-04-09 ASSESSMENT — COGNITIVE AND FUNCTIONAL STATUS - GENERAL
WALKING IN HOSPITAL ROOM: TOTAL
TOILETING: TOTAL
DRESSING REGULAR UPPER BODY CLOTHING: TOTAL
MOVING FROM LYING ON BACK TO SITTING ON SIDE OF FLAT BED WITH BEDRAILS: A LOT
HELP NEEDED FOR BATHING: TOTAL
MOVING FROM LYING ON BACK TO SITTING ON SIDE OF FLAT BED WITH BEDRAILS: A LOT
TURNING FROM BACK TO SIDE WHILE IN FLAT BAD: A LOT
DAILY ACTIVITIY SCORE: 6
CLIMB 3 TO 5 STEPS WITH RAILING: TOTAL
DRESSING REGULAR UPPER BODY CLOTHING: TOTAL
CLIMB 3 TO 5 STEPS WITH RAILING: TOTAL
MOVING TO AND FROM BED TO CHAIR: A LOT
MOVING TO AND FROM BED TO CHAIR: TOTAL
TURNING FROM BACK TO SIDE WHILE IN FLAT BAD: TOTAL
DRESSING REGULAR LOWER BODY CLOTHING: TOTAL
MOBILITY SCORE: 7
TOILETING: TOTAL
STANDING UP FROM CHAIR USING ARMS: TOTAL
DAILY ACTIVITIY SCORE: 6
PERSONAL GROOMING: TOTAL
DRESSING REGULAR LOWER BODY CLOTHING: TOTAL
PERSONAL GROOMING: TOTAL
HELP NEEDED FOR BATHING: TOTAL
WALKING IN HOSPITAL ROOM: TOTAL
EATING MEALS: TOTAL
EATING MEALS: TOTAL
MOBILITY SCORE: 9
STANDING UP FROM CHAIR USING ARMS: TOTAL

## 2025-04-09 ASSESSMENT — PAIN DESCRIPTION - LOCATION
LOCATION: BACK
LOCATION: ABDOMEN
LOCATION: ABDOMEN

## 2025-04-09 NOTE — PROGRESS NOTES
04/09/25 1729   Discharge Planning   Assistance Needed Patient's daughter instructed by diabetic , Attending to update med rec; patient has hospital bed in place; at home updated by nurse on duty  patient's 's, Attending made aware nifedipine administered nurse will recheck in 1hr to see where she is at; before patient is able to discharge   Type of Home Care Services Home health aide;Home nursing visits;Home OT;Home PT;Home SLP   Expected Discharge Disposition Home H  ([facility])   Does the patient need discharge transport arranged? Yes   RoundTrip coordination needed? Yes   Has discharge transport been arranged? No     BP is 182/96 -discharge cx-preferred SBP in the 160's -per Attending

## 2025-04-09 NOTE — PROGRESS NOTES
"               [facility]          Admitting Provider: Nicholas Norman MD Admission Date: 3/31/2025.   Attending Provider: Nicholas Norman MD MRN: [id number]       Subjective   Barbara English is a 50 y.o. female on day 9 of admission presenting with Cerebrovascular accident (CVA), unspecified mechanism (Multi).  Interval History no complaints . Poor oral intake. Refused PEG tube appropriate given terminal diagnosis.      Objective   Physical Exam  Last Recorded Vitals: Blood pressure (!) 183/100, pulse 70, temperature 36.6 °C (97.9 °F), temperature source Temporal, resp. rate 16, height 1.6 m (5' 3\"), weight 61.6 kg (135 lb 11.2 oz), SpO2 96%.  Patient Vitals for the past 24 hrs:   BP Temp Temp src Pulse Resp SpO2   04/09/25 0817 (!) 183/100 36.6 °C (97.9 °F) Temporal -- -- 96 %   04/09/25 0425 (!) 167/97 36.4 °C (97.5 °F) Temporal 70 16 97 %   04/08/25 2336 138/87 36.5 °C (97.7 °F) Temporal 77 17 96 %   04/08/25 2017 134/84 36.6 °C (97.9 °F) Temporal 74 16 95 %   04/08/25 1530 154/90 36.7 °C (98.1 °F) Temporal 70 16 98 %   04/08/25 1217 120/77 36.7 °C (98.1 °F) Temporal 75 15 96 %     Body mass index is 24.04 kg/m².  GENERAL: alert, cooperative, or no distress  SKIN: no rashes  HEENT Atraumatic, Normocephalic and Not pale, no icterus  NECK: supple, no thyromegaly, JVP within normal limits  LUNGS:  not in respiratory distress, respiratory rate normal, clear to auscultation  CARDIAC: regular rate and rhythm, normal S1 and S2, no murmur, rub, or gallop  ABDOMEN: Soft, non-tender, normal bowel sounds; no bruits, organomegaly or masses.  EXTREMITIES: No edema  NEURO: Alert and oriented x 2, dense hemiplegia.    Intake/Output last 3 Shifts:  I/O last 3 completed shifts:  In: 1029.2 (16.7 mL/kg) [I.V.:1029.2 (16.7 mL/kg)]  Out: 300 (4.9 mL/kg) [Urine:300 (0.1 mL/kg/hr)]  Weight: 61.6 kg   DATA:   Diagnostic tests reviewed for today's visit:    Most recent Labs  Results for orders placed or performed " "during the hospital encounter of 03/31/25 (from the past 24 hours)   POCT GLUCOSE   Result Value Ref Range    POCT Glucose 185 (H) 74 - 99 mg/dL   POCT GLUCOSE   Result Value Ref Range    POCT Glucose 145 (H) 74 - 99 mg/dL   POCT GLUCOSE   Result Value Ref Range    POCT Glucose 127 (H) 74 - 99 mg/dL   POCT GLUCOSE   Result Value Ref Range    POCT Glucose 156 (H) 74 - 99 mg/dL     Blood Culture   Date Value Ref Range Status   04/30/2024 No growth at 4 days -  FINAL REPORT  Final    No results found for: \"RESPCULTSM\" No results found for: \"PERDIAFLDCUL\" No results found for: \"STERFLDCULSM\"   Imaging  No results found.    Cardiology, Vascular, and Other Imaging  No other imaging results found for the past 2 days    Current Facility-Administered Medications   Medication Dose Route Frequency Provider Last Rate Last Admin    apixaban (Eliquis) tablet 5 mg  5 mg oral BID Aasef G Shaikh, MD PhD   5 mg at 04/09/25 0813    aspirin EC tablet 81 mg  81 mg oral Daily Nicholas Norman MD   81 mg at 04/09/25 0813    atorvastatin (Lipitor) tablet 80 mg  80 mg oral Nightly Nicholas Norman MD   80 mg at 04/08/25 2056    bisacodyl (Dulcolax) suppository 10 mg  10 mg rectal Daily PRN Seth Rubin MD        buprenorphine-naloxone (Suboxone) 2-0.5 mg per SL tablet 1 tablet  1 tablet sublingual q PM Seth Rubin MD   1 tablet at 04/08/25 2117    dextrose 50 % injection 12.5 g  12.5 g intravenous q15 min PRN Nicholas Norman MD        dextrose 50 % injection 25 g  25 g intravenous q15 min PRN Nicholas Norman MD        gabapentin (Neurontin) capsule 300 mg  300 mg oral Nightly Seth Rubin MD   300 mg at 04/08/25 2056    gabapentin (Neurontin) capsule 600 mg  600 mg oral Daily Seth Rubin MD   600 mg at 04/09/25 0813    glucagon (Glucagen) injection 1 mg  1 mg intramuscular q15 min PRN Nicholas Norman MD        glucagon (Glucagen) injection 1 mg  1 mg intramuscular q15 min PRN Nicholas Norman, MD        " hydrALAZINE (Apresoline) injection 10 mg  10 mg intravenous q4h PRN Nicholas Norman MD   10 mg at 04/05/25 2108    insulin glargine (Lantus) injection 10 Units  10 Units subcutaneous Nightly Nicholas Norman MD   10 Units at 04/08/25 2056    insulin lispro injection 0-5 Units  0-5 Units subcutaneous TID AC Nicholas Norman MD   1 Units at 04/08/25 1235    [Held by provider] losartan (Cozaar) tablet 50 mg  50 mg oral Daily Nicholas Norman MD   50 mg at 04/08/25 0823    naloxone (Narcan) injection 0.2 mg  0.2 mg intravenous q5 min PRN Seth Rubin MD        NIFEdipine ER (Adalat CC) 24 hr tablet 60 mg  60 mg oral Daily Nicholas Norman MD   60 mg at 04/09/25 0814    oxyCODONE (Roxicodone) immediate release tablet 10 mg  10 mg oral BID PRN Seth Rubin MD   10 mg at 04/09/25 0626    oxyCODONE (Roxicodone) immediate release tablet 10 mg  10 mg oral q4h PRN Seth Rubin MD   10 mg at 04/08/25 1522    oxygen (O2) therapy   inhalation Continuous PRN - O2/gases Nicholas Norman MD        pantoprazole (ProtoNix) EC tablet 40 mg  40 mg oral BID Nicholas Norman MD   40 mg at 04/09/25 0813    polyethylene glycol (Glycolax, Miralax) packet 17 g  17 g oral Daily Nicholas Norman MD   17 g at 04/08/25 0824    sennosides (Senokot) tablet 17.2 mg  2 tablet oral BID Seth Rubin MD   17.2 mg at 04/09/25 0813    sertraline (Zoloft) tablet 50 mg  50 mg oral Daily Seth Rubin MD   50 mg at 04/09/25 0813    sodium chloride 0.9% infusion  125 mL/hr intravenous Continuous Nicholas Norman  mL/hr at 04/08/25 1827 125 mL/hr at 04/08/25 1827     Current Outpatient Medications   Medication Sig Dispense Refill    aspirin 81 mg EC tablet Take 1 tablet (81 mg) by mouth once daily.      furosemide (Lasix) 40 mg tablet Take 0.5 tablets (20 mg) by mouth once daily.      glucagon (Glucagen) 1 mg injection Inject 1 mg into the muscle every 15 minutes if needed for low blood sugar - see comments (BS less  jeison 50 if patient unable take glucose by mouth).      insulin glargine (Lantus) 100 unit/mL injection Inject 10 Units under the skin once daily at bedtime. Take as directed per insulin instructions.      losartan (Cozaar) 25 mg tablet Take 2 tablets (50 mg) by mouth once daily.     ..     Medication and Non-Pharmacologic VTE Prophylaxis/Anticoagulants      Last Anticoag Admin            apixaban (Eliquis) tablet 5 mg    Given 5 mg at 0813    Frequency: 2 times daily         There are additional administrations since 04/06/25 0841 that are not shown.    No unadministered anticoagulant orders found.          Assessment/Plan   Barbara English has  Assessment & Plan  Cerebrovascular accident (CVA), unspecified mechanism (Multi)  Dysphagia  HTN   Metastatic Pancreatic cancer  MOUSTAPHA likely due to poor fluid intake  HTN          Patient does not want to give up eating despite risk of aspiration.. Does not want a PEG tube.  On Eliquis ASA, Statins.    Doubt if patient is able to sustain hydration with her dysphagia   Other Hospital problems        Abnormal findings not addressed during hospitalization, but require out patient follow up. None        I spent 35 minutes talking and examining Barbara English, reviewing the labs & medications, formulating plan of care & discussing with patient and nursing staff.    Nicholas Norman MD

## 2025-04-09 NOTE — HH CARE COORDINATION
Home Care received a Referral for SN, PT, OT, HHA, ST. We have processed the referral for a Start of Care on 4/10- 4/12/25.     If you have any questions or concerns, please feel free to contact us at [phone]. Follow the prompts, enter your five digit zip code, and you will be directed to your care team on [facility].

## 2025-04-09 NOTE — CARE PLAN
The patient's goals for the shift include wants to dc home    The clinical goals for the shift include patient will remain free from pain    Over the shift, the patient did not make progress toward the following goals. Barriers to progression include . Recommendations to address these barriers include .  Problem: Pain - Adult  Goal: Verbalizes/displays adequate comfort level or baseline comfort level  Outcome: Progressing     Problem: Safety - Adult  Goal: Free from fall injury  Outcome: Progressing

## 2025-04-09 NOTE — CARE PLAN
Pt will be able to manage pain levels throughout shift  Problem: Pain - Adult  Goal: Verbalizes/displays adequate comfort level or baseline comfort level  Outcome: Progressing     Problem: Pain  Goal: Performs ADL's with improved pain control throughout shift  Outcome: Progressing  Goal: Participates in PT with improved pain control throughout the shift  Outcome: Progressing

## 2025-04-09 NOTE — NURSING NOTE
Called patient's daughter Nannette to let her know that discharge was discontinued due to high BP. Last BP was 181/101. I did notify Dr. Norman and the family. Will attempt to discharge again tomorrow.

## 2025-04-09 NOTE — PROGRESS NOTES
"Barbara English is a 50 y.o. female on day 9 of admission presenting with Cerebrovascular accident (CVA), unspecified mechanism (Multi).    Subjective   Symptoms (0 - 10, Best to Worst)  [place] Symptom Assessment System  0-10 (Numeric) Pain Score: 0 - No pain  Bowel pain is worse today. Requesting lidocaine patch.   More PRN oxycodone usage in last 24 hours.      Interval History: MOUSTAPHA resolved with IVF. Discussed importance of fluid intake at length with patient and family. They are still waiting for DME arrival.      Update: Hospital bed has arrived. She is very hypertensive but asymptomatic for me. Primary notified. Otherwise no barriers to discharge.       Objective     Physical Exam  Constitutional:       General: She is not in acute distress.     Appearance: Normal appearance.      Comments: Somewhat drowsy with LT sided facial droop. Favors RT side.   HENT:      Head: Normocephalic and atraumatic.      Mouth/Throat:      Mouth: Mucous membranes are moist.   Cardiovascular:      Rate and Rhythm: Normal rate and regular rhythm.   Pulmonary:      Effort: No respiratory distress.   Abdominal:      General: Bowel sounds are normal.      Tenderness: Epigastric tenderness. There is no guarding.     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   Skin:     General: Skin is warm and dry.   Neurological:      Mental Status: She is alert and oriented to person, place, and time.   Comments: No activation on RT UE. RT hip with some preserved plantar flexion and hip extension.   Psychiatric:         Thought Content: Thought content normal.         Judgment: Judgment normal.         Last Recorded Vitals  Blood pressure 156/82, pulse 77, temperature 36.5 °C (97.7 °F), resp. rate 17, height 1.6 m (5' 3\"), weight 61.6 kg (135 lb 11.2 oz), SpO2 99%.  Intake/Output last 3 Shifts:  I/O last 3 completed shifts:  In: 410 (6.7 mL/kg) [I.V.:410 (6.7 mL/kg)]  Out: 550 (8.9 mL/kg) [Urine:550 (0.2 " mL/kg/hr)]  Weight: 61.6 kg     Wt Readings from Last 10 Encounters:   03/31/25 76.6 kg (168 lb 14 oz)   1/18/2025([facility]) 61.2 kg  135 lbs   04/30/24 63.5 kg (140 lb)   05/09/22 55 kg (121 lb 4.1 oz)       Relevant Results    Scheduled medications  apixaban, 5 mg, oral, BID  aspirin, 81 mg, oral, Daily  atorvastatin, 80 mg, oral, Nightly  buprenorphine-naloxone, 1 tablet, sublingual, BID  gabapentin, 300 mg, oral, Nightly  gabapentin, 600 mg, oral, Daily  insulin glargine, 10 Units, subcutaneous, Nightly  insulin lispro, 0-5 Units, subcutaneous, TID AC  losartan, 25 mg, oral, Daily  NIFEdipine ER, 60 mg, oral, Daily  pantoprazole, 40 mg, oral, BID  polyethylene glycol, 17 g, oral, Daily  sennosides, 2 tablet, oral, BID  sertraline, 50 mg, oral, Daily      Continuous medications  D5 % and 0.9 % sodium chloride, 75 mL/hr, Last Rate: 75 mL/hr (04/04/25 0441)      PRN medications  PRN medications: dextrose, dextrose, glucagon, glucagon, hydrALAZINE, naloxone, oxyCODONE, oxyCODONE, oxygen    Results for orders placed or performed during the hospital encounter of 03/31/25 (from the past 24 hours)   POCT GLUCOSE   Result Value Ref Range    POCT Glucose 145 (H) 74 - 99 mg/dL   POCT GLUCOSE   Result Value Ref Range    POCT Glucose 127 (H) 74 - 99 mg/dL   POCT GLUCOSE   Result Value Ref Range    POCT Glucose 156 (H) 74 - 99 mg/dL   CBC and Auto Differential   Result Value Ref Range    WBC 7.5 4.4 - 11.3 x10*3/uL    nRBC 0.0 0.0 - 0.0 /100 WBCs    RBC 3.61 (L) 4.00 - 5.20 x10*6/uL    Hemoglobin 8.9 (L) 12.0 - 16.0 g/dL    Hematocrit 29.2 (L) 36.0 - 46.0 %    MCV 81 80 - 100 fL    MCH 24.7 (L) 26.0 - 34.0 pg    MCHC 30.5 (L) 32.0 - 36.0 g/dL    RDW 14.6 (H) 11.5 - 14.5 %    Platelets 243 150 - 450 x10*3/uL    Neutrophils % 76.1 40.0 - 80.0 %    Immature Granulocytes %, Automated 0.3 0.0 - 0.9 %    Lymphocytes % 12.7 13.0 - 44.0 %    Monocytes % 6.5 2.0 - 10.0 %    Eosinophils % 3.6 0.0 - 6.0 %    Basophils % 0.8 0.0 - 2.0 %     Neutrophils Absolute 5.70 1.20 - 7.70 x10*3/uL    Immature Granulocytes Absolute, Automated 0.02 0.00 - 0.70 x10*3/uL    Lymphocytes Absolute 0.95 (L) 1.20 - 4.80 x10*3/uL    Monocytes Absolute 0.49 0.10 - 1.00 x10*3/uL    Eosinophils Absolute 0.27 0.00 - 0.70 x10*3/uL    Basophils Absolute 0.06 0.00 - 0.10 x10*3/uL   Basic Metabolic Panel   Result Value Ref Range    Glucose 145 (H) 74 - 99 mg/dL    Sodium 141 136 - 145 mmol/L    Potassium 3.8 3.5 - 5.3 mmol/L    Chloride 111 (H) 98 - 107 mmol/L    Bicarbonate 23 21 - 32 mmol/L    Anion Gap 11 10 - 20 mmol/L    Urea Nitrogen 19 6 - 23 mg/dL    Creatinine 0.89 0.50 - 1.05 mg/dL    eGFR 79 >60 mL/min/1.73m*2    Calcium 8.1 (L) 8.6 - 10.3 mg/dL   POCT GLUCOSE   Result Value Ref Range    POCT Glucose 145 (H) 74 - 99 mg/dL       CT head wo IV contrast    Result Date: 4/1/2025  1. Negative head CT for acute change.     MACRO: None   Signed by: Raj Rubin 4/1/2025 6:12 PM Dictation workstation:   [id number]    CT cervical spine wo IV contrast    Result Date: 3/31/2025  No acute osseous abnormality is identified in the cervical spine.   MACRO: None   Signed by: Zechariah Cole 3/31/2025 5:37 PM Dictation workstation:   [id number]    CT brain attack angio head and neck W and WO IV contrast    Result Date: 3/31/2025  1. Short-segment of severely attenuated flow related enhancement at the origin of the M1 segment right middle cerebral artery, suspected focus of thrombus. 2. Nonspecific fusiform narrowing of the right internal carotid artery supraclinoid segment. This could be related to nonocclusive wall adherent thrombus, less likely fibrofatty atherosclerotic plaque. 3. Undulating attenuation of flow related enhancement in the right pericallosal artery of the anterior cerebral artery. This may represent thrombosis. 4. Multifocal endodontal and periodontal disease.   MACRO: Eladio Montoya discussed the significance and urgency of this critical finding epic chat  with  SILVINO STRONG AND AMYELEONORA DANIELE on 3/31/2025 at 1:32 pm.  (**-RCF-**) Findings:  See findings.   Signed by: Eladio Montoya 3/31/2025 1:33 PM Dictation workstation:   [id number]    CT brain attack head wo IV contrast    Result Date: 3/31/2025  NEGATIVE BRAIN ATTACK PROTOCOL CT BRAIN:   NO ACUTE INTRACRANIAL HEMORRHAGE   NO ACUTE INTRACRANIAL MASS EFFECT   NO CT EVIDENCE OF A LARGE ACUTE TERRITORIAL INFARCT   I WAS ABLE TO COMMUNICATE THESE FINDINGS BY TELEPHONE TO DR STRONG AT 1059 HOURS, SAME DAY, 31 MARCH 2025   MACRO: Edilberto Sultana discussed the significance and urgency of this critical finding by telephone with  SILVINO STRONG on 3/31/2025 at 11:01 am. (**-RCF-**) Findings:  See findings.   Signed by: Edilberto Sultana 3/31/2025 11:01 AM Dictation workstation:   [id number]    US renal complete    Result Date: 3/21/2025  IMPRESSION: No hydronephrosis. Nonspecific bladder debris, correlate with urinalysis. : EMUZE   Transcribe Date/Time: Mar 21 2025  1:51P Dictated by : LORETTA ANTON MD This examination was interpreted and the report reviewed and electronically signed by: LORETTA ANTON MD on Mar 21 2025  1:52PM  EST    US gallbladder    Result Date: 3/20/2025  IMPRESSION: Known small bilobar hepatic metastases Cholelithiasis and nonspecific diffuse gallbladder wall thickening.   Nuclear medicine hepatobiliary imaging could be used if there is clinical concern for acute cholecystitis. Pancreatic tail mass not assessable on this exam. : EMUZE   Transcribe Date/Time: Mar 20 2025  8:13A Dictated by : ROSITA ALVARADO MD This examination was interpreted and the report reviewed and electronically signed by: ROSITA ALVARADO MD on Mar 20 2025  8:17AM  EST    CT abdomen pelvis w IV contrast    Result Date: 3/19/2025  IMPRESSION: Stable pancreatic tail mass consistent with biopsy-proven pancreatic adenocarcinoma. Stable presumed hepatic metastases since 02/26/2025. 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 Nonspecific mild gallbladder wall thickening with adjacent trace pericholecystic fluid.  May consider right upper quadrant ultrasound to exclude acute cholecystitis if there is clinical concern. Redemonstrated wall thickening of the distal esophagus is nonspecific though may represent esophagitis. : MONICA   Transcribe Date/Time: Mar 19 2025  9:44P Dictated by : ORLIN ANTONIO DO This examination was interpreted and the report reviewed and electronically signed by: HINA DALY MD on Mar 19 2025 10:20PM  EST                  Assessment/Plan   IMP:  50-year-old female with history of type 1 diabetes melitis with multiple complications including neuropathy retinopathy DKA and peripheral angiopathy and notably newly diagnosed pancreatic tail adenocarcinoma.  She presented with stroke symptoms and was found to have an occlusion in her right MCA.  She is being treated conservatively for this and neurology is following.  Palliative was consulted for assistance with pain management and goals of care. She is rehab oriented but is hesitant about cancer treatment. She understands her prognosis. She is going home with PT. She has excellent support from her daughter. They are    Total OME/24h:  125 OME  Morphine:   Oxycodone: 20mg  Hydromorphone:   Fentanyl:   Suboxone: 2mg     Disposition:  Home with PT and [facility] Navigator in place. They know to sign onto hospice once PT is no longer beneficial.   They are awaiting DME since she needs a hospital bed d/t CVA.   D/w patient, family, TCC, primary team.     CVA  Pancreatic Adenocarcinoma  [facility]   Family meeting updates: They want to attempt rehab at SNF. DNR. Pureed diet for now unless patient requests return to regular. Hospice INFORMATIONAL VISIT ONLY  Does not want PEG. Does not want to give up eating as it is one of the few things that give her pleasure anymore.   Discussed her goals and chemotherapy. She is reluctant to try chemotherapy again and  understands that this is not a resectable disease. She would prefer radiation therapy if it is an option for her pancreatic adenocarcinoma that will be offered by her cancer team.   Pain and symptom control are top priorities for her since she was already told her prognosis was likely less than 6 months.   Incentive spirometer ordered. Providing instruction.     MOUSTAPHA: Likely related to intake      Diabetic neuropathy  Continue with gabapentin 600 mg in the morning and 300 mg at night     Cancer related epigastric pain  Metastatic Pancreatic tail adenocarcinoma   C/w Suboxone 2-0.5 to ONCE daily  C/w oxycodone to 10mg q4h prn as pain  Also with oxycodone 10mg q12h prn for breakthrough.   Ordering lidocaine patch for epigastrium.        Mood disorder:  C/w Sertraline 50mg daily     Bowel regimen:  Senna 2 tabs BID   Miralax Prn  Will keep as is for now.         Provider estimate of survival: 1-6 months Hospice appropriate  Patient Prognostic Awareness: Yes - incongruent with provider estimation     Patient/proxy preference for information  Prefers full information     Goals of Care  DNR as of 4/4/25     Is the patient hospice-eligible?   Yes  Was a discussion held re hospice services?   no  Was a decision made re hospice services?  No, conversations ongoing.     Other Palliative Support  Music therapy referral      I spent 50 minutes in the professional and overall care of this patient.    Seth Rubin MD

## 2025-04-09 NOTE — PROGRESS NOTES
Speech-Language Pathology                 Therapy Communication Note    Patient Name: Barbara English  MRN: [id number]  Department: [place] [id number]  Room: [address] [place] [address]  Today's Date: 4/9/2025     Discipline: Speech Language Pathology         Missed Time: Attempt    Comment:  Follow up visit attempted, Patient with lethargy and unable to sustain LANI to task. Patient expressing pain in abdomen as 9/10 with request for pain meds. Patient unable to participate in therapy at this time. Family meeting pending to discuss hospice care. Will sign off from SLP services at this time as patient not able to participate in therapy to achieve meaningful gains given current state. Patient on po diet with acceptance of aspiration risk, per medical team.

## 2025-04-09 NOTE — NURSING NOTE
Barbara English is a 50 y.o. female on day 8 of admission presenting with Cerebrovascular accident (CVA), unspecified mechanism (Multi).     I met with daughter, Nannette to provide Diabetes education. Nannette is very overwhelmed with what is happening and the upcoming task(s) associated with caring for her mother.    Pens  -we reviewed the insulin pen injection technique  -Nannette able to return demo   -reminded to replace needle each time, prime each needle with 2 units of insulin then set dose   -attempted to have home insulin regimen changed- per Dr. Clifford would like to keep basal/bolus dosing as is    -sliding scale#2 ACHS for lispro and nightly for lantus 10units    -sliding scale chart printed and reviewed with Nannette  -proper pen storage and needle disposal      CGM  -placed sample CGM on patient to demonstrate for daughter  -order placed for CGMs and reader to be filled by [facility]   -per [facility] pharmacy fill history will prohibit filling of above items   -pt unable to communicate regarding last fill date of items   -daughter unaware pt was using CGM and reader    -will call to discuss with daughter about finding CGMs and reader to use        Feng Winkler  Time spent on consult and documentation 45 minutes

## 2025-04-10 ENCOUNTER — APPOINTMENT (OUTPATIENT)
Dept: CARDIOLOGY | Facility: HOSPITAL | Age: 50
End: 2025-04-10
Payer: COMMERCIAL

## 2025-04-10 ENCOUNTER — PHARMACY VISIT (OUTPATIENT)
Dept: PHARMACY | Facility: CLINIC | Age: 50
End: 2025-04-10
Payer: MEDICAID

## 2025-04-10 VITALS
SYSTOLIC BLOOD PRESSURE: 112 MMHG | HEART RATE: 75 BPM | WEIGHT: 135.7 LBS | DIASTOLIC BLOOD PRESSURE: 66 MMHG | RESPIRATION RATE: 18 BRPM | HEIGHT: 63 IN | BODY MASS INDEX: 24.04 KG/M2 | TEMPERATURE: 98.1 F | OXYGEN SATURATION: 99 %

## 2025-04-10 LAB
GLUCOSE BLD MANUAL STRIP-MCNC: 107 MG/DL (ref 74–99)
GLUCOSE BLD MANUAL STRIP-MCNC: 142 MG/DL (ref 74–99)
GLUCOSE BLD MANUAL STRIP-MCNC: 161 MG/DL (ref 74–99)

## 2025-04-10 PROCEDURE — 2500000002 HC RX 250 W HCPCS SELF ADMINISTERED DRUGS (ALT 637 FOR MEDICARE OP, ALT 636 FOR OP/ED): Performed by: INTERNAL MEDICINE

## 2025-04-10 PROCEDURE — 2500000001 HC RX 250 WO HCPCS SELF ADMINISTERED DRUGS (ALT 637 FOR MEDICARE OP): Performed by: INTERNAL MEDICINE

## 2025-04-10 PROCEDURE — 82947 ASSAY GLUCOSE BLOOD QUANT: CPT

## 2025-04-10 PROCEDURE — 99233 SBSQ HOSP IP/OBS HIGH 50: CPT | Performed by: INTERNAL MEDICINE

## 2025-04-10 PROCEDURE — 2500000005 HC RX 250 GENERAL PHARMACY W/O HCPCS: Performed by: INTERNAL MEDICINE

## 2025-04-10 PROCEDURE — 2500000001 HC RX 250 WO HCPCS SELF ADMINISTERED DRUGS (ALT 637 FOR MEDICARE OP): Performed by: STUDENT IN AN ORGANIZED HEALTH CARE EDUCATION/TRAINING PROGRAM

## 2025-04-10 PROCEDURE — 2500000004 HC RX 250 GENERAL PHARMACY W/ HCPCS (ALT 636 FOR OP/ED): Performed by: INTERNAL MEDICINE

## 2025-04-10 PROCEDURE — RXMED WILLOW AMBULATORY MEDICATION CHARGE

## 2025-04-10 PROCEDURE — 93005 ELECTROCARDIOGRAM TRACING: CPT

## 2025-04-10 RX ORDER — HYDRALAZINE HYDROCHLORIDE 10 MG/1
10 TABLET, FILM COATED ORAL 3 TIMES DAILY
Qty: 90 TABLET | Refills: 1 | Status: ON HOLD | OUTPATIENT
Start: 2025-04-10

## 2025-04-10 RX ORDER — HEPARIN SODIUM,PORCINE/PF 10 UNIT/ML
50 SYRINGE (ML) INTRAVENOUS ONCE
Status: COMPLETED | OUTPATIENT
Start: 2025-04-10 | End: 2025-04-10

## 2025-04-10 RX ORDER — HEPARIN SODIUM,PORCINE/PF 10 UNIT/ML
50 SYRINGE (ML) INTRAVENOUS AS NEEDED
Status: DISCONTINUED | OUTPATIENT
Start: 2025-04-10 | End: 2025-04-10 | Stop reason: HOSPADM

## 2025-04-10 RX ADMIN — SERTRALINE 50 MG: 50 TABLET, FILM COATED ORAL at 08:36

## 2025-04-10 RX ADMIN — HYDRALAZINE HYDROCHLORIDE 10 MG: 10 TABLET ORAL at 00:11

## 2025-04-10 RX ADMIN — SODIUM CHLORIDE 125 ML/HR: 9 INJECTION, SOLUTION INTRAVENOUS at 06:36

## 2025-04-10 RX ADMIN — FLUCONAZOLE 150 MG: 100 TABLET ORAL at 00:11

## 2025-04-10 RX ADMIN — APIXABAN 5 MG: 5 TABLET, FILM COATED ORAL at 08:36

## 2025-04-10 RX ADMIN — PANTOPRAZOLE SODIUM 40 MG: 40 TABLET, DELAYED RELEASE ORAL at 08:36

## 2025-04-10 RX ADMIN — LIDOCAINE 4% 1 PATCH: 40 PATCH TOPICAL at 08:36

## 2025-04-10 RX ADMIN — METOPROLOL SUCCINATE 25 MG: 25 TABLET, EXTENDED RELEASE ORAL at 08:36

## 2025-04-10 RX ADMIN — HEPARIN, PORCINE (PF) 10 UNIT/ML INTRAVENOUS SYRINGE 50 UNITS: at 16:01

## 2025-04-10 RX ADMIN — OXYCODONE HYDROCHLORIDE 10 MG: 5 TABLET ORAL at 13:49

## 2025-04-10 RX ADMIN — GABAPENTIN 600 MG: 300 CAPSULE ORAL at 08:36

## 2025-04-10 RX ADMIN — HYDRALAZINE HYDROCHLORIDE 10 MG: 10 TABLET ORAL at 08:36

## 2025-04-10 RX ADMIN — NIFEDIPINE 90 MG: 30 TABLET, FILM COATED, EXTENDED RELEASE ORAL at 08:36

## 2025-04-10 RX ADMIN — SENNOSIDES 17.2 MG: 8.6 TABLET ORAL at 08:36

## 2025-04-10 RX ADMIN — POLYETHYLENE GLYCOL 3350 17 G: 17 POWDER, FOR SOLUTION ORAL at 08:36

## 2025-04-10 RX ADMIN — ASPIRIN 81 MG: 81 TABLET, COATED ORAL at 08:36

## 2025-04-10 ASSESSMENT — COGNITIVE AND FUNCTIONAL STATUS - GENERAL
WALKING IN HOSPITAL ROOM: TOTAL
MOBILITY SCORE: 6
TURNING FROM BACK TO SIDE WHILE IN FLAT BAD: TOTAL
PERSONAL GROOMING: TOTAL
MOVING FROM LYING ON BACK TO SITTING ON SIDE OF FLAT BED WITH BEDRAILS: TOTAL
TOILETING: TOTAL
DAILY ACTIVITIY SCORE: 6
EATING MEALS: TOTAL
MOVING TO AND FROM BED TO CHAIR: TOTAL
DRESSING REGULAR UPPER BODY CLOTHING: TOTAL
DRESSING REGULAR LOWER BODY CLOTHING: TOTAL
HELP NEEDED FOR BATHING: TOTAL
STANDING UP FROM CHAIR USING ARMS: TOTAL
CLIMB 3 TO 5 STEPS WITH RAILING: TOTAL

## 2025-04-10 ASSESSMENT — PAIN SCALES - GENERAL
PAINLEVEL_OUTOF10: 7
PAINLEVEL_OUTOF10: 8

## 2025-04-10 ASSESSMENT — PAIN DESCRIPTION - LOCATION: LOCATION: ABDOMEN

## 2025-04-10 NOTE — PROGRESS NOTES
Music Therapy Note        Therapy Session  Referral Type: New referral this admission  Visit Type: New visit  Session Start Time: 1216  Conflict of Service: Asleep        Narrative  Assessment Detail: At the time of assessment pt was asleep with no family present at bedside.  Follow-up: MT will follow up with pt as able.    Education Documentation  No documentation found.

## 2025-04-10 NOTE — PROGRESS NOTES
04/10/25 1030   Discharge Planning   Support Systems Children   Assistance Needed patient is medically ready today ; SBP has improved; updated AVS w/ medication administration highlighted for next dose post discharge, information about transportation, passport services ,and Benjanib Lucila provided to patient's daughter   Type of Home Care Services Home SLP;Home PT;Home OT;Home nursing visits   Expected Discharge Disposition Home H  ([facility])   Does the patient need discharge transport arranged? Yes   RoundTrip coordination needed? Yes   Has discharge transport been arranged? Yes   What day is the transport expected? 04/10/25   What time is the transport expected? 1400     Per H[facility] nurse H[facility] navigator will will follow up with patient's daughter post discharge

## 2025-04-10 NOTE — CARE PLAN
The patient's goals for the shift include wants to dc home    The clinical goals for the shift include Pain Control; Maintain Safety    Over the shift, the patient did not make progress toward the following goals. Barriers to progression include lethargy; non-compliance. Recommendations to address these barriers include apply heat to area; turn and reposition.    Problem: Pain - Adult  Goal: Verbalizes/displays adequate comfort level or baseline comfort level  Outcome: Progressing     Problem: Safety - Adult  Goal: Free from fall injury  Outcome: Progressing     Problem: Discharge Planning  Goal: Discharge to home or other facility with appropriate resources  Outcome: Progressing     Problem: Chronic Conditions and Co-morbidities  Goal: Patient's chronic conditions and co-morbidity symptoms are monitored and maintained or improved  Outcome: Progressing     Problem: Nutrition  Goal: Nutrient intake appropriate for maintaining nutritional needs  Outcome: Progressing     Problem: Fall/Injury  Goal: Not fall by end of shift  Outcome: Progressing  Goal: Be free from injury by end of the shift  Outcome: Progressing  Goal: Verbalize understanding of personal risk factors for fall in the hospital  Outcome: Progressing  Goal: Verbalize understanding of risk factor reduction measures to prevent injury from fall in the home  Outcome: Progressing  Goal: Use assistive devices by end of the shift  Outcome: Progressing  Goal: Pace activities to prevent fatigue by end of the shift  Outcome: Progressing     Problem: Pain  Goal: Takes deep breaths with improved pain control throughout the shift  Outcome: Progressing  Goal: Turns in bed with improved pain control throughout the shift  Outcome: Progressing  Goal: Walks with improved pain control throughout the shift  Outcome: Progressing  Goal: Performs ADL's with improved pain control throughout shift  Outcome: Progressing  Goal: Participates in PT with improved pain control  throughout the shift  Outcome: Progressing  Goal: Free from opioid side effects throughout the shift  Outcome: Progressing  Goal: Free from acute confusion related to pain meds throughout the shift  Outcome: Progressing     Problem: Skin  Goal: Decreased wound size/increased tissue granulation at next dressing change  Outcome: Progressing  Goal: Participates in plan/prevention/treatment measures  Outcome: Progressing  Goal: Prevent/manage excess moisture  Outcome: Progressing  Goal: Prevent/minimize sheer/friction injuries  Outcome: Progressing  Goal: Promote/optimize nutrition  Outcome: Progressing  Goal: Promote skin healing  Outcome: Progressing     Problem: Recent hospitalization or complication while living with DM  Goal: Patient will effectively self-manage their DM disease process  Outcome: Progressing     Problem: Lack of Diabetes disease process knowledge  Goal: Increase knowledge/understanding of diabetes and how to reduce risk of complications  Outcome: Progressing     Problem: Lack of glucose monitoring and target numbers for before and after meals knowledge  Goal: Increase knowledge/understanding of monitoring devices and interpret data to assist with lifestyle change  Outcome: Progressing     Problem: Lack of knowledge on diet for diabetes  Goal: Discover best plan for balanced nutrition to manage diabetes  Outcome: Progressing     Problem: Address barriers to lifestyle change  Goal: Find workable solutions to meet health goals  Outcome: Progressing     Problem: Lack of knowledge on benefits of activity for meeting blood glucose targets and health goals  Goal: Discover best plan for being active and address any barriers  Outcome: Progressing     Problem: Lack of knowldge regarding diabetes medications  Goal: Increase knowledge via education  Outcome: Progressing     Problem: Lack of knowledge on where to find additional support for diabetes  Goal: Increase knowledge of where support can be found to  best address patient's need  Outcome: Progressing     Problem: Recent hospitalization or complication while living with CVA  Goal: Patient will effectively self-manage their CVA disease process  Outcome: Progressing     Problem: Diabetes  Goal: Achieve decreasing blood glucose levels by end of shift  Outcome: Progressing  Goal: Increase stability of blood glucose readings by end of shift  Outcome: Progressing  Goal: Decrease in ketones present in urine by end of shift  Outcome: Progressing  Goal: Maintain electrolyte levels within acceptable range throughout shift  Outcome: Progressing  Goal: Maintain glucose levels >70mg/dl to <250mg/dl throughout shift  Outcome: Progressing  Goal: No changes in neurological exam by end of shift  Outcome: Progressing  Goal: Learn about and adhere to nutrition recommendations by end of shift  Outcome: Progressing  Goal: Vital signs within normal range for age by end of shift  Outcome: Progressing  Goal: Increase self care and/or family involovement by end of shift  Outcome: Progressing  Goal: Receive DSME education by end of shift  Outcome: Progressing     Problem: General Stroke  Goal: Establish a mutual long term goal with patient by discharge  Outcome: Progressing  Goal: Demonstrate improvement in neurological exam throughout the shift  Outcome: Progressing  Goal: Maintain BP within ordered limits throughout shift  Outcome: Progressing  Goal: Participate in treatment (ie., meds, therapy) throughout shift  Outcome: Progressing  Goal: No symptoms of aspiration throughout shift  Outcome: Progressing  Goal: No symptoms of hemorrhage throughout shift  Outcome: Progressing  Goal: Tolerate enteral feeding throughout shift  Outcome: Progressing  Goal: Decreased nausea/vomiting throughout shift  Outcome: Progressing  Goal: Controlled blood glucose throughout shift  Outcome: Progressing  Goal: Out of bed three times today  Outcome: Progressing     Problem: ICU Stroke  Goal: Maintain ICP  within ordered limits throughout shift  Outcome: Progressing  Goal: Tolerate EVD clamping trial throughout shift  Outcome: Progressing  Goal: Tolerate ventilator weaning trial during shift  Outcome: Progressing  Goal: Maintain patent airway throughout shift  Outcome: Progressing  Goal: Achieve/maintain targeted sodium level throughout shift  Outcome: Progressing

## 2025-04-10 NOTE — PROGRESS NOTES
Barbara English is a 50 y.o. female on day 10 of admission presenting with Cerebrovascular accident (CVA), unspecified mechanism (Multi).    Interval History: HTN controlled. Planned for discharge today with C and home PT.      Subjective   Symptoms (0 - 10, Best to Worst)  [facility] Symptom Assessment System  0-10 (Numeric) Pain Score: 0 - No pain  Reports feeling drowsy this morning. Has halitosis which is new. Denies new symptoms for me. She is eager to get back to her own bed. Patient worries that she is causing stress for her daughter. She is at peace with her prognosis.     I told her I would reach out to her daughter, the impetus being to temper expectations for recovery. Ms English has a realistic outlook on her prognosis and clear goals having her symptoms managed, forgoing treatment of cancer, and being around her loved ones.   I worry that Ms English has limited time and her CVA has severely reduced her life expectancy and that Nannette is struggling to emotionally accept this.     Conversation with Nannette:  Left VM.   I was eventually able to reach her and discussed what I expected would be limited intake i/s/o pancreatic cancer. I told her I was worried time would be short and to be patient with her mother. I commended her again for being a source of strength for her mother. They will be able to reach me if they run into issues.       Objective     Physical Exam  Constitutional:       General: She is not in acute distress.     Appearance: Normal appearance.      Comments: Somewhat drowsy with LT sided facial droop. Favors RT side.   HENT:      Head: Normocephalic and atraumatic.      Mouth/Throat:      Mouth: Mucous membranes are moist.   Cardiovascular:      Rate and Rhythm: Normal rate and regular rhythm.   Pulmonary:      Effort: No respiratory distress.   Abdominal:      General: Bowel sounds are normal.      Tenderness: Epigastric tenderness. There is no guarding.     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   Skin:     General: Skin is warm and dry.   Neurological:      Mental Status: She is alert and oriented to person, place, and time.   Comments: No activation on RT UE. RT hip with some preserved plantar flexion and hip extension.   Psychiatric:         Thought Content: Thought content normal.         Judgment: Judgment normal.         Last Recorded Vitals  Blood pressure 156/82, pulse 77, temperature 36.5 °C (97.7 °F), resp. rate 17, height 1.6 m (5' 3\"), weight 61.6 kg (135 lb 11.2 oz), SpO2 99%.  Intake/Output last 3 Shifts:  I/O last 3 completed shifts:  In: 410 (6.7 mL/kg) [I.V.:410 (6.7 mL/kg)]  Out: 550 (8.9 mL/kg) [Urine:550 (0.2 mL/kg/hr)]  Weight: 61.6 kg     Wt Readings from Last 10 Encounters:   03/31/25 76.6 kg (168 lb 14 oz)   1/18/2025([facility]) 61.2 kg  135 lbs   04/30/24 63.5 kg (140 lb)   05/09/22 55 kg (121 lb 4.1 oz)       Relevant Results    Scheduled medications  apixaban, 5 mg, oral, BID  aspirin, 81 mg, oral, Daily  atorvastatin, 80 mg, oral, Nightly  buprenorphine-naloxone, 1 tablet, sublingual, BID  gabapentin, 300 mg, oral, Nightly  gabapentin, 600 mg, oral, Daily  insulin glargine, 10 Units, subcutaneous, Nightly  insulin lispro, 0-5 Units, subcutaneous, TID AC  losartan, 25 mg, oral, Daily  NIFEdipine ER, 60 mg, oral, Daily  pantoprazole, 40 mg, oral, BID  polyethylene glycol, 17 g, oral, Daily  sennosides, 2 tablet, oral, BID  sertraline, 50 mg, oral, Daily      Continuous medications  D5 % and 0.9 % sodium chloride, 75 mL/hr, Last Rate: 75 mL/hr (04/04/25 0441)      PRN medications  PRN medications: dextrose, dextrose, glucagon, glucagon, hydrALAZINE, naloxone, oxyCODONE, oxyCODONE, oxygen    Results for orders placed or performed during the hospital encounter of 03/31/25 (from the past 24 hours)   CBC and Auto Differential   Result Value Ref Range    WBC 7.5 4.4 - 11.3 x10*3/uL    nRBC 0.0 0.0 - 0.0 /100 WBCs    RBC 3.61 (L) 4.00 " - 5.20 x10*6/uL    Hemoglobin 8.9 (L) 12.0 - 16.0 g/dL    Hematocrit 29.2 (L) 36.0 - 46.0 %    MCV 81 80 - 100 fL    MCH 24.7 (L) 26.0 - 34.0 pg    MCHC 30.5 (L) 32.0 - 36.0 g/dL    RDW 14.6 (H) 11.5 - 14.5 %    Platelets 243 150 - 450 x10*3/uL    Neutrophils % 76.1 40.0 - 80.0 %    Immature Granulocytes %, Automated 0.3 0.0 - 0.9 %    Lymphocytes % 12.7 13.0 - 44.0 %    Monocytes % 6.5 2.0 - 10.0 %    Eosinophils % 3.6 0.0 - 6.0 %    Basophils % 0.8 0.0 - 2.0 %    Neutrophils Absolute 5.70 1.20 - 7.70 x10*3/uL    Immature Granulocytes Absolute, Automated 0.02 0.00 - 0.70 x10*3/uL    Lymphocytes Absolute 0.95 (L) 1.20 - 4.80 x10*3/uL    Monocytes Absolute 0.49 0.10 - 1.00 x10*3/uL    Eosinophils Absolute 0.27 0.00 - 0.70 x10*3/uL    Basophils Absolute 0.06 0.00 - 0.10 x10*3/uL   Basic Metabolic Panel   Result Value Ref Range    Glucose 145 (H) 74 - 99 mg/dL    Sodium 141 136 - 145 mmol/L    Potassium 3.8 3.5 - 5.3 mmol/L    Chloride 111 (H) 98 - 107 mmol/L    Bicarbonate 23 21 - 32 mmol/L    Anion Gap 11 10 - 20 mmol/L    Urea Nitrogen 19 6 - 23 mg/dL    Creatinine 0.89 0.50 - 1.05 mg/dL    eGFR 79 >60 mL/min/1.73m*2    Calcium 8.1 (L) 8.6 - 10.3 mg/dL   POCT GLUCOSE   Result Value Ref Range    POCT Glucose 145 (H) 74 - 99 mg/dL   POCT GLUCOSE   Result Value Ref Range    POCT Glucose 136 (H) 74 - 99 mg/dL   POCT GLUCOSE   Result Value Ref Range    POCT Glucose 104 (H) 74 - 99 mg/dL       CT head wo IV contrast    Result Date: 4/1/2025  1. Negative head CT for acute change.     MACRO: None   Signed by: Raj Rubin 4/1/2025 6:12 PM Dictation workstation:   [id number]    CT cervical spine wo IV contrast    Result Date: 3/31/2025  No acute osseous abnormality is identified in the cervical spine.   MACRO: None   Signed by: Zechariah Cole 3/31/2025 5:37 PM Dictation workstation:   [id number]    CT brain attack angio head and neck W and WO IV contrast    Result Date: 3/31/2025  1. Short-segment of severely  attenuated flow related enhancement at the origin of the M1 segment right middle cerebral artery, suspected focus of thrombus. 2. Nonspecific fusiform narrowing of the right internal carotid artery supraclinoid segment. This could be related to nonocclusive wall adherent thrombus, less likely fibrofatty atherosclerotic plaque. 3. Undulating attenuation of flow related enhancement in the right pericallosal artery of the anterior cerebral artery. This may represent thrombosis. 4. Multifocal endodontal and periodontal disease.   MACRO: Eladio Montoya discussed the significance and urgency of this critical finding epic chat with  SILVINO STRONG AND TISHA SANABRIA on 3/31/2025 at 1:32 pm.  (**-RCF-**) Findings:  See findings.   Signed by: Eladio Montoya 3/31/2025 1:33 PM Dictation workstation:   [id number]    CT brain attack head wo IV contrast    Result Date: 3/31/2025  NEGATIVE BRAIN ATTACK PROTOCOL CT BRAIN:   NO ACUTE INTRACRANIAL HEMORRHAGE   NO ACUTE INTRACRANIAL MASS EFFECT   NO CT EVIDENCE OF A LARGE ACUTE TERRITORIAL INFARCT   I WAS ABLE TO COMMUNICATE THESE FINDINGS BY TELEPHONE TO DR STRONG AT 1059 HOURS, SAME DAY, 31 MARCH 2025   MACRO: Edilberto Sultana discussed the significance and urgency of this critical finding by telephone with  SILVINO STRONG on 3/31/2025 at 11:01 am. (**-RCF-**) Findings:  See findings.   Signed by: Edilberto Sultana 3/31/2025 11:01 AM Dictation workstation:   [id number]    US renal complete    Result Date: 3/21/2025  IMPRESSION: No hydronephrosis. Nonspecific bladder debris, correlate with urinalysis. : [facility]   Transcribe Date/Time: Mar 21 2025  1:51P Dictated by : LORETTA ANTON MD This examination was interpreted and the report reviewed and electronically signed by: LORETTA ANTON MD on Mar 21 2025  1:52PM  EST    US gallbladder    Result Date: 3/20/2025  IMPRESSION: Known small bilobar hepatic metastases Cholelithiasis and nonspecific diffuse gallbladder wall thickening.    Nuclear medicine hepatobiliary imaging could be used if there is clinical concern for acute cholecystitis. Pancreatic tail mass not assessable on this exam. : [facility]   Transcribe Date/Time: Mar 20 2025  8:13A Dictated by : ROSITA ALVARADO MD This examination was interpreted and the report reviewed and electronically signed by: ROSITA ALVARADO MD on Mar 20 2025  8:17AM  EST    CT abdomen pelvis w IV contrast    Result Date: 3/19/2025  IMPRESSION: Stable pancreatic tail mass consistent with biopsy-proven pancreatic adenocarcinoma. Stable presumed hepatic metastases since 02/26/2025. 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 Nonspecific mild gallbladder wall thickening with adjacent trace pericholecystic fluid.  May consider right upper quadrant ultrasound to exclude acute cholecystitis if there is clinical concern. Redemonstrated wall thickening of the distal esophagus is nonspecific though may represent esophagitis. : [facility]   Transcribe Date/Time: Mar 19 2025  9:44P Dictated by : ORLIN ANTONIO DO This examination was interpreted and the report reviewed and electronically signed by: HINA DALY MD on Mar 19 2025 10:20PM  EST                  Assessment/Plan   IMP:  50-year-old female with history of type 1 diabetes melitis with multiple complications including neuropathy retinopathy DKA and peripheral angiopathy and notably newly diagnosed pancreatic tail adenocarcinoma.  She presented with stroke symptoms and was found to have an occlusion in her right MCA.  She is being treated conservatively for this and neurology is following.  Palliative was consulted for assistance with pain management and goals of care. She is rehab oriented but is hesitant about cancer treatment. She understands her prognosis. She is going home with PT. She has excellent support from her daughter. They are    Total OME/24h:  125 OME  Morphine:   Oxycodone: 20mg  Hydromorphone:   Fentanyl:   Suboxone: 2mg      Disposition:  Home with PT and [facility] Navigator in place. They know to sign onto hospice once PT is no longer beneficial.   They are awaiting DME since she needs a hospital bed d/t CVA.   D/w patient, family, TCC, primary team.     #CVA  #Pancreatic Adenocarcinoma  #GOC   Family meeting updates: They want to attempt rehab at SNF. DNR. Pureed diet for now unless patient requests return to regular. Hospice INFORMATIONAL VISIT ONLY  Does not want PEG. Does not want to give up eating as it is one of the few things that give her pleasure anymore.   Discussed her goals and chemotherapy. She is reluctant to try chemotherapy again and understands that this is not a resectable disease. She would prefer radiation therapy if it is an option for her pancreatic adenocarcinoma that will be offered by her cancer team.   Pain and symptom control are top priorities for her since she was already told her prognosis was likely less than 6 months.   Incentive spirometer ordered. Providing instruction.     #MOUSTAPHA: resolved.    Likely related to intake,      #Diabetic neuropathy  Continue with gabapentin 600 mg in the morning and 300 mg at night     #Cancer related epigastric pain  #Metastatic Pancreatic tail adenocarcinoma   C/w Suboxone 2-0.5 to ONCE daily  C/w oxycodone to 10mg q4h prn as pain  Also with oxycodone 10mg q12h prn for breakthrough.   Ordering lidocaine patch for epigastrium.        Mood disorder:  C/w Sertraline 50mg daily     Bowel regimen:  Senna 2 tabs BID   Miralax Prn  Will keep as is for now.         Provider estimate of survival: 1-6 months Hospice appropriate  Patient Prognostic Awareness: Yes - incongruent with provider estimation     Patient/proxy preference for information  Prefers full information     Goals of Care  DNR as of 4/4/25     Is the patient hospice-eligible?   Yes  Was a discussion held re hospice services?   no  Was a decision made re hospice services?  No, conversations ongoing.     Other  Palliative Support  Music therapy referral      I spent 50 minutes in the professional and overall care of this patient.    Seth Rubin MD

## 2025-04-10 NOTE — PROGRESS NOTES
"               [facility]          Admitting Provider: Nicholas Norman MD Admission Date: 3/31/2025.   Attending Provider: Nicholas Norman MD MRN: [id number]       Subjective   Barbara English is a 50 y.o. female on day 10 of admission presenting with Cerebrovascular accident (CVA), unspecified mechanism (Multi).  Interval History no complaints. BP better. Dtr requested medication for vaginal candidiasis.     Objective   Physical Exam  Last Recorded Vitals: Blood pressure 130/73, pulse 82, temperature 36.8 °C (98.3 °F), temperature source Axillary, resp. rate 18, height 1.6 m (5' 3\"), weight 61.6 kg (135 lb 11.2 oz), SpO2 96%.  Patient Vitals for the past 24 hrs:   BP Temp Temp src Pulse Resp SpO2   04/10/25 0728 130/73 36.8 °C (98.3 °F) Axillary 82 18 96 %   04/10/25 0439 127/68 36.2 °C (97.2 °F) Temporal 80 17 97 %   04/10/25 0359 137/75 37.4 °C (99.4 °F) Oral 85 16 99 %   04/10/25 0009 128/73 37 °C (98.6 °F) Temporal 85 16 97 %   04/09/25 2015 172/90 -- -- -- -- --   04/09/25 1958 (!) 202/102 -- -- -- -- --   04/09/25 1955 (!) 196/106 -- -- -- -- --   04/09/25 1918 (!) 193/98 36 °C (96.8 °F) Tympanic 65 -- 95 %   04/09/25 1801 (!) 181/101 -- -- -- -- --   04/09/25 1726 (!) 182/92 -- -- -- -- --   04/09/25 1630 (!) 193/103 36.7 °C (98.1 °F) Tympanic 69 12 95 %   04/09/25 1600 -- -- -- -- -- 97 %   04/09/25 1521 (!) 182/96 -- -- -- -- --   04/09/25 1401 (!) 197/95 -- -- 78 -- --   04/09/25 1244 (!) 190/104 36.5 °C (97.7 °F) Temporal -- -- 100 %   04/09/25 0817 (!) 183/100 36.6 °C (97.9 °F) Temporal -- -- 96 %     Body mass index is 24.04 kg/m².  GENERAL: alert, cooperative, or no distress  SKIN: no rashes  HEENT Atraumatic, Normocephalic and Not pale, no icterus  NECK: supple, no thyromegaly, JVP within normal limits  LUNGS:  not in respiratory distress, respiratory rate normal, clear to auscultation  CARDIAC: regular rate and rhythm, normal S1 and S2, no murmur, rub, or gallop  ABDOMEN: Soft, " non-tender, normal bowel sounds; no bruits, organomegaly or masses.  EXTREMITIES: No edema  NEURO: Alert and oriented x 2-3, Left hemiplegia, Facial droop.    Intake/Output last 3 Shifts:  I/O last 3 completed shifts:  In: 2212.5 (35.9 mL/kg) [P.O.:350; I.V.:1862.5 (30.3 mL/kg)]  Out: 1350 (21.9 mL/kg) [Urine:1350 (0.6 mL/kg/hr)]  Weight: 61.6 kg   DATA:   Diagnostic tests reviewed for today's visit:    Most recent Labs  Results for orders placed or performed during the hospital encounter of 03/31/25 (from the past 24 hours)   POCT GLUCOSE   Result Value Ref Range    POCT Glucose 156 (H) 74 - 99 mg/dL   CBC and Auto Differential   Result Value Ref Range    WBC 7.5 4.4 - 11.3 x10*3/uL    nRBC 0.0 0.0 - 0.0 /100 WBCs    RBC 3.61 (L) 4.00 - 5.20 x10*6/uL    Hemoglobin 8.9 (L) 12.0 - 16.0 g/dL    Hematocrit 29.2 (L) 36.0 - 46.0 %    MCV 81 80 - 100 fL    MCH 24.7 (L) 26.0 - 34.0 pg    MCHC 30.5 (L) 32.0 - 36.0 g/dL    RDW 14.6 (H) 11.5 - 14.5 %    Platelets 243 150 - 450 x10*3/uL    Neutrophils % 76.1 40.0 - 80.0 %    Immature Granulocytes %, Automated 0.3 0.0 - 0.9 %    Lymphocytes % 12.7 13.0 - 44.0 %    Monocytes % 6.5 2.0 - 10.0 %    Eosinophils % 3.6 0.0 - 6.0 %    Basophils % 0.8 0.0 - 2.0 %    Neutrophils Absolute 5.70 1.20 - 7.70 x10*3/uL    Immature Granulocytes Absolute, Automated 0.02 0.00 - 0.70 x10*3/uL    Lymphocytes Absolute 0.95 (L) 1.20 - 4.80 x10*3/uL    Monocytes Absolute 0.49 0.10 - 1.00 x10*3/uL    Eosinophils Absolute 0.27 0.00 - 0.70 x10*3/uL    Basophils Absolute 0.06 0.00 - 0.10 x10*3/uL   Basic Metabolic Panel   Result Value Ref Range    Glucose 145 (H) 74 - 99 mg/dL    Sodium 141 136 - 145 mmol/L    Potassium 3.8 3.5 - 5.3 mmol/L    Chloride 111 (H) 98 - 107 mmol/L    Bicarbonate 23 21 - 32 mmol/L    Anion Gap 11 10 - 20 mmol/L    Urea Nitrogen 19 6 - 23 mg/dL    Creatinine 0.89 0.50 - 1.05 mg/dL    eGFR 79 >60 mL/min/1.73m*2    Calcium 8.1 (L) 8.6 - 10.3 mg/dL   POCT GLUCOSE   Result Value  "Ref Range    POCT Glucose 145 (H) 74 - 99 mg/dL   POCT GLUCOSE   Result Value Ref Range    POCT Glucose 136 (H) 74 - 99 mg/dL   POCT GLUCOSE   Result Value Ref Range    POCT Glucose 104 (H) 74 - 99 mg/dL     Blood Culture   Date Value Ref Range Status   04/30/2024 No growth at 4 days -  FINAL REPORT  Final    No results found for: \"RESPCULTSM\" No results found for: \"PERDIAFLDCUL\" No results found for: \"STERFLDCULSM\"   Imaging  No results found.    Cardiology, Vascular, and Other Imaging  No other imaging results found for the past 2 days    Current Facility-Administered Medications   Medication Dose Route Frequency Provider Last Rate Last Admin    apixaban (Eliquis) tablet 5 mg  5 mg oral BID Aasef G Shaikh, MD PhD   5 mg at 04/09/25 2000    aspirin EC tablet 81 mg  81 mg oral Daily Nicholas Norman MD   81 mg at 04/09/25 0813    atorvastatin (Lipitor) tablet 80 mg  80 mg oral Nightly Nicholas Norman MD   80 mg at 04/09/25 2000    bisacodyl (Dulcolax) suppository 10 mg  10 mg rectal Daily PRN Seth Rubin MD        buprenorphine-naloxone (Suboxone) 2-0.5 mg per SL tablet 1 tablet  1 tablet sublingual q PM Seth Rubin MD   1 tablet at 04/09/25 2000    dextrose 50 % injection 12.5 g  12.5 g intravenous q15 min PRN Nicholas Norman MD        dextrose 50 % injection 25 g  25 g intravenous q15 min PRN Nicholas Norman MD        gabapentin (Neurontin) capsule 300 mg  300 mg oral Nightly Seth Rubin MD   300 mg at 04/09/25 2000    gabapentin (Neurontin) capsule 600 mg  600 mg oral Daily Seth Rubin MD   600 mg at 04/09/25 0813    glucagon (Glucagen) injection 1 mg  1 mg intramuscular q15 min PRN Nicholas Norman MD        glucagon (Glucagen) injection 1 mg  1 mg intramuscular q15 min PRN Nicholas Norman MD        hydrALAZINE (Apresoline) injection 10 mg  10 mg intravenous q4h PRN Nicholas Norman MD   10 mg at 04/09/25 1958    hydrALAZINE (Apresoline) tablet 10 mg  10 mg oral TID Nicholas" MD Emerson   10 mg at 04/10/25 0011    insulin glargine (Lantus) injection 10 Units  10 Units subcutaneous Nightly Nicholas Norman MD   10 Units at 04/09/25 2155    insulin lispro injection 0-5 Units  0-5 Units subcutaneous TID AC Nicholas Norman MD   1 Units at 04/09/25 0839    lidocaine 4 % patch 1 patch  1 patch transdermal Daily Seth Rubin MD   1 patch at 04/09/25 1408    [Held by provider] losartan (Cozaar) tablet 50 mg  50 mg oral Daily Nicholas Norman MD   50 mg at 04/08/25 0823    metoprolol succinate XL (Toprol-XL) 24 hr tablet 25 mg  25 mg oral Daily Nicholas Norman MD   25 mg at 04/09/25 1034    naloxone (Narcan) injection 0.2 mg  0.2 mg intravenous q5 min PRN Seth Rubin MD        NIFEdipine ER (Adalat CC) 24 hr tablet 90 mg  90 mg oral Daily Nicholas Norman MD        oxyCODONE (Roxicodone) immediate release tablet 10 mg  10 mg oral BID PRN Seth Rubin MD   10 mg at 04/09/25 0626    oxyCODONE (Roxicodone) immediate release tablet 10 mg  10 mg oral q4h PRN Seth Rubin MD   10 mg at 04/09/25 1959    oxygen (O2) therapy   inhalation Continuous PRN - O2/gases Nicholas Norman MD        pantoprazole (ProtoNix) EC tablet 40 mg  40 mg oral BID Nicholas Norman MD   40 mg at 04/09/25 2000    polyethylene glycol (Glycolax, Miralax) packet 17 g  17 g oral Daily Nicholas Norman MD   17 g at 04/08/25 0824    sennosides (Senokot) tablet 17.2 mg  2 tablet oral BID Seth Rubin MD   17.2 mg at 04/09/25 2000    sertraline (Zoloft) tablet 50 mg  50 mg oral Daily Seth Rubin MD   50 mg at 04/09/25 0813    sodium chloride 0.9% infusion  125 mL/hr intravenous Continuous Nicholas Norman  mL/hr at 04/10/25 0636 125 mL/hr at 04/10/25 0636     Current Outpatient Medications   Medication Sig Dispense Refill    aspirin 81 mg EC tablet Take 1 tablet (81 mg) by mouth once daily.      blood-glucose sensor (FreeStyle Yulai 3 Plus Sensor) device Replace sensors every 15 days.  "2 each 3    FreeStyle Yulia 3 [place] misc Use as instructed 1 each 0    furosemide (Lasix) 40 mg tablet Take 0.5 tablets (20 mg) by mouth once daily.      glucagon (Glucagen) 1 mg injection Inject 1 mg into the muscle every 15 minutes if needed for low blood sugar - see comments (BS less jeison 50 if patient unable take glucose by mouth).      insulin glargine (Lantus) 100 unit/mL (3 mL) pen Inject 10 Units under the skin once daily at bedtime. Take as directed per insulin instructions. 15 mL 1    insulin lispro (HumaLOG) 100 unit/mL pen Inject 4 times a day (before meals and at bedtime) - 0 unit(s) if Blood Glucose is between 71 - 150, 2 unit(s) if Blood Glucose is between 151 - 200, 4 unit(s) if Blood Glucose is between 201 - 250, 6 unit(s) if Blood Glucose is between 251 - 300, 8 unit(s) if Blood Glucose is between 301 - 350, 10 unit(s) if Blood Glucose is between 351 - 400, Notify Physician if Blood Glucose is greater than 400 15 mL 2    lidocaine 4 % patch Place 1 patch over 12 hours on the skin once daily. Remove & discard patch within 12 hours or as directed by MD. 30 patch 0    metoprolol succinate XL (Toprol-XL) 25 mg 24 hr tablet Take 1 tablet (25 mg) by mouth once daily. Do not crush or chew. 30 tablet 1    pen needle, diabetic (Unifine Pentips) 32 gauge x 5/32\" needle Use as directed with insulin injections 100 each 0   ..     Medication and Non-Pharmacologic VTE Prophylaxis/Anticoagulants      Last Anticoag Admin            apixaban (Eliquis) tablet 5 mg    Given 5 mg at 04/09/25 2000    Frequency: 2 times daily         There are additional administrations since 04/07/25 0810 that are not shown.    No unadministered anticoagulant orders found.          Assessment/Plan   Barbara English has  Assessment & Plan  Cerebrovascular accident (CVA), unspecified mechanism (Multi)  Dysphagia  HTN   Metastatic Pancreatic cancer  MOUSTAPHA likely due to poor fluid intake  HTN   On Eliquis ASA, Statins.      Patient does not " want to give up eating despite risk of aspiration.. Does not want a PEG tube.        Doubt if patient is able to sustain hydration with her dysphagia  Controlled.   Other Hospital problems        Abnormal findings not addressed during hospitalization, but require out patient follow up. None        I spent 40 minutes coordinating discharge of Barbara English, Including reconciling medications, reviewing the labs & formulating discharge plan. discussing with patient and nursing staff.  Nicholas Norman MD

## 2025-04-10 NOTE — PROGRESS NOTES
Music Therapy Note        Therapy Session  Referral Type: New referral this admission  Visit Type: New visit  Session Start Time: 1403  Session End Time: 1420  Intervention Delivery: In-person  Conflict of Service: None  Number of family members present: 0  Number of staff members present: 0     Pre-assessment  Pain Score: 8  Mood/Affect: Calm, Tired/lethargic         Treatment/Interventions  Areas of Focus: Emotional support, Pain management  Music Therapy Interventions: Live music listening, Recorded music listening, Empathic listening/validating emotions  Interruption: No  Patient Fell Asleep at End of Session: No    Post-assessment  0-10 (Numeric) Pain Score: 7  Mood/Affect: Tired/lethargic  Verbalized Emotional State: Acceptance  Continue Visiting: Yes  Total Session Time (min): 17 minutes    Narrative  Assessment Detail: Upon arrival of MT pt was lying in bed displaying calm affect. MT introduced self and services, offering music interventions to assist with admission . Pt agreed to music at this time. Pt shared she likes a lot of different music depending on her mood. Pt shared she needed to be encouraged and expressed she was in some pain.  Plan: MT engaged pt in live music listening and recorded music listening to assist with emotional support and pain management.  Intervention: MT facilitated pt in playing live preferred music via piano and voice adjusting volume and tempo to elicit pt relaxation response.  Evaluation: Pt responded to music by closing eyes and singing words with the MT. Pt reported a decrease in pain score and thnaked MT for visit.  Follow-up: MT will follow up with pt as able.    Education Documentation  No documentation found.

## 2025-04-12 ENCOUNTER — HOME CARE VISIT (OUTPATIENT)
Dept: HOME HEALTH SERVICES | Facility: HOME HEALTH | Age: 50
End: 2025-04-12

## 2025-04-14 ENCOUNTER — HOME CARE VISIT (OUTPATIENT)
Dept: HOME HEALTH SERVICES | Facility: HOME HEALTH | Age: 50
End: 2025-04-14
Payer: COMMERCIAL

## 2025-04-14 VITALS
HEART RATE: 88 BPM | DIASTOLIC BLOOD PRESSURE: 79 MMHG | BODY MASS INDEX: 21.26 KG/M2 | SYSTOLIC BLOOD PRESSURE: 163 MMHG | OXYGEN SATURATION: 95 % | TEMPERATURE: 98.3 F | RESPIRATION RATE: 20 BRPM | WEIGHT: 120 LBS | HEIGHT: 63 IN

## 2025-04-14 PROCEDURE — G0299 HHS/HOSPICE OF RN EA 15 MIN: HCPCS

## 2025-04-14 ASSESSMENT — ENCOUNTER SYMPTOMS
APPETITE LEVEL: GOOD
DENIES PAIN: 1
CHANGE IN APPETITE: UNCHANGED
BOWEL INCONTINENCE: 1
MUSCLE WEAKNESS: 1

## 2025-04-14 ASSESSMENT — ACTIVITIES OF DAILY LIVING (ADL)
OASIS_M1830: 06
ENTERING_EXITING_HOME: TOTAL DEPENDENCE

## 2025-04-15 ENCOUNTER — HOME CARE VISIT (OUTPATIENT)
Dept: HOME HEALTH SERVICES | Facility: HOME HEALTH | Age: 50
End: 2025-04-15
Payer: COMMERCIAL

## 2025-04-15 VITALS
OXYGEN SATURATION: 98 % | DIASTOLIC BLOOD PRESSURE: 75 MMHG | HEART RATE: 80 BPM | SYSTOLIC BLOOD PRESSURE: 111 MMHG | TEMPERATURE: 98.1 F

## 2025-04-15 PROCEDURE — G0152 HHCP-SERV OF OT,EA 15 MIN: HCPCS

## 2025-04-15 ASSESSMENT — ENCOUNTER SYMPTOMS
PAIN: 1
PERSON REPORTING PAIN: PATIENT
PAIN LOCATION: LEFT SHOULDER
HIGHEST PAIN SEVERITY IN PAST 24 HOURS: 10/10
LOWEST PAIN SEVERITY IN PAST 24 HOURS: 10/10
PAIN LOCATION - PAIN SEVERITY: 10/10

## 2025-04-16 ENCOUNTER — HOME CARE VISIT (OUTPATIENT)
Dept: HOME HEALTH SERVICES | Facility: HOME HEALTH | Age: 50
End: 2025-04-16
Payer: COMMERCIAL

## 2025-04-16 VITALS
DIASTOLIC BLOOD PRESSURE: 78 MMHG | OXYGEN SATURATION: 97 % | SYSTOLIC BLOOD PRESSURE: 124 MMHG | RESPIRATION RATE: 16 BRPM | HEART RATE: 73 BPM | TEMPERATURE: 96.8 F

## 2025-04-16 PROCEDURE — G0299 HHS/HOSPICE OF RN EA 15 MIN: HCPCS

## 2025-04-16 SDOH — ECONOMIC STABILITY: GENERAL

## 2025-04-16 ASSESSMENT — ACTIVITIES OF DAILY LIVING (ADL)
TOILETING: MAXIMUM ASSIST
DRESSING_UB_CURRENT_FUNCTION: MAXIMUM ASSIST
TOILETING: ONE PERSON
GROOMING ASSESSED: 1
DRESSING_LB_CURRENT_FUNCTION: MAXIMUM ASSIST
BATHING_CURRENT_FUNCTION: ONE PERSON
BATHING_CURRENT_FUNCTION: MAXIMUM ASSIST
AMBULATION ASSISTANCE: TWO PERSON
CURRENT_FUNCTION: TWO PERSON
GROOMING_CURRENT_FUNCTION: MAXIMUM ASSIST
BATHING ASSESSED: 1
MONEY MANAGEMENT (EXPENSES/BILLS): NEEDS ASSISTANCE
TOILETING: 1

## 2025-04-16 ASSESSMENT — ENCOUNTER SYMPTOMS
PAIN: 1
ABDOMINAL PAIN: 1
PAIN LOCATION - PAIN SEVERITY: 5/10
OCCASIONAL FEELINGS OF UNSTEADINESS: 1
PERSON REPORTING PAIN: PATIENT
LIMITED RANGE OF MOTION: 1
CONSTIPATION: 1
CHANGE IN APPETITE: UNCHANGED
APPETITE LEVEL: FAIR
MUSCLE WEAKNESS: 1
PAIN LOCATION: ABDOMEN

## 2025-04-17 ENCOUNTER — HOSPITAL ENCOUNTER (INPATIENT)
Facility: HOSPITAL | Age: 50
End: 2025-04-17
Attending: EMERGENCY MEDICINE | Admitting: INTERNAL MEDICINE
Payer: COMMERCIAL

## 2025-04-17 ENCOUNTER — APPOINTMENT (OUTPATIENT)
Dept: RADIOLOGY | Facility: HOSPITAL | Age: 50
End: 2025-04-17
Payer: COMMERCIAL

## 2025-04-17 ENCOUNTER — CLINICAL SUPPORT (OUTPATIENT)
Dept: EMERGENCY MEDICINE | Facility: HOSPITAL | Age: 50
End: 2025-04-17
Payer: COMMERCIAL

## 2025-04-17 ENCOUNTER — HOME CARE VISIT (OUTPATIENT)
Dept: HOME HEALTH SERVICES | Facility: HOME HEALTH | Age: 50
End: 2025-04-17
Payer: COMMERCIAL

## 2025-04-17 DIAGNOSIS — C79.9 METASTASIS FROM PANCREATIC CANCER (MULTI): ICD-10-CM

## 2025-04-17 DIAGNOSIS — R18.0 MALIGNANT ASCITES (CMS-HCC): ICD-10-CM

## 2025-04-17 DIAGNOSIS — C25.9 METASTASIS FROM PANCREATIC CANCER (MULTI): ICD-10-CM

## 2025-04-17 DIAGNOSIS — D62 ACUTE BLOOD LOSS ANEMIA: Primary | ICD-10-CM

## 2025-04-17 DIAGNOSIS — R11.2 NAUSEA AND VOMITING, UNSPECIFIED VOMITING TYPE: ICD-10-CM

## 2025-04-17 LAB
ABO GROUP (TYPE) IN BLOOD: NORMAL
ALBUMIN SERPL BCP-MCNC: 3.1 G/DL (ref 3.4–5)
ALP SERPL-CCNC: 1016 U/L (ref 33–110)
ALT SERPL W P-5'-P-CCNC: 37 U/L (ref 7–45)
AMORPH CRY #/AREA UR COMP ASSIST: ABNORMAL /HPF
ANION GAP BLDV CALCULATED.4IONS-SCNC: 20 MMOL/L (ref 10–25)
ANION GAP SERPL CALC-SCNC: 18 MMOL/L (ref 10–20)
ANTIBODY SCREEN: NORMAL
APPEARANCE UR: ABNORMAL
APTT PPP: 35 SECONDS (ref 26–36)
AST SERPL W P-5'-P-CCNC: 67 U/L (ref 9–39)
BASE EXCESS BLDV CALC-SCNC: -5.5 MMOL/L (ref -2–3)
BASOPHILS # BLD AUTO: 0.08 X10*3/UL (ref 0–0.1)
BASOPHILS NFR BLD AUTO: 0.8 %
BILIRUB SERPL-MCNC: 1.1 MG/DL (ref 0–1.2)
BILIRUB UR STRIP.AUTO-MCNC: NEGATIVE MG/DL
BODY TEMPERATURE: 37 DEGREES CELSIUS
BUN SERPL-MCNC: 16 MG/DL (ref 6–23)
CA-I BLDV-SCNC: 1.2 MMOL/L (ref 1.1–1.33)
CALCIUM SERPL-MCNC: 8.6 MG/DL (ref 8.6–10.6)
CHLORIDE BLDV-SCNC: 109 MMOL/L (ref 98–107)
CHLORIDE SERPL-SCNC: 107 MMOL/L (ref 98–107)
CO2 SERPL-SCNC: 23 MMOL/L (ref 21–32)
COLOR UR: ABNORMAL
CREAT SERPL-MCNC: 0.96 MG/DL (ref 0.5–1.05)
EGFRCR SERPLBLD CKD-EPI 2021: 72 ML/MIN/1.73M*2
EOSINOPHIL # BLD AUTO: 0.03 X10*3/UL (ref 0–0.7)
EOSINOPHIL NFR BLD AUTO: 0.3 %
ERYTHROCYTE [DISTWIDTH] IN BLOOD BY AUTOMATED COUNT: 15.7 % (ref 11.5–14.5)
ERYTHROCYTE [DISTWIDTH] IN BLOOD BY AUTOMATED COUNT: 16.1 % (ref 11.5–14.5)
GLUCOSE BLD MANUAL STRIP-MCNC: 214 MG/DL (ref 74–99)
GLUCOSE BLD MANUAL STRIP-MCNC: 250 MG/DL (ref 74–99)
GLUCOSE BLD MANUAL STRIP-MCNC: 289 MG/DL (ref 74–99)
GLUCOSE BLDV-MCNC: 277 MG/DL (ref 74–99)
GLUCOSE SERPL-MCNC: 228 MG/DL (ref 74–99)
GLUCOSE UR STRIP.AUTO-MCNC: NORMAL MG/DL
HCO3 BLDV-SCNC: 20.1 MMOL/L (ref 22–26)
HCT VFR BLD AUTO: 23.6 % (ref 36–46)
HCT VFR BLD AUTO: 26.8 % (ref 36–46)
HCT VFR BLD EST: 26 % (ref 36–46)
HGB BLD-MCNC: 7.5 G/DL (ref 12–16)
HGB BLD-MCNC: 7.8 G/DL (ref 12–16)
HGB BLDV-MCNC: 8.6 G/DL (ref 12–16)
HOLD SPECIMEN: NORMAL
IMM GRANULOCYTES # BLD AUTO: 0.04 X10*3/UL (ref 0–0.7)
IMM GRANULOCYTES NFR BLD AUTO: 0.4 % (ref 0–0.9)
INHALED O2 CONCENTRATION: 21 %
INR PPP: 1.7 (ref 0.9–1.1)
KETONES UR STRIP.AUTO-MCNC: ABNORMAL MG/DL
LACTATE BLDV-SCNC: 1.5 MMOL/L (ref 0.4–2)
LEUKOCYTE ESTERASE UR QL STRIP.AUTO: ABNORMAL
LIPASE SERPL-CCNC: 11 U/L (ref 9–82)
LYMPHOCYTES # BLD AUTO: 0.66 X10*3/UL (ref 1.2–4.8)
LYMPHOCYTES NFR BLD AUTO: 6.7 %
MAGNESIUM SERPL-MCNC: 2.08 MG/DL (ref 1.6–2.4)
MCH RBC QN AUTO: 24.2 PG (ref 26–34)
MCH RBC QN AUTO: 24.4 PG (ref 26–34)
MCHC RBC AUTO-ENTMCNC: 29.1 G/DL (ref 32–36)
MCHC RBC AUTO-ENTMCNC: 31.8 G/DL (ref 32–36)
MCV RBC AUTO: 76 FL (ref 80–100)
MCV RBC AUTO: 84 FL (ref 80–100)
MONOCYTES # BLD AUTO: 0.45 X10*3/UL (ref 0.1–1)
MONOCYTES NFR BLD AUTO: 4.6 %
NEUTROPHILS # BLD AUTO: 8.52 X10*3/UL (ref 1.2–7.7)
NEUTROPHILS NFR BLD AUTO: 87.2 %
NITRITE UR QL STRIP.AUTO: NEGATIVE
NRBC BLD-RTO: 0 /100 WBCS (ref 0–0)
NRBC BLD-RTO: 0 /100 WBCS (ref 0–0)
OXYHGB MFR BLDV: 45.1 % (ref 45–75)
PCO2 BLDV: 39 MM HG (ref 41–51)
PH BLDV: 7.32 PH (ref 7.33–7.43)
PH UR STRIP.AUTO: 6 [PH]
PLATELET # BLD AUTO: 286 X10*3/UL (ref 150–450)
PLATELET # BLD AUTO: 332 X10*3/UL (ref 150–450)
PO2 BLDV: 34 MM HG (ref 35–45)
POTASSIUM BLDV-SCNC: 4.6 MMOL/L (ref 3.5–5.3)
POTASSIUM SERPL-SCNC: 3.9 MMOL/L (ref 3.5–5.3)
PROT SERPL-MCNC: 6.5 G/DL (ref 6.4–8.2)
PROT UR STRIP.AUTO-MCNC: ABNORMAL MG/DL
PROTHROMBIN TIME: 18.6 SECONDS (ref 9.8–12.4)
RBC # BLD AUTO: 3.1 X10*6/UL (ref 4–5.2)
RBC # BLD AUTO: 3.2 X10*6/UL (ref 4–5.2)
RBC # UR STRIP.AUTO: ABNORMAL MG/DL
RBC #/AREA URNS AUTO: >20 /HPF
RH FACTOR (ANTIGEN D): NORMAL
SAO2 % BLDV: 46 % (ref 45–75)
SODIUM BLDV-SCNC: 144 MMOL/L (ref 136–145)
SODIUM SERPL-SCNC: 144 MMOL/L (ref 136–145)
SP GR UR STRIP.AUTO: 1.02
SQUAMOUS #/AREA URNS AUTO: ABNORMAL /HPF
UROBILINOGEN UR STRIP.AUTO-MCNC: ABNORMAL MG/DL
WBC # BLD AUTO: 9.4 X10*3/UL (ref 4.4–11.3)
WBC # BLD AUTO: 9.8 X10*3/UL (ref 4.4–11.3)
WBC #/AREA URNS AUTO: ABNORMAL /HPF

## 2025-04-17 PROCEDURE — 96365 THER/PROPH/DIAG IV INF INIT: CPT | Mod: 59

## 2025-04-17 PROCEDURE — 96361 HYDRATE IV INFUSION ADD-ON: CPT

## 2025-04-17 PROCEDURE — 93005 ELECTROCARDIOGRAM TRACING: CPT

## 2025-04-17 PROCEDURE — 83690 ASSAY OF LIPASE: CPT

## 2025-04-17 PROCEDURE — 82947 ASSAY GLUCOSE BLOOD QUANT: CPT

## 2025-04-17 PROCEDURE — 87086 URINE CULTURE/COLONY COUNT: CPT

## 2025-04-17 PROCEDURE — 84132 ASSAY OF SERUM POTASSIUM: CPT

## 2025-04-17 PROCEDURE — 83735 ASSAY OF MAGNESIUM: CPT

## 2025-04-17 PROCEDURE — 2550000001 HC RX 255 CONTRASTS: Mod: SE | Performed by: EMERGENCY MEDICINE

## 2025-04-17 PROCEDURE — 2500000005 HC RX 250 GENERAL PHARMACY W/O HCPCS: Mod: SE

## 2025-04-17 PROCEDURE — 2500000001 HC RX 250 WO HCPCS SELF ADMINISTERED DRUGS (ALT 637 FOR MEDICARE OP): Mod: SE

## 2025-04-17 PROCEDURE — 80053 COMPREHEN METABOLIC PANEL: CPT

## 2025-04-17 PROCEDURE — 85610 PROTHROMBIN TIME: CPT

## 2025-04-17 PROCEDURE — 2500000004 HC RX 250 GENERAL PHARMACY W/ HCPCS (ALT 636 FOR OP/ED): Mod: JZ,SE

## 2025-04-17 PROCEDURE — 81003 URINALYSIS AUTO W/O SCOPE: CPT

## 2025-04-17 PROCEDURE — 86923 COMPATIBILITY TEST ELECTRIC: CPT

## 2025-04-17 PROCEDURE — 1210000001 HC SEMI-PRIVATE ROOM DAILY

## 2025-04-17 PROCEDURE — 2500000002 HC RX 250 W HCPCS SELF ADMINISTERED DRUGS (ALT 637 FOR MEDICARE OP, ALT 636 FOR OP/ED): Mod: SE

## 2025-04-17 PROCEDURE — 85730 THROMBOPLASTIN TIME PARTIAL: CPT

## 2025-04-17 PROCEDURE — 86901 BLOOD TYPING SEROLOGIC RH(D): CPT

## 2025-04-17 PROCEDURE — 83605 ASSAY OF LACTIC ACID: CPT

## 2025-04-17 PROCEDURE — 96374 THER/PROPH/DIAG INJ IV PUSH: CPT

## 2025-04-17 PROCEDURE — 99223 1ST HOSP IP/OBS HIGH 75: CPT

## 2025-04-17 PROCEDURE — 85025 COMPLETE CBC W/AUTO DIFF WBC: CPT

## 2025-04-17 PROCEDURE — 81001 URINALYSIS AUTO W/SCOPE: CPT

## 2025-04-17 PROCEDURE — 99285 EMERGENCY DEPT VISIT HI MDM: CPT | Performed by: EMERGENCY MEDICINE

## 2025-04-17 PROCEDURE — 74177 CT ABD & PELVIS W/CONTRAST: CPT

## 2025-04-17 PROCEDURE — 96375 TX/PRO/DX INJ NEW DRUG ADDON: CPT

## 2025-04-17 PROCEDURE — 86900 BLOOD TYPING SEROLOGIC ABO: CPT

## 2025-04-17 PROCEDURE — 85027 COMPLETE CBC AUTOMATED: CPT

## 2025-04-17 PROCEDURE — 93010 ELECTROCARDIOGRAM REPORT: CPT | Performed by: EMERGENCY MEDICINE

## 2025-04-17 PROCEDURE — 99285 EMERGENCY DEPT VISIT HI MDM: CPT | Mod: 25 | Performed by: EMERGENCY MEDICINE

## 2025-04-17 RX ORDER — HYDRALAZINE HYDROCHLORIDE 10 MG/1
10 TABLET, FILM COATED ORAL 3 TIMES DAILY
Status: DISCONTINUED | OUTPATIENT
Start: 2025-04-17 | End: 2025-04-18

## 2025-04-17 RX ORDER — INSULIN LISPRO 100 [IU]/ML
0-10 INJECTION, SOLUTION INTRAVENOUS; SUBCUTANEOUS EVERY 4 HOURS
Status: DISCONTINUED | OUTPATIENT
Start: 2025-04-17 | End: 2025-04-19

## 2025-04-17 RX ORDER — CEFTRIAXONE 1 G/50ML
1 INJECTION, SOLUTION INTRAVENOUS ONCE
Status: COMPLETED | OUTPATIENT
Start: 2025-04-17 | End: 2025-04-17

## 2025-04-17 RX ORDER — ONDANSETRON HYDROCHLORIDE 2 MG/ML
4 INJECTION, SOLUTION INTRAVENOUS ONCE
Status: COMPLETED | OUTPATIENT
Start: 2025-04-17 | End: 2025-04-17

## 2025-04-17 RX ORDER — GABAPENTIN 100 MG/1
200 CAPSULE ORAL EVERY MORNING
Status: DISCONTINUED | OUTPATIENT
Start: 2025-04-18 | End: 2025-04-18

## 2025-04-17 RX ORDER — DEXTROSE 50 % IN WATER (D50W) INTRAVENOUS SYRINGE
25
Status: DISCONTINUED | OUTPATIENT
Start: 2025-04-17 | End: 2025-04-27 | Stop reason: HOSPADM

## 2025-04-17 RX ORDER — ATORVASTATIN CALCIUM 40 MG/1
40 TABLET, FILM COATED ORAL NIGHTLY
Status: DISCONTINUED | OUTPATIENT
Start: 2025-04-17 | End: 2025-04-24

## 2025-04-17 RX ORDER — MORPHINE SULFATE 4 MG/ML
2 INJECTION INTRAVENOUS ONCE
Status: COMPLETED | OUTPATIENT
Start: 2025-04-17 | End: 2025-04-17

## 2025-04-17 RX ORDER — METOCLOPRAMIDE HYDROCHLORIDE 5 MG/ML
10 INJECTION INTRAMUSCULAR; INTRAVENOUS ONCE
Status: COMPLETED | OUTPATIENT
Start: 2025-04-17 | End: 2025-04-17

## 2025-04-17 RX ORDER — ACETAMINOPHEN 325 MG/1
650 TABLET ORAL EVERY 6 HOURS PRN
Status: DISCONTINUED | OUTPATIENT
Start: 2025-04-17 | End: 2025-04-27 | Stop reason: HOSPADM

## 2025-04-17 RX ORDER — ONDANSETRON HYDROCHLORIDE 2 MG/ML
4 INJECTION, SOLUTION INTRAVENOUS EVERY 8 HOURS PRN
Status: DISCONTINUED | OUTPATIENT
Start: 2025-04-17 | End: 2025-04-23

## 2025-04-17 RX ORDER — SERTRALINE HYDROCHLORIDE 50 MG/1
50 TABLET, FILM COATED ORAL DAILY
Status: DISCONTINUED | OUTPATIENT
Start: 2025-04-17 | End: 2025-04-27 | Stop reason: HOSPADM

## 2025-04-17 RX ORDER — PANTOPRAZOLE SODIUM 40 MG/10ML
80 INJECTION, POWDER, LYOPHILIZED, FOR SOLUTION INTRAVENOUS DAILY
Status: DISCONTINUED | OUTPATIENT
Start: 2025-04-17 | End: 2025-04-17

## 2025-04-17 RX ORDER — DEXTROSE 50 % IN WATER (D50W) INTRAVENOUS SYRINGE
12.5
Status: DISCONTINUED | OUTPATIENT
Start: 2025-04-17 | End: 2025-04-27 | Stop reason: HOSPADM

## 2025-04-17 RX ORDER — METOPROLOL SUCCINATE 25 MG/1
25 TABLET, EXTENDED RELEASE ORAL DAILY
Status: DISCONTINUED | OUTPATIENT
Start: 2025-04-17 | End: 2025-04-27 | Stop reason: HOSPADM

## 2025-04-17 RX ORDER — POLYETHYLENE GLYCOL 3350 17 G/17G
17 POWDER, FOR SOLUTION ORAL DAILY PRN
Status: DISCONTINUED | OUTPATIENT
Start: 2025-04-17 | End: 2025-04-27 | Stop reason: HOSPADM

## 2025-04-17 RX ORDER — OXYCODONE HYDROCHLORIDE 5 MG/1
10 TABLET ORAL EVERY 6 HOURS PRN
Status: DISCONTINUED | OUTPATIENT
Start: 2025-04-17 | End: 2025-04-18

## 2025-04-17 RX ORDER — HYDRALAZINE HYDROCHLORIDE 20 MG/ML
10 INJECTION INTRAMUSCULAR; INTRAVENOUS ONCE
Status: COMPLETED | OUTPATIENT
Start: 2025-04-17 | End: 2025-04-17

## 2025-04-17 RX ORDER — AMOXICILLIN 250 MG
2 CAPSULE ORAL NIGHTLY PRN
Status: DISCONTINUED | OUTPATIENT
Start: 2025-04-17 | End: 2025-04-19

## 2025-04-17 RX ORDER — PANTOPRAZOLE SODIUM 40 MG/1
40 TABLET, DELAYED RELEASE ORAL 2 TIMES DAILY
Status: DISCONTINUED | OUTPATIENT
Start: 2025-04-17 | End: 2025-04-19

## 2025-04-17 RX ORDER — NALOXONE HYDROCHLORIDE 0.4 MG/ML
0.2 INJECTION, SOLUTION INTRAMUSCULAR; INTRAVENOUS; SUBCUTANEOUS EVERY 5 MIN PRN
Status: DISCONTINUED | OUTPATIENT
Start: 2025-04-17 | End: 2025-04-27 | Stop reason: HOSPADM

## 2025-04-17 RX ORDER — HYDROXYZINE HYDROCHLORIDE 25 MG/1
25 TABLET, FILM COATED ORAL EVERY 6 HOURS PRN
Status: DISCONTINUED | OUTPATIENT
Start: 2025-04-17 | End: 2025-04-19

## 2025-04-17 RX ORDER — ASPIRIN 81 MG/1
81 TABLET ORAL DAILY
Status: DISCONTINUED | OUTPATIENT
Start: 2025-04-17 | End: 2025-04-24

## 2025-04-17 RX ADMIN — INSULIN LISPRO 6 UNITS: 100 INJECTION, SOLUTION INTRAVENOUS; SUBCUTANEOUS at 18:10

## 2025-04-17 RX ADMIN — ONDANSETRON 4 MG: 2 INJECTION INTRAMUSCULAR; INTRAVENOUS at 07:50

## 2025-04-17 RX ADMIN — GABAPENTIN 400 MG: 300 CAPSULE ORAL at 20:47

## 2025-04-17 RX ADMIN — METOCLOPRAMIDE 10 MG: 5 INJECTION, SOLUTION INTRAMUSCULAR; INTRAVENOUS at 11:28

## 2025-04-17 RX ADMIN — HYDRALAZINE HYDROCHLORIDE 10 MG: 20 INJECTION INTRAMUSCULAR; INTRAVENOUS at 12:09

## 2025-04-17 RX ADMIN — METOPROLOL SUCCINATE 25 MG: 25 TABLET, EXTENDED RELEASE ORAL at 17:52

## 2025-04-17 RX ADMIN — IOHEXOL 75 ML: 350 INJECTION, SOLUTION INTRAVENOUS at 08:32

## 2025-04-17 RX ADMIN — SODIUM PHOSPHATE 1 ENEMA: 7; 19 ENEMA RECTAL at 21:01

## 2025-04-17 RX ADMIN — OXYCODONE HYDROCHLORIDE 10 MG: 10 TABLET ORAL at 17:57

## 2025-04-17 RX ADMIN — INSULIN LISPRO 4 UNITS: 100 INJECTION, SOLUTION INTRAVENOUS; SUBCUTANEOUS at 22:35

## 2025-04-17 RX ADMIN — HYDRALAZINE HYDROCHLORIDE 10 MG: 10 TABLET ORAL at 20:47

## 2025-04-17 RX ADMIN — SODIUM CHLORIDE 500 ML: 0.9 INJECTION, SOLUTION INTRAVENOUS at 07:51

## 2025-04-17 RX ADMIN — PANTOPRAZOLE SODIUM 80 MG: 40 INJECTION, POWDER, FOR SOLUTION INTRAVENOUS at 11:28

## 2025-04-17 RX ADMIN — HYDRALAZINE HYDROCHLORIDE 10 MG: 10 TABLET ORAL at 18:01

## 2025-04-17 RX ADMIN — SERTRALINE 50 MG: 50 TABLET, FILM COATED ORAL at 17:52

## 2025-04-17 RX ADMIN — PANTOPRAZOLE SODIUM 40 MG: 40 TABLET, DELAYED RELEASE ORAL at 20:47

## 2025-04-17 RX ADMIN — HYDROMORPHONE HYDROCHLORIDE 0.5 MG: 0.5 INJECTION, SOLUTION INTRAMUSCULAR; INTRAVENOUS; SUBCUTANEOUS at 07:51

## 2025-04-17 RX ADMIN — ASPIRIN 81 MG: 81 TABLET, COATED ORAL at 17:52

## 2025-04-17 RX ADMIN — CEFTRIAXONE SODIUM 1 G: 1 INJECTION, SOLUTION INTRAVENOUS at 13:07

## 2025-04-17 RX ADMIN — MORPHINE SULFATE 2 MG: 4 INJECTION, SOLUTION INTRAMUSCULAR; INTRAVENOUS at 11:28

## 2025-04-17 RX ADMIN — ATORVASTATIN CALCIUM 40 MG: 40 TABLET, FILM COATED ORAL at 20:47

## 2025-04-17 ASSESSMENT — PAIN DESCRIPTION - ONSET
ONSET: GRADUAL
ONSET: PROGRESSIVE

## 2025-04-17 ASSESSMENT — PAIN SCALES - GENERAL
PAINLEVEL_OUTOF10: 10 - WORST POSSIBLE PAIN
PAINLEVEL_OUTOF10: 5 - MODERATE PAIN
PAINLEVEL_OUTOF10: 10 - WORST POSSIBLE PAIN
PAINLEVEL_OUTOF10: 0 - NO PAIN
PAINLEVEL_OUTOF10: 10 - WORST POSSIBLE PAIN
PAINLEVEL_OUTOF10: 9

## 2025-04-17 ASSESSMENT — PAIN DESCRIPTION - LOCATION
LOCATION: ABDOMEN

## 2025-04-17 ASSESSMENT — PAIN DESCRIPTION - PAIN TYPE
TYPE: CHRONIC PAIN
TYPE: ACUTE PAIN

## 2025-04-17 ASSESSMENT — PAIN - FUNCTIONAL ASSESSMENT
PAIN_FUNCTIONAL_ASSESSMENT: 0-10

## 2025-04-17 ASSESSMENT — PAIN DESCRIPTION - PROGRESSION
CLINICAL_PROGRESSION: GRADUALLY WORSENING
CLINICAL_PROGRESSION: GRADUALLY WORSENING

## 2025-04-17 ASSESSMENT — COGNITIVE AND FUNCTIONAL STATUS - GENERAL
EATING MEALS: A LITTLE
DRESSING REGULAR LOWER BODY CLOTHING: A LOT
MOBILITY SCORE: 14
WALKING IN HOSPITAL ROOM: A LOT
STANDING UP FROM CHAIR USING ARMS: A LOT
TURNING FROM BACK TO SIDE WHILE IN FLAT BAD: A LOT
PERSONAL GROOMING: A LOT
MOVING TO AND FROM BED TO CHAIR: A LOT
DRESSING REGULAR UPPER BODY CLOTHING: A LOT
TOILETING: A LOT
DAILY ACTIVITIY SCORE: 13
HELP NEEDED FOR BATHING: A LOT
CLIMB 3 TO 5 STEPS WITH RAILING: A LOT

## 2025-04-17 ASSESSMENT — LIFESTYLE VARIABLES
HAVE YOU EVER FELT YOU SHOULD CUT DOWN ON YOUR DRINKING: NO
TOTAL SCORE: 0
EVER FELT BAD OR GUILTY ABOUT YOUR DRINKING: NO
EVER HAD A DRINK FIRST THING IN THE MORNING TO STEADY YOUR NERVES TO GET RID OF A HANGOVER: NO
HAVE PEOPLE ANNOYED YOU BY CRITICIZING YOUR DRINKING: NO

## 2025-04-17 ASSESSMENT — PAIN DESCRIPTION - DESCRIPTORS
DESCRIPTORS: STABBING;CRAMPING;ACHING
DESCRIPTORS: ACHING;CRAMPING
DESCRIPTORS: ACHING;CRAMPING

## 2025-04-17 ASSESSMENT — PAIN DESCRIPTION - ORIENTATION
ORIENTATION: LEFT;UPPER
ORIENTATION: RIGHT;MID;UPPER
ORIENTATION: LEFT;UPPER

## 2025-04-17 NOTE — H&P
"History Of Present Illness    Barbara English is a 50 y.o. 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     On exam, unable to obtain any meaningful history. Patient is very sleepy, but arousable. Upon review of records at most admission, patient had similar mentation after receiving pain medications.     Obtained history from patients daughter, Nannette. Patients daughter states that since patient was discharged from the hospital, she has been doing well. She works with physical therapy and was making progress after her recent stroke (03/31/2025). Since discharge, however, she has only had 1 bowel movement about 6 days ago. She has been vomiting over the last few days and generally did not feel well. Notably also over the last few days she has started her menses. Her daughter states that 3 days ago began having \"golf ball sized\" clots from her menses. She has not had a period prior to this since January. She also was having some hemorrhoidal bleeding while at home.    Patients daughter states that patient only had one dose of chemotherapy, which went \"very badly\". Patient felt very ill, and did not want to pursue subsequent cycles. There were hospice conversations during the last hospitalization, and the decision was ultimately to pursue palliative radiation, and if that did not work, hospice.    Of note, patients daughter states that she has not been taking suboxone since being discharged as they instructed her to stop, although there is no record of this and no discharge summary available.     ED Course:    Interventions:  Ceftriaxone, 10mg IV hydralazine, " 2mg morphine, 4mg IV zofran, 80mg IV pantoprazole, 500cc NS bolus, Dilaudid 0.5mg, Reglan 10mg     Vitals:  BP: 170/107  HR: 91  RR: 19  SpO2: 99%  T: 97.7F    Labs:  CBC: WBC 9.8, Hgb 7.5, Hct 23.6, MCV 76, Plt 286  CMP: Na 144, K 3.9, Cl 107, HCO3 23, Cr 0.96, Mg 2.08,  ALP 1016, ALT 37, AST 67, T bili 1.1  Lipase 11  pH 7.32, pCO2 39, pO2 34, lac 1.5  UA cloudy urine, 2+ protein, 3+ blood, 1+ ketones, negative nitrites, 75 LE, 6-10 WBC    EKG: NSR    Imaging:  CTAP:  IMPRESSION:  1. When compared to prior CT from 04/30/2024, there is interval  progression of disease as evidenced by development of large ascites,  metastatic lesions within the liver, and increased ill-defined hazy  opacity within the pancreatic tail.  2. There is splenic vein thrombosis with questionable portal vein  thrombosis and gastric varices consistent with portal hypertension.  3. New ground-glass opacities within bilateral lower lobes which  could represent atelectasis. However, unable to rule out superimposed  infection.  4. Moderate colonic stool burden.  5. Additional chronic and incidental findings as detailed above.      Past Medical History  Oncologic Hx:  11/14/2024: CT scan for nausea/vomiting showed abnormalities in the pancreatic tail and splenic vein thrombus  12/2024: CT scan done during admission for DKA. CT again with abnormalities in pancreatic tail thought to be inflammatory but MRI pancreas recommended, which was ordered by PCP but not completed  1/17/2025: admitted with chest and abdominal pain, vomiting, and blood in stool. CT A/P with solid appearing 4cm pancreatic tail mass, causing occlusion of the splenic vein. CA 19-9 15,284. CT chest without metastatic disease.   1/19/2025: MRI pancreas with 2 indeterminate liver lesions, around 1 cm.   1/20/2025: EUS and biopsy of pancreatic tail mass  Pathology: adenocarcinoma  1/25/2025: colonoscopy done for GI bleeding. Hemorrhoids noted.  Pathology: benign  2/24/2025: start  FOLFIRINOX    Surgical History  She has a past surgical history that includes CT angio coronary art with heartflow if score >30% (11/17/2021).     Social History  Patient lives at home with boyfriend, however daughter helps manage patients care closely, and knows what medications she takes regularly.    Family History  Reviewed, [place]     Allergies  Haloperidol, Lorazepam, Latex, Lisinopril, and Vancomycin    Review of Systems  Negative other than noted above     Physical Exam  Constitutional:       Comments: Patient resting in bed, very sleepy, though arousable and able to answer yes/ no questions, and intermittently speaks. Overall appears pale   Cardiovascular:      Rate and Rhythm: Normal rate.      Heart sounds: No murmur heard.     No friction rub. No gallop.   Pulmonary:      Effort: Pulmonary effort is normal. No respiratory distress.      Breath sounds: No wheezing.   Abdominal:      General: Abdomen is flat. There is no distension.      Comments: No tenderness on palpation, no palpable masses   Musculoskeletal:      Right lower leg: No edema.      Left lower leg: No edema.         Last Recorded Vitals  /87   Pulse 96   Temp 36.5 °C (97.7 °F) (Tympanic)   Resp 15   Wt 54.4 kg (120 lb)   SpO2 100%     Relevant Results    Scheduled medications  Scheduled Medications[1]  Continuous medications  Continuous Medications[2]  PRN medications  PRN Medications[3]    Results for orders placed or performed during the hospital encounter of 04/17/25 (from the past 24 hours)   CBC and Auto Differential   Result Value Ref Range    WBC 9.8 4.4 - 11.3 x10*3/uL    nRBC 0.0 0.0 - 0.0 /100 WBCs    RBC 3.10 (L) 4.00 - 5.20 x10*6/uL    Hemoglobin 7.5 (L) 12.0 - 16.0 g/dL    Hematocrit 23.6 (L) 36.0 - 46.0 %    MCV 76 (L) 80 - 100 fL    MCH 24.2 (L) 26.0 - 34.0 pg    MCHC 31.8 (L) 32.0 - 36.0 g/dL    RDW 15.7 (H) 11.5 - 14.5 %    Platelets 286 150 - 450 x10*3/uL    Neutrophils % 87.2 40.0 - 80.0 %    Immature Granulocytes  %, Automated 0.4 0.0 - 0.9 %    Lymphocytes % 6.7 13.0 - 44.0 %    Monocytes % 4.6 2.0 - 10.0 %    Eosinophils % 0.3 0.0 - 6.0 %    Basophils % 0.8 0.0 - 2.0 %    Neutrophils Absolute 8.52 (H) 1.20 - 7.70 x10*3/uL    Immature Granulocytes Absolute, Automated 0.04 0.00 - 0.70 x10*3/uL    Lymphocytes Absolute 0.66 (L) 1.20 - 4.80 x10*3/uL    Monocytes Absolute 0.45 0.10 - 1.00 x10*3/uL    Eosinophils Absolute 0.03 0.00 - 0.70 x10*3/uL    Basophils Absolute 0.08 0.00 - 0.10 x10*3/uL   Magnesium   Result Value Ref Range    Magnesium 2.08 1.60 - 2.40 mg/dL   Comprehensive metabolic panel   Result Value Ref Range    Glucose 228 (H) 74 - 99 mg/dL    Sodium 144 136 - 145 mmol/L    Potassium 3.9 3.5 - 5.3 mmol/L    Chloride 107 98 - 107 mmol/L    Bicarbonate 23 21 - 32 mmol/L    Anion Gap 18 10 - 20 mmol/L    Urea Nitrogen 16 6 - 23 mg/dL    Creatinine 0.96 0.50 - 1.05 mg/dL    eGFR 72 >60 mL/min/1.73m*2    Calcium 8.6 8.6 - 10.6 mg/dL    Albumin 3.1 (L) 3.4 - 5.0 g/dL    Alkaline Phosphatase 1,016 (H) 33 - 110 U/L    Total Protein 6.5 6.4 - 8.2 g/dL    AST 67 (H) 9 - 39 U/L    Bilirubin, Total 1.1 0.0 - 1.2 mg/dL    ALT 37 7 - 45 U/L   Lipase   Result Value Ref Range    Lipase 11 9 - 82 U/L   Urinalysis with Reflex Culture and Microscopic   Result Value Ref Range    Color, Urine Light-Red (N) Light-Yellow, Yellow, Dark-Yellow    Appearance, Urine Cloudy (N) Clear    Specific Gravity, Urine 1.020 1.005 - 1.035    pH, Urine 6.0 5.0, 5.5, 6.0, 6.5, 7.0, 7.5, 8.0    Protein, Urine 100 (2+) (A) NEGATIVE, 10 (TRACE) mg/dL    Glucose, Urine Normal Normal mg/dL    Blood, Urine OVER (3+) (A) NEGATIVE mg/dL    Ketones, Urine 10 (1+) (A) NEGATIVE mg/dL    Bilirubin, Urine NEGATIVE NEGATIVE mg/dL    Urobilinogen, Urine 2 (1+) (N) NORM mg/dL    Nitrite, Urine NEGATIVE NEGATIVE    Leukocyte Esterase, Urine 75 Torie/uL (A) NEGATIVE   Microscopic Only, Urine   Result Value Ref Range    WBC, Urine 6-10 (A) 1-5, NONE /HPF    RBC, Urine >20 (A)  NONE, 1-2, 3-5 /HPF    Squamous Epithelial Cells, Urine 1-9 (SPARSE) Reference range not established. /HPF    Amorphous Crystals, Urine 2+ NONE, 1+, 2+ /HPF   Type and Screen   Result Value Ref Range    ABO TYPE A     Rh TYPE POS     ANTIBODY SCREEN NEG    Blood Gas Venous Full Panel   Result Value Ref Range    POCT pH, Venous 7.32 (L) 7.33 - 7.43 pH    POCT pCO2, Venous 39 (L) 41 - 51 mm Hg    POCT pO2, Venous 34 (L) 35 - 45 mm Hg    POCT SO2, Venous 46 45 - 75 %    POCT Oxy Hemoglobin, Venous 45.1 45.0 - 75.0 %    POCT Hematocrit Calculated, Venous 26.0 (L) 36.0 - 46.0 %    POCT Sodium, Venous 144 136 - 145 mmol/L    POCT Potassium, Venous 4.6 3.5 - 5.3 mmol/L    POCT Chloride, Venous 109 (H) 98 - 107 mmol/L    POCT Ionized Calicum, Venous 1.20 1.10 - 1.33 mmol/L    POCT Glucose, Venous 277 (H) 74 - 99 mg/dL    POCT Lactate, Venous 1.5 0.4 - 2.0 mmol/L    POCT Base Excess, Venous -5.5 (L) -2.0 - 3.0 mmol/L    POCT HCO3 Calculated, Venous 20.1 (L) 22.0 - 26.0 mmol/L    POCT Hemoglobin, Venous 8.6 (L) 12.0 - 16.0 g/dL    POCT Anion Gap, Venous 20.0 10.0 - 25.0 mmol/L    Patient Temperature 37.0 degrees Celsius    FiO2 21 %   POCT GLUCOSE   Result Value Ref Range    POCT Glucose 289 (H) 74 - 99 mg/dL   POCT GLUCOSE   Result Value Ref Range    POCT Glucose 250 (H) 74 - 99 mg/dL          Assessment/Plan   Assessment & Plan  Acute blood loss anemia    Metastasis from pancreatic cancer (Multi)    Malignant ascites (CMS-HCC)    Nausea and vomiting, unspecified vomiting type    Barbara English is a 50 y.o. 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 ED labs notable for  Hgb 7.3, ALP 1016. WBC wnl, CTAP not suggestive of obvious infection, though tx with CTX in ED for c/f SBP (patient has likely malignant ascites). In regard to patients anemia, less likely GIB as CHANTAL negative, patient largely constipated (as blood act as a laxative). Patients daughter reports seeing menstrual bleeding, and has had intermittent menses recently. CTAP not suggestive of acute bleed, though not an optimal study to examine acute blood loss. Favor patients abdominal pain likely 2/2 constipation (CTAP suggestive of moderate stool burden, no evidence of obvious obstruction). Finally, patients pancreatic adenoca appears to have progressed in regard to metastasis from previous study on 03/19. There are new liver lesions and now with malignant ascites and mesenteric edema. Patient is admitted to medicine, and will continue to follow anemia with BID CBC, encourage Bms, and pursue GOC discussions.    #Acute on chronic anemia  :: Patients family reports large menstrual bleeding recently, endorsing large clots in last few days, and some additional hemorrhoidal bleeding  :: GI vs  bleed. Favor  as patient still has menses, per daughters history appears to be coming from vagina. Less likely GIB even iso gastric varices (seen on CTAP) as CHANTAL negative, blood would likely act like laxative, though cannot totally rule out  :: CHANTAL in ED with some bright red blood  :: large amount of blood seen on UA  :: Patient hypertensive, Hgb 7.5  - BID CBC  - Type and screen 04/17, consented by daughter via phone  - Coags  - Hold home apixaban    #Constipation  #Abdominal Pain  :: Patient with abd pain/ vomiting iso metastatic pancreatic cancer on large amounts of oxycodone. Has not had a BM in 6 days.  :: No evidence of obstruction on CT  :: Patient had recent flex sig 01/2025 for rectal bleeding found 3 polyps, removed. Had internal and external hemorrhoids.  - NPO, low threshold to pursue NGT with decompression if patient has  persistent nausea  - Fleet enema, will pursue additional if unable to produce bowel movement. Defer PO laxatives as NPO  - PRN Zofran    #Metastatic prostate adenoca  #Cancer related pain  :: Diagnosed initially 11/2024 after presenting for nausea and vomiting, underwent 1 round Folfox therapy but did not tolerate  :: Last admission at [facility], discussions centered around palliative radiation vs hospice, family elected to pursue palliative radiation then hospice if does not work  :: On CTAP 04/17, there is clear progression when compared to last scan 03/19. There are new liver lesions, now has likely malignant ascites and mesenteric edema  :: on recent hospitalization, was recommended suboxone, however patient was told to d/c it on discharge (no d/c summary to confirm), so patient has not taken it  - Ongoing GOC discussions with patient and family  - start with oxycodone 10mg q6h PRN for pain, will increase pain regimen PRN  - Tylenol 650mg PRN for pain    #Splenic vein thrombosis  #Varices  :: New finding in 11/2024, patient started on apixaban  :: Splenic vein thrombus persists on CTAP obtained today, now with evidence of portal HTN and gastric varices  - Holding home apixaban iso anemia    #Recent CVA  :: Thrombotic CVA on 03/31 of R middle cerebral artery with reported residual deficit  :: Patient went home with outpatient PT on discharge  - c/w home ASA daily  - c/w home atorvastatin 40mg daily    #Type I Diabetes  :: Most recent A1c (10.7% 02/2025)  :: Recent home regimen recently switched, family instructed to STOP lantus and patient should remain on SSI  - SSI #1 for now, will titrate PRN  - q4h glucose checks while NPO    #Hx of MW tear  - c/w home pantoprazole 40mg BID    #Dysphagia  :: Notes at last hospital that patient did not want PEG tube, accepted risk of aspiration  - will need to confirm swallow function when clinically appropriate     #Hx HFpEF  #Hx HTN  :: Recent echocardiogram 04/01/2025 with EF  61%, grade I diastolic filling defect with elevated LAP, there is severely increased concentric LVH  :: Clinically euvolemic on exam today  - c/w home metoprolol succ 50mg daily  - c/w home hydralazine 10mg PO TID    #Mood disorder  - c/w sertraline 50mg daily  - c/w gabapentin 400mg nightly, gabapentin 200mg qAM    F: PRN  E: PRN  N: NPO, ok for sips with meds  A: PIV    DVT ppx: holding iso bleeding  GI ppx: PPI    Code: DNR/ DNI (confirmed with family on admission)  NOK: Nannette, Daughter, [phone]    To be formally staffed in         Shahnaz Velazquez MD         [1] [Held by provider] apixaban, 5 mg, oral, BID  aspirin, 81 mg, oral, Daily  atorvastatin, 40 mg, oral, Nightly  [START ON 4/18/2025] gabapentin, 200 mg, oral, q AM  gabapentin, 400 mg, oral, Nightly  hydrALAZINE, 10 mg, oral, TID  insulin lispro, 0-10 Units, subcutaneous, q4h  metoprolol succinate XL, 25 mg, oral, Daily  pantoprazole, 40 mg, oral, BID  sertraline, 50 mg, oral, Daily  sodium phosphates, 1 enema, rectal, Once  [2]    [3] PRN medications: acetaminophen, dextrose, dextrose, glucagon, glucagon, [Held by provider] hydrOXYzine HCL, naloxone, ondansetron, oxyCODONE, polyethylene glycol, [Held by provider] sennosides-docusate sodium

## 2025-04-17 NOTE — ED PROVIDER NOTES
"Emergency Department Provider Note        History of Present Illness     History provided by: Patient  Limitations to History: None  External Records Reviewed with Brief Summary:  Progress note from 4/10/2025 demonstrating patient admitted for a CVA.  Patient recommended PEG which she declined.  Palliative consult    HPI:  Barbara English is a 50 y.o. female past medical history of metastatic prostate cancer, recent CVA, type 1 diabetes with previous episodes of DKA, CHF, CKD, gastroparesis, polysubstance use, severe protein calorie malnutrition, splenic artery aneurysm, splenic vein thrombosis presenting with a chief complaint of abdominal pain.  Patient states her upper abdomen has been painful for last night.  She states that she is on chronic oxycodone because of her pancreatic cancer which has now made her constipated.  Patient states that she is taking MiraLAX and senna, has taken both this morning but is unable to have a bowel movement.  Patient states last bowel movement was 3 days ago.  Patient states she was passing gas yesterday but is now passing gas today.  She states that she feels nauseous and had a small amount of vomiting this morning.  Patient states since leaving [facility] she has been eating and drinking well.  Patient does not believe that she is currently on hospice.  Patient states she has been trying to \"gag\" the stool out of her rectum without improvement.  She states she is currently on her menstrual cycle    Physical Exam   Triage vitals:  T 36.5 °C (97.7 °F)  HR 91  BP (S) (!) 170/107  RR 19  O2 99 % None (Room air)    General: Awake. Ill appearing   Eyes: Gaze conjugate.  No scleral icterus or injection  HENT: Normo-cephalic, atraumatic. No stridor  CV: Regular rate, regular rhythm. Radial pulses 2+ bilaterally  Resp: Breathing non-labored, speaking in full sentences.  Clear to auscultation bilaterally  GI: Soft, distended abdomen, mild epigastric tenderness. No rebound or guarding.  : " rectal exam with chaperone present. External hemorrhoids present. No blood on CHANTAL. Active vaginal bleeding   MSK/Extremities: right partial foot amputation. Moving all extremities  Skin: Warm. Appropriate color  Psych: Appropriate mood and affect    Medical Decision Making & ED Course   Medical Decision Makin y.o. female above-stated past medical history presenting with a chief complaint of abdominal pain and constipation.  On arrival patient is hypertensive, afebrile saturating 99% on room air.  On physical exam patient is chronically ill-appearing with mild epigastric tenderness, mild distention but no peritoneal signs.  Patient has bright red blood in her depends, stating this is from her menstrual cycle.  On my examination she has external hemorrhoids present but no active bleeding from the rectum.  Of note patient was recently admitted to [facility] for CVA, has metastatic pancreatic cancer and is now bedbound from her new neurologic deficits.  I can see that hospice has called her daughter yesterday however it does not look like she is currently enrolled.  When asked, patient believes that hospice nurse was at her house yesterday but does not think that she was actually enrolled in hospice yet.  She is okay with receiving an IV, pain medications, fluids and a CT scan.  Will rule out obstruction based on the lack of having a bowel movement before administering an enema. Remainder of care discussed in the ED course.   ----      Differential diagnoses considered include but are not limited to: as stated above      Social Determinants of Health which Significantly Impact Care: None identified     EKG Independent Interpretation: EKG interpreted by myself. Please see ED Course for full interpretation.    Independent Result Review and Interpretation: Relevant laboratory and radiographic results were reviewed and independently interpreted by myself.  As necessary, they are commented on in the ED Course.    Chronic  conditions affecting the patient's care: As documented above in [facility]    The patient was discussed with the following consultants/services: Admission Coordinator who accepted the patient for admission    Care Considerations: As documented above in [facility]    ED Course:  ED Course as of 04/17/25 1254   Thu Apr 17, 2025   0841 ECG 12 lead  EKG demonstrates normal sinus rhythm with a rate of 83 bpm.  Normal MN and QTc.  No ST elevations depressions concerning for ischemia.  No notable T wave inversions. [AW]   0858 Patient has a very extensive medical history with metastatic pancreatic cancer coming in with abdominal pain and constipation.  Pending labs and CT at this time.  Will discuss with possible admitting team, family members, and possibly hospice to determine best plan for disposition. [DM]   0858 Spoke with patient's daughter and healthcare power of  via phone.  Currently patient is not on hospice.  She states she is also concerned about obstruction.  She agrees that bleeding has been coming from the vagina with clots noted.  They state that the referral is supposed to be for physical therapy, the goal is if she has some recovery of her neurologic status that they would start radiation therapy.  If she does not improve then they will be engaging in hospice. [AW]   1117 Alerted by nursing that patient had a large episode of brown emesis.  I evaluated patient at bedside, emesis is dark brown in nature.  Will order Protonix and additional dose of antiemetics and a type and screen giving her hemoglobin drop to 7.5 [AW]   1134 CT abdomen pelvis w IV contrast  1. When compared to prior CT from 04/30/2024, there is interval progression of disease as evidenced by development of large ascites,  metastatic lesions within the liver, and increased ill-defined hazy opacity within the pancreatic tail.  2. There is splenic vein thrombosis with questionable portal vein thrombosis and gastric varices consistent with portal  hypertension.  3. New ground-glass opacities within bilateral lower lobes which could represent atelectasis. However, unable to rule out superimposed  infection.  4. Moderate colonic stool burden.  5. Additional chronic and incidental findings as detailed above.   [AW]   1157 I discussed with the patient at bedside for concern for gastric varices, worsening metastatic disease and large ascites.  Patient is continuing to have pain and was requesting morphine which I ordered.  Patient is also notably hypertensive for which I ordered hydralazine, this was a recurrent problem during her inpatient hospitalization last time.  I also called and updated her daughter.  Patient will require admission for pain control, acute blood loss anemia, possible intervention on her gastric varices. [AW]   1227 Ordering prophylactic coverage for SBP with ceftriaxone.  Hydralazine improved patient's blood pressure to 184/98. Admitted to medicine is stable condition  [AW]      ED Course User Index  [AW] Maryann Neal DO  [DM] Missael Hair MD         Diagnoses as of 04/17/25 1254   Acute blood loss anemia   Metastasis from pancreatic cancer (Multi)   Malignant ascites (CMS-HCC)   Nausea and vomiting, unspecified vomiting type     Disposition   As a result of their workup, the patient will require admission to the hospital.  The patient was informed of her diagnosis.  The patient was given the opportunity to ask questions and I answered them. The patient agreed to be admitted to the hospital.    Procedures   Procedures    Patient seen and discussed with ED attending physician.    Maryann Neal DO  Emergency Medicine       Maryann Neal DO  Resident  04/17/25 1254    I saw and evaluated the patient. I personally obtained the key and critical portions of the history and physical exam or was physically present for key and critical portions performed by the resident/fellow/TRENT. I reviewed the resident/fellow/TRENT's documentation  and discussed the patient with the resident/fellow/TRENT. I agree with the resident/fellow/TRENT's medical decision making as documented in the note.    ** Please excuse any errors in grammar or translation related to this dictation. Voice recognition software was utilized to prepare this document. **       Missael Hair MD  [facility] Emergency [facility] Medicine        Missael Hair MD  04/18/25 [id number]

## 2025-04-17 NOTE — SIGNIFICANT EVENT
Subjective     Chief concern:   Chief Complaint   Patient presents with    Abdominal Pain    Constipation       History of present illness:  Barbara English is a 50 y.o. 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 Presents to the ED with abdominal pain, at least 3 days of constipation and BRBPR.     On interview, the patient is sleepy but arousablie to voice. She denies any discomfort on exam (most notably abdominal exam negative) though she had recently received Morphine in the ED. She could answer simple questions but unable to give many details.    Spoke with family for collateral whom confirmed current medications she is receiving and recent events. They knew about the external hemorrhoid but they were concerned this bleeding may be “stomach”. Family reports that the patient may currently be menstruating, reporting seeing blood clots per vagina more so than rectum. Hope for this admission per the daughter (Nannette) was to improve the constipation and resolve bleed and hopefully return home to later decided hospice. Family was updated on the results of the CT imaging showing possible progression of metastatic disease. Plan to be at bedside tomorrow. Family to keep current code status until further discussion with siblings.    Further details per excellent intern H&P.      ONCOLOGICAL HISTORY:   11/14/2024: CT scan for nausea/vomiting showed abnormalities in the pancreatic tail and splenic vein thrombus  12/2024: CT scan done during admission for DKA. CT again with abnormalities in pancreatic tail thought to be inflammatory but MRI pancreas recommended, which was ordered by PCP but not completed  1/17/2025: admitted with  chest and abdominal pain, vomiting, and blood in stool. CT A/P with solid appearing 4cm pancreatic tail mass, causing occlusion of the splenic vein. CA 19-9 15,284. CT chest without metastatic disease.   1/19/2025: MRI pancreas with 2 indeterminate liver lesions, around 1 cm.   1/20/2025: EUS and biopsy of pancreatic tail mass  Pathology: adenocarcinoma  1/25/2025: colonoscopy done for GI bleeding. Hemorrhoids noted.  Pathology: benign  2/24/2025: start FOLFIRINOX  03/19/2025: CT scan of abdomen with no new evidence of new/worsened disease.  4/17/2025: CT scan of abdomen with new liver metastases, enlargement of pancreatic tail lesion, and lytic lesion of R femur.      Vital signs:  ED Triage Vitals   Temperature Heart Rate Respirations BP   04/17/25 0900 04/17/25 0721 04/17/25 0721 04/17/25 0721   36.5 °C (97.7 °F) 91 19 (S) (!) 170/107      Pulse Ox Temp Source Heart Rate Source Patient Position   04/17/25 0721 04/17/25 0900 04/17/25 0721 04/17/25 0721   99 % Tympanic Monitor Lying      BP Location FiO2 (%)     04/17/25 0721 --     Right arm           Labs:   Labs Reviewed   CBC WITH AUTO DIFFERENTIAL - Abnormal       Result Value    WBC 9.8      nRBC 0.0      RBC 3.10 (*)     Hemoglobin 7.5 (*)     Hematocrit 23.6 (*)     MCV 76 (*)     MCH 24.2 (*)     MCHC 31.8 (*)     RDW 15.7 (*)     Platelets 286      Neutrophils % 87.2      Immature Granulocytes %, Automated 0.4      Lymphocytes % 6.7      Monocytes % 4.6      Eosinophils % 0.3      Basophils % 0.8      Neutrophils Absolute 8.52 (*)     Immature Granulocytes Absolute, Automated 0.04      Lymphocytes Absolute 0.66 (*)     Monocytes Absolute 0.45      Eosinophils Absolute 0.03      Basophils Absolute 0.08     COMPREHENSIVE METABOLIC PANEL - Abnormal    Glucose 228 (*)     Sodium 144      Potassium 3.9      Chloride 107      Bicarbonate 23      Anion Gap 18      Urea Nitrogen 16      Creatinine 0.96      eGFR 72      Calcium 8.6      Albumin 3.1 (*)      Alkaline Phosphatase 1,016 (*)     Total Protein 6.5      AST 67 (*)     Bilirubin, Total 1.1      ALT 37     URINALYSIS WITH REFLEX CULTURE AND MICROSCOPIC - Abnormal    Color, Urine Light-Red (*)     Appearance, Urine Cloudy (*)     Specific Gravity, Urine 1.020      pH, Urine 6.0      Protein, Urine 100 (2+) (*)     Glucose, Urine Normal      Blood, Urine OVER (3+) (*)     Ketones, Urine 10 (1+) (*)     Bilirubin, Urine NEGATIVE      Urobilinogen, Urine 2 (1+) (*)     Nitrite, Urine NEGATIVE      Leukocyte Esterase, Urine 75 Torie/uL (*)     Narrative:     OVER is reported when the result is greater than the clinically reportable range.   MICROSCOPIC ONLY, URINE - Abnormal    WBC, Urine 6-10 (*)     RBC, Urine >20 (*)     Squamous Epithelial Cells, Urine 1-9 (SPARSE)      Amorphous Crystals, Urine 2+     MAGNESIUM - Normal    Magnesium 2.08     LIPASE - Normal    Lipase 11      Narrative:     Venipuncture immediately after or during the administration of Metamizole may lead to falsely low results. Testing should be performed immediately prior to Metamizole dosing.   URINE CULTURE   TYPE AND SCREEN    ABO TYPE A      Rh TYPE POS      ANTIBODY SCREEN NEG     URINALYSIS WITH REFLEX CULTURE AND MICROSCOPIC    Narrative:     The following orders were created for panel order Urinalysis with Reflex Culture and Microscopic.  Procedure                               Abnormality         Status                     ---------                               -----------         ------                     Urinalysis with Reflex C...[[id number]]  Abnormal            Final result               Extra Urine Gray Tube[[id number]]                            In process                   Please view results for these tests on the individual orders.   EXTRA URINE GRAY TUBE       Imaging:  CT abdomen pelvis w IV contrast   Final Result   1. When compared to prior CT from 04/30/2024, there is interval   progression of disease as evidenced by  development of large ascites,   metastatic lesions within the liver, and increased ill-defined hazy   opacity within the pancreatic tail.   2. There is splenic vein thrombosis with questionable portal vein   thrombosis and gastric varices consistent with portal hypertension.   3. New ground-glass opacities within bilateral lower lobes which   could represent atelectasis. However, unable to rule out superimposed   infection.   4. Moderate colonic stool burden.   5. Additional chronic and incidental findings as detailed above.        I personally reviewed the images/study and I agree with the findings   as stated by Laurent Murrieta MD. This study was interpreted at   [facility], [place], [place].        MACRO:   None        Signed by: Scotty Krishnamurthy 4/17/2025 11:27 AM   Dictation workstation:   [id number]        - EKG:     Encounter Date: 03/31/25   ECG 12 lead   Result Value    Ventricular Rate 67    Atrial Rate 67    NV Interval 154    QRS Duration 82    QT Interval 406    QTC Calculation(Bazett) 429    P Axis 72    R Axis 90    T Axis -27    QRS Count 11    Q Onset 224    P Onset 147    P Offset 197    T Offset 427    QTC Fredericia 421    Narrative    Normal sinus rhythm  Rightward axis  Septal infarct (cited on or before 30-APR-2024)  Abnormal ECG  When compared with ECG of 30-APR-2024 02:26,  Questionable change in QRS axis  T wave inversion now evident in Inferior leads  Nonspecific T wave abnormality, worse in Lateral leads  See ED provider note for full interpretation and clinical correlation  Confirmed by Lilo Jacinto ([id number]) on 4/2/2025 2:51:30 PM     - (Colonoscopy) 01/25/2025    Findings:       The terminal ileum appeared normal.        A 7 mm polyp was found in the cecum. The polyp was semi-pedunculated.        The polyp was removed with a cold snare. Resection and retrieval were        complete. To prevent bleeding after the polypectomy, one hemostatic        clip was  successfully placed.        A 2 mm polyp was found in the cecum. The polyp was semi-sessile. The        polyp was removed with a cold biopsy forceps. Resection and retrieval        were complete.        A 2 mm polyp was found in the ascending colon. The polyp was        semi-sessile. The polyp was removed with a cold biopsy forceps.        Resection and retrieval were complete.        A large amount of stool was found in the descending colon,        interfering with visualization.        External and internal hemorrhoids were found during retroflexion and        during perianal exam. The hemorrhoids were large.   Impression:            - Preparation of the colon was fair.                          - Source of rectal bleeding is likely secondary to                          hemorrhoids. No obvious other sources of lower GI                          bleeding.                          - The examined portion of the ileum was normal.                          - One 7 mm polyp in the cecum, removed with a cold                          snare. Resected and retrieved. Clip was placed.                          - One 2 mm polyp in the cecum, removed with a cold                          biopsy forceps. Resected and retrieved.                          - One 2 mm polyp in the ascending colon, removed                          with a cold biopsy forceps. Resected and retrieved.                          - Stool in the descending colon.                          - External and internal hemorrhoids.     - ED Medications    -   Medications   pantoprazole (Protonix) injection 80 mg (80 mg intravenous Given 4/17/25 1128)   ondansetron (Zofran) injection 4 mg (4 mg intravenous Given 4/17/25 0750)   HYDROmorphone (Dilaudid) injection 0.5 mg (0.5 mg intravenous Given 4/17/25 0751)   sodium chloride 0.9 % bolus 500 mL (0 mL intravenous Stopped 4/17/25 0913)   iohexol (OMNIPaque) 350 mg iodine/mL solution 75 mL (75 mL intravenous Given 4/17/25  0832)   metoclopramide (Reglan) injection 10 mg (10 mg intravenous Given 4/17/25 1128)   hydrALAZINE (Apresoline) injection 10 mg (10 mg intravenous Given 4/17/25 1209)   morphine injection 2 mg (2 mg intravenous Given 4/17/25 1128)   cefTRIAXone (Rocephin) 1 g in dextrose (iso) IV 50 mL (0 g intravenous Stopped 4/17/25 1346)          Allergies:  Haloperidol, Lorazepam, Latex, Lisinopril, and Vancomycin    Home medications:  Prior to Admission medications    Medication Sig Start Date End Date Taking? Authorizing Provider   acetaminophen (Tylenol) 325 mg tablet Take 2 tablets (650 mg) by mouth every 6 hours if needed. 12/18/24   Historical Provider, MD   apixaban (Eliquis) 5 mg tablet Take 1 tablet (5 mg) by mouth twice a day. 2/17/25 2/12/26  Historical Provider, MD   aspirin 81 mg EC tablet Take 1 tablet (81 mg) by mouth once daily.  Patient not taking: Reported on 4/14/2025 4/5/25 5/5/25  Nicholas Norman MD   atorvastatin (Lipitor) 40 mg tablet Take 1 tablet (40 mg) by mouth once daily at bedtime. 1/2/25   Historical Provider, MD   blood-glucose sensor (FreeStyle Yulia 3 Plus Sensor) device Replace sensors every 15 days. 4/9/25   Nicholas Norman MD   buprenorphine-naloxone (Suboxone) 2-0.5 mg per sublingual film Place 1 Film under the tongue twice a day. 3/27/25 4/3/25  Historical Provider, MD   FreeStyle Yulia 3 Guffey misc Use as instructed 4/9/25   Nicholas Norman MD   furosemide (Lasix) 40 mg tablet Take 0.5 tablets (20 mg) by mouth once daily. 4/5/25   Nicholas Norman MD   gabapentin (Neurontin) 300 mg capsule Take by mouth. TAKE 1 CAPSULE BY MOUTH EVERY MORNING AND 2 CAPSULES AT BEDTIME. 3/27/25   Historical Provider, MD   glucagon (Glucagen) 1 mg injection Inject 1 mg into the muscle every 15 minutes if needed for low blood sugar - see comments (BS less jeison 50 if patient unable take glucose by mouth). 4/5/25   Nicholas Norman MD   hydrALAZINE (Apresoline) 10 mg tablet Take 1 tablet (10  mg) by mouth 3 times a day.  Patient not taking: Reported on 4/14/2025 4/10/25   Nicholas Norman MD   hydrOXYzine HCL (Atarax) 25 mg tablet Take 1 tablet (25 mg) by mouth every 6 hours if needed. 1/27/25   Historical Provider, MD   insulin glargine (Lantus) 100 unit/mL (3 mL) pen Inject 10 Units under the skin once daily at bedtime. Take as directed per insulin instructions.  Patient not taking: Reported on 4/14/2025 4/9/25   Nicholas Norman MD   insulin lispro (HumaLOG) 100 unit/mL pen Inject 4 times a day (before meals and at bedtime) - 0 unit(s) if Blood Glucose is between 71 - 150, 2 unit(s) if Blood Glucose is between 151 - 200, 4 unit(s) if Blood Glucose is between 201 - 250, 6 unit(s) if Blood Glucose is between 251 - 300, 8 unit(s) if Blood Glucose is between 301 - 350, 10 unit(s) if Blood Glucose is between 351 - 400, Notify Physician if Blood Glucose is greater than 400 4/9/25   Nicholas Norman MD   lidocaine 4 % patch Place 1 patch over 12 hours on the skin once daily. Remove & discard patch within 12 hours or as directed by MD. 4/9/25   Nicholas Norman MD   lidocaine-prilocaine (Emla) 2.5-2.5 % cream Apply topically. Apply to affected area as needed. Apply to port site 30-60 minutes prior to lab appointment on day of chemotherapy. 1/30/25   Historical Provider, MD   metoprolol succinate XL (Toprol-XL) 25 mg 24 hr tablet Take 1 tablet (25 mg) by mouth once daily. Do not crush or chew. 4/9/25   Nicholas Norman MD   oxyCODONE (Roxicodone) 20 mg immediate release tablet Take 1 tablet (20 mg) by mouth every 4 hours if needed. 3/27/25 4/11/25  Historical Provider, MD   oxyCODONE (Roxicodone) 20 mg immediate release tablet Take 20 mg by mouth every 4 hours if needed for moderate pain (4 - 6). Indications: pain    Historical Provider, MD   pantoprazole (ProtoNix) 40 mg EC tablet Take 1 tablet (40 mg) by mouth 2 times a day. Do not crush, chew, or split.    Historical Provider, MD   pen  "needle, diabetic (Unifine Pentips) 32 gauge x 5/32\" needle Use as directed with insulin injections 4/9/25   Nicholas Norman MD   sennosides-docusate sodium (Hayley-Colace) 8.6-50 mg tablet Take 2 tablets by mouth twice a day. 3/27/25 4/11/25  Historical Provider, MD   sertraline (Zoloft) 50 mg tablet Take 1 tablet (50 mg) by mouth once daily. 3/6/25   Historical Provider, MD          Objective   Vital signs:  Blood pressure 152/87, pulse 96, temperature 36.5 °C (97.7 °F), temperature source Tympanic, resp. rate 15, height 1.6 m (5' 3\"), weight 54.4 kg (120 lb), SpO2 100%.    Physical Exam  Constitutional:       General: She is not in acute distress.     Appearance: She is not toxic-appearing or diaphoretic.   HENT:      Mouth/Throat:      Mouth: Mucous membranes are moist.   Cardiovascular:      Rate and Rhythm: Normal rate and regular rhythm.      Pulses: Normal pulses.      Heart sounds: Normal heart sounds. No murmur heard.  Pulmonary:      Effort: Pulmonary effort is normal. No respiratory distress.      Breath sounds: Normal breath sounds. No wheezing, rhonchi or rales.   Abdominal:      Palpations: Abdomen is soft.      Tenderness: There is no abdominal tenderness. There is no guarding.      Comments: Hypoactive bowel sounds   Musculoskeletal:         General: No swelling or tenderness.   Neurological:      Comments: Sleepy, but arousable  AO x1-2  Left hemiparesis         Relevant Results        Labs:  Last CBC:  Lab Results   Component Value Date    WBC 9.8 04/17/2025    HGB 7.5 (L) 04/17/2025    HCT 23.6 (L) 04/17/2025    MCV 76 (L) 04/17/2025     04/17/2025       Last RFP:  Lab Results   Component Value Date    GLUCOSE 228 (H) 04/17/2025    CALCIUM 8.6 04/17/2025     04/17/2025    K 3.9 04/17/2025    CO2 23 04/17/2025     04/17/2025    BUN 16 04/17/2025    CREATININE 0.96 04/17/2025       Last LFTs:  Lab Results   Component Value Date    ALT 37 04/17/2025    AST 67 (H) 04/17/2025    " ALKPHOS 1,016 (H) 04/17/2025    BILITOT 1.1 04/17/2025       Last coags:  Lab Results   Component Value Date    INR 1.0 03/31/2025    APTT 38 (H) 03/31/2025          Assessment/Plan   Barbara English is a 50 y.o. 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 Presenting to Ed with abdominal pain, 3 days of constipation, and anemia iso BRBPR. She has had multiple hopsiatlizations with similar presentations in the last year largely for complications of uncontrolled DM or sequelae of malignancy. ED labs notable for anemia of 7.5 baseline between 8-10. No leukocytosis, no electrolyte derangements. Noted elevated ALP to 1016 iso of underlying progressive pancreatic cancer with mets to liver bone (proximal femur lytic lesion noted on CT). CT AP also notable for moderate stool burden but no obstruction. Redemonstrated splenic vein thrombus, and Lg vol ascites. Patient received dose of Rocephin in ED to treat SBP. However, no leukocytosis, recent B symptoms, or recorded fevers. Will defer para at this time until further discussion with family regarding GOC. Reports passing gas on exam, but reduced bowel sounds.      #Pancreatic tail cancer  #Cancer pain management  Dx'd Jan 20 via bx.  Patient refusing chemotherapy.  Follows with Dr. Jung (heme/onc) [facility]  -Palliative medicine c/s for support with pain optimization      #GI bleed  # bleed  If hemorrhoidal versus much less likely chemotherapy-induced mucositis versus esophagitis iso of current menstrual cycle on blood thinner.  No bladder/kidney/uterine pathology noted on CT scan.  :: most recent C-scope 1/25 with evidence of large internal/external  hemorrhoids (report above).  Prior MW tears though stool is non-melanotic. Negative CHANTAL, though reported bloody external hemorrhoids.  -Continue PPI pantoprazole 40 mg twice daily  -CTM Hgb; PM CBC  -GI consult if persistent bleeding or drop of hemoglobin, though does not seem necessary at this time.  -Hold Eliquis in the setting of potential bleed  -T/S; consented over the phone for blood.  -Coags       #Abdominal pain  #Nausea  #Constipation  Unclear etiology. Multiple admissions for the same upon chart review.  Chemotherapy-induced pancreatitis versus gastroparesis/constipation.  Lipase wnl 4/17  -Pain control with caution given GI dysmotility, taking total of 30 of oxy daily at home on average. Recently reduced by family given worsening constipation.  -Zofran PRN (normal QTC on admit)  -Low threshold to place dobbhoff if c/f low motility progression to obstruction.     #HTN  Elevated SBP on admission to 170, improved with pain control  Likely in the setting of missed medications and pain  -Will cont to work on pain management  -Resume home regimen as able      #GI bleed  Likely hemorrhoidal versus chemotherapy-induced mucositis versus esophagitis  -Continue PPI pantoprazole 40 mg twice daily  -Monitor hemoglobin, and GI consult if persistent bleeding or drop of hgb  -Holding Eliquis in the setting of potential bleed     #Splenic Vein thrombosis  Noted history of chronic pancreatitis complicated by chronic splenic vein thrombosis with splenule infarcts on apixaban 5mg BID   -Holding in the setting of potential bleed     #Type 1 diabetes  #Diabetic Neuropathy  Patient presented with abdominal pain  Home regimen: Lantus 10 (not taking per daughter), Lispro with meals  -SSI #2 for now, Currently NPO. May initiate lantus at 5 if glucose remains uncontrolled/intake changes  -C/w home gabapentin    #Depression  #Anxiety  #Prior Substance abuse history (Cocaine/Tobacco)  Seen by psychiatry last admission and was  recommended to increase sertraline to 50mg daily   - Continue Zoloft 50 mg daily  - Outpatient follow up with psychiatry    #Right MCA stroke   Residual L sided rocael paresis  -ASA/Atorva    #HFpEF  #HTN  - C/w home Hydral/Metop (unclear why she)        Code status: DNR  Emergency contact: Nannette       To be formally staffed in the am with attending.    Damion Aragon MD  PGY-2 Internal Medicine

## 2025-04-18 ENCOUNTER — HOME CARE VISIT (OUTPATIENT)
Dept: HOME HEALTH SERVICES | Facility: HOME HEALTH | Age: 50
End: 2025-04-18
Payer: COMMERCIAL

## 2025-04-18 LAB
ALBUMIN SERPL BCP-MCNC: 3 G/DL (ref 3.4–5)
ALP SERPL-CCNC: 899 U/L (ref 33–110)
ALT SERPL W P-5'-P-CCNC: 30 U/L (ref 7–45)
ANION GAP SERPL CALC-SCNC: 17 MMOL/L (ref 10–20)
AST SERPL W P-5'-P-CCNC: 49 U/L (ref 9–39)
BACTERIA UR CULT: NORMAL
BASOPHILS # BLD AUTO: 0.06 X10*3/UL (ref 0–0.1)
BASOPHILS NFR BLD AUTO: 0.7 %
BILIRUB SERPL-MCNC: 1.2 MG/DL (ref 0–1.2)
BUN SERPL-MCNC: 17 MG/DL (ref 6–23)
CALCIUM SERPL-MCNC: 8.8 MG/DL (ref 8.6–10.6)
CHLORIDE SERPL-SCNC: 110 MMOL/L (ref 98–107)
CO2 SERPL-SCNC: 25 MMOL/L (ref 21–32)
CREAT SERPL-MCNC: 1.12 MG/DL (ref 0.5–1.05)
EGFRCR SERPLBLD CKD-EPI 2021: 60 ML/MIN/1.73M*2
EOSINOPHIL # BLD AUTO: 0.14 X10*3/UL (ref 0–0.7)
EOSINOPHIL NFR BLD AUTO: 1.6 %
ERYTHROCYTE [DISTWIDTH] IN BLOOD BY AUTOMATED COUNT: 16.4 % (ref 11.5–14.5)
ERYTHROCYTE [DISTWIDTH] IN BLOOD BY AUTOMATED COUNT: 17.3 % (ref 11.5–14.5)
GLUCOSE BLD MANUAL STRIP-MCNC: 163 MG/DL (ref 74–99)
GLUCOSE BLD MANUAL STRIP-MCNC: 168 MG/DL (ref 74–99)
GLUCOSE BLD MANUAL STRIP-MCNC: 173 MG/DL (ref 74–99)
GLUCOSE BLD MANUAL STRIP-MCNC: 175 MG/DL (ref 74–99)
GLUCOSE BLD MANUAL STRIP-MCNC: 198 MG/DL (ref 74–99)
GLUCOSE SERPL-MCNC: 181 MG/DL (ref 74–99)
HCT VFR BLD AUTO: 23.2 % (ref 36–46)
HCT VFR BLD AUTO: 26.5 % (ref 36–46)
HGB BLD-MCNC: 7 G/DL (ref 12–16)
HGB BLD-MCNC: 7.3 G/DL (ref 12–16)
IMM GRANULOCYTES # BLD AUTO: 0.03 X10*3/UL (ref 0–0.7)
IMM GRANULOCYTES NFR BLD AUTO: 0.3 % (ref 0–0.9)
LYMPHOCYTES # BLD AUTO: 1.03 X10*3/UL (ref 1.2–4.8)
LYMPHOCYTES NFR BLD AUTO: 11.9 %
MAGNESIUM SERPL-MCNC: 2.32 MG/DL (ref 1.6–2.4)
MCH RBC QN AUTO: 24.6 PG (ref 26–34)
MCH RBC QN AUTO: 25 PG (ref 26–34)
MCHC RBC AUTO-ENTMCNC: 27.5 G/DL (ref 32–36)
MCHC RBC AUTO-ENTMCNC: 30.2 G/DL (ref 32–36)
MCV RBC AUTO: 82 FL (ref 80–100)
MCV RBC AUTO: 91 FL (ref 80–100)
MONOCYTES # BLD AUTO: 0.75 X10*3/UL (ref 0.1–1)
MONOCYTES NFR BLD AUTO: 8.7 %
NEUTROPHILS # BLD AUTO: 6.66 X10*3/UL (ref 1.2–7.7)
NEUTROPHILS NFR BLD AUTO: 76.8 %
NRBC BLD-RTO: 0 /100 WBCS (ref 0–0)
NRBC BLD-RTO: 0 /100 WBCS (ref 0–0)
PHOSPHATE SERPL-MCNC: 3.9 MG/DL (ref 2.5–4.9)
PLATELET # BLD AUTO: 326 X10*3/UL (ref 150–450)
PLATELET # BLD AUTO: 341 X10*3/UL (ref 150–450)
POTASSIUM SERPL-SCNC: 4.4 MMOL/L (ref 3.5–5.3)
PROT SERPL-MCNC: 6.2 G/DL (ref 6.4–8.2)
RBC # BLD AUTO: 2.84 X10*6/UL (ref 4–5.2)
RBC # BLD AUTO: 2.92 X10*6/UL (ref 4–5.2)
SODIUM SERPL-SCNC: 148 MMOL/L (ref 136–145)
WBC # BLD AUTO: 8.7 X10*3/UL (ref 4.4–11.3)
WBC # BLD AUTO: 9.5 X10*3/UL (ref 4.4–11.3)

## 2025-04-18 PROCEDURE — 2500000002 HC RX 250 W HCPCS SELF ADMINISTERED DRUGS (ALT 637 FOR MEDICARE OP, ALT 636 FOR OP/ED): Mod: SE

## 2025-04-18 PROCEDURE — 99223 1ST HOSP IP/OBS HIGH 75: CPT | Performed by: INTERNAL MEDICINE

## 2025-04-18 PROCEDURE — 85025 COMPLETE CBC W/AUTO DIFF WBC: CPT

## 2025-04-18 PROCEDURE — 99223 1ST HOSP IP/OBS HIGH 75: CPT

## 2025-04-18 PROCEDURE — 82947 ASSAY GLUCOSE BLOOD QUANT: CPT

## 2025-04-18 PROCEDURE — 2500000004 HC RX 250 GENERAL PHARMACY W/ HCPCS (ALT 636 FOR OP/ED): Mod: SE

## 2025-04-18 PROCEDURE — 84100 ASSAY OF PHOSPHORUS: CPT

## 2025-04-18 PROCEDURE — 1210000001 HC SEMI-PRIVATE ROOM DAILY

## 2025-04-18 PROCEDURE — 83735 ASSAY OF MAGNESIUM: CPT

## 2025-04-18 PROCEDURE — 2500000001 HC RX 250 WO HCPCS SELF ADMINISTERED DRUGS (ALT 637 FOR MEDICARE OP): Mod: SE

## 2025-04-18 PROCEDURE — 85027 COMPLETE CBC AUTOMATED: CPT

## 2025-04-18 PROCEDURE — 80053 COMPREHEN METABOLIC PANEL: CPT

## 2025-04-18 RX ORDER — NIFEDIPINE 30 MG/1
30 TABLET, FILM COATED, EXTENDED RELEASE ORAL
Status: CANCELLED | OUTPATIENT
Start: 2025-04-19

## 2025-04-18 RX ORDER — NIFEDIPINE 60 MG/1
60 TABLET, FILM COATED, EXTENDED RELEASE ORAL
Status: DISCONTINUED | OUTPATIENT
Start: 2025-04-19 | End: 2025-04-24

## 2025-04-18 RX ORDER — INSULIN GLARGINE 100 [IU]/ML
3 INJECTION, SOLUTION SUBCUTANEOUS NIGHTLY
Status: DISCONTINUED | OUTPATIENT
Start: 2025-04-18 | End: 2025-04-19

## 2025-04-18 RX ORDER — BISACODYL 10 MG/1
10 SUPPOSITORY RECTAL DAILY
Status: DISCONTINUED | OUTPATIENT
Start: 2025-04-18 | End: 2025-04-27 | Stop reason: HOSPADM

## 2025-04-18 RX ORDER — AMOXICILLIN 250 MG
1 CAPSULE ORAL NIGHTLY
Status: DISCONTINUED | OUTPATIENT
Start: 2025-04-18 | End: 2025-04-19

## 2025-04-18 RX ORDER — GABAPENTIN 300 MG/1
300 CAPSULE ORAL NIGHTLY
Status: DISCONTINUED | OUTPATIENT
Start: 2025-04-18 | End: 2025-04-27 | Stop reason: HOSPADM

## 2025-04-18 RX ORDER — OXYCODONE HYDROCHLORIDE 5 MG/1
10 TABLET ORAL EVERY 4 HOURS PRN
Refills: 0 | Status: DISCONTINUED | OUTPATIENT
Start: 2025-04-18 | End: 2025-04-24

## 2025-04-18 RX ORDER — BISACODYL 10 MG/1
10 SUPPOSITORY RECTAL DAILY PRN
Status: DISCONTINUED | OUTPATIENT
Start: 2025-04-18 | End: 2025-04-27 | Stop reason: HOSPADM

## 2025-04-18 RX ADMIN — OXYCODONE HYDROCHLORIDE 10 MG: 10 TABLET ORAL at 05:20

## 2025-04-18 RX ADMIN — GABAPENTIN 200 MG: 100 CAPSULE ORAL at 08:46

## 2025-04-18 RX ADMIN — METOPROLOL SUCCINATE 25 MG: 25 TABLET, EXTENDED RELEASE ORAL at 08:46

## 2025-04-18 RX ADMIN — OXYCODONE 10 MG: 5 TABLET ORAL at 21:12

## 2025-04-18 RX ADMIN — PANTOPRAZOLE SODIUM 40 MG: 40 TABLET, DELAYED RELEASE ORAL at 20:28

## 2025-04-18 RX ADMIN — ASPIRIN 81 MG: 81 TABLET, COATED ORAL at 08:46

## 2025-04-18 RX ADMIN — SERTRALINE 50 MG: 50 TABLET, FILM COATED ORAL at 08:46

## 2025-04-18 RX ADMIN — HYDRALAZINE HYDROCHLORIDE 10 MG: 10 TABLET ORAL at 08:46

## 2025-04-18 RX ADMIN — PANTOPRAZOLE SODIUM 40 MG: 40 TABLET, DELAYED RELEASE ORAL at 08:46

## 2025-04-18 RX ADMIN — INSULIN LISPRO 2 UNITS: 100 INJECTION, SOLUTION INTRAVENOUS; SUBCUTANEOUS at 08:52

## 2025-04-18 RX ADMIN — ATORVASTATIN CALCIUM 40 MG: 40 TABLET, FILM COATED ORAL at 20:28

## 2025-04-18 RX ADMIN — INSULIN LISPRO 2 UNITS: 100 INJECTION, SOLUTION INTRAVENOUS; SUBCUTANEOUS at 17:09

## 2025-04-18 RX ADMIN — INSULIN LISPRO 2 UNITS: 100 INJECTION, SOLUTION INTRAVENOUS; SUBCUTANEOUS at 12:13

## 2025-04-18 RX ADMIN — SENNOSIDES AND DOCUSATE SODIUM 1 TABLET: 50; 8.6 TABLET ORAL at 20:28

## 2025-04-18 RX ADMIN — GABAPENTIN 300 MG: 300 CAPSULE ORAL at 20:28

## 2025-04-18 RX ADMIN — HYDROMORPHONE HYDROCHLORIDE 0.4 MG: 0.5 INJECTION, SOLUTION INTRAMUSCULAR; INTRAVENOUS; SUBCUTANEOUS at 08:59

## 2025-04-18 RX ADMIN — INSULIN GLARGINE 3 UNITS: 100 INJECTION, SOLUTION SUBCUTANEOUS at 20:43

## 2025-04-18 RX ADMIN — BISACODYL 10 MG: 10 SUPPOSITORY RECTAL at 12:50

## 2025-04-18 SDOH — SOCIAL STABILITY: SOCIAL INSECURITY: DOES ANYONE TRY TO KEEP YOU FROM HAVING/CONTACTING OTHER FRIENDS OR DOING THINGS OUTSIDE YOUR HOME?: NO

## 2025-04-18 SDOH — SOCIAL STABILITY: SOCIAL INSECURITY: ARE THERE ANY APPARENT SIGNS OF INJURIES/BEHAVIORS THAT COULD BE RELATED TO ABUSE/NEGLECT?: NO

## 2025-04-18 SDOH — SOCIAL STABILITY: SOCIAL INSECURITY: HAVE YOU HAD ANY THOUGHTS OF HARMING ANYONE ELSE?: NO

## 2025-04-18 SDOH — SOCIAL STABILITY: SOCIAL INSECURITY: WERE YOU ABLE TO COMPLETE ALL THE BEHAVIORAL HEALTH SCREENINGS?: YES

## 2025-04-18 SDOH — SOCIAL STABILITY: SOCIAL INSECURITY: DO YOU FEEL ANYONE HAS EXPLOITED OR TAKEN ADVANTAGE OF YOU FINANCIALLY OR OF YOUR PERSONAL PROPERTY?: NO

## 2025-04-18 SDOH — SOCIAL STABILITY: SOCIAL INSECURITY: HAS ANYONE EVER THREATENED TO HURT YOUR FAMILY OR YOUR PETS?: NO

## 2025-04-18 SDOH — SOCIAL STABILITY: SOCIAL INSECURITY: DO YOU FEEL UNSAFE GOING BACK TO THE PLACE WHERE YOU ARE LIVING?: NO

## 2025-04-18 SDOH — SOCIAL STABILITY: SOCIAL INSECURITY: ABUSE: ADULT

## 2025-04-18 SDOH — SOCIAL STABILITY: SOCIAL INSECURITY: ARE YOU OR HAVE YOU BEEN THREATENED OR ABUSED PHYSICALLY, EMOTIONALLY, OR SEXUALLY BY ANYONE?: NO

## 2025-04-18 ASSESSMENT — COGNITIVE AND FUNCTIONAL STATUS - GENERAL
CLIMB 3 TO 5 STEPS WITH RAILING: TOTAL
HELP NEEDED FOR BATHING: TOTAL
STANDING UP FROM CHAIR USING ARMS: A LOT
DRESSING REGULAR LOWER BODY CLOTHING: TOTAL
TURNING FROM BACK TO SIDE WHILE IN FLAT BAD: A LITTLE
HELP NEEDED FOR BATHING: TOTAL
EATING MEALS: A LOT
PERSONAL GROOMING: TOTAL
CLIMB 3 TO 5 STEPS WITH RAILING: TOTAL
WALKING IN HOSPITAL ROOM: A LOT
MOVING TO AND FROM BED TO CHAIR: A LOT
DRESSING REGULAR UPPER BODY CLOTHING: A LOT
EATING MEALS: A LOT
TOILETING: TOTAL
PATIENT BASELINE BEDBOUND: YES
STANDING UP FROM CHAIR USING ARMS: A LOT
MOVING TO AND FROM BED TO CHAIR: A LOT
DRESSING REGULAR LOWER BODY CLOTHING: TOTAL
DRESSING REGULAR UPPER BODY CLOTHING: A LOT
TOILETING: TOTAL
MOBILITY SCORE: 14
DAILY ACTIVITIY SCORE: 8
PERSONAL GROOMING: TOTAL
TURNING FROM BACK TO SIDE WHILE IN FLAT BAD: A LITTLE
WALKING IN HOSPITAL ROOM: A LOT

## 2025-04-18 ASSESSMENT — ACTIVITIES OF DAILY LIVING (ADL)
ADEQUATE_TO_COMPLETE_ADL: YES
WALKS IN HOME: NEEDS ASSISTANCE
FEEDING YOURSELF: NEEDS ASSISTANCE
TOILETING: NEEDS ASSISTANCE
HEARING - RIGHT EAR: FUNCTIONAL
ASSISTIVE_DEVICE: WHEELCHAIR;WALKER
HEARING - LEFT EAR: FUNCTIONAL
DRESSING YOURSELF: NEEDS ASSISTANCE
BATHING: NEEDS ASSISTANCE
PATIENT'S MEMORY ADEQUATE TO SAFELY COMPLETE DAILY ACTIVITIES?: NO
JUDGMENT_ADEQUATE_SAFELY_COMPLETE_DAILY_ACTIVITIES: NO
GROOMING: NEEDS ASSISTANCE

## 2025-04-18 ASSESSMENT — PAIN SCALES - GENERAL
PAINLEVEL_OUTOF10: 9
PAINLEVEL_OUTOF10: 7
PAINLEVEL_OUTOF10: 0 - NO PAIN
PAINLEVEL_OUTOF10: 0 - NO PAIN
PAINLEVEL_OUTOF10: 9

## 2025-04-18 ASSESSMENT — PAIN - FUNCTIONAL ASSESSMENT
PAIN_FUNCTIONAL_ASSESSMENT: 0-10

## 2025-04-18 NOTE — CARE PLAN
The patient's goals for the shift include      The clinical goals for the shift include Pt will remain HDS throughout shift      Problem: Skin  Goal: Decreased wound size/increased tissue granulation at next dressing change  Outcome: Progressing  Flowsheets (Taken 4/18/2025 1133)  Decreased wound size/increased tissue granulation at next dressing change:   Promote sleep for wound healing   Protective dressings over bony prominences  Goal: Participates in plan/prevention/treatment measures  Outcome: Progressing  Flowsheets (Taken 4/18/2025 1133)  Participates in plan/prevention/treatment measures:   Elevate heels   Discuss with provider PT/OT consult  Goal: Prevent/manage excess moisture  Outcome: Progressing  Flowsheets (Taken 4/18/2025 1133)  Prevent/manage excess moisture:   Cleanse incontinence/protect with barrier cream   Moisturize dry skin  Goal: Prevent/minimize sheer/friction injuries  Outcome: Progressing  Flowsheets (Taken 4/18/2025 1133)  Prevent/minimize sheer/friction injuries:   HOB 30 degrees or less   Turn/reposition every 2 hours/use positioning/transfer devices   Use pull sheet  Goal: Promote/optimize nutrition  Outcome: Progressing  Flowsheets (Taken 4/18/2025 1133)  Promote/optimize nutrition:   Offer water/supplements/favorite foods   Monitor/record intake including meals  Goal: Promote skin healing  Outcome: Progressing  Flowsheets (Taken 4/18/2025 1133)  Promote skin healing:   Assess skin/pad under line(s)/device(s)   Turn/reposition every 2 hours/use positioning/transfer devices   Rotate device position/do not position patient on device     Problem: Fall/Injury  Goal: Not fall by end of shift  Outcome: Progressing  Goal: Be free from injury by end of the shift  Outcome: Progressing  Goal: Verbalize understanding of personal risk factors for fall in the hospital  Outcome: Progressing  Goal: Verbalize understanding of risk factor reduction measures to prevent injury from fall in the  home  Outcome: Progressing  Goal: Use assistive devices by end of the shift  Outcome: Progressing  Goal: Pace activities to prevent fatigue by end of the shift  Outcome: Progressing     Problem: Pain - Adult  Goal: Verbalizes/displays adequate comfort level or baseline comfort level  Outcome: Progressing

## 2025-04-18 NOTE — NURSING NOTE
Pt has been very sleepy and not answering most of my questions on admission questioners. This nurse has not been able to do most of admission requirement.

## 2025-04-18 NOTE — CARE PLAN
The patient's goals for the shift include      The clinical goals for the shift include        Problem: Pain - Adult  Goal: Verbalizes/displays adequate comfort level or baseline comfort level  4/18/2025 0218 by Silverio Reyes RN  Outcome: Progressing  4/18/2025 0218 by Silverio Reyes RN  Outcome: Progressing     Problem: Safety - Adult  Goal: Free from fall injury  4/18/2025 0218 by Silverio Reyes RN  Outcome: Progressing  4/18/2025 0218 by Silverio Reyes RN  Outcome: Progressing     Problem: Discharge Planning  Goal: Discharge to home or other facility with appropriate resources  4/18/2025 0218 by Silverio Reyes RN  Outcome: Progressing  4/18/2025 0218 by Silverio Reyes RN  Outcome: Progressing     Problem: Chronic Conditions and Co-morbidities  Goal: Patient's chronic conditions and co-morbidity symptoms are monitored and maintained or improved  4/18/2025 0218 by Silverio Reyes RN  Outcome: Progressing  4/18/2025 0218 by Silverio Reyes RN  Outcome: Progressing     Problem: Nutrition  Goal: Nutrient intake appropriate for maintaining nutritional needs  4/18/2025 0218 by Silverio Reyes RN  Outcome: Progressing  4/18/2025 0218 by Silverio Reyes RN  Outcome: Progressing     Problem: Fall/Injury  Goal: Not fall by end of shift  4/18/2025 0218 by Silverio Reyes RN  Outcome: Progressing  4/18/2025 0218 by Silverio Reyes RN  Outcome: Progressing  Goal: Be free from injury by end of the shift  4/18/2025 0218 by Silverio Reyes RN  Outcome: Progressing  4/18/2025 0218 by Silverio Reyes RN  Outcome: Progressing  Goal: Verbalize understanding of personal risk factors for fall in the hospital  4/18/2025 0218 by Silverio Reyes RN  Outcome: Progressing  4/18/2025 0218 by Silverio Reyes RN  Outcome: Progressing  Goal: Verbalize understanding of risk factor reduction measures to prevent injury from fall in the home  4/18/2025 0218 by Silverio Reyes RN  Outcome: Progressing  4/18/2025 0218 by Silverio Reyes RN  Outcome: Progressing  Goal: Use  assistive devices by end of the shift  4/18/2025 0218 by Silverio Reyes RN  Outcome: Progressing  4/18/2025 0218 by Silverio Reyes RN  Outcome: Progressing  Goal: Pace activities to prevent fatigue by end of the shift  4/18/2025 0218 by Silverio Reyes RN  Outcome: Progressing  4/18/2025 0218 by Silverio Reyes RN  Outcome: Progressing     Problem: Skin  Goal: Decreased wound size/increased tissue granulation at next dressing change  Outcome: Progressing  Goal: Participates in plan/prevention/treatment measures  Outcome: Progressing  Goal: Prevent/manage excess moisture  Outcome: Progressing  Goal: Prevent/minimize sheer/friction injuries  Outcome: Progressing  Goal: Promote/optimize nutrition  Outcome: Progressing  Goal: Promote skin healing  Outcome: Progressing

## 2025-04-18 NOTE — CONSULTS
"SUPPORTIVE AND PALLIATIVE ONCOLOGY CONSULT    SERVICE DATE: 4/18/2025    Updates and Recommendations (4/18/2025):  Per discussion with Palliative RPh, discussion with pt [reports of increasing lethargy s/p initiation], and pt's significant lethargy, do not restart home Suboxone.     Change gabapentin 300mg PO once daily--decreased from pt's cumulative home dose iso lethargy, remains renally appropriate dose    Change oxycodone IR 10mg PO q4h PRN for moderate to severe pain [add instructions to hold for AMS/lethargy]    Start hydromorphone 0.2mg IV q3h PRN for breakthrough pain [add instructions to hold for AMS/lethargy]    Change ondansetron 4mg IV q6h PRN for n/v, first line    ASSESSMENT/PLAN:  Barbara English is a 50 y.o. 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 PMHx significant for DM1 c/b gastroparesis/neuropathy/DKA, PAD s/p toe complete R metatarsal amputations, HTN, anxiety, depression, cocaine abuse (last reported use 9/2024). Admitted 4/17/2025 for further evaluation and management of abdominal pain, constipation, and BRBPR (c/f hemorrhoid vs mensuration vs cancer related). Of note, pt previously enrolled in [facility]--pending further GOC. Supportive and Palliative Oncology is consulted for assistance with pain management and ongoing GOC.     Neoplasm Related Pain   Upper abdomen pain iso known malignancy.   Pain Type: Visceral    Pain Control: Sub-optimally controlled when awake, though pt severely lethargic, can still answer orientation questions correcly  Home regimen: Pt unable to fully clarify iso lethargy/AMS, per chart review: gabapentin 600mg AM/300mg PM, oxycodone IR 20mg q4h [I used to tolerate this dose, but then it started to make me really sleepy,\" lidocaine 4% TD patch, per daughter hasn't been using Suboxone since early 4/2025 discharge  Intolerances: None  Previously tried: hydromorphone IR " PO, tramadol  Past 24h OME intake: 30 OME  Risk factors: Hx of cocaine abuse--last reported use 9/2024  Reviewed: (4/18/25) Estimated Creatinine Clearance: 49.7 mL/min (A) (by C-G formula based on SCr of 1.12 mg/dL (H)).  Reviewed: LFTs unremarkable  Per discussion with Palliative RPh, discussion with pt [reports of increasing lethargy s/p initiation], and pt's significant lethargy, do not restart home Suboxone.   Continue acetaminophen 650mg PO q6h PRN for mild pain  Change gabapentin 300mg PO once daily--decreased from pt's cumulative home dose iso lethargy, remains renally appropriate dose  Change oxycodone IR 10mg PO q4h PRN for moderate to severe pain [add instructions to hold for AMS/lethargy]  Start hydromorphone 0.2mg IV q3h PRN for breakthrough pain [add instructions to hold for AMS/lethargy]  Continue to monitor pain scores and administer PRN medications as appropriate  Continue/initiate nonpharmacologic pain management strategies including ice/heat therapy, distraction techniques, deep breathing/relaxation techniques, calming music, and repositioning  Continue to monitor for signs of opioid efficacy (pain scores, improved functionality) and toxicity (pinpoint pupils, excess sedation/drowsiness/confusion, respiratory depression, etc.)    Nausea  At risk for nausea and vomiting related to abdominal pain and malignant disease process.  Home regimen: Unknown  EKG reviewed from 3/31/25 and QTc 429. No more recent EKG uploaded to EMR for review.   PPI per primary   Change ondansetron 4mg IV q6h PRN for n/v, first line    Constipation  At risk for constipation related to opioids, currently not constipated.  Usual bowel pattern: Unknown  Home regimen: senna, Miralax  LBM: 4/17/25, black/red, loose  Continue Miralax 17g PO once daily PRN for constipation  Goal to have BM without straining q48-72h, adjust regimen as needed  Encourage mobility as tolerated, PT/OT following    Altered Mood  Hx of anxiety and  depression, acutely exacerbated by health concerns, pain, and symptoms.  Home regimen: sertraline 50mg once daily   Intolerances: Allergies to haloperidol and lorazepam  Continue sertraline 50mg PO once daily     Disposition:  Please start the process of having prior authorization with meds to beds deliver medications to patient prior to discharge via [facility] pharmacy. Prescriptions will need to be sent 48-72 hours prior to discharge so that a prior authorization can be completed.     Discharge date pending resolution of pain and symptoms.   Will continue to monitor if pt requires outpatient Supportive Oncology follow up.     SIGNATURE: DAVE Tillman  PAGER/CONTACT:  Contact information:  Supportive and Palliative Oncology  Monday-Friday 8 AM-5 PM  Epic Secure chat or pager [phone].  After hours and weekends:  pager [phone]    ==========================================================================================================================  Inpatient consult to [facility] Adult Supportive Oncology  Consult performed by: DAVE Tillman  Consult ordered by: Daniele Sidhu MD    PALLIATIVE MEDICINE OUTPATIENT PROVIDER: Currently following with [facility]   CURRENT ATTENDING PROVIDER: Daniele Sidhu MD     Medical Oncologist: No care team member to display   Radiation Oncologist: No care team member to display  Primary Physician: Shanthi Otero  [phone]    REASON FOR CONSULT/CHIEF CONSULT COMPLAINT: Pain management, ongoing GOC    Subjective   HISTORY OF PRESENT ILLNESS: Barbara English is a 50 y.o. 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 PMHx significant for DM1 c/b gastroparesis/neuropathy/DKA, PAD s/p toe complete R metatarsal amputations, HTN, anxiety, depression, cocaine abuse (last reported use 9/2024). Admitted 4/17/2025 for further evaluation and management of abdominal  "pain, constipation, and BRBPR (c/f hemorrhoid vs mensuration vs cancer related). Of note, pt previously enrolled in [facility]--pending further GOC. Supportive and Palliative Oncology is consulted for assistance with pain management and ongoing GOC.     Pain Assessment:  Onset: Acute on chronic  Location: Upper abdomen  Duration: Constant  Characteristics:  Rating: \"Severe\"  Descriptors: \"Sharp, shooting, constant\"  Aggravating: Unable to answer iso lethargy  Relieving: Analgesics  Intolerances: None  Personal Pain Goal: Unable to answer iso lethargy  Interference with Function: Somewhat  Barriers to Pain Management: Overt lethargy/AMS, hx past cocaine use    Opioid Requirements  Past 24h opioid requirements:  (4/17-4/18, 7731-1878)  morphine 2mg IV x 1 = 5 OME  oxycodone IR 10mg x 2 = 20mg = 25 OME    Total 24h OME use: 30 OME    OARRS/PDMP reviewed, no aberrant behavior noted.    Symptom Assessment:  Only limited ROS able to be completed   Pain: very much --when awake  Difficulty sleeping: none   Nausea: none  Vomiting: none  Constipation: none  Weight loss: none    ECOG Performance Status:  [] 0 Fully active, able to carry on all pre-disease performance without restriction  [] 1 Restricted in physically strenuous activity but ambulatory and able to carry out work of a light or sedentary nature, e.g., light house work, office work  [] 2 Ambulatory and capable of all selfcare but unable to carry out any work activities; up and about more than 50% of waking hours  [] 3 Capable of only limited selfcare; confined to bed or chair more than 50% of waking hours  [x] 4 Completely disabled; cannot carry on any selfcare; totally confined to bed or chair  [] 5 Dead     Information obtained from: chart review, interview of patient, and discussion with primary team  ______________________________________________________________________     Oncology History    No history exists.     Medical History[1]  Surgical " History[2]  Family History[3]     SOCIAL HISTORY:   Social History:  reports that she has been smoking cigarettes. She does not have any smokeless tobacco history on file.  S.O. Zehra  Children involved in her care/life    REVIEW OF SYSTEMS:  Review of systems negative unless noted in HPI.     Objective     Lab Results   Component Value Date    WBC 8.7 04/18/2025    HGB 7.0 (L) 04/18/2025    HCT 23.2 (L) 04/18/2025    MCV 82 04/18/2025     04/18/2025      Lab Results   Component Value Date    GLUCOSE 181 (H) 04/18/2025    CALCIUM 8.8 04/18/2025     (H) 04/18/2025    K 4.4 04/18/2025    CO2 25 04/18/2025     (H) 04/18/2025    BUN 17 04/18/2025    CREATININE 1.12 (H) 04/18/2025     Lab Results   Component Value Date    ALT 30 04/18/2025    AST 49 (H) 04/18/2025    ALKPHOS 899 (H) 04/18/2025    BILITOT 1.2 04/18/2025     Estimated Creatinine Clearance: 49.7 mL/min (A) (by C-G formula based on SCr of 1.12 mg/dL (H)).     Encounter Date: 03/31/25   ECG 12 lead   Result Value    Ventricular Rate 67    Atrial Rate 67    WV Interval 154    QRS Duration 82    QT Interval 406    QTC Calculation(Bazett) 429    P Axis 72    R Axis 90    T Axis -27    QRS Count 11    Q Onset 224    P Onset 147    P Offset 197    T Offset 427    QTC Fredericia 421    Narrative    Normal sinus rhythm  Rightward axis  Septal infarct (cited on or before 30-APR-2024)  Abnormal ECG  When compared with ECG of 30-APR-2024 02:26,  Questionable change in QRS axis  T wave inversion now evident in Inferior leads  Nonspecific T wave abnormality, worse in Lateral leads  See ED provider note for full interpretation and clinical correlation  Confirmed by Lilo Jacinto ([id number]) on 4/2/2025 2:51:30 PM     Wt Readings from Last 5 Encounters:   04/17/25 54.4 kg (120 lb)   04/14/25 54.4 kg (120 lb)   04/02/25 61.6 kg (135 lb 11.2 oz)   04/30/24 63.5 kg (140 lb)   05/09/22 55 kg (121 lb 4.1 oz)     Current Outpatient Medications   Medication  "Instructions    acetaminophen (TYLENOL) 650 mg, Every 6 hours PRN    apixaban (ELIQUIS) 5 mg, 2 times daily    aspirin 81 mg, oral, Daily    atorvastatin (Lipitor) 40 mg tablet 1 tablet, Nightly    blood-glucose sensor (FreeStyle Yulia 3 Plus Sensor) device Replace sensors every 15 days.    buprenorphine-naloxone (Suboxone) 2-0.5 mg per sublingual film 1 Film, 2 times daily    FreeStyle Yulia 3 [place] misc Use as instructed    furosemide (LASIX) 20 mg, oral, Daily    gabapentin (Neurontin) 300 mg capsule oral, TAKE 1 CAPSULE BY MOUTH EVERY MORNING AND 2 CAPSULES AT BEDTIME.    glucagon (GLUCAGEN) 1 mg, intramuscular, Every 15 min PRN    hydrALAZINE (APRESOLINE) 10 mg, oral, 3 times daily    hydrOXYzine HCL (ATARAX) 25 mg, Every 6 hours PRN    insulin glargine (LANTUS) 10 Units, subcutaneous, Nightly, Take as directed per insulin instructions.    insulin lispro (HumaLOG) 100 unit/mL pen Inject 4 times a day (before meals and at bedtime) - 0 unit(s) if Blood Glucose is between 71 - 150, 2 unit(s) if Blood Glucose is between 151 - 200, 4 unit(s) if Blood Glucose is between 201 - 250, 6 unit(s) if Blood Glucose is between 251 - 300, 8 unit(s) if Blood Glucose is between 301 - 350, 10 unit(s) if Blood Glucose is between 351 - 400, Notify Physician if Blood Glucose is greater than 400    lidocaine 4 % patch 1 patch, transdermal, Daily, Remove & discard patch within 12 hours or as directed by MD.    lidocaine-prilocaine (Emla) 2.5-2.5 % cream Apply topically. Apply to affected area as needed. Apply to port site 30-60 minutes prior to lab appointment on day of chemotherapy.    metoprolol succinate XL (TOPROL-XL) 25 mg, oral, Daily, Do not crush or chew.    oxyCODONE (ROXICODONE) 20 mg, oral, Every 4 hours PRN    pantoprazole (PROTONIX) 40 mg, 2 times daily    pen needle, diabetic (Unifine Pentips) 32 gauge x 5/32\" needle Use as directed with insulin injections    sertraline (ZOLOFT) 50 mg, Daily     Scheduled medications "   Scheduled Medications[4]  Continuous medications  Continuous Medications[5]  PRN medications  acetaminophen, 650 mg, q6h PRN  dextrose, 12.5 g, q15 min PRN  dextrose, 25 g, q15 min PRN  glucagon, 1 mg, q15 min PRN  glucagon, 1 mg, q15 min PRN  [Held by provider] hydrOXYzine HCL, 25 mg, q6h PRN  naloxone, 0.2 mg, q5 min PRN  ondansetron, 4 mg, q8h PRN  oxyCODONE, 10 mg, q6h PRN  polyethylene glycol, 17 g, Daily PRN  [Held by provider] sennosides-docusate sodium, 2 tablet, Nightly PRN    Allergies: RX Allergies[6]    PHYSICAL EXAMINATION:  Vital Signs:   Vital signs reviewed  Vitals:    04/18/25 0850   BP: 144/86   Pulse: 75   Resp:    Temp: 36.9 °C (98.4 °F)   SpO2: 98%     Pain Score: 7    Physical Exam  Vitals reviewed.   Constitutional:       Comments: Lethargic, frequently falling asleep during interview, ill appearing woman laying in bed. Occasionally furrowing her brow when awoken. Minimally participating in interview.     HENT:      Head:      Comments: Normocephalic, atraumatic.     Eyes:      Comments: Sclera clear.   Pulmonary:      Comments: Symmetrical chest rise. Regular rate and depth of respirations. Room air.   Abdominal:      Comments: Abdomen slightly distended, RUQ/LUQ tender   Musculoskeletal:      Comments: Generalized muscle weakness and atrophy. VU x4. No visible extremity edema.   Skin:     Comments: No lesions, rash, or abrasions present on visible skin. Skin color appropriate for ethnicity.   Neurological:      Comments: A&Ox3--though c/f disorientation to situation, intermittently follows commands       ==========================================================================================================================    PALLIATIVE CARE ENCOUNTER:    Introduction to Supportive and Palliative Oncology:  Spoke with patient at bedside.  Introduced the role and philosophy of Supportive and Palliative oncology in the evaluation and management of symptoms during cancer  "treatment.  Palliative care was introduced as a service for patients with serious illness to help with symptoms, assist with goals of care conversations, navigate complex decision making, improve quality of life for patients, and provide support both patients and families.    Supportive and Palliative Oncology encounter:  Spoke with patient at bedside.  Emotional support provided.  Coordination of care: medication and symptom evaluation    Medical Decision Making/Goals of Care/Advance Care Planning:  (4/10/25, Per Seth Rubin MD's note)  Does not want PEG. Does not want to give up eating as it is one of the few things that give her pleasure anymore.   Discussed her goals and chemotherapy. She is reluctant to try chemotherapy again and understands that this is not a resectable disease. She would prefer radiation therapy if it is an option for her pancreatic adenocarcinoma that will be offered by her cancer team.   Pain and symptom control are top priorities for her since she was already told her prognosis was likely less than 6 months.     (4/18/2025) Unable to review due to pt's lethargy/AMS/minimal participation. Pt remains DNR.     Advance Directives  Existence of Advance Directives: Yes, documentation or copy in medical record  Decision maker: HCPOA is pt's S.O., Zehra Herman ([phone]), secondary HCPOA is silver, Nannette Nicole ([phone]), and tertiary HCPOA is Jaja English ([phone]). Though during limited discussion with pt, she states, \"I want all my kids to make decisions for me, I told them that.\" Concern for need to re-address HCPOA.     Signature and billing:  Medical complexity was high level due to due to complexity of problems, extensive data review, and high risk of management/treatment.    I spent 75 minutes in the care of this patient which included chart review, interviewing patient/family, discussion with primary team, coordination of care, and documentation.    DATA "   Diagnostic tests and information reviewed for today's visit: Conversation with primary team, Most recent labs and imaging results, Most recent EKG, Medications     Some elements copied from Internal Medicine's note on 4/17/25, the elements have been updated and all reflect current decision making from today, 4/18/2025.    Plan of Care discussed with: Primary team, attempted with pt     Thank you for asking Supportive and Palliative Oncology to assist with care of this patient.  Recommendations will be communicated back to the consulting service by way of shared electronic medical record/secure chat/email or face-to-face.   We will continue to follow.  Please contact us for additional questions or concerns.    SIGNATURE: Starla Wilson, APRN-CNP  PAGER/CONTACT:  Contact information:  Supportive and Palliative Oncology  Monday-Friday 8 AM-5 PM  Epic Secure chat or pager [phone].  After hours and weekends:  pager [phone]       [1] No past medical history on file.  [2]   Past Surgical History:  Procedure Laterality Date    CT ANGIO CORONARY ART WITH HEARTFLOW IF SCORE >30%  11/17/2021    CT HEART CORONARY ANGIOGRAM 11/17/2021 [facility]   [3] No family history on file.  [4] [Held by provider] apixaban, 5 mg, oral, BID  aspirin, 81 mg, oral, Daily  atorvastatin, 40 mg, oral, Nightly  gabapentin, 200 mg, oral, q AM  gabapentin, 400 mg, oral, Nightly  insulin glargine, 3 Units, subcutaneous, Nightly  insulin lispro, 0-10 Units, subcutaneous, q4h  metoprolol succinate XL, 25 mg, oral, Daily  [START ON 4/19/2025] NIFEdipine ER, 60 mg, oral, Daily before breakfast  pantoprazole, 40 mg, oral, BID  sertraline, 50 mg, oral, Daily  [5]    [6]   Allergies  Allergen Reactions    Haloperidol Swelling    Lorazepam Swelling    Latex Itching and Rash    Lisinopril Other     High K - ?pancreatitis    Vancomycin Rash     Suspected red man syndrome with vancomycin infusion - improves with prolonged infusion times

## 2025-04-18 NOTE — CARE PLAN
The patient's goals for the shift include      The clinical goals for the shift include  Pt will remain HDS throughout shift

## 2025-04-18 NOTE — PROGRESS NOTES
4/18/25 @8640 Transitional Care Coordinator Note  Per team patient with pancreatic adenoCa (stage IV) here for constipation and blood loss anemia iso mensus on anticoagulation. Pending further GOC with family/supportive onc given progression, PT/OT pending. I requested PT/OT. Will continue to follow.     4/18/25 @8929 addendum   Called into pt's room to introduce myself, role and to discuss discharge planning. No answer. Will continue to follow.

## 2025-04-19 LAB
ALBUMIN SERPL BCP-MCNC: 2.8 G/DL (ref 3.4–5)
ALBUMIN SERPL BCP-MCNC: 2.9 G/DL (ref 3.4–5)
ALBUMIN SERPL BCP-MCNC: 3 G/DL (ref 3.4–5)
ALP SERPL-CCNC: 904 U/L (ref 33–110)
ALT SERPL W P-5'-P-CCNC: 29 U/L (ref 7–45)
ANION GAP BLDV CALCULATED.4IONS-SCNC: 9 MMOL/L (ref 10–25)
ANION GAP SERPL CALC-SCNC: 14 MMOL/L (ref 10–20)
ANION GAP SERPL CALC-SCNC: 17 MMOL/L (ref 10–20)
ANION GAP SERPL CALC-SCNC: 20 MMOL/L (ref 10–20)
AST SERPL W P-5'-P-CCNC: 54 U/L (ref 9–39)
BASE EXCESS BLDV CALC-SCNC: 0.9 MMOL/L (ref -2–3)
BASOPHILS # BLD AUTO: 0.08 X10*3/UL (ref 0–0.1)
BASOPHILS NFR BLD AUTO: 0.8 %
BILIRUB SERPL-MCNC: 1.5 MG/DL (ref 0–1.2)
BLOOD EXPIRATION DATE: NORMAL
BODY TEMPERATURE: 37 DEGREES CELSIUS
BUN SERPL-MCNC: 19 MG/DL (ref 6–23)
BUN SERPL-MCNC: 23 MG/DL (ref 6–23)
BUN SERPL-MCNC: 23 MG/DL (ref 6–23)
CA-I BLDV-SCNC: 1.19 MMOL/L (ref 1.1–1.33)
CALCIUM SERPL-MCNC: 8.2 MG/DL (ref 8.6–10.6)
CALCIUM SERPL-MCNC: 8.3 MG/DL (ref 8.6–10.6)
CALCIUM SERPL-MCNC: 8.6 MG/DL (ref 8.6–10.6)
CHLORIDE BLDV-SCNC: 109 MMOL/L (ref 98–107)
CHLORIDE SERPL-SCNC: 105 MMOL/L (ref 98–107)
CHLORIDE SERPL-SCNC: 105 MMOL/L (ref 98–107)
CHLORIDE SERPL-SCNC: 111 MMOL/L (ref 98–107)
CO2 SERPL-SCNC: 23 MMOL/L (ref 21–32)
CO2 SERPL-SCNC: 23 MMOL/L (ref 21–32)
CO2 SERPL-SCNC: 26 MMOL/L (ref 21–32)
CREAT SERPL-MCNC: 1.34 MG/DL (ref 0.5–1.05)
CREAT SERPL-MCNC: 1.59 MG/DL (ref 0.5–1.05)
CREAT SERPL-MCNC: 1.67 MG/DL (ref 0.5–1.05)
DISPENSE STATUS: NORMAL
EGFRCR SERPLBLD CKD-EPI 2021: 37 ML/MIN/1.73M*2
EGFRCR SERPLBLD CKD-EPI 2021: 39 ML/MIN/1.73M*2
EGFRCR SERPLBLD CKD-EPI 2021: 48 ML/MIN/1.73M*2
EOSINOPHIL # BLD AUTO: 0.17 X10*3/UL (ref 0–0.7)
EOSINOPHIL NFR BLD AUTO: 1.8 %
ERYTHROCYTE [DISTWIDTH] IN BLOOD BY AUTOMATED COUNT: 16.4 % (ref 11.5–14.5)
ERYTHROCYTE [DISTWIDTH] IN BLOOD BY AUTOMATED COUNT: 16.8 % (ref 11.5–14.5)
GLUCOSE BLD MANUAL STRIP-MCNC: 140 MG/DL (ref 74–99)
GLUCOSE BLD MANUAL STRIP-MCNC: 221 MG/DL (ref 74–99)
GLUCOSE BLD MANUAL STRIP-MCNC: 285 MG/DL (ref 74–99)
GLUCOSE BLD MANUAL STRIP-MCNC: 421 MG/DL (ref 74–99)
GLUCOSE BLD MANUAL STRIP-MCNC: 483 MG/DL (ref 74–99)
GLUCOSE BLD MANUAL STRIP-MCNC: 494 MG/DL (ref 74–99)
GLUCOSE BLDV-MCNC: 436 MG/DL (ref 74–99)
GLUCOSE SERPL-MCNC: 157 MG/DL (ref 74–99)
GLUCOSE SERPL-MCNC: 441 MG/DL (ref 74–99)
GLUCOSE SERPL-MCNC: 531 MG/DL (ref 74–99)
HCO3 BLDV-SCNC: 26 MMOL/L (ref 22–26)
HCT VFR BLD AUTO: 20.7 % (ref 36–46)
HCT VFR BLD AUTO: 25 % (ref 36–46)
HCT VFR BLD EST: 24 % (ref 36–46)
HGB BLD-MCNC: 6.3 G/DL (ref 12–16)
HGB BLD-MCNC: 7.8 G/DL (ref 12–16)
HGB BLDV-MCNC: 8.1 G/DL (ref 12–16)
IMM GRANULOCYTES # BLD AUTO: 0.04 X10*3/UL (ref 0–0.7)
IMM GRANULOCYTES NFR BLD AUTO: 0.4 % (ref 0–0.9)
INHALED O2 CONCENTRATION: 21 %
LACTATE BLDV-SCNC: 1.2 MMOL/L (ref 0.4–2)
LYMPHOCYTES # BLD AUTO: 0.95 X10*3/UL (ref 1.2–4.8)
LYMPHOCYTES NFR BLD AUTO: 9.9 %
MAGNESIUM SERPL-MCNC: 2.31 MG/DL (ref 1.6–2.4)
MCH RBC QN AUTO: 24.5 PG (ref 26–34)
MCH RBC QN AUTO: 25.4 PG (ref 26–34)
MCHC RBC AUTO-ENTMCNC: 30.4 G/DL (ref 32–36)
MCHC RBC AUTO-ENTMCNC: 31.2 G/DL (ref 32–36)
MCV RBC AUTO: 81 FL (ref 80–100)
MCV RBC AUTO: 81 FL (ref 80–100)
MONOCYTES # BLD AUTO: 0.72 X10*3/UL (ref 0.1–1)
MONOCYTES NFR BLD AUTO: 7.5 %
NEUTROPHILS # BLD AUTO: 7.64 X10*3/UL (ref 1.2–7.7)
NEUTROPHILS NFR BLD AUTO: 79.6 %
NRBC BLD-RTO: 0 /100 WBCS (ref 0–0)
NRBC BLD-RTO: 0 /100 WBCS (ref 0–0)
OXYHGB MFR BLDV: 77.1 % (ref 45–75)
PCO2 BLDV: 43 MM HG (ref 41–51)
PH BLDV: 7.39 PH (ref 7.33–7.43)
PHOSPHATE SERPL-MCNC: 3.2 MG/DL (ref 2.5–4.9)
PHOSPHATE SERPL-MCNC: 3.7 MG/DL (ref 2.5–4.9)
PHOSPHATE SERPL-MCNC: 4.3 MG/DL (ref 2.5–4.9)
PLATELET # BLD AUTO: 308 X10*3/UL (ref 150–450)
PLATELET # BLD AUTO: 370 X10*3/UL (ref 150–450)
PO2 BLDV: 45 MM HG (ref 35–45)
POTASSIUM BLDV-SCNC: 4.1 MMOL/L (ref 3.5–5.3)
POTASSIUM SERPL-SCNC: 3.5 MMOL/L (ref 3.5–5.3)
POTASSIUM SERPL-SCNC: 4 MMOL/L (ref 3.5–5.3)
POTASSIUM SERPL-SCNC: 4.9 MMOL/L (ref 3.5–5.3)
PRODUCT BLOOD TYPE: 6200
PRODUCT CODE: NORMAL
PROT SERPL-MCNC: 5.9 G/DL (ref 6.4–8.2)
RBC # BLD AUTO: 2.57 X10*6/UL (ref 4–5.2)
RBC # BLD AUTO: 3.07 X10*6/UL (ref 4–5.2)
SAO2 % BLDV: 79 % (ref 45–75)
SODIUM BLDV-SCNC: 140 MMOL/L (ref 136–145)
SODIUM SERPL-SCNC: 140 MMOL/L (ref 136–145)
SODIUM SERPL-SCNC: 141 MMOL/L (ref 136–145)
SODIUM SERPL-SCNC: 150 MMOL/L (ref 136–145)
UNIT ABO: NORMAL
UNIT NUMBER: NORMAL
UNIT RH: NORMAL
UNIT VOLUME: 350
WBC # BLD AUTO: 10.6 X10*3/UL (ref 4.4–11.3)
WBC # BLD AUTO: 9.6 X10*3/UL (ref 4.4–11.3)
XM INTEP: NORMAL

## 2025-04-19 PROCEDURE — 2500000001 HC RX 250 WO HCPCS SELF ADMINISTERED DRUGS (ALT 637 FOR MEDICARE OP): Mod: SE

## 2025-04-19 PROCEDURE — 2500000002 HC RX 250 W HCPCS SELF ADMINISTERED DRUGS (ALT 637 FOR MEDICARE OP, ALT 636 FOR OP/ED): Mod: SE

## 2025-04-19 PROCEDURE — 85025 COMPLETE CBC W/AUTO DIFF WBC: CPT

## 2025-04-19 PROCEDURE — 80053 COMPREHEN METABOLIC PANEL: CPT

## 2025-04-19 PROCEDURE — 83735 ASSAY OF MAGNESIUM: CPT

## 2025-04-19 PROCEDURE — P9016 RBC LEUKOCYTES REDUCED: HCPCS

## 2025-04-19 PROCEDURE — 2500000004 HC RX 250 GENERAL PHARMACY W/ HCPCS (ALT 636 FOR OP/ED): Mod: JZ,SE

## 2025-04-19 PROCEDURE — 99233 SBSQ HOSP IP/OBS HIGH 50: CPT

## 2025-04-19 PROCEDURE — 2500000004 HC RX 250 GENERAL PHARMACY W/ HCPCS (ALT 636 FOR OP/ED): Mod: JW,SE

## 2025-04-19 PROCEDURE — 84100 ASSAY OF PHOSPHORUS: CPT

## 2025-04-19 PROCEDURE — 82947 ASSAY GLUCOSE BLOOD QUANT: CPT

## 2025-04-19 PROCEDURE — 84520 ASSAY OF UREA NITROGEN: CPT

## 2025-04-19 PROCEDURE — 84132 ASSAY OF SERUM POTASSIUM: CPT

## 2025-04-19 PROCEDURE — 36430 TRANSFUSION BLD/BLD COMPNT: CPT

## 2025-04-19 PROCEDURE — 1210000001 HC SEMI-PRIVATE ROOM DAILY

## 2025-04-19 PROCEDURE — 85027 COMPLETE CBC AUTOMATED: CPT

## 2025-04-19 PROCEDURE — 80069 RENAL FUNCTION PANEL: CPT | Mod: CCI

## 2025-04-19 PROCEDURE — 83605 ASSAY OF LACTIC ACID: CPT

## 2025-04-19 RX ORDER — POTASSIUM CHLORIDE 20 MEQ/1
40 TABLET, EXTENDED RELEASE ORAL ONCE
Status: COMPLETED | OUTPATIENT
Start: 2025-04-19 | End: 2025-04-19

## 2025-04-19 RX ORDER — POLYETHYLENE GLYCOL 3350 17 G/17G
17 POWDER, FOR SOLUTION ORAL DAILY
Status: DISCONTINUED | OUTPATIENT
Start: 2025-04-19 | End: 2025-04-27 | Stop reason: HOSPADM

## 2025-04-19 RX ORDER — AMOXICILLIN 250 MG
1 CAPSULE ORAL 2 TIMES DAILY
Status: DISCONTINUED | OUTPATIENT
Start: 2025-04-19 | End: 2025-04-27 | Stop reason: HOSPADM

## 2025-04-19 RX ORDER — INSULIN LISPRO 100 [IU]/ML
0-10 INJECTION, SOLUTION INTRAVENOUS; SUBCUTANEOUS EVERY 4 HOURS
Status: DISCONTINUED | OUTPATIENT
Start: 2025-04-19 | End: 2025-04-24

## 2025-04-19 RX ORDER — INSULIN GLARGINE 100 [IU]/ML
3 INJECTION, SOLUTION SUBCUTANEOUS EVERY 24 HOURS
Status: DISCONTINUED | OUTPATIENT
Start: 2025-04-19 | End: 2025-04-24

## 2025-04-19 RX ORDER — SODIUM CHLORIDE, SODIUM LACTATE, POTASSIUM CHLORIDE, CALCIUM CHLORIDE 600; 310; 30; 20 MG/100ML; MG/100ML; MG/100ML; MG/100ML
75 INJECTION, SOLUTION INTRAVENOUS CONTINUOUS
Status: DISCONTINUED | OUTPATIENT
Start: 2025-04-19 | End: 2025-04-20

## 2025-04-19 RX ORDER — DEXTROSE MONOHYDRATE 50 MG/ML
75 INJECTION, SOLUTION INTRAVENOUS CONTINUOUS
Status: DISCONTINUED | OUTPATIENT
Start: 2025-04-19 | End: 2025-04-19

## 2025-04-19 RX ORDER — PANTOPRAZOLE SODIUM 40 MG/10ML
40 INJECTION, POWDER, LYOPHILIZED, FOR SOLUTION INTRAVENOUS 2 TIMES DAILY
Status: DISCONTINUED | OUTPATIENT
Start: 2025-04-19 | End: 2025-04-22

## 2025-04-19 RX ORDER — INSULIN LISPRO 100 [IU]/ML
0-10 INJECTION, SOLUTION INTRAVENOUS; SUBCUTANEOUS
Status: DISCONTINUED | OUTPATIENT
Start: 2025-04-19 | End: 2025-04-19

## 2025-04-19 RX ADMIN — POTASSIUM CHLORIDE 40 MEQ: 1500 TABLET, EXTENDED RELEASE ORAL at 09:02

## 2025-04-19 RX ADMIN — DEXTROSE MONOHYDRATE 75 ML/HR: 50 INJECTION, SOLUTION INTRAVENOUS at 09:10

## 2025-04-19 RX ADMIN — METOPROLOL SUCCINATE 25 MG: 25 TABLET, EXTENDED RELEASE ORAL at 09:03

## 2025-04-19 RX ADMIN — SENNOSIDES AND DOCUSATE SODIUM 1 TABLET: 50; 8.6 TABLET ORAL at 09:03

## 2025-04-19 RX ADMIN — OXYCODONE 10 MG: 5 TABLET ORAL at 09:02

## 2025-04-19 RX ADMIN — PANTOPRAZOLE SODIUM 40 MG: 40 INJECTION, POWDER, LYOPHILIZED, FOR SOLUTION INTRAVENOUS at 21:35

## 2025-04-19 RX ADMIN — ASPIRIN 81 MG: 81 TABLET, COATED ORAL at 09:03

## 2025-04-19 RX ADMIN — POLYETHYLENE GLYCOL 3350 17 G: 17 POWDER, FOR SOLUTION ORAL at 09:03

## 2025-04-19 RX ADMIN — INSULIN LISPRO 10 UNITS: 100 INJECTION, SOLUTION INTRAVENOUS; SUBCUTANEOUS at 21:32

## 2025-04-19 RX ADMIN — INSULIN LISPRO 10 UNITS: 100 INJECTION, SOLUTION INTRAVENOUS; SUBCUTANEOUS at 17:33

## 2025-04-19 RX ADMIN — NIFEDIPINE 60 MG: 60 TABLET, FILM COATED, EXTENDED RELEASE ORAL at 09:10

## 2025-04-19 RX ADMIN — INSULIN GLARGINE 3 UNITS: 100 INJECTION, SOLUTION SUBCUTANEOUS at 15:33

## 2025-04-19 RX ADMIN — SODIUM CHLORIDE, SODIUM LACTATE, POTASSIUM CHLORIDE, AND CALCIUM CHLORIDE 75 ML/HR: .6; .31; .03; .02 INJECTION, SOLUTION INTRAVENOUS at 19:38

## 2025-04-19 RX ADMIN — HYDROMORPHONE HYDROCHLORIDE 0.2 MG: 1 INJECTION, SOLUTION INTRAMUSCULAR; INTRAVENOUS; SUBCUTANEOUS at 12:00

## 2025-04-19 RX ADMIN — PANTOPRAZOLE SODIUM 40 MG: 40 TABLET, DELAYED RELEASE ORAL at 09:03

## 2025-04-19 RX ADMIN — SERTRALINE 50 MG: 50 TABLET, FILM COATED ORAL at 09:03

## 2025-04-19 RX ADMIN — INSULIN LISPRO 4 UNITS: 100 INJECTION, SOLUTION INTRAVENOUS; SUBCUTANEOUS at 01:30

## 2025-04-19 ASSESSMENT — COGNITIVE AND FUNCTIONAL STATUS - GENERAL
EATING MEALS: A LOT
CLIMB 3 TO 5 STEPS WITH RAILING: TOTAL
MOVING TO AND FROM BED TO CHAIR: A LOT
STANDING UP FROM CHAIR USING ARMS: TOTAL
PERSONAL GROOMING: TOTAL
HELP NEEDED FOR BATHING: TOTAL
CLIMB 3 TO 5 STEPS WITH RAILING: TOTAL
DAILY ACTIVITIY SCORE: 7
DRESSING REGULAR UPPER BODY CLOTHING: TOTAL
MOVING FROM LYING ON BACK TO SITTING ON SIDE OF FLAT BED WITH BEDRAILS: A LOT
DRESSING REGULAR LOWER BODY CLOTHING: TOTAL
DRESSING REGULAR UPPER BODY CLOTHING: TOTAL
MOBILITY SCORE: 8
MOVING FROM LYING ON BACK TO SITTING ON SIDE OF FLAT BED WITH BEDRAILS: A LITTLE
MOBILITY SCORE: 10
PERSONAL GROOMING: A LOT
TURNING FROM BACK TO SIDE WHILE IN FLAT BAD: A LOT
WALKING IN HOSPITAL ROOM: TOTAL
MOVING TO AND FROM BED TO CHAIR: TOTAL
TOILETING: TOTAL
EATING MEALS: A LOT
DAILY ACTIVITIY SCORE: 8
HELP NEEDED FOR BATHING: TOTAL
TURNING FROM BACK TO SIDE WHILE IN FLAT BAD: A LOT
STANDING UP FROM CHAIR USING ARMS: TOTAL
WALKING IN HOSPITAL ROOM: TOTAL
DRESSING REGULAR LOWER BODY CLOTHING: TOTAL
TOILETING: TOTAL

## 2025-04-19 ASSESSMENT — PAIN SCALES - GENERAL
PAINLEVEL_OUTOF10: 0 - NO PAIN
PAINLEVEL_OUTOF10: 6
PAINLEVEL_OUTOF10: 8

## 2025-04-19 ASSESSMENT — PAIN - FUNCTIONAL ASSESSMENT: PAIN_FUNCTIONAL_ASSESSMENT: 0-10

## 2025-04-19 ASSESSMENT — PAIN SCALES - WONG BAKER: WONGBAKER_NUMERICALRESPONSE: HURTS EVEN MORE

## 2025-04-19 ASSESSMENT — ACTIVITIES OF DAILY LIVING (ADL): LACK_OF_TRANSPORTATION: PATIENT UNABLE TO ANSWER

## 2025-04-19 NOTE — CARE PLAN
Problem: Pain - Adult  Goal: Verbalizes/displays adequate comfort level or baseline comfort level  Outcome: Progressing     Problem: Safety - Adult  Goal: Free from fall injury  Outcome: Progressing     Problem: Nutrition  Goal: Nutrient intake appropriate for maintaining nutritional needs  Outcome: Progressing     Problem: Fall/Injury  Goal: Not fall by end of shift  Outcome: Progressing     Problem: Fall/Injury  Goal: Be free from injury by end of the shift  Outcome: Progressing     Problem: Fall/Injury  Goal: Verbalize understanding of personal risk factors for fall in the hospital  Outcome: Progressing   The patient's goals for the shift include  pt will have increase po intake during shift    The clinical goals for the shift include pt hgb will rmain above 7 during shift    Over the shift, the patient did not make progress toward the following goals. Barriers to progression include pt alertness and orientation. Recommendations to address these barriers include reorient patient to surroundings, medicate when needed, bed alarm, call light in reach.

## 2025-04-19 NOTE — PROGRESS NOTES
Barbara English is a 50 y.o. female on day 2 of admission presenting with Acute blood loss anemia.      Subjective     Patient had no acute events overnight. She had large clots from her vagina yesterday but overnight there has not been much. Still c/o abdominal pain but states it is better than yesterday after having Bms.     Patient denies any chest pain, additional nausea, vomiting, SOB.        Objective     Last Recorded Vitals  /72   Pulse 64   Temp 37.1 °C (98.8 °F)   Resp 18   Wt 54.4 kg (120 lb)   SpO2 99%   Intake/Output last 3 Shifts:  No intake or output data in the 24 hours ending 04/19/25 1143    Admission Weight  Weight: 54.4 kg (120 lb) (04/17/25 0900)    Daily Weight  04/17/25 : 54.4 kg (120 lb)    Image Results  CT abdomen pelvis w IV contrast  Narrative: Interpreted By:  Scotty Krishnamurthy and Gupta Jayesh   STUDY:  CT ABDOMEN PELVIS W IV CONTRAST;  4/17/2025 8:30 am      INDICATION:  Signs/Symptoms:epigastric pain, no longer having BM or passing gas.  Active prostate cancre.      50-year-old female with metastatic pancreatic cancer, splenic artery  aneurysm, splenic vein thrombosis.      COMPARISON:  CT 04/30/2024      ACCESSION NUMBER(S):  [id number]      ORDERING CLINICIAN:  BHAKTI MONROY      TECHNIQUE:  CT of the abdomen and pelvis was performed.  Standard contiguous  axial images were obtained at 3 mm slice thickness through the  abdomen and pelvis. Coronal and sagittal reconstructions at 3 mm  slice thickness were performed.  75 ML of Omnipaque 350 was  administered intravenously without immediate complication.      FINDINGS:  LOWER CHEST:  There are ground-glass opacities within bilateral lower lobes. Heart  appears unremarkable. No pericardial effusion. Partially visualized  distal central venous catheter tip within the right atrium. Interval  increased size of epicardial lymph node measuring 1.3 x 0.8 cm  (Series 201, Image 14).      ABDOMEN:      LIVER:  Multiple ill-defined  hypodense lesions throughout the liver measuring  up to 2.0 cm (Series 201, Image 28) (Series 201, Image 21) (Series  201, Image 34) within hepatic segments 2 and 8 consistent with known  metastatic disease to the liver. No hepatomegaly. Nodular contour of  the parenchyma.      BILE DUCTS:  The intrahepatic and extrahepatic ducts are not dilated.      GALLBLADDER:  There is mild gallbladder wall thickening without evidence of  calcified stones. No pericholecystic fluid.      PANCREAS:  There is a hazy appearance of the pancreatic tail, which appears  slightly hypodense centrally (Series 201, Image 40). Pancreatic body  and head appear unremarkable.      SPLEEN:  The spleen is normal in size without focal lesions.      ADRENAL GLANDS:  Bilateral adrenal glands appear normal.      KIDNEYS AND URETERS:  The kidneys are normal in size and enhance symmetrically.  No  hydroureteronephrosis or nephroureterolithiasis is identified.      PELVIS:      BLADDER:  The urinary bladder appears normal without abnormal wall thickening.      REPRODUCTIVE ORGANS:  The uterus is present.      BOWEL:  The stomach is unremarkable.   The small and large bowel are normal  in caliber and demonstrate no wall thickening. Hyperdense linear  structure near the cecum of unknown etiology, possibly representing  postsurgical changes. Appendectomy. Moderate colonic stool burden.      VESSELS:  There is no aneurysmal dilatation of the abdominal aorta. The IVC  appears normal. There is loss of visualization of the portal vein  concerning for at least partial portal vein thrombosis. The splenic  vein is also not visualized concerning for splenic vein thrombosis.  There is new gastric varices which are most prominent adjacent to the  stomach and spleen (Series 201, Image 36).      PERITONEUM/RETROPERITONEUM/LYMPH NODES:  Interval development of large volume abdominopelvic ascites with  mesenteric edema. No abdominopelvic lymphadenopathy is present.     "  BONES AND ABDOMINAL WALL:  No suspicious osseous lesions are identified. Stable appearance of  focal sclerotic lesion within the right proximal femur, which is  stable when compared to prior CT from 04/30/2024. Interval increase  in diffuse body wall edema.      Impression: 1. When compared to prior CT from 04/30/2024, there is interval  progression of disease as evidenced by development of large ascites,  metastatic lesions within the liver, and increased ill-defined hazy  opacity within the pancreatic tail.  2. There is splenic vein thrombosis with questionable portal vein  thrombosis and gastric varices consistent with portal hypertension.  3. New ground-glass opacities within bilateral lower lobes which  could represent atelectasis. However, unable to rule out superimposed  infection.  4. Moderate colonic stool burden.  5. Additional chronic and incidental findings as detailed above.      I personally reviewed the images/study and I agree with the findings  as stated by Laurent Murrieta MD. This study was interpreted at  [facility], [place], [place].      MACRO:  None      Signed by: Scotty Krishnamurthy 4/17/2025 11:27 AM  Dictation workstation:   [id number]      Physical Exam  /72   Pulse 64   Temp 37.1 °C (98.8 °F)   Resp 18   Ht 1.6 m (5' 3\")   Wt 54.4 kg (120 lb)   SpO2 99%   BMI 21.26 kg/m²       Constitutional:       Comments: Patient resting in bed, very sleepy, though arousable and able to answer yes/ no questions, and intermittently speaks. Overall appears pale   Cardiovascular:      Rate and Rhythm: Normal rate.      Heart sounds: No murmur heard.     No friction rub. No gallop.   Pulmonary:      Effort: Pulmonary effort is normal. No respiratory distress.      Breath sounds: No wheezing.   Abdominal:      General: Abdomen is flat. There is no distension.      Comments: No tenderness on palpation, no palpable masses   GYN:  -Dried blood in pad, no clots this " AM  Musculoskeletal:      Right lower leg: No edema. TMA on RLE     Left lower leg: No edema.   Neuro:  -Baseline L arm/leg weakness       Relevant Results  Scheduled medications  Scheduled Medications[1]  Continuous medications  Continuous Medications[2]  PRN medications  PRN Medications[3]    Results for orders placed or performed during the hospital encounter of 04/17/25 (from the past 24 hours)   POCT GLUCOSE   Result Value Ref Range    POCT Glucose 173 (H) 74 - 99 mg/dL   CBC   Result Value Ref Range    WBC 9.5 4.4 - 11.3 x10*3/uL    nRBC 0.0 0.0 - 0.0 /100 WBCs    RBC 2.92 (L) 4.00 - 5.20 x10*6/uL    Hemoglobin 7.3 (L) 12.0 - 16.0 g/dL    Hematocrit 26.5 (L) 36.0 - 46.0 %    MCV 91 80 - 100 fL    MCH 25.0 (L) 26.0 - 34.0 pg    MCHC 27.5 (L) 32.0 - 36.0 g/dL    RDW 17.3 (H) 11.5 - 14.5 %    Platelets 341 150 - 450 x10*3/uL   POCT GLUCOSE   Result Value Ref Range    POCT Glucose 175 (H) 74 - 99 mg/dL   POCT GLUCOSE   Result Value Ref Range    POCT Glucose 221 (H) 74 - 99 mg/dL   POCT GLUCOSE   Result Value Ref Range    POCT Glucose 140 (H) 74 - 99 mg/dL   CBC and Auto Differential   Result Value Ref Range    WBC 9.6 4.4 - 11.3 x10*3/uL    nRBC 0.0 0.0 - 0.0 /100 WBCs    RBC 2.57 (L) 4.00 - 5.20 x10*6/uL    Hemoglobin 6.3 (LL) 12.0 - 16.0 g/dL    Hematocrit 20.7 (L) 36.0 - 46.0 %    MCV 81 80 - 100 fL    MCH 24.5 (L) 26.0 - 34.0 pg    MCHC 30.4 (L) 32.0 - 36.0 g/dL    RDW 16.8 (H) 11.5 - 14.5 %    Platelets 308 150 - 450 x10*3/uL    Neutrophils % 79.6 40.0 - 80.0 %    Immature Granulocytes %, Automated 0.4 0.0 - 0.9 %    Lymphocytes % 9.9 13.0 - 44.0 %    Monocytes % 7.5 2.0 - 10.0 %    Eosinophils % 1.8 0.0 - 6.0 %    Basophils % 0.8 0.0 - 2.0 %    Neutrophils Absolute 7.64 1.20 - 7.70 x10*3/uL    Immature Granulocytes Absolute, Automated 0.04 0.00 - 0.70 x10*3/uL    Lymphocytes Absolute 0.95 (L) 1.20 - 4.80 x10*3/uL    Monocytes Absolute 0.72 0.10 - 1.00 x10*3/uL    Eosinophils Absolute 0.17 0.00 - 0.70  x10*3/uL    Basophils Absolute 0.08 0.00 - 0.10 x10*3/uL   Magnesium   Result Value Ref Range    Magnesium 2.31 1.60 - 2.40 mg/dL   Comprehensive metabolic panel   Result Value Ref Range    Glucose 157 (H) 74 - 99 mg/dL    Sodium 150 (H) 136 - 145 mmol/L    Potassium 3.5 3.5 - 5.3 mmol/L    Chloride 111 (H) 98 - 107 mmol/L    Bicarbonate 23 21 - 32 mmol/L    Anion Gap 20 10 - 20 mmol/L    Urea Nitrogen 19 6 - 23 mg/dL    Creatinine 1.34 (H) 0.50 - 1.05 mg/dL    eGFR 48 (L) >60 mL/min/1.73m*2    Calcium 8.6 8.6 - 10.6 mg/dL    Albumin 2.8 (L) 3.4 - 5.0 g/dL    Alkaline Phosphatase 904 (H) 33 - 110 U/L    Total Protein 5.9 (L) 6.4 - 8.2 g/dL    AST 54 (H) 9 - 39 U/L    Bilirubin, Total 1.5 (H) 0.0 - 1.2 mg/dL    ALT 29 7 - 45 U/L   Phosphorus   Result Value Ref Range    Phosphorus 3.7 2.5 - 4.9 mg/dL   Prepare RBC: 1 Units   Result Value Ref Range    PRODUCT CODE M7632S36     Unit Number [id number]     Unit ABO A     Unit RH POS     XM INTEP COMP     Dispense Status IS     Blood Expiration Date 4/29/2025 11:59:00 PM EDT     PRODUCT BLOOD TYPE 6200     UNIT VOLUME 350        This patient has a central line   Reason for the central line remaining today?       Assessment & Plan  Acute blood loss anemia      Barbara English is a 50 y.o. 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 ED labs notable for Hgb 7.3, ALP 1016. WBC wnl, CTAP not suggestive of obvious infection, though tx with CTX in ED for c/f SBP (patient has likely malignant ascites). In regard to patients anemia, less likely GIB as CHANTAL negative, patient largely constipated (as blood act as a laxative).  Patients daughter reports seeing menstrual bleeding, and has had intermittent menses recently. CTAP not suggestive of acute bleed, though not an optimal study to examine acute blood loss. Favor patients abdominal pain likely 2/2 constipation (CTAP suggestive of moderate stool burden, no evidence of obvious obstruction). Finally, patients pancreatic adenoca appears to have progressed in regard to metastasis from previous study on 03/19. There are new liver lesions and now with malignant ascites and mesenteric edema. Patient is admitted to medicine, and will continue to follow anemia with BID CBC, encourage Bms, and pursue GOC discussions.    Updates 4/19:  - having Bms with current bowel regimen  - appreciate ongoing GOC discussion with supportive onc - discussed care with daughter who would still like the patient to try to get stronger but knows he rmother is dying. If she decompensates she would likely pursue hospice   - held metop for bleeding to not mask any tachycardia, remains hemodynamically stable  - hgb 6.3 this AM so will give 1u pRBC and checks labs this PM  - Na also 150 this AM so will give 500cc D5 and recheck this PM  -SLP consulted for swallowing    #Acute on chronic anemia  :: Patients family reports large menstrual bleeding recently, endorsing large clots in last few days, and some additional hemorrhoidal bleeding  :: GI vs  bleed. Favor  as patient still has menses, per daughters history appears to be coming from vagina. Less likely GIB even iso gastric varices (seen on CTAP) as CHANTAL negative, blood would likely act like laxative, though cannot totally rule out  :: CHANTAL in ED with some bright red blood  :: large amount of blood seen on UA  :: Patient hypertensive, Hgb 7.5  - BID CBC  - Type and screen 04/17, consented by daughter via phone  - Coags  - Hold home apixaban     #Constipation  #Abdominal Pain  :: Patient with abd pain/ vomiting iso metastatic pancreatic cancer on large amounts of  oxycodone. Has not had a BM in 6 days.  :: No evidence of obstruction on CT  :: Patient had recent flex sig 01/2025 for rectal bleeding found 3 polyps, removed. Had internal and external hemorrhoids.  - Will see how patient tolerates diet  - low threshold to pursue NGT with decompression if patient has persistent nausea  - Fleet enema, will pursue additional if unable to produce bowel movement.  - PRN Zofran     #Metastatic prostate adenoca  #Cancer related pain  :: Diagnosed initially 11/2024 after presenting for nausea and vomiting, underwent 1 round Folfox therapy but did not tolerate  :: Last admission at [facility], discussions centered around palliative radiation vs hospice, family elected to pursue palliative radiation then hospice if does not work  :: On CTAP 04/17, there is clear progression when compared to last scan 03/19. There are new liver lesions, now has likely malignant ascites and mesenteric edema  :: on recent hospitalization, was recommended suboxone, however patient was told to d/c it on discharge (no d/c summary to confirm), so patient has not taken it  - Ongoing GOC discussions with patient and family  - pain regimen per supportive onc  - Tylenol 650mg PRN for pain     #Splenic vein thrombosis  #Varices  :: New finding in 11/2024, patient started on apixaban  :: Splenic vein thrombus persists on CTAP obtained today, now with evidence of portal HTN and gastric varices  - Holding home apixaban iso anemia     #Recent CVA  :: Thrombotic CVA on 03/31 of R middle cerebral artery with reported residual deficit  :: Patient went home with outpatient PT on discharge  - c/w home ASA daily  - c/w home atorvastatin 40mg daily     #Type I Diabetes  :: Most recent A1c (10.7% 02/2025)  :: Recent home regimen recently switched, family instructed to STOP lantus and patient should remain on SSI  - SSI #1 for now, will titrate PRN  - q4h glucose checks while NPO     #Hx of MW tear  - c/w home pantoprazole 40mg BID      #Dysphagia  :: Notes at last hospital that patient did not want PEG tube, accepted risk of aspiration  - will need to confirm swallow function when clinically appropriate      #Hx HFpEF  #Hx HTN  :: Recent echocardiogram 04/01/2025 with EF 61%, grade I diastolic filling defect with elevated LAP, there is severely increased concentric LVH  :: Clinically euvolemic on exam today  - c/w home metoprolol succ 50mg daily     #Mood disorder  - c/w sertraline 50mg daily  - c/w gabapentin 400mg nightly, gabapentin 200mg qAM     F: PRN  E: PRN  N: NPO, ok for sips with meds pending SLP  A: PIV     DVT ppx: holding iso bleeding  GI ppx: PPI     Code: DNR/ DNI (confirmed with family on admission)  NOK: Nannette Nicole, Daughter, [phone]     Freddie Medellin MD  PGY2           [1] [Held by provider] apixaban, 5 mg, oral, BID  aspirin, 81 mg, oral, Daily  atorvastatin, 40 mg, oral, Nightly  bisacodyl, 10 mg, rectal, Daily  gabapentin, 300 mg, oral, Nightly  insulin glargine, 3 Units, subcutaneous, Nightly  insulin lispro, 0-10 Units, subcutaneous, q4h  metoprolol succinate XL, 25 mg, oral, Daily  NIFEdipine ER, 60 mg, oral, Daily before breakfast  pantoprazole, 40 mg, oral, BID  polyethylene glycol, 17 g, oral, Daily  sennosides-docusate sodium, 1 tablet, oral, BID  sertraline, 50 mg, oral, Daily     [2] dextrose 5%, 75 mL/hr, Last Rate: 75 mL/hr (04/19/25 0910)     [3] PRN medications: acetaminophen, bisacodyl, dextrose, dextrose, glucagon, glucagon, HYDROmorphone, naloxone, ondansetron, oxyCODONE, polyethylene glycol

## 2025-04-19 NOTE — PROGRESS NOTES
Physical Therapy                 Therapy Communication Note    Patient Name: Barbara English  MRN: [id number]  Department: [facility] 3  Room: [address] [place] [address]  Today's Date: 4/19/2025     Discipline: Physical Therapy    PT Missed Visit: Yes     Missed Visit Reason: Other (Comment) (Pt and chair set up for mobility.  Sister arriving and  requesting pt be changed.  RN alerted.  Reporting pt had already been changed, but will check on pt)    Missed Time: 1450 Attempt

## 2025-04-19 NOTE — CARE PLAN
The patient's goals for the shift include      The clinical goals for the shift include Pt to have pain controlled      Problem: Pain - Adult  Goal: Verbalizes/displays adequate comfort level or baseline comfort level  Outcome: Progressing     Problem: Safety - Adult  Goal: Free from fall injury  Outcome: Progressing     Problem: Discharge Planning  Goal: Discharge to home or other facility with appropriate resources  Outcome: Progressing     Problem: Chronic Conditions and Co-morbidities  Goal: Patient's chronic conditions and co-morbidity symptoms are monitored and maintained or improved  Outcome: Progressing     Problem: Nutrition  Goal: Nutrient intake appropriate for maintaining nutritional needs  Outcome: Progressing     Problem: Fall/Injury  Goal: Not fall by end of shift  Outcome: Progressing  Goal: Be free from injury by end of the shift  Outcome: Progressing  Goal: Verbalize understanding of personal risk factors for fall in the hospital  Outcome: Progressing  Goal: Verbalize understanding of risk factor reduction measures to prevent injury from fall in the home  Outcome: Progressing  Goal: Use assistive devices by end of the shift  Outcome: Progressing  Goal: Pace activities to prevent fatigue by end of the shift  Outcome: Progressing     Problem: Skin  Goal: Decreased wound size/increased tissue granulation at next dressing change  Outcome: Progressing  Goal: Participates in plan/prevention/treatment measures  Outcome: Progressing  Goal: Prevent/manage excess moisture  Outcome: Progressing  Flowsheets (Taken 4/19/2025 0138)  Prevent/manage excess moisture: Moisturize dry skin  Goal: Prevent/minimize sheer/friction injuries  Outcome: Progressing  Goal: Promote/optimize nutrition  Outcome: Progressing  Goal: Promote skin healing  Outcome: Progressing

## 2025-04-19 NOTE — PROGRESS NOTES
Physical Therapy                 Therapy Communication Note    Patient Name: Barbara English  MRN: [id number]  Department: [facility] 3  Room: [address] [place] [address]  Today's Date: 4/19/2025     Discipline: Physical Therapy    PT Missed Visit: Yes     Missed Visit Reason: Other (Comment) (PT returned after pt had been changed.  Food arrived and pt wanting to eat meal.    Offered to help pt up to chair to eat.  But pt declining.)    Missed Time: 1522 Attempt

## 2025-04-19 NOTE — PROGRESS NOTES
04/19/25 1514   Discharge Planning   Living Arrangements Spouse/significant other;Children   Support Systems Children;Spouse/significant other   Assistance Needed assist for bathing, dressing and toileting   Type of Residence Private residence   Home or Post Acute Services In home services   Type of Home Care Services Home nursing visits;Home OT;Home PT;Home SLP   Expected Discharge Disposition Home Health  (PT/OT pending)   Does the patient need discharge transport arranged? Yes   RoundTrip coordination needed? Yes   Has discharge transport been arranged? No   Financial Resource Strain   How hard is it for you to pay for the very basics like food, housing, medical care, and heating? Pt Unable   Housing Stability   In the last 12 months, was there a time when you were not able to pay the mortgage or rent on time? Pt Unable   At any time in the past 12 months, were you homeless or living in a shelter (including now)? Pt Unable   Transportation Needs   In the past 12 months, has lack of transportation kept you from medical appointments or from getting medications? Pt Unable   In the past 12 months, has lack of transportation kept you from meetings, work, or from getting things needed for daily living? Pt Unable   Stroke Family Assessment   Stroke Family Assessment Needed No   Intensity of Service   Intensity of Service 0-30 min     Transitional Care Coordination Progress Note:  Patient discussed during interdisciplinary rounds.   Team members present: MD, MICHELET  Plan per Medical/Surgical team: Aute blood loss anemia  Payor: [employer]  Discharge disposition: PT/OT pending  Potential Barriers: none  ADOD: 2-3 days    Previous Home Care: [facility] for SN/PT/OT/SLP  DME: walker, cane, BSC  Pharmacy: [facility]  Falls: Denies  PCP:  PCP at [facility]; Patient unable to state last visit  Met with patient at bedside, provided introduction of self and role. Patient able to answer simple questions and verify contact information,  however believes she is at home and calls out for daughter Nannette during assessment. Patient states she lives at home with her significant other. Patient states she requires assist for adls; safe at home. Patient states her daughter provides transport to appointments. Attempted to call silver Brunson, left message requesting return call. Patient receiving blood this date. Will continue to follow for discharge planning needs.     Kori HERNANDEZ, RN  Transitional Care Coordinator (TCC)  [phone]

## 2025-04-20 VITALS
TEMPERATURE: 97.9 F | HEIGHT: 63 IN | WEIGHT: 120 LBS | SYSTOLIC BLOOD PRESSURE: 143 MMHG | OXYGEN SATURATION: 98 % | BODY MASS INDEX: 21.26 KG/M2 | RESPIRATION RATE: 16 BRPM | DIASTOLIC BLOOD PRESSURE: 92 MMHG | HEART RATE: 89 BPM

## 2025-04-20 LAB
ALBUMIN SERPL BCP-MCNC: 2.8 G/DL (ref 3.4–5)
ALP SERPL-CCNC: 883 U/L (ref 33–110)
ALT SERPL W P-5'-P-CCNC: 32 U/L (ref 7–45)
ANION GAP SERPL CALC-SCNC: 12 MMOL/L (ref 10–20)
APPEARANCE UR: CLEAR
AST SERPL W P-5'-P-CCNC: 66 U/L (ref 9–39)
BASOPHILS # BLD AUTO: 0.06 X10*3/UL (ref 0–0.1)
BASOPHILS NFR BLD AUTO: 0.6 %
BILIRUB SERPL-MCNC: 2.3 MG/DL (ref 0–1.2)
BILIRUB UR STRIP.AUTO-MCNC: ABNORMAL MG/DL
BUN SERPL-MCNC: 21 MG/DL (ref 6–23)
CALCIUM SERPL-MCNC: 8.4 MG/DL (ref 8.6–10.6)
CHLORIDE SERPL-SCNC: 107 MMOL/L (ref 98–107)
CO2 SERPL-SCNC: 28 MMOL/L (ref 21–32)
COLOR UR: YELLOW
CREAT SERPL-MCNC: 1.54 MG/DL (ref 0.5–1.05)
EGFRCR SERPLBLD CKD-EPI 2021: 41 ML/MIN/1.73M*2
EOSINOPHIL # BLD AUTO: 0.25 X10*3/UL (ref 0–0.7)
EOSINOPHIL NFR BLD AUTO: 2.5 %
ERYTHROCYTE [DISTWIDTH] IN BLOOD BY AUTOMATED COUNT: 16.4 % (ref 11.5–14.5)
GLUCOSE BLD MANUAL STRIP-MCNC: 147 MG/DL (ref 74–99)
GLUCOSE BLD MANUAL STRIP-MCNC: 167 MG/DL (ref 74–99)
GLUCOSE BLD MANUAL STRIP-MCNC: 168 MG/DL (ref 74–99)
GLUCOSE BLD MANUAL STRIP-MCNC: 183 MG/DL (ref 74–99)
GLUCOSE BLD MANUAL STRIP-MCNC: 220 MG/DL (ref 74–99)
GLUCOSE BLD MANUAL STRIP-MCNC: 281 MG/DL (ref 74–99)
GLUCOSE SERPL-MCNC: 216 MG/DL (ref 74–99)
GLUCOSE UR STRIP.AUTO-MCNC: ABNORMAL MG/DL
HCT VFR BLD AUTO: 25.3 % (ref 36–46)
HGB BLD-MCNC: 7.9 G/DL (ref 12–16)
IMM GRANULOCYTES # BLD AUTO: 0.05 X10*3/UL (ref 0–0.7)
IMM GRANULOCYTES NFR BLD AUTO: 0.5 % (ref 0–0.9)
KETONES UR STRIP.AUTO-MCNC: ABNORMAL MG/DL
LEUKOCYTE ESTERASE UR QL STRIP.AUTO: NEGATIVE
LYMPHOCYTES # BLD AUTO: 0.92 X10*3/UL (ref 1.2–4.8)
LYMPHOCYTES NFR BLD AUTO: 9.3 %
MAGNESIUM SERPL-MCNC: 2.17 MG/DL (ref 1.6–2.4)
MCH RBC QN AUTO: 25.2 PG (ref 26–34)
MCHC RBC AUTO-ENTMCNC: 31.2 G/DL (ref 32–36)
MCV RBC AUTO: 81 FL (ref 80–100)
MONOCYTES # BLD AUTO: 0.78 X10*3/UL (ref 0.1–1)
MONOCYTES NFR BLD AUTO: 7.9 %
MUCOUS THREADS #/AREA URNS AUTO: ABNORMAL /LPF
NEUTROPHILS # BLD AUTO: 7.83 X10*3/UL (ref 1.2–7.7)
NEUTROPHILS NFR BLD AUTO: 79.2 %
NITRITE UR QL STRIP.AUTO: NEGATIVE
NRBC BLD-RTO: 0 /100 WBCS (ref 0–0)
PH UR STRIP.AUTO: 7 [PH]
PHOSPHATE SERPL-MCNC: 2.6 MG/DL (ref 2.5–4.9)
PLATELET # BLD AUTO: 338 X10*3/UL (ref 150–450)
POTASSIUM SERPL-SCNC: 3.6 MMOL/L (ref 3.5–5.3)
PROT SERPL-MCNC: 5.8 G/DL (ref 6.4–8.2)
PROT UR STRIP.AUTO-MCNC: ABNORMAL MG/DL
RBC # BLD AUTO: 3.13 X10*6/UL (ref 4–5.2)
RBC # UR STRIP.AUTO: ABNORMAL MG/DL
RBC #/AREA URNS AUTO: ABNORMAL /HPF
SODIUM SERPL-SCNC: 143 MMOL/L (ref 136–145)
SP GR UR STRIP.AUTO: 1.03
SQUAMOUS #/AREA URNS AUTO: ABNORMAL /HPF
UROBILINOGEN UR STRIP.AUTO-MCNC: ABNORMAL MG/DL
WBC # BLD AUTO: 9.9 X10*3/UL (ref 4.4–11.3)
WBC #/AREA URNS AUTO: ABNORMAL /HPF

## 2025-04-20 PROCEDURE — 92610 EVALUATE SWALLOWING FUNCTION: CPT | Mod: GN | Performed by: SPEECH-LANGUAGE PATHOLOGIST

## 2025-04-20 PROCEDURE — 2500000001 HC RX 250 WO HCPCS SELF ADMINISTERED DRUGS (ALT 637 FOR MEDICARE OP): Mod: SE

## 2025-04-20 PROCEDURE — 2500000002 HC RX 250 W HCPCS SELF ADMINISTERED DRUGS (ALT 637 FOR MEDICARE OP, ALT 636 FOR OP/ED): Mod: SE

## 2025-04-20 PROCEDURE — 81001 URINALYSIS AUTO W/SCOPE: CPT

## 2025-04-20 PROCEDURE — 99233 SBSQ HOSP IP/OBS HIGH 50: CPT

## 2025-04-20 PROCEDURE — 85025 COMPLETE CBC W/AUTO DIFF WBC: CPT

## 2025-04-20 PROCEDURE — 97165 OT EVAL LOW COMPLEX 30 MIN: CPT | Mod: GO

## 2025-04-20 PROCEDURE — 81003 URINALYSIS AUTO W/O SCOPE: CPT

## 2025-04-20 PROCEDURE — 2500000004 HC RX 250 GENERAL PHARMACY W/ HCPCS (ALT 636 FOR OP/ED): Mod: JZ,SE

## 2025-04-20 PROCEDURE — 84100 ASSAY OF PHOSPHORUS: CPT

## 2025-04-20 PROCEDURE — 2500000004 HC RX 250 GENERAL PHARMACY W/ HCPCS (ALT 636 FOR OP/ED): Mod: SE

## 2025-04-20 PROCEDURE — 1210000001 HC SEMI-PRIVATE ROOM DAILY

## 2025-04-20 PROCEDURE — 51701 INSERT BLADDER CATHETER: CPT

## 2025-04-20 PROCEDURE — 80053 COMPREHEN METABOLIC PANEL: CPT

## 2025-04-20 PROCEDURE — 97161 PT EVAL LOW COMPLEX 20 MIN: CPT | Mod: GP

## 2025-04-20 PROCEDURE — 82947 ASSAY GLUCOSE BLOOD QUANT: CPT

## 2025-04-20 PROCEDURE — 83735 ASSAY OF MAGNESIUM: CPT

## 2025-04-20 RX ORDER — MORPHINE SULFATE 4 MG/ML
1 INJECTION INTRAVENOUS EVERY 4 HOURS PRN
Status: DISCONTINUED | OUTPATIENT
Start: 2025-04-20 | End: 2025-04-20

## 2025-04-20 RX ORDER — POTASSIUM CHLORIDE 20 MEQ/1
40 TABLET, EXTENDED RELEASE ORAL ONCE
Status: DISCONTINUED | OUTPATIENT
Start: 2025-04-20 | End: 2025-04-22 | Stop reason: ALTCHOICE

## 2025-04-20 RX ORDER — MORPHINE SULFATE 4 MG/ML
1 INJECTION INTRAVENOUS
Status: DISCONTINUED | OUTPATIENT
Start: 2025-04-20 | End: 2025-04-21

## 2025-04-20 RX ORDER — SODIUM CHLORIDE, SODIUM LACTATE, POTASSIUM CHLORIDE, CALCIUM CHLORIDE 600; 310; 30; 20 MG/100ML; MG/100ML; MG/100ML; MG/100ML
75 INJECTION, SOLUTION INTRAVENOUS CONTINUOUS
Status: ACTIVE | OUTPATIENT
Start: 2025-04-20 | End: 2025-04-21

## 2025-04-20 RX ADMIN — SERTRALINE 50 MG: 50 TABLET, FILM COATED ORAL at 09:40

## 2025-04-20 RX ADMIN — PANTOPRAZOLE SODIUM 40 MG: 40 INJECTION, POWDER, LYOPHILIZED, FOR SOLUTION INTRAVENOUS at 09:38

## 2025-04-20 RX ADMIN — INSULIN LISPRO 2 UNITS: 100 INJECTION, SOLUTION INTRAVENOUS; SUBCUTANEOUS at 20:15

## 2025-04-20 RX ADMIN — SENNOSIDES AND DOCUSATE SODIUM 1 TABLET: 50; 8.6 TABLET ORAL at 20:14

## 2025-04-20 RX ADMIN — ASPIRIN 81 MG: 81 TABLET, COATED ORAL at 09:44

## 2025-04-20 RX ADMIN — MORPHINE SULFATE 1 MG: 4 INJECTION, SOLUTION INTRAMUSCULAR; INTRAVENOUS at 20:14

## 2025-04-20 RX ADMIN — OXYCODONE 10 MG: 5 TABLET ORAL at 12:02

## 2025-04-20 RX ADMIN — PANTOPRAZOLE SODIUM 40 MG: 40 INJECTION, POWDER, LYOPHILIZED, FOR SOLUTION INTRAVENOUS at 20:14

## 2025-04-20 RX ADMIN — INSULIN LISPRO 6 UNITS: 100 INJECTION, SOLUTION INTRAVENOUS; SUBCUTANEOUS at 00:15

## 2025-04-20 RX ADMIN — SENNOSIDES AND DOCUSATE SODIUM 1 TABLET: 50; 8.6 TABLET ORAL at 09:40

## 2025-04-20 RX ADMIN — INSULIN GLARGINE 3 UNITS: 100 INJECTION, SOLUTION SUBCUTANEOUS at 15:01

## 2025-04-20 RX ADMIN — GABAPENTIN 300 MG: 300 CAPSULE ORAL at 20:14

## 2025-04-20 RX ADMIN — MORPHINE SULFATE 1 MG: 4 INJECTION, SOLUTION INTRAMUSCULAR; INTRAVENOUS at 09:07

## 2025-04-20 RX ADMIN — INSULIN LISPRO 2 UNITS: 100 INJECTION, SOLUTION INTRAVENOUS; SUBCUTANEOUS at 17:04

## 2025-04-20 RX ADMIN — BISACODYL 10 MG: 10 SUPPOSITORY RECTAL at 09:44

## 2025-04-20 RX ADMIN — POLYETHYLENE GLYCOL 3350 17 G: 17 POWDER, FOR SOLUTION ORAL at 06:38

## 2025-04-20 RX ADMIN — SODIUM CHLORIDE, SODIUM LACTATE, POTASSIUM CHLORIDE, AND CALCIUM CHLORIDE 75 ML/HR: .6; .31; .03; .02 INJECTION, SOLUTION INTRAVENOUS at 09:08

## 2025-04-20 RX ADMIN — INSULIN LISPRO 4 UNITS: 100 INJECTION, SOLUTION INTRAVENOUS; SUBCUTANEOUS at 04:56

## 2025-04-20 RX ADMIN — ATORVASTATIN CALCIUM 40 MG: 40 TABLET, FILM COATED ORAL at 20:14

## 2025-04-20 RX ADMIN — NIFEDIPINE 60 MG: 60 TABLET, FILM COATED, EXTENDED RELEASE ORAL at 06:57

## 2025-04-20 RX ADMIN — INSULIN LISPRO 2 UNITS: 100 INJECTION, SOLUTION INTRAVENOUS; SUBCUTANEOUS at 13:41

## 2025-04-20 ASSESSMENT — COGNITIVE AND FUNCTIONAL STATUS - GENERAL
DAILY ACTIVITIY SCORE: 13
MOVING FROM LYING ON BACK TO SITTING ON SIDE OF FLAT BED WITH BEDRAILS: TOTAL
EATING MEALS: A LITTLE
TURNING FROM BACK TO SIDE WHILE IN FLAT BAD: TOTAL
PERSONAL GROOMING: A LITTLE
HELP NEEDED FOR BATHING: TOTAL
MOVING TO AND FROM BED TO CHAIR: TOTAL
DRESSING REGULAR UPPER BODY CLOTHING: A LOT
TURNING FROM BACK TO SIDE WHILE IN FLAT BAD: A LOT
MOBILITY SCORE: 8
DRESSING REGULAR LOWER BODY CLOTHING: TOTAL
MOBILITY SCORE: 6
STANDING UP FROM CHAIR USING ARMS: TOTAL
EATING MEALS: A LOT
DRESSING REGULAR LOWER BODY CLOTHING: A LOT
PERSONAL GROOMING: A LOT
MOVING FROM LYING ON BACK TO SITTING ON SIDE OF FLAT BED WITH BEDRAILS: A LOT
TOILETING: TOTAL
STANDING UP FROM CHAIR USING ARMS: TOTAL
CLIMB 3 TO 5 STEPS WITH RAILING: TOTAL
HELP NEEDED FOR BATHING: A LOT
DRESSING REGULAR UPPER BODY CLOTHING: TOTAL
MOVING TO AND FROM BED TO CHAIR: TOTAL
WALKING IN HOSPITAL ROOM: TOTAL
TOILETING: TOTAL
WALKING IN HOSPITAL ROOM: TOTAL
CLIMB 3 TO 5 STEPS WITH RAILING: TOTAL
DAILY ACTIVITIY SCORE: 8

## 2025-04-20 ASSESSMENT — PAIN DESCRIPTION - DESCRIPTORS
DESCRIPTORS: CRAMPING
DESCRIPTORS: CRAMPING

## 2025-04-20 ASSESSMENT — PAIN SCALES - GENERAL
PAINLEVEL_OUTOF10: 10 - WORST POSSIBLE PAIN

## 2025-04-20 ASSESSMENT — PAIN - FUNCTIONAL ASSESSMENT
PAIN_FUNCTIONAL_ASSESSMENT: 0-10

## 2025-04-20 ASSESSMENT — ACTIVITIES OF DAILY LIVING (ADL): BATHING_ASSISTANCE: MAXIMAL

## 2025-04-20 NOTE — DISCHARGE INSTRUCTIONS
Dear Ms. English,     You were hospitalized at [facility] from 04/17- 04/** after you experienced abdominal pain and bleeding. We found that your constipation was likely causing your abdominal pain, and your bleeding was related to your menses. We held your blood thinner for your bleeding when you were admitted. We asked the supportive oncology team to see you and aid us in your pain regimen.     You will need the follow up with the following providers when you leave the hospital:   - Supportive oncology for further aid in your pain regimen and supportive resources for your cancer diagosis    It was a pleasure taking care of you during your stay.     - Your  Care Team

## 2025-04-20 NOTE — CARE PLAN
The patient's goals for the shift include  pt unable to report    The clinical goals for the shift include pt hgb will rmain above 7 during shift        Problem: Discharge Planning  Goal: Discharge to home or other facility with appropriate resources  Outcome: Not Progressing     Problem: Chronic Conditions and Co-morbidities  Goal: Patient's chronic conditions and co-morbidity symptoms are monitored and maintained or improved  Outcome: Not Progressing     Problem: Nutrition  Goal: Nutrient intake appropriate for maintaining nutritional needs  Outcome: Not Progressing     Problem: Skin  Goal: Participates in plan/prevention/treatment measures  Outcome: Not Progressing  Goal: Promote/optimize nutrition  Outcome: Not Progressing     Problem: Diabetes  Goal: Maintain glucose levels >70mg/dl to <250mg/dl throughout shift  Outcome: Not Progressing

## 2025-04-20 NOTE — HOSPITAL COURSE
Barbara English is a 50 y.o. 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 ED labs notable for Hgb 7.3, ALP 1016. WBC wnl, CTAP not suggestive of obvious infection, though tx with CTX in ED for c/f SBP (patient has likely malignant ascites).     In regard to patients acute on chronic anemia, patient is currently menstruating, with several clots as reported per daugther at home, and intermittently throughout admission. Less likely GIB as CHANTAL negative in ED, patient largely constipated (as blood act as a laxative).  CTAP not suggestive of acute bleed, though not an optimal study to examine acute blood loss. Patients daughter reports intermittent menses in the last few months. While admitted she has required 1u pRBC on 04/19. Patient has remained HDS throughout this time. Home apixaban (for splenic vein thrombosis), held on admission.    Favor patients admission abdominal pain likely 2/2 constipation (CTAP suggestive of moderate stool burden, no evidence of obvious obstruction). Patient began to have Bms with aggressive bowel regimen. Will likely continue to need aggressive bowel regimen given significant opioid burden for cancer related pain.    Finally, patients pancreatic adenoca appears to have progressed in regard to metastasis from previous study on 03/19. There are new liver lesions and now with malignant ascites and mesenteric edema. Supportive oncology consulted, and provided pain management recommendations. On conversations with patients daughter, she would still like the patient to try and get stronger, however if she decompensates she will likely pursue  hospice. Will appreciate additional support from supportive onc in regard to pursuing additional GOC discussions.    Patient is admitted to medicine, and will continue to follow anemia with BID CBC, encourage Bms, and pursue GOC discussions.    To Dos:  - Continue to optimize bowel regimen  - Follow Hgb while patient still on menses  - Additional GOC discussions with supportive onc

## 2025-04-20 NOTE — PROGRESS NOTES
Barbara English is a 50 y.o. female on day 3 of admission presenting with Acute blood loss anemia.      Transitional Care Coordinator Note: Attempted to speak  with patient's daughter to discuss discharge planning s/p admission( Nannette) straight to VM unable to leave message. TCC team to follow up. Esha Gutierrez RN TCC via Epic.

## 2025-04-20 NOTE — PROGRESS NOTES
Barbara English is a 50 y.o. female on day 3 of admission presenting with Acute blood loss anemia.      Subjective     Overnight patient had episode of hyperglycemia to ~500s so D5 was stopped and switched to LR and she was given 10u lispro. DKA workup negative.    This AM patient states she is still having abdominal pain but is having Bms. Is more lethargic this AM. Has no had any further bleeding. No other complaints.     Patient denies any chest pain, additional nausea, vomiting, SOB.        Objective     Last Recorded Vitals  /79 (BP Location: Right arm)   Pulse 78   Temp 37.1 °C (98.8 °F) (Temporal)   Resp 14   Wt 54.4 kg (120 lb)   SpO2 99%   Intake/Output last 3 Shifts:    Intake/Output Summary (Last 24 hours) at 4/20/2025 1219  Last data filed at 4/20/2025 0618  Gross per 24 hour   Intake 1963.75 ml   Output 575 ml   Net 1388.75 ml       Admission Weight  Weight: 54.4 kg (120 lb) (04/17/25 0900)    Daily Weight  04/17/25 : 54.4 kg (120 lb)    Image Results  CT abdomen pelvis w IV contrast  Narrative: Interpreted By:  Scotty Krishnamurthy,  Bala Mancilla   STUDY:  CT ABDOMEN PELVIS W IV CONTRAST;  4/17/2025 8:30 am      INDICATION:  Signs/Symptoms:epigastric pain, no longer having BM or passing gas.  Active prostate cancre.      50-year-old female with metastatic pancreatic cancer, splenic artery  aneurysm, splenic vein thrombosis.      COMPARISON:  CT 04/30/2024      ACCESSION NUMBER(S):  [id number]      ORDERING CLINICIAN:  BHAKTI MONROY      TECHNIQUE:  CT of the abdomen and pelvis was performed.  Standard contiguous  axial images were obtained at 3 mm slice thickness through the  abdomen and pelvis. Coronal and sagittal reconstructions at 3 mm  slice thickness were performed.  75 ML of Omnipaque 350 was  administered intravenously without immediate complication.      FINDINGS:  LOWER CHEST:  There are ground-glass opacities within bilateral lower lobes. Heart  appears unremarkable. No pericardial  effusion. Partially visualized  distal central venous catheter tip within the right atrium. Interval  increased size of epicardial lymph node measuring 1.3 x 0.8 cm  (Series 201, Image 14).      ABDOMEN:      LIVER:  Multiple ill-defined hypodense lesions throughout the liver measuring  up to 2.0 cm (Series 201, Image 28) (Series 201, Image 21) (Series  201, Image 34) within hepatic segments 2 and 8 consistent with known  metastatic disease to the liver. No hepatomegaly. Nodular contour of  the parenchyma.      BILE DUCTS:  The intrahepatic and extrahepatic ducts are not dilated.      GALLBLADDER:  There is mild gallbladder wall thickening without evidence of  calcified stones. No pericholecystic fluid.      PANCREAS:  There is a hazy appearance of the pancreatic tail, which appears  slightly hypodense centrally (Series 201, Image 40). Pancreatic body  and head appear unremarkable.      SPLEEN:  The spleen is normal in size without focal lesions.      ADRENAL GLANDS:  Bilateral adrenal glands appear normal.      KIDNEYS AND URETERS:  The kidneys are normal in size and enhance symmetrically.  No  hydroureteronephrosis or nephroureterolithiasis is identified.      PELVIS:      BLADDER:  The urinary bladder appears normal without abnormal wall thickening.      REPRODUCTIVE ORGANS:  The uterus is present.      BOWEL:  The stomach is unremarkable.   The small and large bowel are normal  in caliber and demonstrate no wall thickening. Hyperdense linear  structure near the cecum of unknown etiology, possibly representing  postsurgical changes. Appendectomy. Moderate colonic stool burden.      VESSELS:  There is no aneurysmal dilatation of the abdominal aorta. The IVC  appears normal. There is loss of visualization of the portal vein  concerning for at least partial portal vein thrombosis. The splenic  vein is also not visualized concerning for splenic vein thrombosis.  There is new gastric varices which are most prominent  "adjacent to the  stomach and spleen (Series 201, Image 36).      PERITONEUM/RETROPERITONEUM/LYMPH NODES:  Interval development of large volume abdominopelvic ascites with  mesenteric edema. No abdominopelvic lymphadenopathy is present.      BONES AND ABDOMINAL WALL:  No suspicious osseous lesions are identified. Stable appearance of  focal sclerotic lesion within the right proximal femur, which is  stable when compared to prior CT from 04/30/2024. Interval increase  in diffuse body wall edema.      Impression: 1. When compared to prior CT from 04/30/2024, there is interval  progression of disease as evidenced by development of large ascites,  metastatic lesions within the liver, and increased ill-defined hazy  opacity within the pancreatic tail.  2. There is splenic vein thrombosis with questionable portal vein  thrombosis and gastric varices consistent with portal hypertension.  3. New ground-glass opacities within bilateral lower lobes which  could represent atelectasis. However, unable to rule out superimposed  infection.  4. Moderate colonic stool burden.  5. Additional chronic and incidental findings as detailed above.      I personally reviewed the images/study and I agree with the findings  as stated by Laurent Murrieta MD. This study was interpreted at  [facility], [place], [place].      MACRO:  None      Signed by: Scotty Krishnamurthy 4/17/2025 11:27 AM  Dictation workstation:   [id number]      Physical Exam  /79 (BP Location: Right arm)   Pulse 78   Temp 37.1 °C (98.8 °F) (Temporal)   Resp 14   Ht 1.6 m (5' 3\")   Wt 54.4 kg (120 lb)   SpO2 99%   BMI 21.26 kg/m²       Constitutional:       Comments: Patient resting in bed, very sleepy, though arousable and able to answer yes/ no questions, and intermittently speaks. Overall appears pale   Cardiovascular:      Rate and Rhythm: Normal rate.      Heart sounds: No murmur heard.     No friction rub. No gallop.   Pulmonary:      " Effort: Pulmonary effort is normal. No respiratory distress.      Breath sounds: No wheezing.   Abdominal:      General: Abdomen is flat. There is no distension.      Comments: No tenderness on palpation, no palpable masses   GYN:  -Dried blood in pad, no clots this AM  Musculoskeletal:      Right lower leg: No edema. TMA on RLE     Left lower leg: No edema.   Neuro:  -Baseline L arm/leg weakness       Relevant Results  Scheduled medications  Scheduled Medications[1]  Continuous medications  Continuous Medications[2]  PRN medications  PRN Medications[3]    Results for orders placed or performed during the hospital encounter of 04/17/25 (from the past 24 hours)   POCT GLUCOSE   Result Value Ref Range    POCT Glucose 285 (H) 74 - 99 mg/dL   POCT GLUCOSE   Result Value Ref Range    POCT Glucose 483 (H) 74 - 99 mg/dL   POCT GLUCOSE   Result Value Ref Range    POCT Glucose 494 (H) 74 - 99 mg/dL   Renal function panel   Result Value Ref Range    Glucose 531 (HH) 74 - 99 mg/dL    Sodium 140 136 - 145 mmol/L    Potassium 4.9 3.5 - 5.3 mmol/L    Chloride 105 98 - 107 mmol/L    Bicarbonate 23 21 - 32 mmol/L    Anion Gap 17 10 - 20 mmol/L    Urea Nitrogen 23 6 - 23 mg/dL    Creatinine 1.67 (H) 0.50 - 1.05 mg/dL    eGFR 37 (L) >60 mL/min/1.73m*2    Calcium 8.3 (L) 8.6 - 10.6 mg/dL    Phosphorus 4.3 2.5 - 4.9 mg/dL    Albumin 3.0 (L) 3.4 - 5.0 g/dL   CBC   Result Value Ref Range    WBC 10.6 4.4 - 11.3 x10*3/uL    nRBC 0.0 0.0 - 0.0 /100 WBCs    RBC 3.07 (L) 4.00 - 5.20 x10*6/uL    Hemoglobin 7.8 (L) 12.0 - 16.0 g/dL    Hematocrit 25.0 (L) 36.0 - 46.0 %    MCV 81 80 - 100 fL    MCH 25.4 (L) 26.0 - 34.0 pg    MCHC 31.2 (L) 32.0 - 36.0 g/dL    RDW 16.4 (H) 11.5 - 14.5 %    Platelets 370 150 - 450 x10*3/uL   POCT GLUCOSE   Result Value Ref Range    POCT Glucose 421 (H) 74 - 99 mg/dL   Renal function panel   Result Value Ref Range    Glucose 441 (H) 74 - 99 mg/dL    Sodium 141 136 - 145 mmol/L    Potassium 4.0 3.5 - 5.3 mmol/L     Chloride 105 98 - 107 mmol/L    Bicarbonate 26 21 - 32 mmol/L    Anion Gap 14 10 - 20 mmol/L    Urea Nitrogen 23 6 - 23 mg/dL    Creatinine 1.59 (H) 0.50 - 1.05 mg/dL    eGFR 39 (L) >60 mL/min/1.73m*2    Calcium 8.2 (L) 8.6 - 10.6 mg/dL    Phosphorus 3.2 2.5 - 4.9 mg/dL    Albumin 2.9 (L) 3.4 - 5.0 g/dL   Blood Gas Venous Full Panel   Result Value Ref Range    POCT pH, Venous 7.39 7.33 - 7.43 pH    POCT pCO2, Venous 43 41 - 51 mm Hg    POCT pO2, Venous 45 35 - 45 mm Hg    POCT SO2, Venous 79 (H) 45 - 75 %    POCT Oxy Hemoglobin, Venous 77.1 (H) 45.0 - 75.0 %    POCT Hematocrit Calculated, Venous 24.0 (L) 36.0 - 46.0 %    POCT Sodium, Venous 140 136 - 145 mmol/L    POCT Potassium, Venous 4.1 3.5 - 5.3 mmol/L    POCT Chloride, Venous 109 (H) 98 - 107 mmol/L    POCT Ionized Calicum, Venous 1.19 1.10 - 1.33 mmol/L    POCT Glucose, Venous 436 (H) 74 - 99 mg/dL    POCT Lactate, Venous 1.2 0.4 - 2.0 mmol/L    POCT Base Excess, Venous 0.9 -2.0 - 3.0 mmol/L    POCT HCO3 Calculated, Venous 26.0 22.0 - 26.0 mmol/L    POCT Hemoglobin, Venous 8.1 (L) 12.0 - 16.0 g/dL    POCT Anion Gap, Venous 9.0 (L) 10.0 - 25.0 mmol/L    Patient Temperature 37.0 degrees Celsius    FiO2 21 %   POCT GLUCOSE   Result Value Ref Range    POCT Glucose 281 (H) 74 - 99 mg/dL   CBC and Auto Differential   Result Value Ref Range    WBC 9.9 4.4 - 11.3 x10*3/uL    nRBC 0.0 0.0 - 0.0 /100 WBCs    RBC 3.13 (L) 4.00 - 5.20 x10*6/uL    Hemoglobin 7.9 (L) 12.0 - 16.0 g/dL    Hematocrit 25.3 (L) 36.0 - 46.0 %    MCV 81 80 - 100 fL    MCH 25.2 (L) 26.0 - 34.0 pg    MCHC 31.2 (L) 32.0 - 36.0 g/dL    RDW 16.4 (H) 11.5 - 14.5 %    Platelets 338 150 - 450 x10*3/uL    Neutrophils % 79.2 40.0 - 80.0 %    Immature Granulocytes %, Automated 0.5 0.0 - 0.9 %    Lymphocytes % 9.3 13.0 - 44.0 %    Monocytes % 7.9 2.0 - 10.0 %    Eosinophils % 2.5 0.0 - 6.0 %    Basophils % 0.6 0.0 - 2.0 %    Neutrophils Absolute 7.83 (H) 1.20 - 7.70 x10*3/uL    Immature Granulocytes  Absolute, Automated 0.05 0.00 - 0.70 x10*3/uL    Lymphocytes Absolute 0.92 (L) 1.20 - 4.80 x10*3/uL    Monocytes Absolute 0.78 0.10 - 1.00 x10*3/uL    Eosinophils Absolute 0.25 0.00 - 0.70 x10*3/uL    Basophils Absolute 0.06 0.00 - 0.10 x10*3/uL   Magnesium   Result Value Ref Range    Magnesium 2.17 1.60 - 2.40 mg/dL   Comprehensive metabolic panel   Result Value Ref Range    Glucose 216 (H) 74 - 99 mg/dL    Sodium 143 136 - 145 mmol/L    Potassium 3.6 3.5 - 5.3 mmol/L    Chloride 107 98 - 107 mmol/L    Bicarbonate 28 21 - 32 mmol/L    Anion Gap 12 10 - 20 mmol/L    Urea Nitrogen 21 6 - 23 mg/dL    Creatinine 1.54 (H) 0.50 - 1.05 mg/dL    eGFR 41 (L) >60 mL/min/1.73m*2    Calcium 8.4 (L) 8.6 - 10.6 mg/dL    Albumin 2.8 (L) 3.4 - 5.0 g/dL    Alkaline Phosphatase 883 (H) 33 - 110 U/L    Total Protein 5.8 (L) 6.4 - 8.2 g/dL    AST 66 (H) 9 - 39 U/L    Bilirubin, Total 2.3 (H) 0.0 - 1.2 mg/dL    ALT 32 7 - 45 U/L   Phosphorus   Result Value Ref Range    Phosphorus 2.6 2.5 - 4.9 mg/dL   POCT GLUCOSE   Result Value Ref Range    POCT Glucose 220 (H) 74 - 99 mg/dL   Urinalysis with Reflex Microscopic   Result Value Ref Range    Color, Urine Yellow Light-Yellow, Yellow, Dark-Yellow    Appearance, Urine Clear Clear    Specific Gravity, Urine 1.026 1.005 - 1.035    pH, Urine 7.0 5.0, 5.5, 6.0, 6.5, 7.0, 7.5, 8.0    Protein, Urine 300 (3+) (A) NEGATIVE, 10 (TRACE), 20 (TRACE) mg/dL    Glucose, Urine 150 (2+) (A) Normal mg/dL    Blood, Urine 0.03 (TRACE) (A) NEGATIVE mg/dL    Ketones, Urine TRACE (A) NEGATIVE mg/dL    Bilirubin, Urine 0.5 (1+) (A) NEGATIVE mg/dL    Urobilinogen, Urine 4 (2+) (A) Normal mg/dL    Nitrite, Urine NEGATIVE NEGATIVE    Leukocyte Esterase, Urine NEGATIVE NEGATIVE   Microscopic Only, Urine   Result Value Ref Range    WBC, Urine 11-20 (A) 1-5, NONE /HPF    RBC, Urine 3-5 NONE, 1-2, 3-5 /HPF    Squamous Epithelial Cells, Urine 1-9 (SPARSE) Reference range not established. /HPF    Mucus, Urine FEW  Reference range not established. /LPF   POCT GLUCOSE   Result Value Ref Range    POCT Glucose 147 (H) 74 - 99 mg/dL   POCT GLUCOSE   Result Value Ref Range    POCT Glucose 167 (H) 74 - 99 mg/dL       This patient has a central line   Reason for the central line remaining today?       Assessment & Plan  Acute blood loss anemia      Barbara English is a 50 y.o. 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 ED labs notable for Hgb 7.3, ALP 1016. WBC wnl, CTAP not suggestive of obvious infection, though tx with CTX in ED for c/f SBP (patient has likely malignant ascites). In regard to patients anemia, less likely GIB as CHANTAL negative, patient largely constipated (as blood act as a laxative). Patients daughter reports seeing menstrual bleeding, and has had intermittent menses recently. CTAP not suggestive of acute bleed, though not an optimal study to examine acute blood loss. Favor patients abdominal pain likely 2/2 constipation (CTAP suggestive of moderate stool burden, no evidence of obvious obstruction). Finally, patients pancreatic adenoca appears to have progressed in regard to metastasis from previous study on 03/19. There are new liver lesions and now with malignant ascites and mesenteric edema. Patient is admitted to medicine, and will continue to follow anemia with BID CBC, encourage Bms, and pursue GOC discussions.    Updates 4/20:  - having Bms with current bowel regimen  - appreciate ongoing GOC discussion with supportive onc - discussed care with daughter who would still like the patient to try to get stronger but knows he rmother is dying. If she  decompensates she would likely pursue hospice   - PRN diluadid switched to morphine 1mg q3 hrs per patient preference  - hgb stable this AM  - Continue LR 75 ml/hr for maintenance  - Passed SLP, regular diet ordered  - Pending PT/OT eval tomorrow    #Acute on chronic anemia  :: Patients family reports large menstrual bleeding recently, endorsing large clots in last few days, and some additional hemorrhoidal bleeding  :: GI vs  bleed. Favor  as patient still has menses, per daughters history appears to be coming from vagina. Less likely GIB even iso gastric varices (seen on CTAP) as CHANTAL negative, blood would likely act like laxative, though cannot totally rule out  :: CHANTAL in ED with some bright red blood  :: large amount of blood seen on UA  :: Patient hypertensive, Hgb 7.5  - BID CBC  - Type and screen 04/17, consented by daughter via phone  - Coags  - Hold home apixaban     #Constipation  #Abdominal Pain  :: Patient with abd pain/ vomiting iso metastatic pancreatic cancer on large amounts of oxycodone. Has not had a BM in 6 days.  :: No evidence of obstruction on CT  :: Patient had recent flex sig 01/2025 for rectal bleeding found 3 polyps, removed. Had internal and external hemorrhoids.  - Will see how patient tolerates diet  - low threshold to pursue NGT with decompression if patient has persistent nausea  - Fleet enema, will pursue additional if unable to produce bowel movement.  - PRN Zofran     #Metastatic prostate adenoca  #Cancer related pain  :: Diagnosed initially 11/2024 after presenting for nausea and vomiting, underwent 1 round Folfox therapy but did not tolerate  :: Last admission at [facility], discussions centered around palliative radiation vs hospice, family elected to pursue palliative radiation then hospice if does not work  :: On CTAP 04/17, there is clear progression when compared to last scan 03/19. There are new liver lesions, now has likely malignant ascites and mesenteric edema  :: on recent  hospitalization, was recommended suboxone, however patient was told to d/c it on discharge (no d/c summary to confirm), so patient has not taken it  - Ongoing GOC discussions with patient and family  - pain regimen per supportive onc  - Tylenol 650mg PRN for pain     #Splenic vein thrombosis  #Varices  :: New finding in 11/2024, patient started on apixaban  :: Splenic vein thrombus persists on CTAP obtained today, now with evidence of portal HTN and gastric varices  - Holding home apixaban iso anemia     #Recent CVA  :: Thrombotic CVA on 03/31 of R middle cerebral artery with reported residual deficit  :: Patient went home with outpatient PT on discharge  - c/w home ASA daily  - c/w home atorvastatin 40mg daily     #Type I Diabetes  :: Most recent A1c (10.7% 02/2025)  :: Recent home regimen recently switched, family instructed to STOP lantus and patient should remain on SSI  - lantus 3u  - SSI #2 for now, will titrate PRN  - q4h glucose checks while NPO     #Hx of MW tear  - c/w home pantoprazole 40mg BID     #Dysphagia  :: Notes at last hospital that patient did not want PEG tube, accepted risk of aspiration  - passed SLP this admission, good for regular diet/thin liquids     #Hx HFpEF  #Hx HTN  :: Recent echocardiogram 04/01/2025 with EF 61%, grade I diastolic filling defect with elevated LAP, there is severely increased concentric LVH  :: Clinically euvolemic on exam today  - holding home metoprolol succ 25mg daily     #Mood disorder  - c/w sertraline 50mg daily  - c/w gabapentin 400mg nightly, gabapentin 200mg qAM     F: PRN  E: PRN  N: Regular diet  A: PIV     DVT ppx: holding iso bleeding  GI ppx: PPI     Code: DNR/ DNI (confirmed with family on admission)  NOK: Nannette Nicole, Daughter, [phone]     Freddie Medellin MD  PGY2               [1] [Held by provider] apixaban, 5 mg, oral, BID  aspirin, 81 mg, oral, Daily  atorvastatin, 40 mg, oral, Nightly  bisacodyl, 10 mg, rectal, Daily  gabapentin, 300 mg, oral,  Nightly  insulin glargine, 3 Units, subcutaneous, q24h  insulin lispro, 0-10 Units, subcutaneous, q4h  [Held by provider] metoprolol succinate XL, 25 mg, oral, Daily  NIFEdipine ER, 60 mg, oral, Daily before breakfast  pantoprazole, 40 mg, intravenous, BID  polyethylene glycol, 17 g, oral, Daily  potassium chloride CR, 40 mEq, oral, Once  sennosides-docusate sodium, 1 tablet, oral, BID  sertraline, 50 mg, oral, Daily     [2] lactated Ringer's, 75 mL/hr, Last Rate: 75 mL/hr (04/20/25 0908)     [3] PRN medications: acetaminophen, bisacodyl, dextrose, dextrose, glucagon, glucagon, morphine, naloxone, ondansetron, oxyCODONE, polyethylene glycol

## 2025-04-20 NOTE — SIGNIFICANT EVENT
Noted 1700 POCT  w/ repeat 494, at this point, 10 units of lispro administered for correction. POCT re-checked at approx 2000 and persistently elevated at 2011. At this point, due to PMHx of T1DM and hyperglycemia, ordered UA to check for ketones, stat VBG, and RFP to evaluate for DKA. VBG reasuring for normal pH (7.39). RFP w/ normal HCO3 (26) and without evidence of anion gap (AG 10). UA pending at the time of this note. Labs re-assuring that patient is not currently in DKA.     Reviewed glucose and insulin administration for the day. Notably, patient had not received sliding scale correction during the day, reason unclear. Reassuringly, patient did receive basal insulin at 1533.     Plan as follows:  [ ] Q4H POCT BG w/ sliding scale correction   [ ] will continue to monitor, if patient becomes symptomatic (AMS, nausea, vomiting), will obtain stat VBG, RFP, and UA      Cheryl Fairbanks M.D.  Internal Medicine-Pediatrics, PGY-1

## 2025-04-20 NOTE — PROGRESS NOTES
Speech-Language Pathology  Adult Inpatient Clinical Bedside Swallow Evaluation    Patient Name: Barbara English  MRN: [id number]  Today's Date: 4/20/2025   Start Time: 928  Stop Time: 948  Time Calculation (min): 20    History of Present Illness:   Barbara English is a 50 y.o. 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    Assessment:   Clinical swallow evaluation completed. Pt. A&O x4 with slow but functional oral motor musculature. Pt. tolerated ice chips and sips of water without difficulty. Consumed 3 oz of water in continuous sequential swallows without overt s/s of aspiration.  Adequate manipulation of puree and mastication of solids with full oral clearance. No evidence of dysphagia at this time. If there is a change in medical condition or increase difficulty swallowing please reconsult SLP. Discussed with RN and MD results and recommendations.       Recommendations:  Regular/thin  Upright for all PO intake  Remain upright for 20-30 min after eating  Small bites/sips  Pills per pt preference    Goal:          Plan:  SLP Services Indicated: No  Frequency: N/A  Discussed POC with patient  SLP - OK to Discharge    Pain:   0-10  0 = No pain.     Inpatient Education:  Extensive education provided to patient regarding current swallow function, recommendations/results, and POC.      Consultations/Referrals/Coordination of Services:   N/A

## 2025-04-20 NOTE — PROGRESS NOTES
Physical Therapy    Physical Therapy Evaluation    Patient Name: Barbara English  MRN: [id number]  Today's Date: 4/20/2025   Room: [address] [place] [address]  Time Calculation  Start Time: 0900  Stop Time: 0917  Time Calculation (min): 17 min    Assessment/Plan   PT Assessment  PT Assessment Results: Decreased strength, Decreased range of motion, Decreased endurance, Impaired balance, Decreased mobility, Impaired judgement, Decreased safety awareness, Decreased cognition  Rehab Prognosis: Fair  Barriers to Discharge Home: Cognition needs, Physical needs  Cognition Needs: 24hr supervision for safety awareness needed  Physical Needs: High falls risk due to function or environment  Evaluation/Treatment Tolerance: Patient limited by pain  Medical Staff Made Aware: Yes  Strengths: Attitude of self  Barriers to Participation: Comorbidities  End of Session Communication: Bedside nurse  Assessment Comment: 50 year old female admitted with abdominal pain, constipation, bleeding and acute blood loss.Pt presents with decreased strength, endurance and balance impacting functional mobility. Pt would benefit from continued PT while in hospital to improve functional mobility and return to PLOF.  End of Session Patient Position: Bed, 3 rail up, Alarm on  IP OR SWING BED PT PLAN  Inpatient or Swing Bed: Inpatient  PT Plan  Treatment/Interventions: Bed mobility, Transfer training, Gait training, Balance training, Neuromuscular re-education, Strengthening, Endurance training, Therapeutic exercise, Therapeutic activity, Home exercise program, Positioning, Postural re-education  PT Plan: Ongoing PT  PT Frequency: 3 times per week  PT Discharge Recommendations: Moderate intensity level of continued care  Equipment Recommended upon Discharge:  (tbd)  PT Recommended Transfer Status: Total assist  PT - OK to Discharge: Yes    Subjective   General Visit Information:  Reason for Referral: 50 year old female admitted with abdominal pain, constipation, bleeding  and acute blood loss.  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  Prior to Session Communication: Bedside nurse  Patient Position Received: Bed, 3 rail up, Alarm on  General Comment: Pt supine in bed upon entry to room. Pt drowsy but willing to work with PT.   Home Living:  Home Living  Type of Home: House  Lives With: Significant other  Home Layout: Two level  Home Access: Stairs to enter with rails  Entrance Stairs-Number of Steps: 10  Bathroom Shower/Tub: Tub/shower unit  Home Living Comments: pt ? historian  Prior Level of Function:  Prior Function Per Pt/Caregiver Report  Level of [place]: Independent with ADLs and functional transfers, Independent with homemaking with ambulation  Prior Function Comments: pt ? mary louan, stating independent prior to admission, per chart review, was at OSH a few weeks ago for CVA, max assist at that time.  Precautions:  Precautions  Medical Precautions: Fall precautions    Objective   Lines/Tubes/Drains:  Implantable Port 25 Right Chest Single lumen port (Active)   Number of days: 18     Continuous Medications/Drips:  Continuous Medications[1]    Pain:  Pain Assessment  Pain Assessment: 0-10  0-10 (Numeric) Pain Score: 10 - Worst possible pain  Pain Location: Abdomen  Cognition:  Cognition  Overall Cognitive Status: Impaired  Orientation Level:  (, name, hospital, month, and year)  Cognition Comments: Pt with eyes closed most of session, max verbal cuing to keep eyes open    Extremity/Trunk Assessments:  Strength:     RUE   RUE : Within Functional  Limits  LUE   LUE:  (0/5)  RLE   RLE : Within Functional Limits  LLE   LLE :  (0/5)    General Assessments:  Strength  Strength Comments: RUE WFL, LUE flaccid      Activity Tolerance  Endurance: Decreased tolerance for upright activites  Sensation  Light Touch: Not tested     Static Sitting Balance  Static Sitting-Level of Assistance: Dependent  Dynamic Sitting Balance  Dynamic Sitting-Level of Assistance: Dependent     Functional Assessments:  Bed Mobility  Bed Mobility: Yes  Bed Mobility 1  Bed Mobility 1: Supine to sitting, Sitting to supine  Level of Assistance 1: Dependent  Bed Mobility Comments 1: x2 assist, HOb elevated, use of TAPS  Bed Mobility 2  Bed Mobility  2: Rolling right  Level of Assistance 2: Dependent  Bed Mobility Comments 2: x2 trials  Bed Mobility 3  Bed Mobility 3: Rolling left  Level of Assistance 3: Maximum assistance, Maximum verbal cues, Maximum tactile cues  Bed Mobility Comments 3: x2 trials  Transfers  Transfer: No (unable to safely progress 2/2 to poor sitting balance)    Outcome Measures:  [facility] Basic Mobility  Turning from your back to your side while in a flat bed without using bedrails: Total  Moving from lying on your back to sitting on the side of a flat bed without using bedrails: Total  Moving to and from bed to chair (including a wheelchair): Total  Standing up from a chair using your arms (e.g. wheelchair or bedside chair): Total  To walk in hospital room: Total  Climbing 3-5 steps with railing: Total  Basic Mobility - Total Score: 6    Encounter Problems       Encounter Problems (Active)       Balance       STG - Maintains static sitting balance with upper extremity support for >10 minutes with cga with no LOB to promote upright activity tolerance and participation in therapy.        Start:  04/20/25    Expected End:  05/04/25               Mobility       Pt will perform bed mobility with mod assist.         Start:  04/20/25    Expected End:  05/04/25            Pt will  ambulate >5ft with LRAD and max Assist with no LOB and VSS.         Start:  04/20/25    Expected End:  05/04/25            Pt will perform all functional transfers with LRAD and max assist.         Start:  04/20/25    Expected End:  05/04/25               Pain - Adult            Education Documentation  Mobility Training, taught by Fern Trent, PT at 4/20/2025  2:43 PM.  Learner: Patient  Readiness: Acceptance  Method: Explanation  Response: Needs Reinforcement  Comment: importance of functional mobility    Education Comments  No comments found.      04/20/25 at 2:45 PM   Fern rTent, PT   Rehab Office: [phone]            [1] lactated Ringer's, 75 mL/hr, Last Rate: 75 mL/hr (04/20/25 0908)

## 2025-04-20 NOTE — PROGRESS NOTES
Occupational Therapy    Evaluation    Patient Name: Barbara English  MRN: [id number]  Department: [place] [id number]  Room: [address] [place] [address]  Today's Date: 4/20/2025  Time Calculation  Start Time: 0859  Stop Time: 0918  Time Calculation (min): 19 min        Assessment:  OT Assessment: Pt will benefit from continued skilled OT to increase independence in ADLs, functional mobility, activity tolerance, safety, strength, and cognition.  Prognosis: Good  Barriers to Discharge Home: Caregiver assistance, Cognition needs, Physical needs  Caregiver Assistance: Caregiver assistance needed per identified barriers - however, level of patient's required assistance exceeds assistance available at home  Cognition Needs: 24hr supervision for safety awareness needed, Insight of patient limited regarding functional ability/needs  Physical Needs: Ambulating household distances limited by function/safety, Intermittent mobility assistance needed, Intermittent ADL assistance needed  Evaluation/Treatment Tolerance: Patient limited by fatigue, Patient limited by pain  Medical Staff Made Aware: Yes  End of Session Communication: Bedside nurse  End of Session Patient Position: Bed, 3 rail up, Alarm on  OT Assessment Results: Decreased ADL status, Decreased upper extremity strength, Decreased safe judgment during ADL, Decreased cognition, Decreased endurance, Decreased functional mobility, Decreased IADLs  Prognosis: Good  Evaluation/Treatment Tolerance: Patient limited by fatigue, Patient limited by pain  Medical Staff Made Aware: Yes  Strengths: Attitude of self  Barriers to Participation: Comorbidities  Plan:  Treatment Interventions: ADL retraining, Functional transfer training, UE strengthening/ROM, Cognitive reorientation, Endurance training, Patient/family training, Equipment evaluation/education, Neuromuscular reeducation, Compensatory technique education  OT Frequency: 3 times per week  OT Discharge Recommendations: Moderate intensity level of  continued care  Equipment Recommended upon Discharge:  (TBD)  OT Recommended Transfer Status: Assist of 2  OT - OK to Discharge: Yes (upon medical clearance)  Treatment Interventions: ADL retraining, Functional transfer training, UE strengthening/ROM, Cognitive reorientation, Endurance training, Patient/family training, Equipment evaluation/education, Neuromuscular reeducation, Compensatory technique education    Subjective   Current Problem:  1. Acute blood loss anemia        2. Metastasis from pancreatic cancer (Multi)        3. Malignant ascites (CMS-HCC)        4. Nausea and vomiting, unspecified vomiting type          General:  General  Reason for Referral: Acute blood loss anemia, presents to the ED with abdominal pain, constipation, and bleeding.  Past Medical History Relevant to Rehab: Stage IV metastatic pancreatic adenoCa with liver mets, diabetes mellitus type 1 (c/b gastroparesis/ neuropathy s/p several toe amputations), HTN, HFpEF (EF 61% 2025), CKD 3b, LA Grade A Esophagitis () c/b hematemesis, chronic splenic vein thrombosis, polysubstance use, recent CVA with residual deficits  Family/Caregiver Present: No  Prior to Session Communication: Bedside nurse  Patient Position Received: Bed, 3 rail up, Alarm on  General Comment: Pt supine in bed upon arrival, agreeable to OT  Precautions:  Medical Precautions: Fall precautions    Pain:  Pain Assessment  Pain Assessment: 0-10  0-10 (Numeric) Pain Score: 10 - Worst possible pain  Pain Type: Acute pain  Pain Location: Abdomen  Pain Descriptors: Cramping  Pain Interventions: Repositioned, Distraction    Objective   Cognition:  Overall Cognitive Status: Impaired  Orientation Level:  (reported , name, hospital, month, and year)  Cognition Comments: Pt with eyes closed most of session, max VCs to open eyes throughout, impaired insight, pt requesting to shower multiple times end of session  Insight: Mild  Impulsive: Mildly     Home Living:  Type of Home:  House  Lives With: Significant other  Home Layout: Two level  Home Access: Stairs to enter with rails  Entrance Stairs-Number of Steps: 10  Bathroom Shower/Tub: Tub/shower unit  Bathroom Equipment: Shower chair with back  Home Living Comments: pt is ? historian, per EMR pt lives in a house with significant other with 6-10 JAYLAN, bed and bath upstairs. Has been staying with daughter in apartment with no JAYLAN and single floor setup with bathtub shower  Prior Function:  Prior Function Comments: unable to obtain 2/2 pt lethargic throughout session, per EMR prior to recent admission pt IND with I/ADLs    ADL:  Eating Assistance: Stand by (anticipated)  Grooming Assistance: Stand by (anticipated)  Bathing Assistance: Maximal (anticipated seated)  UE Dressing Assistance: Moderate (mod A to adjust gown seated EOB)  LE Dressing Assistance: Maximal (donned R sock supine in bed max A)  Toileting Assistance with Device: Total (anticipated)  Activity Tolerance:  Endurance: Tolerates 10 - 20 min exercise with multiple rests  Bed Mobility/Transfers: Bed Mobility  Bed Mobility: Yes  Bed Mobility 1  Bed Mobility 1: Supine to sitting, Sitting to supine  Level of Assistance 1: Dependent, +2  Bed Mobility Comments 1: HOB elevated  Bed Mobility 2  Bed Mobility  2: Rolling right  Level of Assistance 2: Dependent  Bed Mobility Comments 2: 2x trials  Bed Mobility 3  Bed Mobility 3: Rolling left  Level of Assistance 3: Maximum assistance  Bed Mobility Comments 3: 2x trials    Transfers  Transfer: No    Functional Mobility:  Functional Mobility  Functional Mobility Performed: No  Sitting Balance:  Static Sitting Balance  Static Sitting-Balance Support: Feet supported  Static Sitting-Level of Assistance: Dependent  Dynamic Sitting Balance  Dynamic Sitting-Balance Support: Feet supported  Dynamic Sitting-Level of Assistance: Dependent    Modalities:  Modalities Used: No  Vision:Vision - Basic Assessment  Current Vision: No visual  deficits  Strength:  Strength Comments: RUE WFL, LUE flaccid  Perception:  Inattention/Neglect: Cues to attend to left side of body  Coordination:  Movements are Fluid and Coordinated: No  Coordination Comment: L hemiparisis   Hand Function:  Gross Grasp: Impaired  Coordination: Impaired  Extremities: RUE   RUE : Within Functional Limits and LUE   LUE: Exceptions to WFL (L hemiparisis)    Outcome Measures:[facility] Daily Activity  Putting on and taking off regular lower body clothing: A lot  Bathing (including washing, rinsing, drying): A lot  Putting on and taking off regular upper body clothing: A lot  Toileting, which includes using toilet, bedpan or urinal: Total  Taking care of personal grooming such as brushing teeth: A little  Eating Meals: A little  Daily Activity - Total Score: 13     and OT Adult Other Outcome Measures  4AT: negative    Education Documentation  Body Mechanics, taught by Brock Olea OT at 4/20/2025 12:20 PM.  Learner: Patient  Readiness: Acceptance  Method: Explanation  Response: Verbalizes Understanding, Needs Reinforcement  Comment: OT POC, d/c rec, ADLs    Precautions, taught by Brock Olea OT at 4/20/2025 12:20 PM.  Learner: Patient  Readiness: Acceptance  Method: Explanation  Response: Verbalizes Understanding, Needs Reinforcement  Comment: OT POC, d/c rec, ADLs    ADL Training, taught by Brock Olea OT at 4/20/2025 12:20 PM.  Learner: Patient  Readiness: Acceptance  Method: Explanation  Response: Verbalizes Understanding, Needs Reinforcement  Comment: OT POC, d/c rec, ADLs    Education Comments  No comments found.      Goals:  Encounter Problems       Encounter Problems (Active)       ADLs       Patient with complete upper body dressing with set-up level of assistance donning and doffing all UE clothes with PRN adaptive equipment while edge of bed  (Progressing)       Start:  04/20/25    Expected End:  05/11/25            Patient with complete lower body dressing with moderate  assist level of assistance donning and doffing all LE clothes  with PRN adaptive equipment while edge of bed  and standing (Progressing)       Start:  04/20/25    Expected End:  05/11/25            Patient will complete toileting including hygiene clothing management/hygiene with moderate assist level of assistance and bedside commode. (Progressing)       Start:  04/20/25    Expected End:  05/11/25               BALANCE       Pt will maintain dynamic standing balance during ADL task with minimal assist  level of assistance in order to demonstrate decreased risk of falling and improved postural control. (Progressing)       Start:  04/20/25    Expected End:  05/11/25               COGNITION/SAFETY       Patient will score WFL on standardized cognitive assessment with verbal cues and within reasonable time frame (Progressing)       Start:  04/20/25    Expected End:  05/11/25               EXERCISE/STRENGTHENING       Patient will complete BUE exercises in order to improve strength and activity for ADL performance.  (Progressing)       Start:  04/20/25    Expected End:  05/11/25               TRANSFERS       Patient will perform bed mobility moderate assist level of assistance and bed rails in order to improve safety and independence with mobility (Progressing)       Start:  04/20/25    Expected End:  05/11/25            Patient will complete sit to stand transfer with moderate assist level of assistance and least restrictive device in order to improve safety and prepare for out of bed mobility. (Progressing)       Start:  04/20/25    Expected End:  05/11/25                  RUCHI Buck/DEBBIE

## 2025-04-21 LAB
ALBUMIN SERPL BCP-MCNC: 2.8 G/DL (ref 3.4–5)
ALP SERPL-CCNC: 1014 U/L (ref 33–110)
ALT SERPL W P-5'-P-CCNC: 48 U/L (ref 7–45)
ANION GAP SERPL CALC-SCNC: 14 MMOL/L (ref 10–20)
AST SERPL W P-5'-P-CCNC: 116 U/L (ref 9–39)
ATRIAL RATE: 83 BPM
BASOPHILS # BLD AUTO: 0.06 X10*3/UL (ref 0–0.1)
BASOPHILS NFR BLD AUTO: 0.6 %
BILIRUB SERPL-MCNC: 3.7 MG/DL (ref 0–1.2)
BUN SERPL-MCNC: 16 MG/DL (ref 6–23)
CALCIUM SERPL-MCNC: 8.4 MG/DL (ref 8.6–10.6)
CHLORIDE SERPL-SCNC: 107 MMOL/L (ref 98–107)
CO2 SERPL-SCNC: 26 MMOL/L (ref 21–32)
CREAT SERPL-MCNC: 1.25 MG/DL (ref 0.5–1.05)
EGFRCR SERPLBLD CKD-EPI 2021: 53 ML/MIN/1.73M*2
EOSINOPHIL # BLD AUTO: 0.19 X10*3/UL (ref 0–0.7)
EOSINOPHIL NFR BLD AUTO: 2 %
ERYTHROCYTE [DISTWIDTH] IN BLOOD BY AUTOMATED COUNT: 17.1 % (ref 11.5–14.5)
GLUCOSE BLD MANUAL STRIP-MCNC: 170 MG/DL (ref 74–99)
GLUCOSE BLD MANUAL STRIP-MCNC: 172 MG/DL (ref 74–99)
GLUCOSE BLD MANUAL STRIP-MCNC: 176 MG/DL (ref 74–99)
GLUCOSE BLD MANUAL STRIP-MCNC: 183 MG/DL (ref 74–99)
GLUCOSE BLD MANUAL STRIP-MCNC: 187 MG/DL (ref 74–99)
GLUCOSE BLD MANUAL STRIP-MCNC: 194 MG/DL (ref 74–99)
GLUCOSE BLD MANUAL STRIP-MCNC: 194 MG/DL (ref 74–99)
GLUCOSE SERPL-MCNC: 194 MG/DL (ref 74–99)
HCT VFR BLD AUTO: 26.5 % (ref 36–46)
HGB BLD-MCNC: 8.3 G/DL (ref 12–16)
IMM GRANULOCYTES # BLD AUTO: 0.04 X10*3/UL (ref 0–0.7)
IMM GRANULOCYTES NFR BLD AUTO: 0.4 % (ref 0–0.9)
LYMPHOCYTES # BLD AUTO: 0.72 X10*3/UL (ref 1.2–4.8)
LYMPHOCYTES NFR BLD AUTO: 7.6 %
MAGNESIUM SERPL-MCNC: 2.1 MG/DL (ref 1.6–2.4)
MCH RBC QN AUTO: 25.2 PG (ref 26–34)
MCHC RBC AUTO-ENTMCNC: 31.3 G/DL (ref 32–36)
MCV RBC AUTO: 81 FL (ref 80–100)
MONOCYTES # BLD AUTO: 0.65 X10*3/UL (ref 0.1–1)
MONOCYTES NFR BLD AUTO: 6.8 %
NEUTROPHILS # BLD AUTO: 7.84 X10*3/UL (ref 1.2–7.7)
NEUTROPHILS NFR BLD AUTO: 82.6 %
NRBC BLD-RTO: 0 /100 WBCS (ref 0–0)
P AXIS: 78 DEGREES
P OFFSET: 204 MS
P ONSET: 162 MS
PHOSPHATE SERPL-MCNC: 2.8 MG/DL (ref 2.5–4.9)
PLATELET # BLD AUTO: 319 X10*3/UL (ref 150–450)
POTASSIUM SERPL-SCNC: 3.8 MMOL/L (ref 3.5–5.3)
PR INTERVAL: 128 MS
PROT SERPL-MCNC: 5.7 G/DL (ref 6.4–8.2)
Q ONSET: 226 MS
QRS COUNT: 14 BEATS
QRS DURATION: 82 MS
QT INTERVAL: 390 MS
QTC CALCULATION(BAZETT): 458 MS
QTC FREDERICIA: 434 MS
R AXIS: 26 DEGREES
RBC # BLD AUTO: 3.29 X10*6/UL (ref 4–5.2)
SODIUM SERPL-SCNC: 143 MMOL/L (ref 136–145)
T AXIS: 60 DEGREES
T OFFSET: 421 MS
VENTRICULAR RATE: 83 BPM
WBC # BLD AUTO: 9.5 X10*3/UL (ref 4.4–11.3)

## 2025-04-21 PROCEDURE — 2500000002 HC RX 250 W HCPCS SELF ADMINISTERED DRUGS (ALT 637 FOR MEDICARE OP, ALT 636 FOR OP/ED): Mod: SE

## 2025-04-21 PROCEDURE — 2500000004 HC RX 250 GENERAL PHARMACY W/ HCPCS (ALT 636 FOR OP/ED): Mod: JZ,SE

## 2025-04-21 PROCEDURE — 82947 ASSAY GLUCOSE BLOOD QUANT: CPT

## 2025-04-21 PROCEDURE — 2500000004 HC RX 250 GENERAL PHARMACY W/ HCPCS (ALT 636 FOR OP/ED): Mod: SE

## 2025-04-21 PROCEDURE — 99233 SBSQ HOSP IP/OBS HIGH 50: CPT

## 2025-04-21 PROCEDURE — 99232 SBSQ HOSP IP/OBS MODERATE 35: CPT

## 2025-04-21 PROCEDURE — 84100 ASSAY OF PHOSPHORUS: CPT

## 2025-04-21 PROCEDURE — 85025 COMPLETE CBC W/AUTO DIFF WBC: CPT

## 2025-04-21 PROCEDURE — 1210000001 HC SEMI-PRIVATE ROOM DAILY

## 2025-04-21 PROCEDURE — 2500000001 HC RX 250 WO HCPCS SELF ADMINISTERED DRUGS (ALT 637 FOR MEDICARE OP): Mod: SE

## 2025-04-21 PROCEDURE — 80053 COMPREHEN METABOLIC PANEL: CPT

## 2025-04-21 PROCEDURE — 83735 ASSAY OF MAGNESIUM: CPT

## 2025-04-21 RX ADMIN — INSULIN LISPRO 2 UNITS: 100 INJECTION, SOLUTION INTRAVENOUS; SUBCUTANEOUS at 05:08

## 2025-04-21 RX ADMIN — OXYCODONE 10 MG: 5 TABLET ORAL at 19:58

## 2025-04-21 RX ADMIN — OXYCODONE 10 MG: 5 TABLET ORAL at 15:55

## 2025-04-21 RX ADMIN — INSULIN LISPRO 2 UNITS: 100 INJECTION, SOLUTION INTRAVENOUS; SUBCUTANEOUS at 17:37

## 2025-04-21 RX ADMIN — SENNOSIDES AND DOCUSATE SODIUM 1 TABLET: 50; 8.6 TABLET ORAL at 09:10

## 2025-04-21 RX ADMIN — NIFEDIPINE 60 MG: 60 TABLET, FILM COATED, EXTENDED RELEASE ORAL at 08:09

## 2025-04-21 RX ADMIN — POLYETHYLENE GLYCOL 3350 17 G: 17 POWDER, FOR SOLUTION ORAL at 09:10

## 2025-04-21 RX ADMIN — OXYCODONE 10 MG: 5 TABLET ORAL at 00:43

## 2025-04-21 RX ADMIN — INSULIN GLARGINE 3 UNITS: 100 INJECTION, SOLUTION SUBCUTANEOUS at 15:53

## 2025-04-21 RX ADMIN — METHYLNALTREXONE BROMIDE 4 MG: 12 INJECTION, SOLUTION SUBCUTANEOUS at 11:23

## 2025-04-21 RX ADMIN — INSULIN LISPRO 2 UNITS: 100 INJECTION, SOLUTION INTRAVENOUS; SUBCUTANEOUS at 09:10

## 2025-04-21 RX ADMIN — SODIUM CHLORIDE, SODIUM LACTATE, POTASSIUM CHLORIDE, AND CALCIUM CHLORIDE 75 ML/HR: .6; .31; .03; .02 INJECTION, SOLUTION INTRAVENOUS at 01:26

## 2025-04-21 RX ADMIN — INSULIN LISPRO 2 UNITS: 100 INJECTION, SOLUTION INTRAVENOUS; SUBCUTANEOUS at 00:50

## 2025-04-21 RX ADMIN — INSULIN LISPRO 2 UNITS: 100 INJECTION, SOLUTION INTRAVENOUS; SUBCUTANEOUS at 12:35

## 2025-04-21 RX ADMIN — BISACODYL 10 MG: 10 SUPPOSITORY RECTAL at 09:20

## 2025-04-21 RX ADMIN — SERTRALINE 50 MG: 50 TABLET, FILM COATED ORAL at 09:10

## 2025-04-21 RX ADMIN — MORPHINE SULFATE 1 MG: 4 INJECTION, SOLUTION INTRAMUSCULAR; INTRAVENOUS at 05:08

## 2025-04-21 RX ADMIN — PANTOPRAZOLE SODIUM 40 MG: 40 INJECTION, POWDER, LYOPHILIZED, FOR SOLUTION INTRAVENOUS at 20:17

## 2025-04-21 RX ADMIN — GABAPENTIN 300 MG: 300 CAPSULE ORAL at 20:01

## 2025-04-21 RX ADMIN — INSULIN LISPRO 2 UNITS: 100 INJECTION, SOLUTION INTRAVENOUS; SUBCUTANEOUS at 21:32

## 2025-04-21 RX ADMIN — MORPHINE SULFATE 1 MG: 4 INJECTION, SOLUTION INTRAMUSCULAR; INTRAVENOUS at 11:23

## 2025-04-21 RX ADMIN — ASPIRIN 81 MG: 81 TABLET, COATED ORAL at 09:10

## 2025-04-21 RX ADMIN — PANTOPRAZOLE SODIUM 40 MG: 40 INJECTION, POWDER, LYOPHILIZED, FOR SOLUTION INTRAVENOUS at 09:10

## 2025-04-21 ASSESSMENT — PAIN SCALES - GENERAL
PAINLEVEL_OUTOF10: 0 - NO PAIN
PAINLEVEL_OUTOF10: 5 - MODERATE PAIN
PAINLEVEL_OUTOF10: 10 - WORST POSSIBLE PAIN
PAINLEVEL_OUTOF10: 8
PAINLEVEL_OUTOF10: 10 - WORST POSSIBLE PAIN
PAINLEVEL_OUTOF10: 10 - WORST POSSIBLE PAIN
PAINLEVEL_OUTOF10: 2
PAINLEVEL_OUTOF10: 7

## 2025-04-21 ASSESSMENT — PAIN DESCRIPTION - ORIENTATION: ORIENTATION: RIGHT

## 2025-04-21 ASSESSMENT — COGNITIVE AND FUNCTIONAL STATUS - GENERAL
DRESSING REGULAR UPPER BODY CLOTHING: TOTAL
TURNING FROM BACK TO SIDE WHILE IN FLAT BAD: TOTAL
MOVING TO AND FROM BED TO CHAIR: TOTAL
DRESSING REGULAR LOWER BODY CLOTHING: TOTAL
EATING MEALS: A LOT
STANDING UP FROM CHAIR USING ARMS: TOTAL
TOILETING: TOTAL
WALKING IN HOSPITAL ROOM: TOTAL
PERSONAL GROOMING: A LOT
HELP NEEDED FOR BATHING: TOTAL
CLIMB 3 TO 5 STEPS WITH RAILING: TOTAL
MOVING FROM LYING ON BACK TO SITTING ON SIDE OF FLAT BED WITH BEDRAILS: A LOT
DAILY ACTIVITIY SCORE: 8
MOBILITY SCORE: 7

## 2025-04-21 ASSESSMENT — PAIN - FUNCTIONAL ASSESSMENT
PAIN_FUNCTIONAL_ASSESSMENT: 0-10

## 2025-04-21 ASSESSMENT — PAIN DESCRIPTION - DESCRIPTORS
DESCRIPTORS: TENDER
DESCRIPTORS: TENDER

## 2025-04-21 ASSESSMENT — PAIN SCALES - WONG BAKER: WONGBAKER_NUMERICALRESPONSE: HURTS LITTLE MORE

## 2025-04-21 ASSESSMENT — PAIN DESCRIPTION - LOCATION
LOCATION: ABDOMEN
LOCATION: ABDOMEN

## 2025-04-21 NOTE — PROGRESS NOTES
Barbara English is a 50 y.o. female on day 4 of admission presenting with Acute blood loss anemia.      Subjective     Overnight patient had episode of hyperglycemia to ~500s so D5 was stopped and switched to LR and she was given 10u lispro. DKA workup negative.    This AM patient states she is still having abdominal pain but is having Bms. Is more lethargic this AM. Has no had any further bleeding. No other complaints.     Patient denies any chest pain, additional nausea, vomiting, SOB.        Objective     Last Recorded Vitals  /80   Pulse 80   Temp 37 °C (98.6 °F)   Resp 16   Wt 54.4 kg (120 lb)   SpO2 97%   Intake/Output last 3 Shifts:    Intake/Output Summary (Last 24 hours) at 4/21/2025 0805  Last data filed at 4/21/2025 0600  Gross per 24 hour   Intake 1875 ml   Output 800 ml   Net 1075 ml       Admission Weight  Weight: 54.4 kg (120 lb) (04/17/25 0900)    Daily Weight  04/17/25 : 54.4 kg (120 lb)    Image Results  CT abdomen pelvis w IV contrast  Narrative: Interpreted By:  Scotty Krishnamurthy,  Bala Mancilla   STUDY:  CT ABDOMEN PELVIS W IV CONTRAST;  4/17/2025 8:30 am      INDICATION:  Signs/Symptoms:epigastric pain, no longer having BM or passing gas.  Active prostate cancre.      50-year-old female with metastatic pancreatic cancer, splenic artery  aneurysm, splenic vein thrombosis.      COMPARISON:  CT 04/30/2024      ACCESSION NUMBER(S):  [id number]      ORDERING CLINICIAN:  BHAKTI MONROY      TECHNIQUE:  CT of the abdomen and pelvis was performed.  Standard contiguous  axial images were obtained at 3 mm slice thickness through the  abdomen and pelvis. Coronal and sagittal reconstructions at 3 mm  slice thickness were performed.  75 ML of Omnipaque 350 was  administered intravenously without immediate complication.      FINDINGS:  LOWER CHEST:  There are ground-glass opacities within bilateral lower lobes. Heart  appears unremarkable. No pericardial effusion. Partially visualized  distal  central venous catheter tip within the right atrium. Interval  increased size of epicardial lymph node measuring 1.3 x 0.8 cm  (Series 201, Image 14).      ABDOMEN:      LIVER:  Multiple ill-defined hypodense lesions throughout the liver measuring  up to 2.0 cm (Series 201, Image 28) (Series 201, Image 21) (Series  201, Image 34) within hepatic segments 2 and 8 consistent with known  metastatic disease to the liver. No hepatomegaly. Nodular contour of  the parenchyma.      BILE DUCTS:  The intrahepatic and extrahepatic ducts are not dilated.      GALLBLADDER:  There is mild gallbladder wall thickening without evidence of  calcified stones. No pericholecystic fluid.      PANCREAS:  There is a hazy appearance of the pancreatic tail, which appears  slightly hypodense centrally (Series 201, Image 40). Pancreatic body  and head appear unremarkable.      SPLEEN:  The spleen is normal in size without focal lesions.      ADRENAL GLANDS:  Bilateral adrenal glands appear normal.      KIDNEYS AND URETERS:  The kidneys are normal in size and enhance symmetrically.  No  hydroureteronephrosis or nephroureterolithiasis is identified.      PELVIS:      BLADDER:  The urinary bladder appears normal without abnormal wall thickening.      REPRODUCTIVE ORGANS:  The uterus is present.      BOWEL:  The stomach is unremarkable.   The small and large bowel are normal  in caliber and demonstrate no wall thickening. Hyperdense linear  structure near the cecum of unknown etiology, possibly representing  postsurgical changes. Appendectomy. Moderate colonic stool burden.      VESSELS:  There is no aneurysmal dilatation of the abdominal aorta. The IVC  appears normal. There is loss of visualization of the portal vein  concerning for at least partial portal vein thrombosis. The splenic  vein is also not visualized concerning for splenic vein thrombosis.  There is new gastric varices which are most prominent adjacent to the  stomach and spleen  "(Series 201, Image 36).      PERITONEUM/RETROPERITONEUM/LYMPH NODES:  Interval development of large volume abdominopelvic ascites with  mesenteric edema. No abdominopelvic lymphadenopathy is present.      BONES AND ABDOMINAL WALL:  No suspicious osseous lesions are identified. Stable appearance of  focal sclerotic lesion within the right proximal femur, which is  stable when compared to prior CT from 04/30/2024. Interval increase  in diffuse body wall edema.      Impression: 1. When compared to prior CT from 04/30/2024, there is interval  progression of disease as evidenced by development of large ascites,  metastatic lesions within the liver, and increased ill-defined hazy  opacity within the pancreatic tail.  2. There is splenic vein thrombosis with questionable portal vein  thrombosis and gastric varices consistent with portal hypertension.  3. New ground-glass opacities within bilateral lower lobes which  could represent atelectasis. However, unable to rule out superimposed  infection.  4. Moderate colonic stool burden.  5. Additional chronic and incidental findings as detailed above.      I personally reviewed the images/study and I agree with the findings  as stated by Laurent Murrieta MD. This study was interpreted at  [facility], [place], [place].      MACRO:  None      Signed by: Scotty Krishnamurthy 4/17/2025 11:27 AM  Dictation workstation:   [id number]      Physical Exam  /80   Pulse 80   Temp 37 °C (98.6 °F)   Resp 16   Ht 1.6 m (5' 3\")   Wt 54.4 kg (120 lb)   SpO2 97%   BMI 21.26 kg/m²       Constitutional:       Comments: Patient resting in bed, very sleepy, though arousable and able to answer yes/ no questions, and intermittently speaks. Overall appears pale. Demonstrated some insight and understanding into her situation. Unclear if brief answers related to lethargy or confusion.  Cardiovascular:      Rate and Rhythm: Normal rate.      Heart sounds: No murmur " heard.     No friction rub. No gallop.   Pulmonary:      Effort: Pulmonary effort is normal. No respiratory distress.      Breath sounds: No wheezing.   Abdominal:      General: Abdomen is flat. There is no distension.      Comments: No tenderness on palpation, no palpable masses   GYN:  -Dried blood in pad, no clots this AM  Musculoskeletal:      Right lower leg: No edema. TMA on RLE     Left lower leg: No edema.   Neuro:  -Baseline L arm/leg weakness       Relevant Results  Scheduled medications  Scheduled Medications[1]  Continuous medications  Continuous Medications[2]  PRN medications  PRN Medications[3]    Results for orders placed or performed during the hospital encounter of 04/17/25 (from the past 24 hours)   POCT GLUCOSE   Result Value Ref Range    POCT Glucose 147 (H) 74 - 99 mg/dL   POCT GLUCOSE   Result Value Ref Range    POCT Glucose 167 (H) 74 - 99 mg/dL   POCT GLUCOSE   Result Value Ref Range    POCT Glucose 168 (H) 74 - 99 mg/dL   POCT GLUCOSE   Result Value Ref Range    POCT Glucose 183 (H) 74 - 99 mg/dL   POCT GLUCOSE   Result Value Ref Range    POCT Glucose 172 (H) 74 - 99 mg/dL   CBC and Auto Differential   Result Value Ref Range    WBC 9.5 4.4 - 11.3 x10*3/uL    nRBC 0.0 0.0 - 0.0 /100 WBCs    RBC 3.29 (L) 4.00 - 5.20 x10*6/uL    Hemoglobin 8.3 (L) 12.0 - 16.0 g/dL    Hematocrit 26.5 (L) 36.0 - 46.0 %    MCV 81 80 - 100 fL    MCH 25.2 (L) 26.0 - 34.0 pg    MCHC 31.3 (L) 32.0 - 36.0 g/dL    RDW 17.1 (H) 11.5 - 14.5 %    Platelets 319 150 - 450 x10*3/uL    Neutrophils % 82.6 40.0 - 80.0 %    Immature Granulocytes %, Automated 0.4 0.0 - 0.9 %    Lymphocytes % 7.6 13.0 - 44.0 %    Monocytes % 6.8 2.0 - 10.0 %    Eosinophils % 2.0 0.0 - 6.0 %    Basophils % 0.6 0.0 - 2.0 %    Neutrophils Absolute 7.84 (H) 1.20 - 7.70 x10*3/uL    Immature Granulocytes Absolute, Automated 0.04 0.00 - 0.70 x10*3/uL    Lymphocytes Absolute 0.72 (L) 1.20 - 4.80 x10*3/uL    Monocytes Absolute 0.65 0.10 - 1.00 x10*3/uL     Eosinophils Absolute 0.19 0.00 - 0.70 x10*3/uL    Basophils Absolute 0.06 0.00 - 0.10 x10*3/uL   Magnesium   Result Value Ref Range    Magnesium 2.10 1.60 - 2.40 mg/dL   Comprehensive metabolic panel   Result Value Ref Range    Glucose 194 (H) 74 - 99 mg/dL    Sodium 143 136 - 145 mmol/L    Potassium 3.8 3.5 - 5.3 mmol/L    Chloride 107 98 - 107 mmol/L    Bicarbonate 26 21 - 32 mmol/L    Anion Gap 14 10 - 20 mmol/L    Urea Nitrogen 16 6 - 23 mg/dL    Creatinine 1.25 (H) 0.50 - 1.05 mg/dL    eGFR 53 (L) >60 mL/min/1.73m*2    Calcium 8.4 (L) 8.6 - 10.6 mg/dL    Albumin 2.8 (L) 3.4 - 5.0 g/dL    Alkaline Phosphatase 1,014 (H) 33 - 110 U/L    Total Protein 5.7 (L) 6.4 - 8.2 g/dL     (H) 9 - 39 U/L    Bilirubin, Total 3.7 (H) 0.0 - 1.2 mg/dL    ALT 48 (H) 7 - 45 U/L   Phosphorus   Result Value Ref Range    Phosphorus 2.8 2.5 - 4.9 mg/dL   POCT GLUCOSE   Result Value Ref Range    POCT Glucose 183 (H) 74 - 99 mg/dL       This patient has a central line   Reason for the central line remaining today?       Assessment & Plan  Acute blood loss anemia      Barbara English is a 50 y.o. 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 ED labs notable for Hgb 7.3, ALP 1016. WBC wnl, CTAP not suggestive of obvious infection, though tx with CTX in ED for c/f SBP (patient has likely malignant ascites). In regard to patients anemia, less likely GIB as CHANTAL negative, patient largely constipated (as blood act as a laxative). Patients daughter reports seeing menstrual bleeding, and has had intermittent menses recently. CTAP not suggestive of acute bleed, though not an optimal study  to examine acute blood loss. Favor patients abdominal pain likely 2/2 constipation (CTAP suggestive of moderate stool burden, no evidence of obvious obstruction). Finally, patients pancreatic adenoca appears to have progressed in regard to metastasis from previous study on 03/19. There are new liver lesions and now with malignant ascites and mesenteric edema. Patient is admitted to medicine, and will continue to follow anemia with BID CBC, encourage Bms, and pursue GOC discussions.    Updates 4/21:  - having Bms with current bowel regimen per notes, per pt she is not, unclear if having bm but not recalling or clerical discrepancy   - appreciate ongoing GOC discussion with supportive onc and social work- S/O contacted today and discussed need for high intensity PT and SNF placement, who agreed with SNF in dispo planning. Arrangements to be made in coming days.   - Contact hospice for possible discharge recommendations   - PRN diluadid switched to morphine 1mg q3 hrs per patient preference  - hgb stable this AM  - Continue LR 75 ml/hr for maintenance    #Acute on chronic anemia  :: Patients family reports large menstrual bleeding recently, endorsing large clots in last few days, and some additional hemorrhoidal bleeding  :: GI vs  bleed. Favor  as patient still has menses, per daughters history appears to be coming from vagina. Less likely GIB even iso gastric varices (seen on CTAP) as CHANTAL negative, blood would likely act like laxative, though cannot totally rule out  :: CHANTAL in ED with some bright red blood  :: large amount of blood seen on UA  :: Patient hypertensive, Hgb 7.5  - BID CBC  - Type and screen 04/17, consented by daughter via phone  - Coags  - Hold home apixaban     #Constipation  #Abdominal Pain  :: Patient with abd pain/ vomiting iso metastatic pancreatic cancer on large amounts of oxycodone. Has not had a BM in 6 days.  :: No evidence of obstruction on CT  :: Patient had recent flex sig 01/2025 for  rectal bleeding found 3 polyps, removed. Had internal and external hemorrhoids.  - Will see how patient tolerates diet  - low threshold to pursue NGT with decompression if patient has persistent nausea  - Fleet enema, will pursue additional if unable to produce bowel movement.  - PRN Zofran     #Metastatic prostate adenoca  #Cancer related pain  :: Diagnosed initially 11/2024 after presenting for nausea and vomiting, underwent 1 round Folfox therapy but did not tolerate  :: Last admission at [facility], discussions centered around palliative radiation vs hospice, family elected to pursue palliative radiation then hospice if does not work  :: On CTAP 04/17, there is clear progression when compared to last scan 03/19. There are new liver lesions, now has likely malignant ascites and mesenteric edema  :: on recent hospitalization, was recommended suboxone, however patient was told to d/c it on discharge (no d/c summary to confirm), so patient has not taken it  - Ongoing GOC discussions with patient and family  - pain regimen per supportive onc  - Tylenol 650mg PRN for pain     #Splenic vein thrombosis  #Varices  :: New finding in 11/2024, patient started on apixaban  :: Splenic vein thrombus persists on CTAP obtained today, now with evidence of portal HTN and gastric varices  - Holding home apixaban iso anemia     #Recent CVA  :: Thrombotic CVA on 03/31 of R middle cerebral artery with reported residual deficit  :: Patient went home with outpatient PT on discharge  - c/w home ASA daily  - c/w home atorvastatin 40mg daily     #Type I Diabetes  :: Most recent A1c (10.7% 02/2025)  :: Recent home regimen recently switched, family instructed to STOP lantus and patient should remain on SSI  - lantus 3u  - SSI #2 for now, will titrate PRN  - q4h glucose checks while NPO     #Hx of MW tear  - c/w home pantoprazole 40mg BID     #Dysphagia  :: Notes at last hospital that patient did not want PEG tube, accepted risk of aspiration  -  passed SLP this admission, good for regular diet/thin liquids     #Hx HFpEF  #Hx HTN  :: Recent echocardiogram 04/01/2025 with EF 61%, grade I diastolic filling defect with elevated LAP, there is severely increased concentric LVH  :: Clinically euvolemic on exam today  - holding home metoprolol succ 25mg daily     #Mood disorder  - c/w sertraline 50mg daily  - c/w gabapentin 400mg nightly, gabapentin 200mg qAM     F: PRN  E: PRN  N: Regular diet  A: PIV     DVT ppx: holding iso bleeding  GI ppx: PPI     Code: DNR/ DNI (confirmed with family on admission)  NOK: Nannette Nicole, Daughter, [phone]     Alyssa Roberts MD  PGY2               [1] [Held by provider] apixaban, 5 mg, oral, BID  aspirin, 81 mg, oral, Daily  atorvastatin, 40 mg, oral, Nightly  bisacodyl, 10 mg, rectal, Daily  gabapentin, 300 mg, oral, Nightly  insulin glargine, 3 Units, subcutaneous, q24h  insulin lispro, 0-10 Units, subcutaneous, q4h  [Held by provider] metoprolol succinate XL, 25 mg, oral, Daily  NIFEdipine ER, 60 mg, oral, Daily before breakfast  pantoprazole, 40 mg, intravenous, BID  polyethylene glycol, 17 g, oral, Daily  potassium chloride CR, 40 mEq, oral, Once  sennosides-docusate sodium, 1 tablet, oral, BID  sertraline, 50 mg, oral, Daily     [2] lactated Ringer's, 75 mL/hr, Last Rate: 75 mL/hr (04/21/25 0126)     [3] PRN medications: acetaminophen, bisacodyl, dextrose, dextrose, glucagon, glucagon, morphine, naloxone, ondansetron, oxyCODONE, polyethylene glycol

## 2025-04-21 NOTE — CARE PLAN
The patient's goals for the shift include  comfort    The clinical goals for the shift include Patient jumana remain safe by the end of the shift    Over the shift, the patient did not make progress toward the following goals. Barriers to progression include co morbidities. Recommendations to address these barriers include follow plan of care.

## 2025-04-21 NOTE — PROGRESS NOTES
Transitional Care Coordination Progress Note:  Patient discussed during interdisciplinary rounds.   Team members present: MICHELET HACKETT  Plan per Medical/Surgical team: Acute blood loss anemia  [facility]  Discharge disposition: new SNF  Potential Barriers: medical  ADOD: 2 days  Met with patient at bedside to discuss PT recommendations for moderate intensity therapy at discharge. Discussed differences between acute rehab, skilled nursing facility, home health care and outpatient therapy. Patient states she would be agreeable to skilled nursing facility. Educated patient regarding freedom of choice and provided financial disclosure. Patient asked that this TCC reach out to significant other Zehra Romero to assist and call him while at bedside. Phone call to significant other over speaker phone placed. Significant other agreeable to SNF and asked for facility list to be emailed to him at [email or web address]. List emailed per his request. Patient will require precert. Will continue ot follow for discharge planning needs.     Kori HERNANDEZ, RN  Transitional Care Coordinator (TCC)  [phone]

## 2025-04-21 NOTE — CARE PLAN
The patient's goals for the shift include  pain relief    The clinical goals for the shift include pt will have improved mentation and decreasing blood sugars by end of shift      Problem: Safety - Adult  Goal: Free from fall injury  Outcome: Progressing     Problem: Pain - Adult  Goal: Verbalizes/displays adequate comfort level or baseline comfort level  Outcome: Progressing     Problem: Discharge Planning  Goal: Discharge to home or other facility with appropriate resources  Outcome: Progressing     Problem: Chronic Conditions and Co-morbidities  Goal: Patient's chronic conditions and co-morbidity symptoms are monitored and maintained or improved  Outcome: Progressing

## 2025-04-21 NOTE — PROGRESS NOTES
"SUPPORTIVE AND PALLIATIVE ONCOLOGY INPATIENT FOLLOW-UP    SERVICE DATE: 04/21/25     Updates and Recommendations (04/21/25):  During today's assessment and interview, pt seen repeatedly calling out for her son Eugenio, who was not present at bedside. She endorses mild confusion regarding reason for hospitalization and current clinical course, stating, \"they came in to talk to me about hospice or something but I don't know.\" Concerned that pt currently lacks medical decision making capacity. Given documented HCPOA being S.O., but pt also previously having made conflicting statements about wanting her kids to make decisions for her, may be beneficial to discuss pt's case with Ethics prior to proceeding with any further GOC.     Discontinued morphine IV PRN iso renal impairment--possibly contributing to AMS    Start hydromorphone 0.2mg IV q3h PRN for breakthrough pain [add instructions to hold for AMS/lethargy]    Change ondansetron 4mg IV q6h PRN for n/v, first line    ASSESSMENT/PLAN:  Barbara English is a 50 y.o. 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 PMHx significant for DM1 c/b gastroparesis/neuropathy/DKA, PAD s/p toe complete R metatarsal amputations, HTN, anxiety, depression, cocaine abuse (last reported use 9/2024). Admitted 4/17/2025 for further evaluation and management of abdominal pain, constipation, and BRBPR (c/f hemorrhoid vs mensuration vs cancer related). Of note, pt previously enrolled in [facility]--pending further GOC. Supportive and Palliative Oncology is consulted for assistance with pain management and ongoing GOC.      Neoplasm Related Pain   Upper abdomen pain iso known malignancy.   Pain Type: Visceral    Pain Control: Fairly controlled, limited by ongoing AMS, pending GOC  Home regimen: Pt unable to fully clarify iso lethargy/AMS, per chart review: gabapentin 600mg AM/300mg PM, oxycodone IR " "20mg q4h [I used to tolerate this dose, but then it started to make me really sleepy,\" lidocaine 4% TD patch, per daughter hasn't been using Suboxone since early 4/2025 discharge  Intolerances: None  Previously tried: hydromorphone IR PO, tramadol  Past 24h OME intake: 32.5 OME  Risk factors: Hx of cocaine abuse--last reported use 9/2024  Reviewed: (4/21/25) Estimated Creatinine Clearance: 44.5 mL/min (A) (by C-G formula based on SCr of 1.25 mg/dL (H)).  Reviewed: LFTs unremarkable  Continue acetaminophen 650mg PO q6h PRN for mild pain  Continue gabapentin 300mg PO once daily  Change oxycodone IR 10mg PO q4h PRN for moderate to severe pain [hold for AMS/lethargy]  Start hydromorphone 0.2mg IV q3h PRN for breakthrough pain [add instructions to hold for AMS/lethargy]  Discontinued morphine IV PRN iso renal impairment  Continue to monitor pain scores and administer PRN medications as appropriate  Continue/initiate nonpharmacologic pain management strategies including ice/heat therapy, distraction techniques, deep breathing/relaxation techniques, calming music, and repositioning  Continue to monitor for signs of opioid efficacy (pain scores, improved functionality) and toxicity (pinpoint pupils, excess sedation/drowsiness/confusion, respiratory depression, etc.)     Nausea  At risk for nausea and vomiting related to abdominal pain and malignant disease process.  Home regimen: Unknown  EKG reviewed from 3/31/25 and QTc 429. No more recent EKG uploaded to EMR for review.   PPI per primary   Change ondansetron 4mg IV q6h PRN for n/v, first line     Constipation  At risk for constipation related to opioids, currently not constipated.  Usual bowel pattern: Unknown  Home regimen: senna, Miralax  LBM: 4/20/25  Continue scheduled Miralax 17g PO once daily   Continue scheduled Hayley-Colace 1 tab PO BID  Continue bisacodyl 10mg WI once daily PRN for constipation  Goal to have BM without straining q48-72h, adjust regimen as " needed  Encourage mobility as tolerated, PT/OT following     Altered Mood  Hx of anxiety and depression, acutely exacerbated by health concerns, pain, and symptoms.  Home regimen: sertraline 50mg once daily   Intolerances: Allergies to haloperidol and lorazepam  Continue sertraline 50mg PO once daily      Disposition:  Please start the process of having prior authorization with meds to beds deliver medications to patient prior to discharge via [facility] pharmacy. Prescriptions will need to be sent 48-72 hours prior to discharge so that a prior authorization can be completed.      Discharge date pending resolution of pain and symptoms.   Will continue to monitor if pt requires outpatient Supportive Oncology follow up.     SIGNATURE: DAVE Tillman   PAGER/CONTACT:  Contact information:  Supportive and Palliative Oncology  Monday-Friday 8 AM-5 PM  Epic Secure chat or pager [phone].  After hours and weekends:  pager [phone]    =====================================================================================================    SUBJECTIVE:    Pain Assessment:  Location: Upper abdomen  Duration: Constant  Characteristics:  Ratin/10  Descriptors: Sharp, shooting, aching  Aggravating: Did not answer  Relieving: Analgesics  Interference with Function: Somewhat     Opioid Requirements  Past 24h opioid requirements:  (-, 9025-0760)  morphine 1mg IV x 3 = 3mg IV = 7.5 OME  oxycodone 10mg x 2 = 20mg = 25 OME    Total 24h OME use: 32.5 OME    Symptom Assessment:  Nausea: none  AMS/drowsiness: yes    Information obtained from: chart review, interview of patient, and discussion with primary team  ______________________________________________________________________     OBJECTIVE:    Lab Results   Component Value Date    WBC 9.5 2025    HGB 8.3 (L) 2025    HCT 26.5 (L) 2025    MCV 81 2025     2025     Lab Results   Component Value Date    GLUCOSE 194 (H) 2025    CALCIUM  8.4 (L) 04/21/2025     04/21/2025    K 3.8 04/21/2025    CO2 26 04/21/2025     04/21/2025    BUN 16 04/21/2025    CREATININE 1.25 (H) 04/21/2025     Lab Results   Component Value Date    ALT 48 (H) 04/21/2025     (H) 04/21/2025    ALKPHOS 1,014 (H) 04/21/2025    BILITOT 3.7 (H) 04/21/2025     Estimated Creatinine Clearance: 44.5 mL/min (A) (by C-G formula based on SCr of 1.25 mg/dL (H)).    Scheduled medications   Scheduled Medications[1]  Continuous medications  Continuous Medications[2]  PRN medications  acetaminophen, 650 mg, q6h PRN  bisacodyl, 10 mg, Daily PRN  dextrose, 12.5 g, q15 min PRN  dextrose, 25 g, q15 min PRN  glucagon, 1 mg, q15 min PRN  glucagon, 1 mg, q15 min PRN  morphine, 1 mg, q3h PRN  naloxone, 0.2 mg, q5 min PRN  ondansetron, 4 mg, q8h PRN  oxyCODONE, 10 mg, q4h PRN  polyethylene glycol, 17 g, Daily PRN    PHYSICAL EXAMINATION:    Vital Signs:   Vital signs reviewed  Visit Vitals  /80   Pulse 80   Temp 37 °C (98.6 °F)   Resp 16      0-10 (Numeric) Pain Score: 8     Physical Exam   Vitals reviewed.   Constitutional:       Comments: Drowsy, confused, ill appearing woman laying in bed. Repeatedly calling out for her son, Eugenio. Moderate participation in interview.  HENT:   Head:      Comments: Normocephalic, atraumatic.   Eyes:      Comments: Sclera clear, atraumatic.   Pulmonary:      Comments: Symmetrical chest rise. Regular rate and depth of respirations. Room air.   Abdominal:      Comments: Abdomen slightly distended, RUQ/LUQ tender.   Musculoskeletal:      Comments: Generalized muscle weakness and atrophy. VU x4. No visible extremity edema.   Skin:     Comments: No lesions, rash, or abrasions present on visible skin. Skin color appropriate for ethnicity.   Neurological:      Comments: A&Ox2-3, follows commands.    PALLIATIVE CARE ENCOUNTER:    Supportive and Palliative Oncology encounter:  Spoke with patient at bedside.  Emotional support provided.  Coordination of  "care: medication and symptom re-evaluation    Medical Decision Making/Goals of Care/Advance Care Planning:  (4/10/25, Per Seth Rubin MD's note)  Does not want PEG. Does not want to give up eating as it is one of the few things that give her pleasure anymore.   Discussed her goals and chemotherapy. She is reluctant to try chemotherapy again and understands that this is not a resectable disease. She would prefer radiation therapy if it is an option for her pancreatic adenocarcinoma that will be offered by her cancer team.   Pain and symptom control are top priorities for her since she was already told her prognosis was likely less than 6 months.      (4/18/2025) Unable to review due to pt's lethargy/AMS/minimal participation. Pt remains DNR.      Advance Directives  Existence of Advance Directives: Yes, documentation or copy in medical record  Decision maker: HCPOA is pt's S.O., Zehra Romero Batsheva ([phone]), secondary HCPOA is silver, Nannette Nicole ([phone]), and tertiary HCPOA is Jaja English ([phone]). Though during limited discussion with pt, she states, \"I want all my kids to make decisions for me, I told them that.\" Concern for need to re-address HCPOA.     =====================================================================================================    Signature and billing:  Medical complexity was high level due to due to complexity of problems, extensive data review, and high risk of management/treatment.    I spent 50 minutes in the care of this patient which included chart review, interviewing patient/family, discussion with primary team, coordination of care, and documentation.    Data:   Diagnostic tests and information reviewed for today's visit:  Conversation with primary team, Most recent labs, Most recent EKG, Medications    Some elements copied from my note on 4/18/25, the elements have been updated and all reflect current decision making from today, 04/21/25.    Plan of Care " discussed with: Primary team, pt     Thank you for asking Supportive and Palliative Oncology to assist with care of this patient.  Recommendations will be communicated back to the consulting service by way of shared electronic medical record/secure chat/email or face-to-face.   We will continue to follow.  Please contact us for additional questions or concerns.    SIGNATURE: Starla Wilson, APRN-CNP   PAGER/CONTACT:  Contact information:  Supportive and Palliative Oncology  Monday-Friday 8 AM-5 PM, Epic Secure chat or pager [phone].  After hours and weekends: pager [phone]       [1] [Held by provider] apixaban, 5 mg, oral, BID  aspirin, 81 mg, oral, Daily  atorvastatin, 40 mg, oral, Nightly  bisacodyl, 10 mg, rectal, Daily  gabapentin, 300 mg, oral, Nightly  insulin glargine, 3 Units, subcutaneous, q24h  insulin lispro, 0-10 Units, subcutaneous, q4h  [Held by provider] metoprolol succinate XL, 25 mg, oral, Daily  NIFEdipine ER, 60 mg, oral, Daily before breakfast  pantoprazole, 40 mg, intravenous, BID  polyethylene glycol, 17 g, oral, Daily  potassium chloride CR, 40 mEq, oral, Once  sennosides-docusate sodium, 1 tablet, oral, BID  sertraline, 50 mg, oral, Daily  [2]

## 2025-04-22 LAB
ALBUMIN SERPL BCP-MCNC: 2.6 G/DL (ref 3.4–5)
ALBUMIN SERPL BCP-MCNC: 2.8 G/DL (ref 3.4–5)
ALP SERPL-CCNC: 1033 U/L (ref 33–110)
ALT SERPL W P-5'-P-CCNC: 46 U/L (ref 7–45)
ANION GAP SERPL CALC-SCNC: 15 MMOL/L (ref 10–20)
ANION GAP SERPL CALC-SCNC: 16 MMOL/L (ref 10–20)
AST SERPL W P-5'-P-CCNC: 118 U/L (ref 9–39)
BASOPHILS # BLD AUTO: 0.05 X10*3/UL (ref 0–0.1)
BASOPHILS NFR BLD AUTO: 0.6 %
BILIRUB SERPL-MCNC: 4 MG/DL (ref 0–1.2)
BUN SERPL-MCNC: 15 MG/DL (ref 6–23)
BUN SERPL-MCNC: 16 MG/DL (ref 6–23)
CALCIUM SERPL-MCNC: 8.1 MG/DL (ref 8.6–10.6)
CALCIUM SERPL-MCNC: 8.2 MG/DL (ref 8.6–10.6)
CHLORIDE SERPL-SCNC: 107 MMOL/L (ref 98–107)
CHLORIDE SERPL-SCNC: 108 MMOL/L (ref 98–107)
CO2 SERPL-SCNC: 25 MMOL/L (ref 21–32)
CO2 SERPL-SCNC: 26 MMOL/L (ref 21–32)
CREAT SERPL-MCNC: 1.34 MG/DL (ref 0.5–1.05)
CREAT SERPL-MCNC: 1.41 MG/DL (ref 0.5–1.05)
EGFRCR SERPLBLD CKD-EPI 2021: 46 ML/MIN/1.73M*2
EGFRCR SERPLBLD CKD-EPI 2021: 48 ML/MIN/1.73M*2
EOSINOPHIL # BLD AUTO: 0.14 X10*3/UL (ref 0–0.7)
EOSINOPHIL NFR BLD AUTO: 1.7 %
ERYTHROCYTE [DISTWIDTH] IN BLOOD BY AUTOMATED COUNT: 17.2 % (ref 11.5–14.5)
GLUCOSE BLD MANUAL STRIP-MCNC: 133 MG/DL (ref 74–99)
GLUCOSE BLD MANUAL STRIP-MCNC: 134 MG/DL (ref 74–99)
GLUCOSE BLD MANUAL STRIP-MCNC: 165 MG/DL (ref 74–99)
GLUCOSE BLD MANUAL STRIP-MCNC: 165 MG/DL (ref 74–99)
GLUCOSE BLD MANUAL STRIP-MCNC: 170 MG/DL (ref 74–99)
GLUCOSE BLD MANUAL STRIP-MCNC: 230 MG/DL (ref 74–99)
GLUCOSE SERPL-MCNC: 139 MG/DL (ref 74–99)
GLUCOSE SERPL-MCNC: 236 MG/DL (ref 74–99)
HCT VFR BLD AUTO: 23.5 % (ref 36–46)
HGB BLD-MCNC: 7.4 G/DL (ref 12–16)
IMM GRANULOCYTES # BLD AUTO: 0.16 X10*3/UL (ref 0–0.7)
IMM GRANULOCYTES NFR BLD AUTO: 1.9 % (ref 0–0.9)
LYMPHOCYTES # BLD AUTO: 0.76 X10*3/UL (ref 1.2–4.8)
LYMPHOCYTES NFR BLD AUTO: 9.2 %
MAGNESIUM SERPL-MCNC: 2.1 MG/DL (ref 1.6–2.4)
MCH RBC QN AUTO: 25.4 PG (ref 26–34)
MCHC RBC AUTO-ENTMCNC: 31.5 G/DL (ref 32–36)
MCV RBC AUTO: 81 FL (ref 80–100)
MONOCYTES # BLD AUTO: 0.57 X10*3/UL (ref 0.1–1)
MONOCYTES NFR BLD AUTO: 6.9 %
NEUTROPHILS # BLD AUTO: 6.57 X10*3/UL (ref 1.2–7.7)
NEUTROPHILS NFR BLD AUTO: 79.7 %
NRBC BLD-RTO: 0 /100 WBCS (ref 0–0)
PHOSPHATE SERPL-MCNC: 3.2 MG/DL (ref 2.5–4.9)
PHOSPHATE SERPL-MCNC: 3.5 MG/DL (ref 2.5–4.9)
PLATELET # BLD AUTO: 300 X10*3/UL (ref 150–450)
POTASSIUM SERPL-SCNC: 3.6 MMOL/L (ref 3.5–5.3)
POTASSIUM SERPL-SCNC: 3.6 MMOL/L (ref 3.5–5.3)
PROT SERPL-MCNC: 5.7 G/DL (ref 6.4–8.2)
RBC # BLD AUTO: 2.91 X10*6/UL (ref 4–5.2)
SODIUM SERPL-SCNC: 144 MMOL/L (ref 136–145)
SODIUM SERPL-SCNC: 145 MMOL/L (ref 136–145)
WBC # BLD AUTO: 8.3 X10*3/UL (ref 4.4–11.3)

## 2025-04-22 PROCEDURE — 99232 SBSQ HOSP IP/OBS MODERATE 35: CPT

## 2025-04-22 PROCEDURE — 80069 RENAL FUNCTION PANEL: CPT | Mod: CCI

## 2025-04-22 PROCEDURE — 85025 COMPLETE CBC W/AUTO DIFF WBC: CPT

## 2025-04-22 PROCEDURE — 2500000001 HC RX 250 WO HCPCS SELF ADMINISTERED DRUGS (ALT 637 FOR MEDICARE OP): Mod: SE

## 2025-04-22 PROCEDURE — 2500000002 HC RX 250 W HCPCS SELF ADMINISTERED DRUGS (ALT 637 FOR MEDICARE OP, ALT 636 FOR OP/ED): Mod: SE

## 2025-04-22 PROCEDURE — 82947 ASSAY GLUCOSE BLOOD QUANT: CPT

## 2025-04-22 PROCEDURE — 1210000001 HC SEMI-PRIVATE ROOM DAILY

## 2025-04-22 PROCEDURE — 2500000001 HC RX 250 WO HCPCS SELF ADMINISTERED DRUGS (ALT 637 FOR MEDICARE OP): Mod: SE | Performed by: PHARMACY TECHNICIAN

## 2025-04-22 PROCEDURE — 84100 ASSAY OF PHOSPHORUS: CPT

## 2025-04-22 PROCEDURE — 83735 ASSAY OF MAGNESIUM: CPT

## 2025-04-22 PROCEDURE — 2500000004 HC RX 250 GENERAL PHARMACY W/ HCPCS (ALT 636 FOR OP/ED): Mod: SE

## 2025-04-22 PROCEDURE — 80053 COMPREHEN METABOLIC PANEL: CPT

## 2025-04-22 PROCEDURE — 2500000004 HC RX 250 GENERAL PHARMACY W/ HCPCS (ALT 636 FOR OP/ED): Mod: JZ,SE

## 2025-04-22 RX ORDER — PANTOPRAZOLE SODIUM 40 MG/1
40 TABLET, DELAYED RELEASE ORAL 2 TIMES DAILY
Status: DISCONTINUED | OUTPATIENT
Start: 2025-04-22 | End: 2025-04-24

## 2025-04-22 RX ADMIN — INSULIN LISPRO 2 UNITS: 100 INJECTION, SOLUTION INTRAVENOUS; SUBCUTANEOUS at 21:04

## 2025-04-22 RX ADMIN — OXYCODONE 10 MG: 5 TABLET ORAL at 22:21

## 2025-04-22 RX ADMIN — PANTOPRAZOLE SODIUM 40 MG: 40 TABLET, DELAYED RELEASE ORAL at 20:58

## 2025-04-22 RX ADMIN — SERTRALINE 50 MG: 50 TABLET, FILM COATED ORAL at 08:58

## 2025-04-22 RX ADMIN — ASPIRIN 81 MG: 81 TABLET, COATED ORAL at 08:59

## 2025-04-22 RX ADMIN — OXYCODONE 10 MG: 5 TABLET ORAL at 05:26

## 2025-04-22 RX ADMIN — ATORVASTATIN CALCIUM 40 MG: 40 TABLET, FILM COATED ORAL at 20:58

## 2025-04-22 RX ADMIN — BISACODYL 10 MG: 10 SUPPOSITORY RECTAL at 08:58

## 2025-04-22 RX ADMIN — PANTOPRAZOLE SODIUM 40 MG: 40 INJECTION, POWDER, LYOPHILIZED, FOR SOLUTION INTRAVENOUS at 08:59

## 2025-04-22 RX ADMIN — INSULIN LISPRO 2 UNITS: 100 INJECTION, SOLUTION INTRAVENOUS; SUBCUTANEOUS at 01:22

## 2025-04-22 RX ADMIN — SENNOSIDES AND DOCUSATE SODIUM 1 TABLET: 50; 8.6 TABLET ORAL at 20:58

## 2025-04-22 RX ADMIN — INSULIN GLARGINE 3 UNITS: 100 INJECTION, SOLUTION SUBCUTANEOUS at 15:35

## 2025-04-22 RX ADMIN — GABAPENTIN 300 MG: 300 CAPSULE ORAL at 20:58

## 2025-04-22 RX ADMIN — INSULIN LISPRO 4 UNITS: 100 INJECTION, SOLUTION INTRAVENOUS; SUBCUTANEOUS at 16:47

## 2025-04-22 RX ADMIN — SENNOSIDES AND DOCUSATE SODIUM 1 TABLET: 50; 8.6 TABLET ORAL at 08:58

## 2025-04-22 RX ADMIN — SODIUM CHLORIDE, SODIUM LACTATE, POTASSIUM CHLORIDE, AND CALCIUM CHLORIDE 500 ML: .6; .31; .03; .02 INJECTION, SOLUTION INTRAVENOUS at 08:58

## 2025-04-22 RX ADMIN — INSULIN LISPRO 2 UNITS: 100 INJECTION, SOLUTION INTRAVENOUS; SUBCUTANEOUS at 09:17

## 2025-04-22 RX ADMIN — POLYETHYLENE GLYCOL 3350 17 G: 17 POWDER, FOR SOLUTION ORAL at 08:59

## 2025-04-22 ASSESSMENT — COGNITIVE AND FUNCTIONAL STATUS - GENERAL
MOVING TO AND FROM BED TO CHAIR: TOTAL
CLIMB 3 TO 5 STEPS WITH RAILING: TOTAL
HELP NEEDED FOR BATHING: A LOT
MOVING FROM LYING ON BACK TO SITTING ON SIDE OF FLAT BED WITH BEDRAILS: A LOT
HELP NEEDED FOR BATHING: A LOT
DRESSING REGULAR LOWER BODY CLOTHING: A LOT
MOBILITY SCORE: 8
DRESSING REGULAR LOWER BODY CLOTHING: A LOT
DAILY ACTIVITIY SCORE: 12
WALKING IN HOSPITAL ROOM: TOTAL
PERSONAL GROOMING: A LOT
EATING MEALS: A LOT
DRESSING REGULAR UPPER BODY CLOTHING: A LOT
TOILETING: A LOT
MOVING FROM LYING ON BACK TO SITTING ON SIDE OF FLAT BED WITH BEDRAILS: A LOT
PERSONAL GROOMING: A LOT
TOILETING: A LOT
EATING MEALS: A LOT
STANDING UP FROM CHAIR USING ARMS: TOTAL
DRESSING REGULAR UPPER BODY CLOTHING: A LOT
CLIMB 3 TO 5 STEPS WITH RAILING: TOTAL
DAILY ACTIVITIY SCORE: 12
MOBILITY SCORE: 8
WALKING IN HOSPITAL ROOM: TOTAL
TURNING FROM BACK TO SIDE WHILE IN FLAT BAD: A LOT
MOVING TO AND FROM BED TO CHAIR: TOTAL
TURNING FROM BACK TO SIDE WHILE IN FLAT BAD: A LOT
STANDING UP FROM CHAIR USING ARMS: TOTAL

## 2025-04-22 ASSESSMENT — PAIN SCALES - GENERAL
PAINLEVEL_OUTOF10: 10 - WORST POSSIBLE PAIN
PAINLEVEL_OUTOF10: 5 - MODERATE PAIN

## 2025-04-22 ASSESSMENT — PAIN - FUNCTIONAL ASSESSMENT: PAIN_FUNCTIONAL_ASSESSMENT: 0-10

## 2025-04-22 NOTE — PROGRESS NOTES
"Barbara English is a 50 y.o. female on day 5 of admission presenting with Acute blood loss anemia.      Subjective   NAEON. Had Bm after receiving relistor yesterday. Minimal UOP overnight. Marry-colored urine drained from land this morning.    No acute complaints this morning.  Resting comfortably in bed this morning.   Patient denies any chest pain, additional nausea, vomiting, SOB.        Objective     Last Recorded Vitals  /63   Pulse 98   Temp 36.8 °C (98.2 °F)   Resp 17   Wt 54.4 kg (120 lb)   SpO2 98%   Intake/Output last 3 Shifts:    Intake/Output Summary (Last 24 hours) at 4/22/2025 1137  Last data filed at 4/22/2025 0600  Gross per 24 hour   Intake --   Output 595 ml   Net -595 ml       Admission Weight  Weight: 54.4 kg (120 lb) (04/17/25 0900)    Daily Weight  04/17/25 : 54.4 kg (120 lb)    Image Results  ECG 12 lead  Normal sinus rhythm  Normal ECG  When compared with ECG of 31-MAR-2025 10:37,  Questionable change in QRS axis  T wave inversion no longer evident in Inferior leads  Nonspecific T wave abnormality, improved in Lateral leads    See ED provider note for full interpretation and clinical correlation  Confirmed by Meghana Mckenna ([id number]) on 4/21/2025 8:41:21 PM      Physical Exam  /63   Pulse 98   Temp 36.8 °C (98.2 °F)   Resp 17   Ht 1.6 m (5' 3\")   Wt 54.4 kg (120 lb)   SpO2 98%   BMI 21.26 kg/m²       Constitutional:       Comments: Patient resting in bed, very sleepy, and ill-appearing. Arousable and able to answer yes/ no questions, and intermittently speaks. Overall appears pale. Demonstrated some insight and understanding into her situation. Unclear if brief answers related to lethargy or confusion.  Cardiovascular:      Rate and Rhythm: Normal rate.      Heart sounds: No murmur heard.     No friction rub. No gallop.   Pulmonary:      Effort: Pulmonary effort is normal. No respiratory distress.      Breath sounds: No wheezing.   Abdominal:      General: Abdomen is " soft, flat. There is no distension or tenderness to palpation.      Comments: No tenderness on palpation, no palpable masses   GYN:  -Dried blood in pad, no clots this AM  Musculoskeletal:      Right lower leg: No edema. TMA on RLE     Left lower leg: No edema.   Neuro:  -Baseline L arm/leg weakness       Relevant Results  Scheduled medications  Scheduled Medications[1]  Continuous medications  Continuous Medications[2]  PRN medications  PRN Medications[3]    Results for orders placed or performed during the hospital encounter of 04/17/25 (from the past 24 hours)   POCT GLUCOSE   Result Value Ref Range    POCT Glucose 194 (H) 74 - 99 mg/dL   POCT GLUCOSE   Result Value Ref Range    POCT Glucose 194 (H) 74 - 99 mg/dL   POCT GLUCOSE   Result Value Ref Range    POCT Glucose 187 (H) 74 - 99 mg/dL   POCT GLUCOSE   Result Value Ref Range    POCT Glucose 176 (H) 74 - 99 mg/dL   POCT GLUCOSE   Result Value Ref Range    POCT Glucose 165 (H) 74 - 99 mg/dL   POCT GLUCOSE   Result Value Ref Range    POCT Glucose 133 (H) 74 - 99 mg/dL   CBC and Auto Differential   Result Value Ref Range    WBC 8.3 4.4 - 11.3 x10*3/uL    nRBC 0.0 0.0 - 0.0 /100 WBCs    RBC 2.91 (L) 4.00 - 5.20 x10*6/uL    Hemoglobin 7.4 (L) 12.0 - 16.0 g/dL    Hematocrit 23.5 (L) 36.0 - 46.0 %    MCV 81 80 - 100 fL    MCH 25.4 (L) 26.0 - 34.0 pg    MCHC 31.5 (L) 32.0 - 36.0 g/dL    RDW 17.2 (H) 11.5 - 14.5 %    Platelets 300 150 - 450 x10*3/uL    Neutrophils % 79.7 40.0 - 80.0 %    Immature Granulocytes %, Automated 1.9 (H) 0.0 - 0.9 %    Lymphocytes % 9.2 13.0 - 44.0 %    Monocytes % 6.9 2.0 - 10.0 %    Eosinophils % 1.7 0.0 - 6.0 %    Basophils % 0.6 0.0 - 2.0 %    Neutrophils Absolute 6.57 1.20 - 7.70 x10*3/uL    Immature Granulocytes Absolute, Automated 0.16 0.00 - 0.70 x10*3/uL    Lymphocytes Absolute 0.76 (L) 1.20 - 4.80 x10*3/uL    Monocytes Absolute 0.57 0.10 - 1.00 x10*3/uL    Eosinophils Absolute 0.14 0.00 - 0.70 x10*3/uL    Basophils Absolute 0.05 0.00  - 0.10 x10*3/uL   Magnesium   Result Value Ref Range    Magnesium 2.10 1.60 - 2.40 mg/dL   Comprehensive metabolic panel   Result Value Ref Range    Glucose 139 (H) 74 - 99 mg/dL    Sodium 145 136 - 145 mmol/L    Potassium 3.6 3.5 - 5.3 mmol/L    Chloride 108 (H) 98 - 107 mmol/L    Bicarbonate 26 21 - 32 mmol/L    Anion Gap 15 10 - 20 mmol/L    Urea Nitrogen 15 6 - 23 mg/dL    Creatinine 1.34 (H) 0.50 - 1.05 mg/dL    eGFR 48 (L) >60 mL/min/1.73m*2    Calcium 8.1 (L) 8.6 - 10.6 mg/dL    Albumin 2.6 (L) 3.4 - 5.0 g/dL    Alkaline Phosphatase 1,033 (H) 33 - 110 U/L    Total Protein 5.7 (L) 6.4 - 8.2 g/dL     (H) 9 - 39 U/L    Bilirubin, Total 4.0 (H) 0.0 - 1.2 mg/dL    ALT 46 (H) 7 - 45 U/L   Phosphorus   Result Value Ref Range    Phosphorus 3.2 2.5 - 4.9 mg/dL   POCT GLUCOSE   Result Value Ref Range    POCT Glucose 170 (H) 74 - 99 mg/dL       This patient has a central line   Reason for the central line remaining today?       Assessment & Plan  Acute blood loss anemia      Barbara English is a 50 y.o. 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 ED labs notable for Hgb 7.3, ALP 1016. WBC wnl, CTAP not suggestive of obvious infection, though tx with CTX in ED for c/f SBP (patient has likely malignant ascites). In regard to patients anemia, less likely GIB as CHANTAL negative, patient largely constipated (as blood act as a laxative). Patients daughter reports seeing menstrual bleeding, and has had intermittent menses recently. CTAP not suggestive of acute bleed, though not an optimal study to examine acute blood loss. Favor patients abdominal pain likely 2/2  constipation (CTAP suggestive of moderate stool burden, no evidence of obvious obstruction). Finally, patients pancreatic adenoca appears to have progressed in regard to metastasis from previous study on 03/19. There are new liver lesions and now with malignant ascites and mesenteric edema. Patient is admitted to medicine, and will continue to follow anemia with BID CBC, encourage Bms, and pursue GOC discussions.    Updates 4/22:  - having Bms after receiving relastor yesterday 4/21  - appreciate ongoing GOC discussion with supportive onc and social work- S/O contacted today and discussed need for high intensity PT and SNF placement, who agreed with SNF in dispo planning. Due to patient stating inconsistently who she wants as primary decision-maker (alternates between daughter and S/O), ethics consulted for further input., Supportive onc also leading in this effort. Arrangements to be made in coming days.   - Contact hospice for possible discharge recommendations   - PRN diluadid switched to morphine 1mg q3 hrs per patient preference  - hgb stable this AM  - 500 LR bolus, repeat RFP after bolus, monitor UOP response, bladder scan for retention if there is concern     #Acute on chronic anemia  :: Patients family reports large menstrual bleeding recently, endorsing large clots in last few days, and some additional hemorrhoidal bleeding  :: GI vs  bleed. Favor  as patient still has menses, per daughters history appears to be coming from vagina. Less likely GIB even iso gastric varices (seen on CTAP) as CHANTAL negative, blood would likely act like laxative, though cannot totally rule out  :: CHANTAL in ED with some bright red blood  :: large amount of blood seen on UA  :: Patient hypertensive, Hgb 7.5  - BID CBC  - Type and screen 04/17, consented by daughter via phone  - Coags  - Hold home apixaban     #Constipation  #Abdominal Pain  :: Patient with abd pain/ vomiting iso metastatic pancreatic cancer on large amounts of  oxycodone. Has not had a BM in 6 days.  :: No evidence of obstruction on CT  :: Patient had recent flex sig 01/2025 for rectal bleeding found 3 polyps, removed. Had internal and external hemorrhoids.  - Will see how patient tolerates diet  - low threshold to pursue NGT with decompression if patient has persistent nausea  - Fleet enema, will pursue additional if unable to produce bowel movement.  - PRN Zofran     #Metastatic prostate adenoca  #Cancer related pain  :: Diagnosed initially 11/2024 after presenting for nausea and vomiting, underwent 1 round Folfox therapy but did not tolerate  :: Last admission at [facility], discussions centered around palliative radiation vs hospice, family elected to pursue palliative radiation then hospice if does not work  :: On CTAP 04/17, there is clear progression when compared to last scan 03/19. There are new liver lesions, now has likely malignant ascites and mesenteric edema  :: on recent hospitalization, was recommended suboxone, however patient was told to d/c it on discharge (no d/c summary to confirm), so patient has not taken it  - Ongoing GOC discussions with patient and family  - pain regimen per supportive onc  - Tylenol 650mg PRN for pain     #Splenic vein thrombosis  #Varices  :: New finding in 11/2024, patient started on apixaban  :: Splenic vein thrombus persists on CTAP obtained today, now with evidence of portal HTN and gastric varices  - Holding home apixaban iso anemia     #Recent CVA  :: Thrombotic CVA on 03/31 of R middle cerebral artery with reported residual deficit  :: Patient went home with outpatient PT on discharge  - c/w home ASA daily  - c/w home atorvastatin 40mg daily     #Type I Diabetes  :: Most recent A1c (10.7% 02/2025)  :: Recent home regimen recently switched, family instructed to STOP lantus and patient should remain on SSI  - lantus 3u  - SSI #2 for now, will titrate PRN  - q4h glucose checks while NPO     #Hx of MW tear  - c/w home pantoprazole  40mg BID     #Dysphagia  :: Notes at last hospital that patient did not want PEG tube, accepted risk of aspiration  - passed SLP this admission, good for regular diet/thin liquids     #Hx HFpEF  #Hx HTN  :: Recent echocardiogram 04/01/2025 with EF 61%, grade I diastolic filling defect with elevated LAP, there is severely increased concentric LVH  :: Clinically euvolemic on exam today  - holding home metoprolol succ 25mg daily     #Mood disorder  - c/w sertraline 50mg daily  - c/w gabapentin 400mg nightly, gabapentin 200mg qAM     F: PRN  E: PRN  N: Regular diet  A: PIV     DVT ppx: holding iso bleeding  GI ppx: PPI     Code: DNR/ DNI (confirmed with family on admission)  NOK: Nannette Nicole, Daughter, [phone]     Alyssa Roberts MD  PGY2               [1] [Held by provider] apixaban, 5 mg, oral, BID  aspirin, 81 mg, oral, Daily  atorvastatin, 40 mg, oral, Nightly  bisacodyl, 10 mg, rectal, Daily  gabapentin, 300 mg, oral, Nightly  insulin glargine, 3 Units, subcutaneous, q24h  insulin lispro, 0-10 Units, subcutaneous, q4h  [Held by provider] metoprolol succinate XL, 25 mg, oral, Daily  NIFEdipine ER, 60 mg, oral, Daily before breakfast  pantoprazole, 40 mg, intravenous, BID  polyethylene glycol, 17 g, oral, Daily  potassium chloride CR, 40 mEq, oral, Once  sennosides-docusate sodium, 1 tablet, oral, BID  sertraline, 50 mg, oral, Daily     [2]    [3] PRN medications: acetaminophen, bisacodyl, dextrose, dextrose, glucagon, glucagon, naloxone, ondansetron, oxyCODONE, polyethylene glycol

## 2025-04-22 NOTE — CARE PLAN
The patient's goals for the shift include  Patient will have decrease pain during shift  The clinical goals for the shift include Patient will remain HDS throughout shift        Problem: Pain - Adult  Goal: Verbalizes/displays adequate comfort level or baseline comfort level  Outcome: Not Progressing     Problem: Safety - Adult  Goal: Free from fall injury  Outcome: Not Progressing     Problem: Discharge Planning  Goal: Discharge to home or other facility with appropriate resources  Outcome: Not Progressing     Problem: Chronic Conditions and Co-morbidities  Goal: Patient's chronic conditions and co-morbidity symptoms are monitored and maintained or improved  Outcome: Not Progressing     Problem: Nutrition  Goal: Nutrient intake appropriate for maintaining nutritional needs  Outcome: Not Progressing     Problem: Fall/Injury  Goal: Not fall by end of shift  Outcome: Not Progressing  Goal: Be free from injury by end of the shift  Outcome: Not Progressing  Goal: Verbalize understanding of personal risk factors for fall in the hospital  Outcome: Not Progressing  Goal: Verbalize understanding of risk factor reduction measures to prevent injury from fall in the home  Outcome: Not Progressing  Goal: Use assistive devices by end of the shift  Outcome: Not Progressing  Goal: Pace activities to prevent fatigue by end of the shift  Outcome: Not Progressing     Problem: Skin  Goal: Decreased wound size/increased tissue granulation at next dressing change  Outcome: Not Progressing  Goal: Participates in plan/prevention/treatment measures  Outcome: Not Progressing  Goal: Prevent/manage excess moisture  Outcome: Not Progressing  Goal: Prevent/minimize sheer/friction injuries  Outcome: Not Progressing  Goal: Promote/optimize nutrition  Outcome: Not Progressing  Goal: Promote skin healing  Outcome: Not Progressing     Problem: Diabetes  Goal: Achieve decreasing blood glucose levels by end of shift  Outcome: Not Progressing  Goal:  Increase stability of blood glucose readings by end of shift  Outcome: Not Progressing  Goal: Maintain glucose levels >70mg/dl to <250mg/dl throughout shift  Outcome: Not Progressing  Goal: Decrease in ketones present in urine by end of shift  Outcome: Not Progressing  Goal: Maintain electrolyte levels within acceptable range throughout shift  Outcome: Not Progressing  Goal: No changes in neurological exam by end of shift  Outcome: Not Progressing  Goal: Learn about and adhere to nutrition recommendations by end of shift  Outcome: Not Progressing  Goal: Vital signs within normal range for age by end of shift  Outcome: Not Progressing  Goal: Increase self care and/or family involovement by end of shift  Outcome: Not Progressing  Goal: Receive DSME education by end of shift  Outcome: Not Progressing     Problem: Pain  Goal: Takes deep breaths with improved pain control throughout the shift  Outcome: Not Progressing  Goal: Turns in bed with improved pain control throughout the shift  Outcome: Not Progressing  Goal: Walks with improved pain control throughout the shift  Outcome: Not Progressing  Goal: Performs ADL's with improved pain control throughout shift  Outcome: Not Progressing  Goal: Participates in PT with improved pain control throughout the shift  Outcome: Not Progressing  Goal: Free from opioid side effects throughout the shift  Outcome: Not Progressing  Goal: Free from acute confusion related to pain meds throughout the shift  Outcome: Not Progressing

## 2025-04-22 NOTE — CARE PLAN
The patient's goals for the shift include  pain control    The clinical goals for the shift include Patient will remain safe by the end of the shfit    Over the shift, the patient did not make progress toward the following goals. Barriers to progression include co morbidities. Recommendations to address these barriers include evaluate plan of care.

## 2025-04-23 LAB
ALBUMIN SERPL BCP-MCNC: 2.8 G/DL (ref 3.4–5)
ALP SERPL-CCNC: 1077 U/L (ref 33–110)
ALT SERPL W P-5'-P-CCNC: 46 U/L (ref 7–45)
ANION GAP SERPL CALC-SCNC: 17 MMOL/L (ref 10–20)
AST SERPL W P-5'-P-CCNC: 115 U/L (ref 9–39)
BILIRUB SERPL-MCNC: 4.9 MG/DL (ref 0–1.2)
BUN SERPL-MCNC: 14 MG/DL (ref 6–23)
CALCIUM SERPL-MCNC: 8.4 MG/DL (ref 8.6–10.6)
CHLORIDE SERPL-SCNC: 109 MMOL/L (ref 98–107)
CO2 SERPL-SCNC: 22 MMOL/L (ref 21–32)
CREAT SERPL-MCNC: 1.24 MG/DL (ref 0.5–1.05)
EGFRCR SERPLBLD CKD-EPI 2021: 53 ML/MIN/1.73M*2
ERYTHROCYTE [DISTWIDTH] IN BLOOD BY AUTOMATED COUNT: 18 % (ref 11.5–14.5)
GLUCOSE BLD MANUAL STRIP-MCNC: 133 MG/DL (ref 74–99)
GLUCOSE BLD MANUAL STRIP-MCNC: 143 MG/DL (ref 74–99)
GLUCOSE BLD MANUAL STRIP-MCNC: 160 MG/DL (ref 74–99)
GLUCOSE BLD MANUAL STRIP-MCNC: 165 MG/DL (ref 74–99)
GLUCOSE BLD MANUAL STRIP-MCNC: 174 MG/DL (ref 74–99)
GLUCOSE BLD MANUAL STRIP-MCNC: 273 MG/DL (ref 74–99)
GLUCOSE BLD MANUAL STRIP-MCNC: 311 MG/DL (ref 74–99)
GLUCOSE BLD MANUAL STRIP-MCNC: 350 MG/DL (ref 74–99)
GLUCOSE SERPL-MCNC: 147 MG/DL (ref 74–99)
HCT VFR BLD AUTO: 26.3 % (ref 36–46)
HGB BLD-MCNC: 8.1 G/DL (ref 12–16)
MAGNESIUM SERPL-MCNC: 2.47 MG/DL (ref 1.6–2.4)
MCH RBC QN AUTO: 25.3 PG (ref 26–34)
MCHC RBC AUTO-ENTMCNC: 30.8 G/DL (ref 32–36)
MCV RBC AUTO: 82 FL (ref 80–100)
NRBC BLD-RTO: 0 /100 WBCS (ref 0–0)
PHOSPHATE SERPL-MCNC: 3.1 MG/DL (ref 2.5–4.9)
PLATELET # BLD AUTO: 282 X10*3/UL (ref 150–450)
POTASSIUM SERPL-SCNC: 4.4 MMOL/L (ref 3.5–5.3)
PROT SERPL-MCNC: 5.9 G/DL (ref 6.4–8.2)
RBC # BLD AUTO: 3.2 X10*6/UL (ref 4–5.2)
SODIUM SERPL-SCNC: 144 MMOL/L (ref 136–145)
WBC # BLD AUTO: 10.1 X10*3/UL (ref 4.4–11.3)

## 2025-04-23 PROCEDURE — 2500000002 HC RX 250 W HCPCS SELF ADMINISTERED DRUGS (ALT 637 FOR MEDICARE OP, ALT 636 FOR OP/ED): Mod: SE

## 2025-04-23 PROCEDURE — 83735 ASSAY OF MAGNESIUM: CPT

## 2025-04-23 PROCEDURE — 82947 ASSAY GLUCOSE BLOOD QUANT: CPT

## 2025-04-23 PROCEDURE — 2500000001 HC RX 250 WO HCPCS SELF ADMINISTERED DRUGS (ALT 637 FOR MEDICARE OP): Mod: SE

## 2025-04-23 PROCEDURE — 85027 COMPLETE CBC AUTOMATED: CPT

## 2025-04-23 PROCEDURE — 2500000001 HC RX 250 WO HCPCS SELF ADMINISTERED DRUGS (ALT 637 FOR MEDICARE OP): Mod: SE | Performed by: PHARMACY TECHNICIAN

## 2025-04-23 PROCEDURE — 2500000004 HC RX 250 GENERAL PHARMACY W/ HCPCS (ALT 636 FOR OP/ED): Mod: SE

## 2025-04-23 PROCEDURE — 1210000001 HC SEMI-PRIVATE ROOM DAILY

## 2025-04-23 PROCEDURE — 80053 COMPREHEN METABOLIC PANEL: CPT

## 2025-04-23 PROCEDURE — 84100 ASSAY OF PHOSPHORUS: CPT

## 2025-04-23 PROCEDURE — 36415 COLL VENOUS BLD VENIPUNCTURE: CPT

## 2025-04-23 PROCEDURE — 99232 SBSQ HOSP IP/OBS MODERATE 35: CPT

## 2025-04-23 RX ORDER — ONDANSETRON HYDROCHLORIDE 2 MG/ML
4 INJECTION, SOLUTION INTRAVENOUS EVERY 6 HOURS PRN
Status: DISCONTINUED | OUTPATIENT
Start: 2025-04-23 | End: 2025-04-27 | Stop reason: HOSPADM

## 2025-04-23 RX ADMIN — ASPIRIN 81 MG: 81 TABLET, COATED ORAL at 08:56

## 2025-04-23 RX ADMIN — INSULIN LISPRO 2 UNITS: 100 INJECTION, SOLUTION INTRAVENOUS; SUBCUTANEOUS at 05:18

## 2025-04-23 RX ADMIN — GABAPENTIN 300 MG: 300 CAPSULE ORAL at 21:54

## 2025-04-23 RX ADMIN — BISACODYL 10 MG: 10 SUPPOSITORY RECTAL at 08:56

## 2025-04-23 RX ADMIN — OXYCODONE 10 MG: 5 TABLET ORAL at 09:00

## 2025-04-23 RX ADMIN — INSULIN LISPRO 6 UNITS: 100 INJECTION, SOLUTION INTRAVENOUS; SUBCUTANEOUS at 18:02

## 2025-04-23 RX ADMIN — SENNOSIDES AND DOCUSATE SODIUM 1 TABLET: 50; 8.6 TABLET ORAL at 08:56

## 2025-04-23 RX ADMIN — ATORVASTATIN CALCIUM 40 MG: 40 TABLET, FILM COATED ORAL at 21:54

## 2025-04-23 RX ADMIN — NIFEDIPINE 60 MG: 60 TABLET, FILM COATED, EXTENDED RELEASE ORAL at 06:34

## 2025-04-23 RX ADMIN — POLYETHYLENE GLYCOL 3350 17 G: 17 POWDER, FOR SOLUTION ORAL at 08:56

## 2025-04-23 RX ADMIN — INSULIN LISPRO 2 UNITS: 100 INJECTION, SOLUTION INTRAVENOUS; SUBCUTANEOUS at 12:22

## 2025-04-23 RX ADMIN — OXYCODONE 10 MG: 5 TABLET ORAL at 03:29

## 2025-04-23 RX ADMIN — SERTRALINE 50 MG: 50 TABLET, FILM COATED ORAL at 08:56

## 2025-04-23 RX ADMIN — INSULIN GLARGINE 3 UNITS: 100 INJECTION, SOLUTION SUBCUTANEOUS at 15:56

## 2025-04-23 RX ADMIN — INSULIN LISPRO 8 UNITS: 100 INJECTION, SOLUTION INTRAVENOUS; SUBCUTANEOUS at 22:09

## 2025-04-23 RX ADMIN — SENNOSIDES AND DOCUSATE SODIUM 1 TABLET: 50; 8.6 TABLET ORAL at 21:54

## 2025-04-23 RX ADMIN — PANTOPRAZOLE SODIUM 40 MG: 40 TABLET, DELAYED RELEASE ORAL at 08:56

## 2025-04-23 RX ADMIN — PANTOPRAZOLE SODIUM 40 MG: 40 TABLET, DELAYED RELEASE ORAL at 16:05

## 2025-04-23 ASSESSMENT — COGNITIVE AND FUNCTIONAL STATUS - GENERAL
TOILETING: A LOT
PERSONAL GROOMING: A LOT
STANDING UP FROM CHAIR USING ARMS: TOTAL
EATING MEALS: A LOT
MOVING TO AND FROM BED TO CHAIR: TOTAL
WALKING IN HOSPITAL ROOM: TOTAL
DRESSING REGULAR UPPER BODY CLOTHING: A LOT
DRESSING REGULAR LOWER BODY CLOTHING: A LOT
MOVING FROM LYING ON BACK TO SITTING ON SIDE OF FLAT BED WITH BEDRAILS: A LOT
DAILY ACTIVITIY SCORE: 12
TURNING FROM BACK TO SIDE WHILE IN FLAT BAD: A LOT
HELP NEEDED FOR BATHING: A LOT
MOBILITY SCORE: 8
CLIMB 3 TO 5 STEPS WITH RAILING: TOTAL

## 2025-04-23 ASSESSMENT — PAIN SCALES - GENERAL
PAINLEVEL_OUTOF10: 10 - WORST POSSIBLE PAIN
PAINLEVEL_OUTOF10: 7
PAINLEVEL_OUTOF10: 10 - WORST POSSIBLE PAIN

## 2025-04-23 ASSESSMENT — PAIN - FUNCTIONAL ASSESSMENT
PAIN_FUNCTIONAL_ASSESSMENT: 0-10
PAIN_FUNCTIONAL_ASSESSMENT: 0-10

## 2025-04-23 NOTE — CONSULTS
ETHICS CONSULTATION NOTE    CONSULT REQUESTER: Nurse: Brooke Oglesby RN    ETHICS QUESTION: appropriate decision maker    BACKGROUND:    Process Steps: Haiku messages with DAVE Garcia, Alyssa Roberts MD; review of chart    Ethically Relevant Medical Information: The patient is a 49 yo woman with a history of metastatic pancreatic cancer, recent CVA, DM, DKA, CHF, CKD, gastroparesis, polysubstance use, and severe protein malnutrition.  She is on chronic oxycodone for the pancreatic cancer.  It is possible that the metastatic cancer is progressing.    Code Status: DNR    Capacity/Decision-Making Considerations: The patient's mentation fluctuates and she is unable to make health care decisions for herself.    Advance Directives/Family/Support System: The patient has a poa that designates her significant other [place] as the surrogate.  She also has a daughter.      ETHICS DISCUSSION & ANALYSIS:    Rec 1: Because the patient executed a durable power of  for health care while she had capacity and designated her significant other to make medical care decisions for her, the appropriate decision maker is the significant other.    Rec 2: The role of the surrogate is to work with the health care team to determine which of the medically appropriate and advisable treatments procedures is most congruent with the patient's values and desires.  In the event that the surrogate is unaware of the patient's values and wishes, decisions are to be made based on the patient's best interests, and always within the range of what is medically appropriate and advisable.    Rec 3: My understanding based on my conversation with Jayne Roberts is that the care team has been involving the patient's daughter and less so the patient's significant other.  I suggest that the significant other be involved to a greater degree if he is willing and available to serve as the designated surrogate.    ETHICS RECOMMENDATIONS:    The  patient's significant other is the appropriate surrigate based on the patient's documents appointing him as surrogate, that were executed when the patient had capacity to do so.  The significant other be involved to a greater degree if he is willing and available to serve as the designated surrogate.  It may be helpful to arrange a meeting with the significant other and the daughter to explain who has decision making authority and the role of the surrogate, and to determine and facilitate family agreement.    FOLLOW UP:   The Ethics Consultation Service remains available.  Please call with any questions or additional concerns..  Please refer to the intranet On-Call Directory for contact information to reach the Ethics Consult Service (ECS)..     Hortensia Kendall JD     no

## 2025-04-23 NOTE — PROGRESS NOTES
Transitional Care Coordination Progress Note:  Patient was discussed during interdisciplinary rounds.  Team members present: medical team, and TCC.  Plan per medical team: Per team, plan GOC (supportive oncology consulted) and ethics for identification of surrogate decision maker.  Plan to continue to monitor CBC BID.  Payer: [facility].  Status: Inpatient.  Discharge disposition: Await outcome of GOC discussion.  There is a HCPOA in chart.  Potential barriers: None.  ADOD: 4/25  Care coordinator will continue to follow for discharge planning needs.     Temi Lopez MSN, RN-BC  Transitional Care Coordinator (TCC)  [phone]

## 2025-04-23 NOTE — CARE PLAN
The patient's goals for the shift include  pain control    The clinical goals for the shift include Patient will remain safe by the end of the shfit    Over the shift, the patient did make progress toward the following goals. Barriers to progression include Chronic Conditions and Co-morbidities.

## 2025-04-23 NOTE — PROGRESS NOTES
"Barbara English is a 50 y.o. female on day 6 of admission presenting with Acute blood loss anemia.      Subjective   NAEON. Had Bm after receiving relistor yesterday.   UOP and renal function labs mildly improving from days prior. S/p 500 LR bolus 4/22.   No acute complaints this morning.  Resting comfortably in bed this morning.   Patient denies any chest pain, additional nausea, vomiting, SOB.        Objective     Last Recorded Vitals  /86   Pulse 97   Temp 36.8 °C (98.2 °F)   Resp 16   Wt 54.4 kg (120 lb)   SpO2 97%   Intake/Output last 3 Shifts:    Intake/Output Summary (Last 24 hours) at 4/23/2025 1130  Last data filed at 4/23/2025 0700  Gross per 24 hour   Intake --   Output 570 ml   Net -570 ml       Admission Weight  Weight: 54.4 kg (120 lb) (04/17/25 0900)    Daily Weight  04/17/25 : 54.4 kg (120 lb)    Image Results  ECG 12 lead  Normal sinus rhythm  Normal ECG  When compared with ECG of 31-MAR-2025 10:37,  Questionable change in QRS axis  T wave inversion no longer evident in Inferior leads  Nonspecific T wave abnormality, improved in Lateral leads    See ED provider note for full interpretation and clinical correlation  Confirmed by Meghana Mckenna ([id number]) on 4/21/2025 8:41:21 PM      Physical Exam  /86   Pulse 97   Temp 36.8 °C (98.2 °F)   Resp 16   Ht 1.6 m (5' 3\")   Wt 54.4 kg (120 lb)   SpO2 97%   BMI 21.26 kg/m²       Constitutional:       Comments: Patient resting in bed, very sleepy, and ill-appearing. Arousable and able to answer simple questions. Overall appears pale and jaundiced. Demonstrated some insight and understanding into her situation.   Cardiovascular:      Rate and Rhythm: Normal rate.      Heart sounds: No murmur heard.     No friction rub. No gallop.   Pulmonary:      Effort: Pulmonary effort is normal. No respiratory distress.      Breath sounds: No wheezing.   Abdominal:      General: Abdomen is soft, flat. There is no distension or tenderness to palpation.     "  Comments: No tenderness on palpation, no palpable masses   GYN:  -Dried blood in pad, no clots this AM  Musculoskeletal:      Right lower leg: No edema. TMA on RLE     Left lower leg: No edema.   Neuro:  -Baseline L arm/leg weakness  - AOX2 (oriented to self, place, somewhat situation, not time)       Relevant Results  Scheduled medications  Scheduled Medications[1]  Continuous medications  Continuous Medications[2]  PRN medications  PRN Medications[3]    Results for orders placed or performed during the hospital encounter of 04/17/25 (from the past 24 hours)   POCT GLUCOSE   Result Value Ref Range    POCT Glucose 134 (H) 74 - 99 mg/dL   POCT GLUCOSE   Result Value Ref Range    POCT Glucose 230 (H) 74 - 99 mg/dL   Renal function panel   Result Value Ref Range    Glucose 236 (H) 74 - 99 mg/dL    Sodium 144 136 - 145 mmol/L    Potassium 3.6 3.5 - 5.3 mmol/L    Chloride 107 98 - 107 mmol/L    Bicarbonate 25 21 - 32 mmol/L    Anion Gap 16 10 - 20 mmol/L    Urea Nitrogen 16 6 - 23 mg/dL    Creatinine 1.41 (H) 0.50 - 1.05 mg/dL    eGFR 46 (L) >60 mL/min/1.73m*2    Calcium 8.2 (L) 8.6 - 10.6 mg/dL    Phosphorus 3.5 2.5 - 4.9 mg/dL    Albumin 2.8 (L) 3.4 - 5.0 g/dL   POCT GLUCOSE   Result Value Ref Range    POCT Glucose 165 (H) 74 - 99 mg/dL   POCT GLUCOSE   Result Value Ref Range    POCT Glucose 143 (H) 74 - 99 mg/dL   POCT GLUCOSE   Result Value Ref Range    POCT Glucose 174 (H) 74 - 99 mg/dL   POCT GLUCOSE   Result Value Ref Range    POCT Glucose 165 (H) 74 - 99 mg/dL   Magnesium   Result Value Ref Range    Magnesium 2.47 (H) 1.60 - 2.40 mg/dL   Comprehensive metabolic panel   Result Value Ref Range    Glucose 147 (H) 74 - 99 mg/dL    Sodium 144 136 - 145 mmol/L    Potassium 4.4 3.5 - 5.3 mmol/L    Chloride 109 (H) 98 - 107 mmol/L    Bicarbonate 22 21 - 32 mmol/L    Anion Gap 17 10 - 20 mmol/L    Urea Nitrogen 14 6 - 23 mg/dL    Creatinine 1.24 (H) 0.50 - 1.05 mg/dL    eGFR 53 (L) >60 mL/min/1.73m*2    Calcium 8.4 (L)  8.6 - 10.6 mg/dL    Albumin 2.8 (L) 3.4 - 5.0 g/dL    Alkaline Phosphatase 1,077 (H) 33 - 110 U/L    Total Protein 5.9 (L) 6.4 - 8.2 g/dL     (H) 9 - 39 U/L    Bilirubin, Total 4.9 (H) 0.0 - 1.2 mg/dL    ALT 46 (H) 7 - 45 U/L   Phosphorus   Result Value Ref Range    Phosphorus 3.1 2.5 - 4.9 mg/dL   POCT GLUCOSE   Result Value Ref Range    POCT Glucose 133 (H) 74 - 99 mg/dL       This patient has a central line   Reason for the central line remaining today?       Assessment & Plan  Acute blood loss anemia      Barbara English is a 50 y.o. 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 ED labs notable for Hgb 7.3, ALP 1016. WBC wnl, CTAP not suggestive of obvious infection, though tx with CTX in ED for c/f SBP (patient has likely malignant ascites). In regard to patients anemia, less likely GIB as CHANTAL negative, patient largely constipated (as blood act as a laxative). Patients daughter reports seeing menstrual bleeding, and has had intermittent menses recently. CTAP not suggestive of acute bleed, though not an optimal study to examine acute blood loss. Favor patients abdominal pain likely 2/2 constipation (CTAP suggestive of moderate stool burden, no evidence of obvious obstruction). Finally, patients pancreatic adenoca appears to have progressed in regard to metastasis from previous study on 03/19. There are new liver lesions and now with malignant ascites and mesenteric edema. Patient is admitted to medicine, and will continue to follow anemia with BID CBC, encourage Bms, and pursue GOC discussions.    Updates 4/23:  - having Bms with relastor  - appreciate  ongoing C discussion with supportive onc and social work- ethics engaged for assistance with identifying surrogate decision-maker as pt does not demonstrate reliable capacity with fluctuating mentation, and she alternates between identifying adult daughter and unmarried, s/o as decision makers. Dispo planning can proceed once this is adjudicated.   - Contact hospice for possible discharge recommendations   - PRN diluadid switched to morphine 1mg q3 hrs per patient preference  - hgb stable this AM  - CTM UOP and renal function, consider additional bolus if needed     #Acute on chronic anemia  :: Patients family reports large menstrual bleeding recently, endorsing large clots in last few days, and some additional hemorrhoidal bleeding  :: GI vs  bleed. Favor  as patient still has menses, per daughters history appears to be coming from vagina. Less likely GIB even iso gastric varices (seen on CTAP) as CHANTAL negative, blood would likely act like laxative, though cannot totally rule out  :: CHANTAL in ED with some bright red blood  :: large amount of blood seen on UA  :: Patient hypertensive, Hgb 7.5  - BID CBC  - Type and screen 04/17, consented by daughter via phone  - Coags  - Hold home apixaban     #Constipation  #Abdominal Pain  :: Patient with abd pain/ vomiting iso metastatic pancreatic cancer on large amounts of oxycodone. Has not had a BM in 6 days.  :: No evidence of obstruction on CT  :: Patient had recent flex sig 01/2025 for rectal bleeding found 3 polyps, removed. Had internal and external hemorrhoids.  - Will see how patient tolerates diet  - low threshold to pursue NGT with decompression if patient has persistent nausea  - Fleet enema, will pursue additional if unable to produce bowel movement.  - PRN Zofran     #Metastatic prostate adenoca  #Cancer related pain  :: Diagnosed initially 11/2024 after presenting for nausea and vomiting, underwent 1 round Folfox therapy but did not tolerate  :: Last  admission at [facility], discussions centered around palliative radiation vs hospice, family elected to pursue palliative radiation then hospice if does not work  :: On CTAP 04/17, there is clear progression when compared to last scan 03/19. There are new liver lesions, now has likely malignant ascites and mesenteric edema  :: on recent hospitalization, was recommended suboxone, however patient was told to d/c it on discharge (no d/c summary to confirm), so patient has not taken it  - Ongoing GOC discussions with patient and family  - pain regimen per supportive onc  - Tylenol 650mg PRN for pain     #Splenic vein thrombosis  #Varices  :: New finding in 11/2024, patient started on apixaban  :: Splenic vein thrombus persists on CTAP obtained today, now with evidence of portal HTN and gastric varices  - Holding home apixaban iso anemia     #Recent CVA  :: Thrombotic CVA on 03/31 of R middle cerebral artery with reported residual deficit  :: Patient went home with outpatient PT on discharge  - c/w home ASA daily  - c/w home atorvastatin 40mg daily     #Type I Diabetes  :: Most recent A1c (10.7% 02/2025)  :: Recent home regimen recently switched, family instructed to STOP lantus and patient should remain on SSI  - lantus 3u  - SSI #2 for now, will titrate PRN  - q4h glucose checks while NPO     #Hx of MW tear  - c/w home pantoprazole 40mg BID     #Dysphagia  :: Notes at last hospital that patient did not want PEG tube, accepted risk of aspiration  - passed SLP this admission, good for regular diet/thin liquids     #Hx HFpEF  #Hx HTN  :: Recent echocardiogram 04/01/2025 with EF 61%, grade I diastolic filling defect with elevated LAP, there is severely increased concentric LVH  :: Clinically euvolemic on exam today  - holding home metoprolol succ 25mg daily     #Mood disorder  - c/w sertraline 50mg daily  - c/w gabapentin 400mg nightly, gabapentin 200mg qAM     F: PRN  E: PRN  N: Regular diet  A: PIV     DVT ppx: holding iso  bleeding  GI ppx: PPI     Code: DNR/ DNI (confirmed with family on admission)  NOK: Nannette Nicole, Daughter, [phone]     Alyssa Roberts MD  PGY2               [1] [Held by provider] apixaban, 5 mg, oral, BID  aspirin, 81 mg, oral, Daily  atorvastatin, 40 mg, oral, Nightly  bisacodyl, 10 mg, rectal, Daily  gabapentin, 300 mg, oral, Nightly  insulin glargine, 3 Units, subcutaneous, q24h  insulin lispro, 0-10 Units, subcutaneous, q4h  [Held by provider] metoprolol succinate XL, 25 mg, oral, Daily  NIFEdipine ER, 60 mg, oral, Daily before breakfast  pantoprazole, 40 mg, oral, BID  polyethylene glycol, 17 g, oral, Daily  sennosides-docusate sodium, 1 tablet, oral, BID  sertraline, 50 mg, oral, Daily     [2]    [3] PRN medications: acetaminophen, bisacodyl, dextrose, dextrose, glucagon, glucagon, HYDROmorphone, naloxone, ondansetron, oxyCODONE, polyethylene glycol

## 2025-04-24 LAB
ALBUMIN SERPL BCP-MCNC: 2.6 G/DL (ref 3.4–5)
ALP SERPL-CCNC: 1073 U/L (ref 33–110)
ALT SERPL W P-5'-P-CCNC: 45 U/L (ref 7–45)
ANION GAP SERPL CALC-SCNC: 14 MMOL/L (ref 10–20)
AST SERPL W P-5'-P-CCNC: 109 U/L (ref 9–39)
BASOPHILS # BLD AUTO: 0.04 X10*3/UL (ref 0–0.1)
BASOPHILS NFR BLD AUTO: 0.5 %
BILIRUB SERPL-MCNC: 4.8 MG/DL (ref 0–1.2)
BUN SERPL-MCNC: 17 MG/DL (ref 6–23)
CALCIUM SERPL-MCNC: 8.2 MG/DL (ref 8.6–10.6)
CHLORIDE SERPL-SCNC: 106 MMOL/L (ref 98–107)
CO2 SERPL-SCNC: 25 MMOL/L (ref 21–32)
CREAT SERPL-MCNC: 1.63 MG/DL (ref 0.5–1.05)
EGFRCR SERPLBLD CKD-EPI 2021: 38 ML/MIN/1.73M*2
EOSINOPHIL # BLD AUTO: 0.09 X10*3/UL (ref 0–0.7)
EOSINOPHIL NFR BLD AUTO: 1 %
ERYTHROCYTE [DISTWIDTH] IN BLOOD BY AUTOMATED COUNT: 18.3 % (ref 11.5–14.5)
GLUCOSE BLD MANUAL STRIP-MCNC: 147 MG/DL (ref 74–99)
GLUCOSE BLD MANUAL STRIP-MCNC: 214 MG/DL (ref 74–99)
GLUCOSE BLD MANUAL STRIP-MCNC: 250 MG/DL (ref 74–99)
GLUCOSE BLD MANUAL STRIP-MCNC: 282 MG/DL (ref 74–99)
GLUCOSE BLD MANUAL STRIP-MCNC: 353 MG/DL (ref 74–99)
GLUCOSE BLD MANUAL STRIP-MCNC: 394 MG/DL (ref 74–99)
GLUCOSE SERPL-MCNC: 161 MG/DL (ref 74–99)
HCT VFR BLD AUTO: 24.6 % (ref 36–46)
HGB BLD-MCNC: 7.6 G/DL (ref 12–16)
IMM GRANULOCYTES # BLD AUTO: 0.03 X10*3/UL (ref 0–0.7)
IMM GRANULOCYTES NFR BLD AUTO: 0.3 % (ref 0–0.9)
LYMPHOCYTES # BLD AUTO: 0.78 X10*3/UL (ref 1.2–4.8)
LYMPHOCYTES NFR BLD AUTO: 9 %
MAGNESIUM SERPL-MCNC: 2.16 MG/DL (ref 1.6–2.4)
MCH RBC QN AUTO: 25.1 PG (ref 26–34)
MCHC RBC AUTO-ENTMCNC: 30.9 G/DL (ref 32–36)
MCV RBC AUTO: 81 FL (ref 80–100)
MONOCYTES # BLD AUTO: 0.6 X10*3/UL (ref 0.1–1)
MONOCYTES NFR BLD AUTO: 7 %
NEUTROPHILS # BLD AUTO: 7.08 X10*3/UL (ref 1.2–7.7)
NEUTROPHILS NFR BLD AUTO: 82.2 %
NRBC BLD-RTO: 0 /100 WBCS (ref 0–0)
PHOSPHATE SERPL-MCNC: 2.7 MG/DL (ref 2.5–4.9)
PLATELET # BLD AUTO: 264 X10*3/UL (ref 150–450)
POTASSIUM SERPL-SCNC: 3.4 MMOL/L (ref 3.5–5.3)
PROT SERPL-MCNC: 5.6 G/DL (ref 6.4–8.2)
RBC # BLD AUTO: 3.03 X10*6/UL (ref 4–5.2)
SODIUM SERPL-SCNC: 142 MMOL/L (ref 136–145)
WBC # BLD AUTO: 8.6 X10*3/UL (ref 4.4–11.3)

## 2025-04-24 PROCEDURE — 80053 COMPREHEN METABOLIC PANEL: CPT

## 2025-04-24 PROCEDURE — 1210000001 HC SEMI-PRIVATE ROOM DAILY

## 2025-04-24 PROCEDURE — 2500000002 HC RX 250 W HCPCS SELF ADMINISTERED DRUGS (ALT 637 FOR MEDICARE OP, ALT 636 FOR OP/ED): Mod: SE

## 2025-04-24 PROCEDURE — 2500000001 HC RX 250 WO HCPCS SELF ADMINISTERED DRUGS (ALT 637 FOR MEDICARE OP): Mod: SE

## 2025-04-24 PROCEDURE — 99232 SBSQ HOSP IP/OBS MODERATE 35: CPT

## 2025-04-24 PROCEDURE — 99498 ADVNCD CARE PLAN ADDL 30 MIN: CPT

## 2025-04-24 PROCEDURE — 2500000004 HC RX 250 GENERAL PHARMACY W/ HCPCS (ALT 636 FOR OP/ED): Mod: SE,JZ

## 2025-04-24 PROCEDURE — 84100 ASSAY OF PHOSPHORUS: CPT

## 2025-04-24 PROCEDURE — 2500000004 HC RX 250 GENERAL PHARMACY W/ HCPCS (ALT 636 FOR OP/ED): Mod: SE

## 2025-04-24 PROCEDURE — 99233 SBSQ HOSP IP/OBS HIGH 50: CPT

## 2025-04-24 PROCEDURE — 82947 ASSAY GLUCOSE BLOOD QUANT: CPT

## 2025-04-24 PROCEDURE — 99497 ADVNCD CARE PLAN 30 MIN: CPT

## 2025-04-24 PROCEDURE — 85025 COMPLETE CBC W/AUTO DIFF WBC: CPT

## 2025-04-24 PROCEDURE — 2500000001 HC RX 250 WO HCPCS SELF ADMINISTERED DRUGS (ALT 637 FOR MEDICARE OP): Mod: SE | Performed by: PHARMACY TECHNICIAN

## 2025-04-24 PROCEDURE — 83735 ASSAY OF MAGNESIUM: CPT

## 2025-04-24 PROCEDURE — 36416 COLLJ CAPILLARY BLOOD SPEC: CPT

## 2025-04-24 RX ORDER — POTASSIUM CHLORIDE 1.5 G/1.58G
40 POWDER, FOR SOLUTION ORAL ONCE
Status: COMPLETED | OUTPATIENT
Start: 2025-04-24 | End: 2025-04-24

## 2025-04-24 RX ORDER — ACETAMINOPHEN 160 MG/5ML
650 SOLUTION ORAL EVERY 6 HOURS PRN
Status: DISCONTINUED | OUTPATIENT
Start: 2025-04-24 | End: 2025-04-27 | Stop reason: HOSPADM

## 2025-04-24 RX ORDER — GLYCOPYRROLATE 0.2 MG/ML
0.2 INJECTION INTRAMUSCULAR; INTRAVENOUS EVERY 4 HOURS PRN
Status: DISCONTINUED | OUTPATIENT
Start: 2025-04-24 | End: 2025-04-27 | Stop reason: HOSPADM

## 2025-04-24 RX ORDER — OLANZAPINE 5 MG/1
5 TABLET, ORALLY DISINTEGRATING ORAL EVERY 12 HOURS PRN
Status: DISCONTINUED | OUTPATIENT
Start: 2025-04-24 | End: 2025-04-27 | Stop reason: HOSPADM

## 2025-04-24 RX ADMIN — PANTOPRAZOLE SODIUM 40 MG: 40 TABLET, DELAYED RELEASE ORAL at 06:15

## 2025-04-24 RX ADMIN — INSULIN LISPRO 6 UNITS: 100 INJECTION, SOLUTION INTRAVENOUS; SUBCUTANEOUS at 13:00

## 2025-04-24 RX ADMIN — SENNOSIDES AND DOCUSATE SODIUM 1 TABLET: 50; 8.6 TABLET ORAL at 09:16

## 2025-04-24 RX ADMIN — SERTRALINE 50 MG: 50 TABLET, FILM COATED ORAL at 09:16

## 2025-04-24 RX ADMIN — OXYCODONE 10 MG: 5 TABLET ORAL at 10:30

## 2025-04-24 RX ADMIN — ASPIRIN 81 MG: 81 TABLET, COATED ORAL at 09:17

## 2025-04-24 RX ADMIN — BISACODYL 10 MG: 10 SUPPOSITORY RECTAL at 09:16

## 2025-04-24 RX ADMIN — INSULIN LISPRO 4 UNITS: 100 INJECTION, SOLUTION INTRAVENOUS; SUBCUTANEOUS at 03:12

## 2025-04-24 RX ADMIN — HYDROMORPHONE HYDROCHLORIDE 0.2 MG: 0.5 INJECTION, SOLUTION INTRAMUSCULAR; INTRAVENOUS; SUBCUTANEOUS at 04:26

## 2025-04-24 RX ADMIN — POLYETHYLENE GLYCOL 3350 17 G: 17 POWDER, FOR SOLUTION ORAL at 09:12

## 2025-04-24 RX ADMIN — SODIUM CHLORIDE, SODIUM LACTATE, POTASSIUM CHLORIDE, AND CALCIUM CHLORIDE 500 ML: .6; .31; .03; .02 INJECTION, SOLUTION INTRAVENOUS at 12:58

## 2025-04-24 RX ADMIN — NIFEDIPINE 60 MG: 60 TABLET, FILM COATED, EXTENDED RELEASE ORAL at 06:15

## 2025-04-24 RX ADMIN — POTASSIUM CHLORIDE 40 MEQ: 1.5 POWDER, FOR SOLUTION ORAL at 09:15

## 2025-04-24 RX ADMIN — SENNOSIDES AND DOCUSATE SODIUM 1 TABLET: 50; 8.6 TABLET ORAL at 21:22

## 2025-04-24 RX ADMIN — GABAPENTIN 300 MG: 300 CAPSULE ORAL at 21:22

## 2025-04-24 RX ADMIN — HYDROMORPHONE HYDROCHLORIDE 0.4 MG: 0.5 INJECTION, SOLUTION INTRAMUSCULAR; INTRAVENOUS; SUBCUTANEOUS at 21:19

## 2025-04-24 RX ADMIN — INSULIN GLARGINE 3 UNITS: 100 INJECTION, SOLUTION SUBCUTANEOUS at 14:56

## 2025-04-24 RX ADMIN — METHYLNALTREXONE BROMIDE 4 MG: 12 INJECTION, SOLUTION SUBCUTANEOUS at 13:00

## 2025-04-24 ASSESSMENT — COGNITIVE AND FUNCTIONAL STATUS - GENERAL
MOVING TO AND FROM BED TO CHAIR: A LOT
CLIMB 3 TO 5 STEPS WITH RAILING: TOTAL
DRESSING REGULAR LOWER BODY CLOTHING: TOTAL
MOVING FROM LYING ON BACK TO SITTING ON SIDE OF FLAT BED WITH BEDRAILS: A LOT
HELP NEEDED FOR BATHING: TOTAL
EATING MEALS: A LITTLE
DRESSING REGULAR UPPER BODY CLOTHING: TOTAL
STANDING UP FROM CHAIR USING ARMS: TOTAL
WALKING IN HOSPITAL ROOM: TOTAL
TOILETING: TOTAL
DAILY ACTIVITIY SCORE: 8
MOBILITY SCORE: 9
TURNING FROM BACK TO SIDE WHILE IN FLAT BAD: A LOT
PERSONAL GROOMING: TOTAL

## 2025-04-24 ASSESSMENT — PAIN SCALES - GENERAL
PAINLEVEL_OUTOF10: 10 - WORST POSSIBLE PAIN

## 2025-04-24 ASSESSMENT — PAIN DESCRIPTION - ORIENTATION: ORIENTATION: RIGHT

## 2025-04-24 ASSESSMENT — PAIN DESCRIPTION - LOCATION
LOCATION: GENERALIZED
LOCATION: LEG

## 2025-04-24 ASSESSMENT — PAIN - FUNCTIONAL ASSESSMENT
PAIN_FUNCTIONAL_ASSESSMENT: 0-10

## 2025-04-24 ASSESSMENT — PAIN DESCRIPTION - DESCRIPTORS: DESCRIPTORS: ACHING;SHARP

## 2025-04-24 NOTE — CONSULTS
ETHICS CONSULTATION NOTE    CONSULT REQUESTER: Nurse: Brooke Oglesby    ETHICS QUESTION: determination of appropriate surrogate decision maker; patient lack of capacity    BACKGROUND:    Process Steps: Haiku messages, chart review, family meeting with care team    Ethically Relevant Medical Information: The patient is 50 years old.She has metastatic pancreatic cancer. Past medical history includes a recent CVA, prior polysubstance use, DM,DKA, CHF, CKD, gastroparesis, severe protein malnutrition, splenic artery aneurysm, splenic vein thrombosis. The patient is experiencing severe pain.    Code Status: DNR Comfort Measures Only    Capacity/Decision-Making Considerations: The patient does not appear to have capacity to make health care decisions.    Advance Directives/Family/Support System: There was confusion regarding who was the appointed surrogate for decision making.  The hospital had a durable power of  for health care (dpahc) on file that designated the patient's significant other as primary and silver [place] as secondary.  Nannette had advised the health care team that a more recent dpahc existed that appointed her as the surrogate.      ETHICS DISCUSSION & ANALYSIS:    Rec 1: The patient's daughter Lori provided the dpahc dated March 2025 that appointed silver [place] as primary, Nannette as secondary, and Lori as third surrogate.  The document had been signed by the patient and witnessed by two different individuals.  There is no indication that the patient lacked capacity at that time.    Rec 2:  Chayito lives out of town and indicated by phone during the meeting that Nannette should remain the primary point of contact.  She indicated that the code status can be changed to DNRCC and that hospice can be engaged,      ETHICS RECOMMENDATIONS:    The more recently executed dpahc should prevail as it seems to have been signed and witnessed appropriately.  This means that silver [place]  is primary, Nannette secondary, and Lori is third surrogate.  Because Chayito lives outside of [place], she indicated that Nannette should serve as the initial point of contact.  Code status to be changed to DNRCC and hospice to be engaged pursuant to Chayito and care team recommendation as medically appropriate and advisable.      FOLLOW UP:   Re-consult if needed..  Please refer to the intranet On-Call Directory for contact information to reach the Ethics Consult Service (ECS)..     Hortensia Kendall JD

## 2025-04-24 NOTE — CARE PLAN
The patient's goals for the shift include      The clinical goals for the shift include manage comfort/pain

## 2025-04-24 NOTE — ACP (ADVANCE CARE PLANNING)
SUPPORTIVE AND PALLIATIVE ONCOLOGY ACP NOTE    SERVICE DATE: 4/24/25    Advance Care Planning   Time Statement: Total time spent on advance care planning was 50 minutes with 50 minutes spent in counseling, including the explanation.     Held a voluntary GOC discussion with pt's family [daughters: Chayito--via Tona, Nannette, and Jaja--in person], Supportive Oncology, primary team [Dr. Thong Stark], and clinical ethicist [Hortensia Kendall JD].     *Of note, prior to our GOC discussion our teams having confirmed and visualized most recent physical copy of HCPOA dated from 3/3/25 naming primary HCPOA: Chayito, secondary HCPOA: Nannette, and tertiary HCPOA: Jaja. Scanned into  3 chart, though pending delivery to clinical records by family. Able to proceed with GOC discussion afterward. Pt continues to lack capacity for medical decision making during our visit. Pt unable to perceive who was in the hospital room for discussion and displaying moderate confusion, hence deferring medical decision making to primary HCPOA, Chayito.     Provided brief clinical update regarding pt's case to Chayito per pt's/Nannette's request. Given pt's past documented and currently expressed goals of making comfort paramount rather than pursuing aggressive medical treatments, recommending DNR-CC transition and referral to hospice. Chayito and family agreeable to pursuit of comfort measures and referral to hospice. Despite pt's ongoing confusion, also attempted to confirm with her transition to comfort care. Pt able provide fragmented affirmation that she would like to be comfortable and her pain controlled, and would not like to pursue aggressive medical treatments. Briefly reviewed philosophy and benefits of hospice enrollment, and explained what treatments would not be provided under hospice care [artifical nutrition, IV fluids, additional tests/labs/procedures, blood transfusions, medications that do not optimize or contribute  to comfort].        SIGNATURE: LOLIS Tillman-AC   PAGER/CONTACT:  Contact information:  Supportive and Palliative Oncology  Monday-Friday 8 AM-5 PM, Epic Secure chat or pager [phone].  After hours and weekends: pager [phone]

## 2025-04-24 NOTE — PROGRESS NOTES
Physical Therapy                 Therapy Communication Note    Patient Name: Barbara English  MRN: [id number]  Department: [place] [id number]  Room: [address] [place] [address]  Today's Date: 4/24/2025     Discipline: Physical Therapy    Missed Visit Reason: Missed Visit Reason:  (deferred per RN 2/2 medical status as pt with dec alertness & pending goc; will follow up as appropriate)    Missed Time: Attempt 0845      04/24/25 at 10:35 AM - Renetta Hill, PT

## 2025-04-24 NOTE — PROGRESS NOTES
Barbara English is a 50 y.o. female on day 7 of admission presenting with Acute blood loss anemia.      Subjective   NAEON.   Reports 7-10/10 pain overnight. Increased abdominal pain this morning, but pt is overall feeling more uncomfortable than recent nights and mornings.   Also experiencing mild, intermittent nausea. Denies vomiting.   UOP and renal function worsened overnight with minimal UOP overnight (75cc from indwelling land).  No Bms in last 48 hours. Denies passing flatus.     Creatinine increase from 1.24 4/23-->1.63 4/24.  BG elevated overnight with q4 glucose checks ranging in 200-300s. Fasting morning .     Pt and daughter had questions about resuming suboxone as it  helped with symptoms in months prior. Discussed with pt and other members of care team. After multidisciplinary assessment and med record review, team deemed re-initiating pt on suboxone is not a therapy most appropriate or beneficial for current hospitalization. Refer to supportive oncology consult notes 4/18 for further details.      Planning for family meeting led by supportive oncology and ethics to assist in identifying primary decision-maker as both significantly other and pt's daughter both have POA documentation stating they are decision makers. Dispo planning pending result of today's meeting.          Objective     Last Recorded Vitals  /71   Pulse 99   Temp 37.3 °C (99.1 °F)   Resp 16   Wt 54.4 kg (120 lb)   SpO2 100%   Intake/Output last 3 Shifts:    Intake/Output Summary (Last 24 hours) at 4/24/2025 0708  Last data filed at 4/23/2025 1800  Gross per 24 hour   Intake --   Output 75 ml   Net -75 ml       Admission Weight  Weight: 54.4 kg (120 lb) (04/17/25 0900)    Daily Weight  04/17/25 : 54.4 kg (120 lb)    Image Results  ECG 12 lead  Normal sinus rhythm  Normal ECG  When compared with ECG of 31-MAR-2025 10:37,  Questionable change in QRS axis  T wave inversion no longer evident in Inferior leads  Nonspecific  "T wave abnormality, improved in Lateral leads    See ED provider note for full interpretation and clinical correlation  Confirmed by Meghana Mckenna ([id number]) on 4/21/2025 8:41:21 PM      Physical Exam  /71   Pulse 99   Temp 37.3 °C (99.1 °F)   Resp 16   Ht 1.6 m (5' 3\")   Wt 54.4 kg (120 lb)   SpO2 100%   BMI 21.26 kg/m²       Constitutional:       Comments: Patient resting in bed, very sleepy, and ill-appearing. Arousable and able to answer simple questions. Overall appears pale and jaundiced. Demonstrated some insight and understanding into her situation.   Cardiovascular:      Rate and Rhythm: Normal rate.      Heart sounds: No murmur heard.     No friction rub. No gallop.   Pulmonary:      Effort: Pulmonary effort is normal. No respiratory distress.      Breath sounds: No wheezing.   Abdominal:      General: Abdomen is soft, flat. There is no distension or tenderness to palpation.      Comments: No tenderness on palpation, no palpable masses   GYN:  -Dried blood in pad, no clots this AM  Musculoskeletal:      Right lower leg: No edema. TMA on RLE     Left lower leg: No edema.   Neuro:  -Baseline L arm/leg weakness  - AOX2 (oriented to self, place, somewhat situation, not time)       Relevant Results  Scheduled medications  Scheduled Medications[1]  Continuous medications  Continuous Medications[2]  PRN medications  PRN Medications[3]    Results for orders placed or performed during the hospital encounter of 04/17/25 (from the past 24 hours)   POCT GLUCOSE   Result Value Ref Range    POCT Glucose 133 (H) 74 - 99 mg/dL   POCT GLUCOSE   Result Value Ref Range    POCT Glucose 160 (H) 74 - 99 mg/dL   CBC   Result Value Ref Range    WBC 10.1 4.4 - 11.3 x10*3/uL    nRBC 0.0 0.0 - 0.0 /100 WBCs    RBC 3.20 (L) 4.00 - 5.20 x10*6/uL    Hemoglobin 8.1 (L) 12.0 - 16.0 g/dL    Hematocrit 26.3 (L) 36.0 - 46.0 %    MCV 82 80 - 100 fL    MCH 25.3 (L) 26.0 - 34.0 pg    MCHC 30.8 (L) 32.0 - 36.0 g/dL    RDW 18.0 (H) 11.5 " - 14.5 %    Platelets 282 150 - 450 x10*3/uL   POCT GLUCOSE   Result Value Ref Range    POCT Glucose 273 (H) 74 - 99 mg/dL   POCT GLUCOSE   Result Value Ref Range    POCT Glucose 311 (H) 74 - 99 mg/dL   POCT GLUCOSE   Result Value Ref Range    POCT Glucose 350 (H) 74 - 99 mg/dL   POCT GLUCOSE   Result Value Ref Range    POCT Glucose 250 (H) 74 - 99 mg/dL   POCT GLUCOSE   Result Value Ref Range    POCT Glucose 214 (H) 74 - 99 mg/dL       This patient has a central line   Reason for the central line remaining today?       Assessment & Plan  Acute blood loss anemia      Barbara English is a 50 y.o. 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 ED labs notable for Hgb 7.3, ALP 1016. WBC wnl, CTAP not suggestive of obvious infection, though tx with CTX in ED for c/f SBP (patient has likely malignant ascites). In regard to patients anemia, less likely GIB as CHANTAL negative, patient largely constipated (as blood act as a laxative). Patients daughter reports seeing menstrual bleeding, and has had intermittent menses recently. CTAP not suggestive of acute bleed, though not an optimal study to examine acute blood loss. Favor patients abdominal pain likely 2/2 constipation (CTAP suggestive of moderate stool burden, no evidence of obvious obstruction). Finally, patients pancreatic adenoca appears to have progressed in regard to metastasis from previous study on 03/19. There are new liver lesions and now with malignant ascites and mesenteric edema. Patient is admitted to medicine, and will continue to follow anemia with BID CBC, encourage Bms, and pursue GOC  discussions.    Updates 4/24:  - give 1x dose of relastor for BM; anticipate pt will need re-dosing in coming days/weeks; plan is to not order scheduled and reassess on daily basis   - appreciate ongoing GOC discussion with supportive onc, ethics and social work; dispo planning pending result of today's meeting   - Contact hospice for possible discharge recommendations   - PRN duladid and oxy for pain control; holding consideration of morphine in light of pt's worsened renal function  - CTM UOP and renal function, consider additional bolus if needed   - CTM blood glucose for now, reassess fasting AM glucose and possible need for dose adjustments in long acting insulin     #Acute on chronic anemia  :: Patients family reports large menstrual bleeding recently, endorsing large clots in last few days, and some additional hemorrhoidal bleeding  :: GI vs  bleed. Favor  as patient still has menses, per daughters history appears to be coming from vagina. Less likely GIB even iso gastric varices (seen on CTAP) as CHANTAL negative, blood would likely act like laxative, though cannot totally rule out  :: CHANTAL in ED with some bright red blood  :: large amount of blood seen on UA  :: Patient hypertensive, Hgb 7.5  - BID CBC  - Type and screen 04/17, consented by daughter via phone  - Coags  - Hold home apixaban     #Constipation  #Abdominal Pain  :: Patient with abd pain/ vomiting iso metastatic pancreatic cancer on large amounts of oxycodone. Has not had a BM in 6 days.  :: No evidence of obstruction on CT  :: Patient had recent flex sig 01/2025 for rectal bleeding found 3 polyps, removed. Had internal and external hemorrhoids.  - Will see how patient tolerates diet  - low threshold to pursue NGT with decompression if patient has persistent nausea  - Fleet enema, will pursue additional if unable to produce bowel movement.  - PRN Zofran     #Metastatic prostate adenoca  #Cancer related pain  :: Diagnosed initially 11/2024 after  presenting for nausea and vomiting, underwent 1 round Folfox therapy but did not tolerate  :: Last admission at [facility], discussions centered around palliative radiation vs hospice, family elected to pursue palliative radiation then hospice if does not work  :: On CTAP 04/17, there is clear progression when compared to last scan 03/19. There are new liver lesions, now has likely malignant ascites and mesenteric edema  :: on recent hospitalization, was recommended suboxone, however patient was told to d/c it on discharge (no d/c summary to confirm), so patient has not taken it  - Ongoing GOC discussions with patient and family  - pain regimen per supportive onc  - Tylenol 650mg PRN for pain     #Splenic vein thrombosis  #Varices  :: New finding in 11/2024, patient started on apixaban  :: Splenic vein thrombus persists on CTAP obtained today, now with evidence of portal HTN and gastric varices  - Holding home apixaban iso anemia     #Recent CVA  :: Thrombotic CVA on 03/31 of R middle cerebral artery with reported residual deficit  :: Patient went home with outpatient PT on discharge  - c/w home ASA daily  - c/w home atorvastatin 40mg daily     #Type I Diabetes  :: Most recent A1c (10.7% 02/2025)  :: Recent home regimen recently switched, family instructed to STOP lantus and patient should remain on SSI  - lantus 3u  - SSI #2 for now, will titrate PRN  - q4h glucose checks while NPO     #Hx of MW tear  - c/w home pantoprazole 40mg BID     #Dysphagia  :: Notes at last hospital that patient did not want PEG tube, accepted risk of aspiration  - passed SLP this admission, good for regular diet/thin liquids     #Hx HFpEF  #Hx HTN  :: Recent echocardiogram 04/01/2025 with EF 61%, grade I diastolic filling defect with elevated LAP, there is severely increased concentric LVH  :: Clinically euvolemic on exam today  - holding home metoprolol succ 25mg daily     #Mood disorder  - c/w sertraline 50mg daily  - c/w gabapentin 400mg  nightly, gabapentin 200mg qAM     F: PRN  E: PRN  N: Regular diet  A: PIV     DVT ppx: holding iso bleeding  GI ppx: PPI     Code: DNR/ DNI (confirmed with family on admission)  NOK: Nannette Nicole, Daughter, [phone]     Alyssa Roberts MD  PGY2               [1] [Held by provider] apixaban, 5 mg, oral, BID  aspirin, 81 mg, oral, Daily  atorvastatin, 40 mg, oral, Nightly  bisacodyl, 10 mg, rectal, Daily  gabapentin, 300 mg, oral, Nightly  insulin glargine, 3 Units, subcutaneous, q24h  insulin lispro, 0-10 Units, subcutaneous, q4h  [Held by provider] metoprolol succinate XL, 25 mg, oral, Daily  NIFEdipine ER, 60 mg, oral, Daily before breakfast  pantoprazole, 40 mg, oral, BID  polyethylene glycol, 17 g, oral, Daily  sennosides-docusate sodium, 1 tablet, oral, BID  sertraline, 50 mg, oral, Daily     [2]    [3] PRN medications: acetaminophen, bisacodyl, dextrose, dextrose, glucagon, glucagon, HYDROmorphone, naloxone, ondansetron, oxyCODONE, polyethylene glycol

## 2025-04-24 NOTE — PROGRESS NOTES
SUPPORTIVE AND PALLIATIVE ONCOLOGY INPATIENT FOLLOW-UP    SERVICE DATE: 04/24/25     Updates and Recommendations (04/24/25):  ACP discussion held today with pt's three daughters, Supportive Oncology, Ethics, and primary team. See associated note. HCPOA and family agreeable to DNR-CC transition and pursuit of hospice.    See EOL Comfort medication recommendations below.    Please ensure Hospice consult is placed for [facility] and LSW notified so they may send referral to [facility]. Please have them use daughter, Nannette, at point of contact. Her cell number is ([phone]).    ASSESSMENT/PLAN:  Barbara English is a 50 y.o. 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 PMHx significant for DM1 c/b gastroparesis/neuropathy/DKA, PAD s/p toe complete R metatarsal amputations, HTN, anxiety, depression, cocaine abuse (last reported use 9/2024). Admitted 4/17/2025 for further evaluation and management of abdominal pain, constipation, and BRBPR (c/f hemorrhoid vs mensuration vs cancer related). Of note, pt previously enrolled in [facility] Navigator Program--pending further GOC. Supportive and Palliative Oncology is consulted for assistance with pain management and ongoing GOC.      EOL Comfort Measures Plan:  EOL c/b abdominal pain and MOUSTAPHA.  Cancer related pain: Upper abdomen pain related to known metastatic malignancy.   At risk for shortness of breath 2/2 dying process.  At risk for nausea and vomiting.  At risk for opioid-induced constipation.  At risk for anxiety 2/2 dying process.  Assess for respiratory distress using RDOS scale. Signs of discomfort and/or respiratory distress include tachypnea, restlessness, accessory muscle use, grunting at end-expiration, nasal flaring, and fearful facial expressions.  If patient unable to self-report, assess pain using NPAT scale and assess dyspnea using RDOS scale q4h and PRN to assess response to  medications.  Combine comfort care measures with frequent skilled nursing monitoring and medication adjustments as necessary to manage symptoms of pain, respiratory distress, and inability to manage secretions 2/2 dying process.  Use Comfort Care order set  Discontinue any medications or orders that do not promote comfort or manage symptoms.  Start hydromorphone 0.4mg IV q15min PRN for moderate to severe pain, NPAT >/= 3, and/or RDOS >/= 3  Start olanzapine 5mg ODT q12h PRN for anxiety and/or agitation  Start glycopyrrolate 0.2mg IV q4h PRN for secretions  Start acetaminophen 650mg MT q4h PRN for fever  Start ondansetron 4mg IV q4h PRN for n/v  Start bisacodyl 10mg MT once daily PRN for constipation  If patient stabilizes enough for transfer to [facility]/hospice facility, will plan to transition to PO meds for symptom control.    SIGNATURE: DAVE Tillman   PAGER/CONTACT:  Contact information:  Supportive and Palliative Oncology  Monday-Friday 8 AM-5 PM  Epic Secure chat or pager [phone].  After hours and weekends:  pager [phone]    =====================================================================================================    SUBJECTIVE:    Pain Assessment:  Location: Upper abdomen  Duration: Constant  Characteristics:  Rating: Severe  Descriptors: Sharp, shooting, aching  Aggravating: Did not answer   Relieving: Analgesics    Interference with Function: Very Much    Opioid Requirements  Past 24h opioid requirements:  (4/23-4/24, 5827-6560)  hydromorphone 0.2mg IV x 1 = 2.5 OME  oxycodone IR 10mg x 1 = 12.5 OME    Total 24h OME use: 15 OME    Symptom Assessment:  Nausea: none  Constipation: none  Anxiety: none    Information obtained from: chart review, interview of patient, and discussion with primary team  ______________________________________________________________________     OBJECTIVE:    Lab Results   Component Value Date    WBC 8.6 04/24/2025    HGB 7.6 (L) 04/24/2025    HCT 24.6 (L) 04/24/2025     MCV 81 04/24/2025     04/24/2025     Lab Results   Component Value Date    GLUCOSE 161 (H) 04/24/2025    CALCIUM 8.2 (L) 04/24/2025     04/24/2025    K 3.4 (L) 04/24/2025    CO2 25 04/24/2025     04/24/2025    BUN 17 04/24/2025    CREATININE 1.63 (H) 04/24/2025     Lab Results   Component Value Date    ALT 45 04/24/2025     (H) 04/24/2025    ALKPHOS 1,073 (H) 04/24/2025    BILITOT 4.8 (H) 04/24/2025     Estimated Creatinine Clearance: 34.2 mL/min (A) (by C-G formula based on SCr of 1.63 mg/dL (H)).    Scheduled medications   Scheduled Medications[1]  Continuous medications  Continuous Medications[2]  PRN medications  acetaminophen, 650 mg, q6h PRN  bisacodyl, 10 mg, Daily PRN  dextrose, 12.5 g, q15 min PRN  dextrose, 25 g, q15 min PRN  glucagon, 1 mg, q15 min PRN  glucagon, 1 mg, q15 min PRN  HYDROmorphone, 0.4 mg, q15 min PRN  naloxone, 0.2 mg, q5 min PRN  ondansetron, 4 mg, q6h PRN  polyethylene glycol, 17 g, Daily PRN    PHYSICAL EXAMINATION:    Vital Signs:   Vital signs reviewed  Visit Vitals  /80   Pulse 96   Temp 36.9 °C (98.4 °F)   Resp 16      0-10 (Numeric) Pain Score: 10 - Worst possible pain     Physical Exam   Vitals reviewed.   Constitutional:       Comments: Drowsy, confused, ill appearing woman laying in bed. Moderate participation in interview.  HENT:   Head:      Comments: Normocephalic, atraumatic.   Eyes:      Comments: Sclera clear, atraumatic.   Pulmonary:      Comments: Symmetrical chest rise. Regular rate and depth of respirations. Room air.   Abdominal:      Comments: Abdomen slightly distended, RUQ/LUQ tender.   Musculoskeletal:      Comments: Generalized muscle weakness and atrophy. VU x4. No visible extremity edema.   Skin:     Comments: No lesions, rash, or abrasions present on visible skin. Skin color appropriate for ethnicity.   Neurological:      Comments: A&Ox2, confused who is present in hospital room, follows commands.    PALLIATIVE CARE  ENCOUNTER:    Supportive and Palliative Oncology encounter:  Spoke with patient at bedside.  Emotional support provided.  Coordination of care: medication and symptom re-evaluation, [facility]     Medical Decision Making/Goals of Care/Advance Care Planning:  (4/10/25, Per Seth Rubin MD's note)  Does not want PEG. Does not want to give up eating as it is one of the few things that give her pleasure anymore.   Discussed her goals and chemotherapy. She is reluctant to try chemotherapy again and understands that this is not a resectable disease. She would prefer radiation therapy if it is an option for her pancreatic adenocarcinoma that will be offered by her cancer team.   Pain and symptom control are top priorities for her since she was already told her prognosis was likely less than 6 months.      (4/18/2025) Unable to review due to pt's lethargy/AMS/minimal participation. Pt remains DNR.     (4/24/25) See ACP note.     Advance Directives  Existence of Advance Directives: Yes, but NOT documented in medical record  Decision maker: NOEMI is pt's daughterChayito     =====================================================================================================    Signature and billing:  Medical complexity was high level due to due to complexity of problems, extensive data review, and high risk of management/treatment.    I spent 50 minutes in the care of this patient which included chart review, interviewing patient/family, discussion with primary team, coordination of care, and documentation.    Data:   Diagnostic tests and information reviewed for today's visit:  Conversation with primary team, Most recent labs and imaging results, Most recent EKG, Medications    Some elements copied from my note on 4/21/25, the elements have been updated and all reflect current decision making from today, 04/24/25.    Plan of Care discussed with: Primary team, pt     Thank you for asking Supportive and Palliative Oncology to  assist with care of this patient.  Recommendations will be communicated back to the consulting service by way of shared electronic medical record/secure chat/email or face-to-face.   We will continue to follow.  Please contact us for additional questions or concerns.    SIGNATURE: LOLIS Tillman-AC   PAGER/CONTACT:  Contact information:  Supportive and Palliative Oncology  Monday-Friday 8 AM-5 PM, Epic Secure chat or pager [phone].  After hours and weekends: pager [phone]         [1] bisacodyl, 10 mg, rectal, Daily  gabapentin, 300 mg, oral, Nightly  [Held by provider] metoprolol succinate XL, 25 mg, oral, Daily  polyethylene glycol, 17 g, oral, Daily  sennosides-docusate sodium, 1 tablet, oral, BID  sertraline, 50 mg, oral, Daily  [2]

## 2025-04-25 LAB
GLUCOSE BLD MANUAL STRIP-MCNC: 333 MG/DL (ref 74–99)
GLUCOSE BLD MANUAL STRIP-MCNC: 336 MG/DL (ref 74–99)
GLUCOSE BLD MANUAL STRIP-MCNC: 338 MG/DL (ref 74–99)
GLUCOSE BLD MANUAL STRIP-MCNC: 355 MG/DL (ref 74–99)
GLUCOSE BLD MANUAL STRIP-MCNC: 362 MG/DL (ref 74–99)

## 2025-04-25 PROCEDURE — 2500000001 HC RX 250 WO HCPCS SELF ADMINISTERED DRUGS (ALT 637 FOR MEDICARE OP): Mod: SE

## 2025-04-25 PROCEDURE — 2500000002 HC RX 250 W HCPCS SELF ADMINISTERED DRUGS (ALT 637 FOR MEDICARE OP, ALT 636 FOR OP/ED): Mod: SE

## 2025-04-25 PROCEDURE — 99232 SBSQ HOSP IP/OBS MODERATE 35: CPT

## 2025-04-25 PROCEDURE — 2500000004 HC RX 250 GENERAL PHARMACY W/ HCPCS (ALT 636 FOR OP/ED): Mod: SE

## 2025-04-25 PROCEDURE — 1210000001 HC SEMI-PRIVATE ROOM DAILY

## 2025-04-25 PROCEDURE — 82947 ASSAY GLUCOSE BLOOD QUANT: CPT

## 2025-04-25 PROCEDURE — 2500000004 HC RX 250 GENERAL PHARMACY W/ HCPCS (ALT 636 FOR OP/ED): Mod: JZ,SE

## 2025-04-25 PROCEDURE — 99233 SBSQ HOSP IP/OBS HIGH 50: CPT

## 2025-04-25 RX ORDER — INSULIN GLARGINE 100 [IU]/ML
5 INJECTION, SOLUTION SUBCUTANEOUS EVERY 24 HOURS
Status: DISCONTINUED | OUTPATIENT
Start: 2025-04-25 | End: 2025-04-26

## 2025-04-25 RX ORDER — OXYCODONE HCL 5 MG/5 ML
5 SOLUTION, ORAL ORAL EVERY 6 HOURS
Refills: 0 | Status: DISCONTINUED | OUTPATIENT
Start: 2025-04-25 | End: 2025-04-26

## 2025-04-25 RX ADMIN — BISACODYL 10 MG: 10 SUPPOSITORY RECTAL at 08:44

## 2025-04-25 RX ADMIN — HYDROMORPHONE HYDROCHLORIDE 0.4 MG: 1 INJECTION, SOLUTION INTRAMUSCULAR; INTRAVENOUS; SUBCUTANEOUS at 20:56

## 2025-04-25 RX ADMIN — HYDROMORPHONE HYDROCHLORIDE 0.4 MG: 0.5 INJECTION, SOLUTION INTRAMUSCULAR; INTRAVENOUS; SUBCUTANEOUS at 00:23

## 2025-04-25 RX ADMIN — HYDROMORPHONE HYDROCHLORIDE 0.4 MG: 1 INJECTION, SOLUTION INTRAMUSCULAR; INTRAVENOUS; SUBCUTANEOUS at 18:49

## 2025-04-25 RX ADMIN — INSULIN GLARGINE 5 UNITS: 100 INJECTION, SOLUTION SUBCUTANEOUS at 09:30

## 2025-04-25 RX ADMIN — HYDROMORPHONE HYDROCHLORIDE 0.4 MG: 0.5 INJECTION, SOLUTION INTRAMUSCULAR; INTRAVENOUS; SUBCUTANEOUS at 03:03

## 2025-04-25 RX ADMIN — SERTRALINE 50 MG: 50 TABLET, FILM COATED ORAL at 08:44

## 2025-04-25 RX ADMIN — HYDROMORPHONE HYDROCHLORIDE 0.4 MG: 1 INJECTION, SOLUTION INTRAMUSCULAR; INTRAVENOUS; SUBCUTANEOUS at 09:51

## 2025-04-25 RX ADMIN — SENNOSIDES AND DOCUSATE SODIUM 1 TABLET: 50; 8.6 TABLET ORAL at 08:44

## 2025-04-25 RX ADMIN — HYDROMORPHONE HYDROCHLORIDE 0.5 MG: 1 INJECTION, SOLUTION INTRAMUSCULAR; INTRAVENOUS; SUBCUTANEOUS at 23:16

## 2025-04-25 RX ADMIN — OXYCODONE HYDROCHLORIDE 5 MG: 5 SOLUTION ORAL at 17:42

## 2025-04-25 RX ADMIN — HYDROMORPHONE HYDROCHLORIDE 0.4 MG: 1 INJECTION, SOLUTION INTRAMUSCULAR; INTRAVENOUS; SUBCUTANEOUS at 14:34

## 2025-04-25 RX ADMIN — HYDROMORPHONE HYDROCHLORIDE 0.4 MG: 0.5 INJECTION, SOLUTION INTRAMUSCULAR; INTRAVENOUS; SUBCUTANEOUS at 07:02

## 2025-04-25 RX ADMIN — POLYETHYLENE GLYCOL 3350 17 G: 17 POWDER, FOR SOLUTION ORAL at 08:44

## 2025-04-25 ASSESSMENT — PAIN - FUNCTIONAL ASSESSMENT
PAIN_FUNCTIONAL_ASSESSMENT: 0-10

## 2025-04-25 ASSESSMENT — PAIN SCALES - GENERAL
PAINLEVEL_OUTOF10: 10 - WORST POSSIBLE PAIN
PAINLEVEL_OUTOF10: 2
PAINLEVEL_OUTOF10: 8

## 2025-04-25 ASSESSMENT — PAIN DESCRIPTION - LOCATION: LOCATION: ABDOMEN

## 2025-04-25 NOTE — PROGRESS NOTES
Transitional Care Coordination Progress Note:  Patient was discussed during interdisciplinary rounds.  Team members present: medical team, and TCC.  Plan per medical team: Per team, there was a family meeting on 4/24 to discuss GOC.  Family is agreeable to hospice care.  Payer: [facility]  Status: Inpatient.  Discharge disposition: Per supportive and palliative oncology, family is ok with a referral to [facility].  A referral was sent to the [facility] via [employer] this morning, awaiting updates on status.    Potential barriers: Await [facility] to schedule meeting with family.  ADOD: 4/26.  Care coordinator will continue to follow for discharge planning needs.     Addendum 1455:  Per [facility], they are still trying to reach family to schedule a meeting (left a message for jessica Brunson). Plan to follow up on status, [facility] team updated on the above.    Addendum 0145:  Per [facility], family meeting on 4/26 between 4-4:30pm, [facility] team notified.    Temi Lopez MSN, RN-BC  Transitional Care Coordinator (TCC)  [phone]

## 2025-04-25 NOTE — CARE PLAN
The clinical goals for the shift include Pt's pain will be managed throughout shift    Problem: Pain - Adult  Goal: Verbalizes/displays adequate comfort level or baseline comfort level  Outcome: Progressing     Problem: Safety - Adult  Goal: Free from fall injury  Outcome: Progressing     Problem: Discharge Planning  Goal: Discharge to home or other facility with appropriate resources  Outcome: Progressing     Problem: Chronic Conditions and Co-morbidities  Goal: Patient's chronic conditions and co-morbidity symptoms are monitored and maintained or improved  Outcome: Progressing     Problem: Nutrition  Goal: Nutrient intake appropriate for maintaining nutritional needs  Outcome: Progressing     Problem: Fall/Injury  Goal: Not fall by end of shift  Outcome: Progressing  Goal: Be free from injury by end of the shift  Outcome: Progressing  Goal: Verbalize understanding of personal risk factors for fall in the hospital  Outcome: Progressing  Goal: Verbalize understanding of risk factor reduction measures to prevent injury from fall in the home  Outcome: Progressing  Goal: Use assistive devices by end of the shift  Outcome: Progressing  Goal: Pace activities to prevent fatigue by end of the shift  Outcome: Progressing     Problem: Skin  Goal: Decreased wound size/increased tissue granulation at next dressing change  Outcome: Progressing  Flowsheets (Taken 4/25/2025 0327)  Decreased wound size/increased tissue granulation at next dressing change: Protective dressings over bony prominences  Goal: Participates in plan/prevention/treatment measures  Outcome: Progressing  Flowsheets (Taken 4/25/2025 0327)  Participates in plan/prevention/treatment measures: Elevate heels  Goal: Prevent/manage excess moisture  Outcome: Progressing  Flowsheets (Taken 4/25/2025 0327)  Prevent/manage excess moisture:   Cleanse incontinence/protect with barrier cream   Moisturize dry skin  Goal: Prevent/minimize sheer/friction injuries  Outcome:  Progressing  Flowsheets (Taken 4/25/2025 0327)  Prevent/minimize sheer/friction injuries: Turn/reposition every 2 hours/use positioning/transfer devices  Goal: Promote/optimize nutrition  Outcome: Progressing  Flowsheets (Taken 4/25/2025 0327)  Promote/optimize nutrition: Assist with feeding  Goal: Promote skin healing  Outcome: Progressing  Flowsheets (Taken 4/25/2025 0327)  Promote skin healing: Assess skin/pad under line(s)/device(s)     Problem: Diabetes  Goal: Achieve decreasing blood glucose levels by end of shift  Outcome: Progressing  Goal: Increase stability of blood glucose readings by end of shift  Outcome: Progressing  Goal: Maintain glucose levels >70mg/dl to <250mg/dl throughout shift  Outcome: Progressing  Goal: Decrease in ketones present in urine by end of shift  Outcome: Progressing  Goal: Maintain electrolyte levels within acceptable range throughout shift  Outcome: Progressing  Goal: No changes in neurological exam by end of shift  Outcome: Progressing  Goal: Learn about and adhere to nutrition recommendations by end of shift  Outcome: Progressing  Goal: Vital signs within normal range for age by end of shift  Outcome: Progressing  Goal: Increase self care and/or family involovement by end of shift  Outcome: Progressing  Goal: Receive DSME education by end of shift  Outcome: Progressing     Problem: Pain  Goal: Takes deep breaths with improved pain control throughout the shift  Outcome: Progressing  Goal: Turns in bed with improved pain control throughout the shift  Outcome: Progressing  Goal: Walks with improved pain control throughout the shift  Outcome: Progressing  Goal: Performs ADL's with improved pain control throughout shift  Outcome: Progressing  Goal: Participates in PT with improved pain control throughout the shift  Outcome: Progressing  Goal: Free from opioid side effects throughout the shift  Outcome: Progressing  Goal: Free from acute confusion related to pain meds throughout the  shift  Outcome: Progressing

## 2025-04-25 NOTE — PROGRESS NOTES
"Barbara English is a 50 y.o. female on day 8 of admission presenting with Acute blood loss anemia.      Subjective   NAEON.     Pt transitioned to comfort care after family meeting yesterday afternoon 4/24, which was led by primary team, ethics and supportive oncology.     Pt is having bowel movements, and pain is well controlled with current regimen.     Plan to continue insulin regimen as pt is T1 diabetic, and preventing possible development of DKA would be in line with comfort measures.     Otherwise, all labs and interventions not in accordance with comfort care have been discontinued.        Objective     Last Recorded Vitals  /89   Pulse 89   Temp 36.6 °C (97.9 °F)   Resp 16   Wt 54.4 kg (120 lb)   SpO2 98%   Intake/Output last 3 Shifts:    Intake/Output Summary (Last 24 hours) at 4/25/2025 0721  Last data filed at 4/25/2025 0700  Gross per 24 hour   Intake 600 ml   Output 700 ml   Net -100 ml       Admission Weight  Weight: 54.4 kg (120 lb) (04/17/25 0900)    Daily Weight  04/17/25 : 54.4 kg (120 lb)    Image Results  ECG 12 lead  Normal sinus rhythm  Normal ECG  When compared with ECG of 31-MAR-2025 10:37,  Questionable change in QRS axis  T wave inversion no longer evident in Inferior leads  Nonspecific T wave abnormality, improved in Lateral leads    See ED provider note for full interpretation and clinical correlation  Confirmed by Meghana Mckenna ([id number]) on 4/21/2025 8:41:21 PM      Physical Exam  /89   Pulse 89   Temp 36.6 °C (97.9 °F)   Resp 16   Ht 1.6 m (5' 3\")   Wt 54.4 kg (120 lb)   SpO2 98%   BMI 21.26 kg/m²       Constitutional:       Comments: Patient resting in bed, very sleepy, and ill-appearing. Arousable and able to answer simple questions. Overall appears pale and jaundiced. Demonstrated some insight and understanding into her situation.   Cardiovascular:      Rate and Rhythm: Normal rate.      Heart sounds: No murmur heard.     No friction rub. No gallop.   Pulmonary: "      Effort: Pulmonary effort is normal. No respiratory distress.      Breath sounds: No wheezing.   Abdominal:      General: Abdomen is soft, flat. There is no distension or tenderness to palpation.      Comments: No tenderness on palpation, no palpable masses   GYN:  -Dried blood in pad, no clots this AM  Musculoskeletal:      Right lower leg: No edema. TMA on RLE     Left lower leg: No edema.   Neuro:  -Baseline L arm/leg weakness  - AOX2 (oriented to self, place, somewhat situation, not time)       Relevant Results  Scheduled medications  Scheduled Medications[1]  Continuous medications  Continuous Medications[2]  PRN medications  PRN Medications[3]    Results for orders placed or performed during the hospital encounter of 04/17/25 (from the past 24 hours)   POCT GLUCOSE   Result Value Ref Range    POCT Glucose 147 (H) 74 - 99 mg/dL   POCT GLUCOSE   Result Value Ref Range    POCT Glucose 282 (H) 74 - 99 mg/dL   POCT GLUCOSE   Result Value Ref Range    POCT Glucose 353 (H) 74 - 99 mg/dL   POCT GLUCOSE   Result Value Ref Range    POCT Glucose 394 (H) 74 - 99 mg/dL   POCT GLUCOSE   Result Value Ref Range    POCT Glucose 333 (H) 74 - 99 mg/dL       This patient has a central line   Reason for the central line remaining today?       Assessment & Plan  Acute blood loss anemia      Barbara English is a 50 y.o. 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 ED labs notable for Hgb 7.3, ALP 1016. WBC wnl, CTAP not suggestive of obvious infection, though tx with CTX in ED for c/f SBP (patient has likely malignant ascites). In regard to patients anemia, less likely GIB as  CHANTAL negative, patient largely constipated (as blood act as a laxative). Patients daughter reports seeing menstrual bleeding, and has had intermittent menses recently. CTAP not suggestive of acute bleed, though not an optimal study to examine acute blood loss. Favor patients abdominal pain likely 2/2 constipation (CTAP suggestive of moderate stool burden, no evidence of obvious obstruction). Finally, patients pancreatic adenoca appears to have progressed in regard to metastasis from previous study on 03/19. There are new liver lesions and now with malignant ascites and mesenteric edema. Patient is admitted to medicine, and will continue to follow anemia with BID CBC, encourage Bms, and pursue GOC discussions.    Updates 4/25:  Appreciate supportive oncology recs for comfort care and symptom management:   Start hydromorphone 0.4mg IV q15min PRN for moderate to severe pain, NPAT >/= 3, and/or RDOS >/= 3  Start olanzapine 5mg ODT q12h PRN for anxiety and/or agitation  Start glycopyrrolate 0.2mg IV q4h PRN for secretions  Start acetaminophen 650mg AL q4h PRN for fever  Start ondansetron 4mg IV q4h PRN for n/v  Start bisacodyl 10mg AL once daily PRN for constipation  If patient stabilizes enough for transfer to [facility]/hospice facility, will plan to transition to PO meds for symptom control.   - In contact with hospice for possible discharge recommendations   - CTM blood glucose for now, reassess fasting AM glucose and possible need for dose adjustments in long acting insulin     #Acute on chronic anemia  :: Patients family reports large menstrual bleeding recently, endorsing large clots in last few days, and some additional hemorrhoidal bleeding  :: GI vs  bleed. Favor  as patient still has menses, per daughters history appears to be coming from vagina. Less likely GIB even iso gastric varices (seen on CTAP) as CHANTAL negative, blood would likely act like laxative, though cannot totally rule out  :: CHANTAL in ED with some  bright red blood  :: large amount of blood seen on UA  :: Patient hypertensive, Hgb 7.5  - BID CBC  - Type and screen 04/17, consented by daughter via phone  - Coags  - Hold home apixaban     #Constipation  #Abdominal Pain  :: Patient with abd pain/ vomiting iso metastatic pancreatic cancer on large amounts of oxycodone. Has not had a BM in 6 days.  :: No evidence of obstruction on CT  :: Patient had recent flex sig 01/2025 for rectal bleeding found 3 polyps, removed. Had internal and external hemorrhoids.  - Will see how patient tolerates diet  - low threshold to pursue NGT with decompression if patient has persistent nausea  - Fleet enema, will pursue additional if unable to produce bowel movement.  - PRN Zofran     #Metastatic prostate adenoca  #Cancer related pain  :: Diagnosed initially 11/2024 after presenting for nausea and vomiting, underwent 1 round Folfox therapy but did not tolerate  :: Last admission at [facility], discussions centered around palliative radiation vs hospice, family elected to pursue palliative radiation then hospice if does not work  :: On CTAP 04/17, there is clear progression when compared to last scan 03/19. There are new liver lesions, now has likely malignant ascites and mesenteric edema  :: on recent hospitalization, was recommended suboxone, however patient was told to d/c it on discharge (no d/c summary to confirm), so patient has not taken it  - Ongoing GOC discussions with patient and family  - pain regimen per supportive onc  - Tylenol 650mg PRN for pain     #Splenic vein thrombosis  #Varices  :: New finding in 11/2024, patient started on apixaban  :: Splenic vein thrombus persists on CTAP obtained today, now with evidence of portal HTN and gastric varices  - Holding home apixaban iso anemia     #Recent CVA  :: Thrombotic CVA on 03/31 of R middle cerebral artery with reported residual deficit  :: Patient went home with outpatient PT on discharge  - c/w home ASA daily  - c/w home  atorvastatin 40mg daily     #Type I Diabetes  :: Most recent A1c (10.7% 02/2025)  :: Recent home regimen recently switched, family instructed to STOP lantus and patient should remain on SSI  - lantus 3u  - SSI #2 for now, will titrate PRN  - q4h glucose checks while NPO     #Hx of MW tear  - c/w home pantoprazole 40mg BID     #Dysphagia  :: Notes at last hospital that patient did not want PEG tube, accepted risk of aspiration  - passed SLP this admission, good for regular diet/thin liquids     #Hx HFpEF  #Hx HTN  :: Recent echocardiogram 04/01/2025 with EF 61%, grade I diastolic filling defect with elevated LAP, there is severely increased concentric LVH  :: Clinically euvolemic on exam today  - holding home metoprolol succ 25mg daily     #Mood disorder  - c/w sertraline 50mg daily  - c/w gabapentin 400mg nightly, gabapentin 200mg qAM     F: PRN  E: PRN  N: Regular diet  A: PIV     DVT ppx: holding iso bleeding  GI ppx: PPI     Code: DNR/ DNI (confirmed with family on admission)  NOK: Nannette Nicole, Daughter, [phone]     Alyssa Roberts MD  PGY2               [1] bisacodyl, 10 mg, rectal, Daily  gabapentin, 300 mg, oral, Nightly  [Held by provider] metoprolol succinate XL, 25 mg, oral, Daily  polyethylene glycol, 17 g, oral, Daily  sennosides-docusate sodium, 1 tablet, oral, BID  sertraline, 50 mg, oral, Daily     [2]    [3] PRN medications: acetaminophen, acetaminophen, bisacodyl, dextrose, dextrose, glucagon, glucagon, glycopyrrolate, HYDROmorphone, HYDROmorphone, naloxone, OLANZapine zydis, ondansetron, polyethylene glycol

## 2025-04-25 NOTE — CARE PLAN
Problem: Pain - Adult  Goal: Verbalizes/displays adequate comfort level or baseline comfort level  Outcome: Progressing     Problem: Safety - Adult  Goal: Free from fall injury  Outcome: Progressing     Problem: Discharge Planning  Goal: Discharge to home or other facility with appropriate resources  Outcome: Progressing   The patient's goals for the shift include  safety    The clinical goals for the shift include pain control    Over the shift, the patient did make progress toward the following goals.

## 2025-04-25 NOTE — PROGRESS NOTES
SUPPORTIVE AND PALLIATIVE ONCOLOGY INPATIENT FOLLOW-UP    SERVICE DATE: 04/25/25     Updates and Recommendations (04/25/25):  Start scheduled oxycodone solution 5mg PO q6h     Of note, cannot safely justify initiation of a long-acting pain medication and/or continuous PCA pump based on pt's 24h OME intake    ASSESSMENT/PLAN:  Barbara English is a 50 y.o. 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 PMHx significant for DM1 c/b gastroparesis/neuropathy/DKA, PAD s/p toe complete R metatarsal amputations, HTN, anxiety, depression, cocaine abuse (last reported use 9/2024). Admitted 4/17/2025 for further evaluation and management of abdominal pain, constipation, and BRBPR (c/f hemorrhoid vs mensuration vs cancer related). Of note, pt previously enrolled in [facility]--pending further GOC. Supportive and Palliative Oncology is consulted for assistance with pain management and ongoing GOC.      EOL Comfort Measures Plan:  EOL c/b abdominal pain and MOUSTAPHA.  Cancer related pain: Upper abdomen pain related to known metastatic malignancy.   At risk for shortness of breath 2/2 dying process.  At risk for nausea and vomiting.  At risk for opioid-induced constipation.  At risk for anxiety 2/2 dying process.  Assess for respiratory distress using RDOS scale. Signs of discomfort and/or respiratory distress include tachypnea, restlessness, accessory muscle use, grunting at end-expiration, nasal flaring, and fearful facial expressions.  If patient unable to self-report, assess pain using NPAT scale and assess dyspnea using RDOS scale q4h and PRN to assess response to medications.  Combine comfort care measures with frequent skilled nursing monitoring and medication adjustments as necessary to manage symptoms of pain, respiratory distress, and inability to manage secretions 2/2 dying process.  Use Comfort Care order set  Discontinue any  "medications or orders that do not promote comfort or manage symptoms.  Of note, cannot safely justify initiation of a long-acting pain medication and/or continuous PCA pump based on pt's 24h OME intake  Start scheduled oxycodone solution 5mg PO q6h   Continue hydromorphone 0.4mg IV q15min PRN for moderate to severe pain, NPAT >/= 3, and/or RDOS >/= 3  Continue gabapentin 300mg PO once daily HS   Continue sertraline 50mg PO once daily   Continue olanzapine 5mg ODT q12h PRN for anxiety and/or agitation  Continue glycopyrrolate 0.2mg IV q4h PRN for secretions  Continue acetaminophen 650mg PO q4h PRN for fever  Continue ondansetron 4mg IV q6h PRN for n/v  Continue scheduled Miralax 17g PO once daily  Continue scheduled Hayley-Colace 1 tab PO BID  Continue bisacodyl 10mg NM once daily PRN for constipation  If patient stabilizes enough for transfer to F/hospice facility, will plan to transition to PO meds for symptom control.    SIGNATURE: DAVE Tillman   PAGER/CONTACT:  Contact information:  Supportive and Palliative Oncology  Monday-Friday 8 AM-5 PM  Epic Secure chat or pager [phone].  After hours and weekends:  pager [phone]    =====================================================================================================    SUBJECTIVE:    Interval Events:  Pt's pain remains sub-optimally controlled. Pt only having received minimal PRN pain medications in the last 24h despite current q15min availability. She is still confused and encephalopathic.     Pain Assessment:  Repeatedly stating, \"I have pain!\" Pt unable to further elaborate/specify.     NPAT (Non-Verbal Pain Assessment Tool):   Emotion: 1: Anxious; irritable; withdrawn; closes eyes; does not engage with physical environment  Movement: 1: Restless or slow; decreased movement  Verbal Cues: 1: Whimpering; moaning; sighing  Facial Cues: 0: Relaxed; calm expression  Positioning/Guardin: Relaxed body  Total Score: 3    Opioid Requirements  Past 24h " opioid requirements:  (4/24-4/25, 8527-7394)  oxycodone IR 10mg x 1 = 12.5 OME   hydromorphone 0.4mg IV x 4 = 1.6mg IV = 20 OME    Total 24h OME use: 32.5 OME    Symptom Assessment:  Nausea: none  Agitation: none  Restlessness: a little    Information obtained from: chart review, interview of patient, and discussion with primary team  ______________________________________________________________________     OBJECTIVE:    Lab Results   Component Value Date    WBC 8.6 04/24/2025    HGB 7.6 (L) 04/24/2025    HCT 24.6 (L) 04/24/2025    MCV 81 04/24/2025     04/24/2025     Lab Results   Component Value Date    GLUCOSE 161 (H) 04/24/2025    CALCIUM 8.2 (L) 04/24/2025     04/24/2025    K 3.4 (L) 04/24/2025    CO2 25 04/24/2025     04/24/2025    BUN 17 04/24/2025    CREATININE 1.63 (H) 04/24/2025     Lab Results   Component Value Date    ALT 45 04/24/2025     (H) 04/24/2025    ALKPHOS 1,073 (H) 04/24/2025    BILITOT 4.8 (H) 04/24/2025     Estimated Creatinine Clearance: 34.2 mL/min (A) (by C-G formula based on SCr of 1.63 mg/dL (H)).    Scheduled medications   Scheduled Medications[1]  Continuous medications  Continuous Medications[2]  PRN medications  acetaminophen, 650 mg, q6h PRN  acetaminophen, 650 mg, q6h PRN  bisacodyl, 10 mg, Daily PRN  dextrose, 12.5 g, q15 min PRN  dextrose, 25 g, q15 min PRN  glucagon, 1 mg, q15 min PRN  glucagon, 1 mg, q15 min PRN  glycopyrrolate, 0.2 mg, q4h PRN  HYDROmorphone, 0.4 mg, q15 min PRN  HYDROmorphone, 0.4 mg, q15 min PRN  naloxone, 0.2 mg, q5 min PRN  OLANZapine zydis, 5 mg, q12h PRN  ondansetron, 4 mg, q6h PRN  polyethylene glycol, 17 g, Daily PRN    PHYSICAL EXAMINATION:    Vital Signs:   Vital signs reviewed  Visit Vitals  /87   Pulse 97   Temp 36.5 °C (97.7 °F)   Resp 17      0-10 (Numeric) Pain Score: 8     Physical Exam   Vitals reviewed.   Constitutional:       Comments: Drowsy, confused, restless ill appearing woman laying in bed. Moderate  participation in interview, though nonsensical at times due to confusion.  HENT:   Head:      Comments: Normocephalic, atraumatic.   Eyes:      Comments: Sclera clear, atraumatic.   Pulmonary:      Comments: Symmetrical chest rise. Regular rate and depth of respirations. Room air.   Abdominal:      Comments: Abdomen slightly distended, RUQ/LUQ tender.   Musculoskeletal:      Comments: Generalized muscle weakness and atrophy. VU x4. No visible extremity edema.   Skin:     Comments: No lesions, rash, or abrasions present on visible skin. Skin color appropriate for ethnicity.   Neurological:      Comments: A&Ox2, intermittently follows commands.    PALLIATIVE CARE ENCOUNTER:    Supportive and Palliative Oncology encounter:  Spoke with patient at bedside.  Emotional support provided.  Coordination of care: medication and symptom re-evaluation at EOL     Medical Decision Making/Goals of Care/Advance Care Planning:  (4/10/25, Per Seth Rubin MD's note)  Does not want PEG. Does not want to give up eating as it is one of the few things that give her pleasure anymore.   Discussed her goals and chemotherapy. She is reluctant to try chemotherapy again and understands that this is not a resectable disease. She would prefer radiation therapy if it is an option for her pancreatic adenocarcinoma that will be offered by her cancer team.   Pain and symptom control are top priorities for her since she was already told her prognosis was likely less than 6 months.      (4/18/2025) Unable to review due to pt's lethargy/AMS/minimal participation. Pt remains DNR.      (4/24/25) See ACP note.     Advance Directives  Existence of Advance Directives: Yes, but NOT documented in medical record  Decision maker: NOEMI is pt's daughterChayito     =====================================================================================================    Signature and billing:  Medical complexity was high level due to due to complexity of  problems, extensive data review, and high risk of management/treatment.    I spent 50 minutes in the care of this patient which included chart review, interviewing patient/family, discussion with primary team, coordination of care, and documentation.    Data:   Diagnostic tests and information reviewed for today's visit:  Conversation with primary team, Most recent labs, Most recent EKG, Medications    Some elements copied from my note on 4/24/25, the elements have been updated and all reflect current decision making from today, 04/25/25.    Plan of Care discussed with: Primary team, pt     Thank you for asking Supportive and Palliative Oncology to assist with care of this patient.  Recommendations will be communicated back to the consulting service by way of shared electronic medical record/secure chat/email or face-to-face.   We will continue to follow.  Please contact us for additional questions or concerns.    SIGNATURE: DAVE Tillman   PAGER/CONTACT:  Contact information:  Supportive and Palliative Oncology  Monday-Friday 8 AM-5 PM, Epic Secure chat or pager [phone].  After hours and weekends: pager [phone]       [1] bisacodyl, 10 mg, rectal, Daily  gabapentin, 300 mg, oral, Nightly  insulin glargine, 5 Units, subcutaneous, q24h  [Held by provider] metoprolol succinate XL, 25 mg, oral, Daily  polyethylene glycol, 17 g, oral, Daily  sennosides-docusate sodium, 1 tablet, oral, BID  sertraline, 50 mg, oral, Daily  [2]

## 2025-04-26 LAB
GLUCOSE BLD MANUAL STRIP-MCNC: 324 MG/DL (ref 74–99)
GLUCOSE BLD MANUAL STRIP-MCNC: 343 MG/DL (ref 74–99)
GLUCOSE BLD MANUAL STRIP-MCNC: 377 MG/DL (ref 74–99)

## 2025-04-26 PROCEDURE — 2500000001 HC RX 250 WO HCPCS SELF ADMINISTERED DRUGS (ALT 637 FOR MEDICARE OP): Mod: SE

## 2025-04-26 PROCEDURE — 2500000002 HC RX 250 W HCPCS SELF ADMINISTERED DRUGS (ALT 637 FOR MEDICARE OP, ALT 636 FOR OP/ED): Mod: SE

## 2025-04-26 PROCEDURE — 2500000004 HC RX 250 GENERAL PHARMACY W/ HCPCS (ALT 636 FOR OP/ED): Mod: JZ,SE

## 2025-04-26 PROCEDURE — 99232 SBSQ HOSP IP/OBS MODERATE 35: CPT | Performed by: INTERNAL MEDICINE

## 2025-04-26 PROCEDURE — 2500000004 HC RX 250 GENERAL PHARMACY W/ HCPCS (ALT 636 FOR OP/ED): Mod: SE

## 2025-04-26 PROCEDURE — 82947 ASSAY GLUCOSE BLOOD QUANT: CPT

## 2025-04-26 PROCEDURE — 1210000001 HC SEMI-PRIVATE ROOM DAILY

## 2025-04-26 RX ORDER — NALOXONE HYDROCHLORIDE 0.4 MG/ML
0.2 INJECTION, SOLUTION INTRAMUSCULAR; INTRAVENOUS; SUBCUTANEOUS AS NEEDED
Status: DISCONTINUED | OUTPATIENT
Start: 2025-04-26 | End: 2025-04-27 | Stop reason: HOSPADM

## 2025-04-26 RX ORDER — INSULIN GLARGINE 100 [IU]/ML
5 INJECTION, SOLUTION SUBCUTANEOUS EVERY 24 HOURS
Status: CANCELLED
Start: 2025-04-26

## 2025-04-26 RX ORDER — OXYCODONE HCL 5 MG/5 ML
5 SOLUTION, ORAL ORAL EVERY 6 HOURS
Status: CANCELLED
Start: 2025-04-26

## 2025-04-26 RX ORDER — HYDROMORPHONE HCL/0.9% NACL/PF 15 MG/30ML
PATIENT CONTROLLED ANALGESIA SYRINGE INTRAVENOUS CONTINUOUS
Refills: 0 | Status: DISCONTINUED | OUTPATIENT
Start: 2025-04-26 | End: 2025-04-27 | Stop reason: HOSPADM

## 2025-04-26 RX ORDER — INSULIN GLARGINE 100 [IU]/ML
10 INJECTION, SOLUTION SUBCUTANEOUS EVERY 24 HOURS
Status: DISCONTINUED | OUTPATIENT
Start: 2025-04-27 | End: 2025-04-27 | Stop reason: HOSPADM

## 2025-04-26 RX ADMIN — SENNOSIDES AND DOCUSATE SODIUM 1 TABLET: 50; 8.6 TABLET ORAL at 09:23

## 2025-04-26 RX ADMIN — HYDROMORPHONE HYDROCHLORIDE 0.5 MG: 1 INJECTION, SOLUTION INTRAMUSCULAR; INTRAVENOUS; SUBCUTANEOUS at 04:21

## 2025-04-26 RX ADMIN — HYDROMORPHONE HYDROCHLORIDE 0.5 MG: 1 INJECTION, SOLUTION INTRAMUSCULAR; INTRAVENOUS; SUBCUTANEOUS at 09:23

## 2025-04-26 RX ADMIN — HYDROMORPHONE HYDROCHLORIDE 0.5 MG: 1 INJECTION, SOLUTION INTRAMUSCULAR; INTRAVENOUS; SUBCUTANEOUS at 00:01

## 2025-04-26 RX ADMIN — SERTRALINE 50 MG: 50 TABLET, FILM COATED ORAL at 09:23

## 2025-04-26 RX ADMIN — HYDROMORPHONE HYDROCHLORIDE 0.5 MG: 1 INJECTION, SOLUTION INTRAMUSCULAR; INTRAVENOUS; SUBCUTANEOUS at 06:17

## 2025-04-26 RX ADMIN — Medication: at 14:47

## 2025-04-26 RX ADMIN — POLYETHYLENE GLYCOL 3350 17 G: 17 POWDER, FOR SOLUTION ORAL at 09:24

## 2025-04-26 ASSESSMENT — PAIN SCALES - GENERAL
PAINLEVEL_OUTOF10: 10 - WORST POSSIBLE PAIN

## 2025-04-26 ASSESSMENT — PAIN - FUNCTIONAL ASSESSMENT
PAIN_FUNCTIONAL_ASSESSMENT: 0-10

## 2025-04-26 ASSESSMENT — COGNITIVE AND FUNCTIONAL STATUS - GENERAL
DRESSING REGULAR LOWER BODY CLOTHING: TOTAL
PERSONAL GROOMING: TOTAL
DAILY ACTIVITIY SCORE: 8
CLIMB 3 TO 5 STEPS WITH RAILING: TOTAL
MOVING TO AND FROM BED TO CHAIR: A LOT
STANDING UP FROM CHAIR USING ARMS: TOTAL
TURNING FROM BACK TO SIDE WHILE IN FLAT BAD: A LOT
HELP NEEDED FOR BATHING: TOTAL
MOBILITY SCORE: 9
DRESSING REGULAR UPPER BODY CLOTHING: TOTAL
TOILETING: TOTAL
MOVING FROM LYING ON BACK TO SITTING ON SIDE OF FLAT BED WITH BEDRAILS: A LOT
EATING MEALS: A LITTLE
WALKING IN HOSPITAL ROOM: TOTAL

## 2025-04-26 ASSESSMENT — PAIN DESCRIPTION - LOCATION
LOCATION: GENERALIZED
LOCATION: ABDOMEN
LOCATION: GENERALIZED
LOCATION: ABDOMEN

## 2025-04-26 NOTE — PROGRESS NOTES
Subjective   Barbara English is a 50 y.o. female on hospital day 9 reports intermittent abdominal pain which does improve with pain meds.  Patient requesting pain medication when I saw her this morning.    Objective     Exam     Vitals:    04/25/25 0701 04/25/25 1353 04/25/25 2021 04/26/25 0454   BP: 142/89 145/87 (!) 173/98 (!) 162/98   BP Location:    Right arm   Patient Position:    Lying   Pulse: 89 97 97 94   Resp:  17 16 15   Temp:  36.5 °C (97.7 °F) 36.5 °C (97.7 °F) 36.2 °C (97.2 °F)   TempSrc:    Temporal   SpO2: 98% 99% 99% 99%   Weight:       Height:          Intake/Output last 3 shifts:  I/O last 3 completed shifts:  In: - (0 mL/kg)   Out: 580 (10.7 mL/kg) [Urine:580 (0.3 mL/kg/hr)]  Weight: 54.4 kg     Physical Exam  Vitals reviewed.   Constitutional:       General: She is not in acute distress.     Appearance: She is ill-appearing. She is not toxic-appearing or diaphoretic.      Comments: Cachectic female appears somewhat drowsy but comfortable   HENT:      Head: Normocephalic and atraumatic.   Cardiovascular:      Rate and Rhythm: Normal rate and regular rhythm.      Pulses: Normal pulses.      Heart sounds: No murmur heard.     No friction rub. No gallop.   Pulmonary:      Effort: Pulmonary effort is normal.      Breath sounds: Normal breath sounds. No wheezing, rhonchi or rales.      Comments: Anteriorly  Abdominal:      General: Bowel sounds are normal. There is no distension.      Palpations: Abdomen is soft.      Tenderness: There is abdominal tenderness. There is no guarding or rebound.   Musculoskeletal:      Right lower leg: No edema.      Left lower leg: No edema.   Skin:     General: Skin is warm and dry.   Neurological:      Comments: Left-sided hemiplegia            Medications   Scheduled Medications[1]   PRN Medications[2]       Labs     All new labs reviewed:  some of the basic labs as follows -     Results from last 7 days   Lab Units 04/24/25  0531 04/23/25  1154 04/22/25  0542  "04/21/25  0452   WBC AUTO x10*3/uL 8.6 10.1 8.3 9.5   HEMOGLOBIN g/dL 7.6* 8.1* 7.4* 8.3*   HEMATOCRIT % 24.6* 26.3* 23.5* 26.5*   PLATELETS AUTO x10*3/uL 264 282 300 319   NEUTROS PCT AUTO % 82.2  --  79.7 82.6   LYMPHS PCT AUTO % 9.0  --  9.2 7.6   MONOS PCT AUTO % 7.0  --  6.9 6.8   EOS PCT AUTO % 1.0  --  1.7 2.0          Results from last 72 hours   Lab Units 04/24/25  0531   SODIUM mmol/L 142   POTASSIUM mmol/L 3.4*   CHLORIDE mmol/L 106   CO2 mmol/L 25   BUN mg/dL 17   CREATININE mg/dL 1.63*     Results from last 72 hours   Lab Units 04/24/25  0531   ALK PHOS U/L 1,073*   AST U/L 109*   ALT U/L 45   BILIRUBIN TOTAL mg/dL 4.8*   ALBUMIN g/dL 2.6*   PROTEIN TOTAL g/dL 5.6*     Results from last 72 hours   Lab Units 04/24/25  0531   GLUCOSE mg/dL 161*         No results found for: \"TR1\"  Lab Results   Component Value Date    URINECULTURE  04/17/2025     Growth indicates contamination with mixed bacterial emilia. Repeat culture if clinically indicated.    BLOODCULT No growth at 4 days -  FINAL REPORT 04/30/2024    BLOODCULT No growth at 4 days -  FINAL REPORT 04/30/2024            Imaging   ECG 12 lead  Normal sinus rhythm  Normal ECG  When compared with ECG of 31-MAR-2025 10:37,  Questionable change in QRS axis  T wave inversion no longer evident in Inferior leads  Nonspecific T wave abnormality, improved in Lateral leads    See ED provider note for full interpretation and clinical correlation  Confirmed by Meghana Mckenna ([id number]) on 4/21/2025 8:41:21 PM     No results found for this or any previous visit from the past 1095 days.     Encounter Date: 04/17/25   ECG 12 lead   Result Value    Ventricular Rate 83    Atrial Rate 83    NV Interval 128    QRS Duration 82    QT Interval 390    QTC Calculation(Bazett) 458    P Axis 78    R Axis 26    T Axis 60    QRS Count 14    Q Onset 226    P Onset 162    P Offset 204    T Offset 421    QTC Fredericia 434    Narrative    Normal sinus rhythm  Normal ECG  When compared with " ECG of 31-MAR-2025 10:37,  Questionable change in QRS axis  T wave inversion no longer evident in Inferior leads  Nonspecific T wave abnormality, improved in Lateral leads    See ED provider note for full interpretation and clinical correlation  Confirmed by Meghana Mckenna ([id number]) on 4/21/2025 8:41:21 PM          Assessment and Plan     Abdominal pain: In the setting of metastatic pancreatic cancer and constipation.  With shifted focus to comfort measures with plans for hospice.  -Per supportive oncology initiated scheduled oxycodone with breakthrough hydromorphone 4 which is requiring hydromorphone.  Patient is now transition over to hydromorphone PCA pump  -Continue the gabapentin  -Aggressive bowel care as outlined by supportive oncology  -Follow-up further supportive oncology recommendations  -Comfort care order set as outlined by supportive oncology     Cardiovascular: Hypertension and recent CVA.  Blood pressure overall doing well  -Continue statin and aspirin for now  - Continue nifedipine.  Currently holding metoprolol XL at this time secondary to lows yesterday     Diabetes mellitus: With a history of DKA in the past.    - Can increase Lantus to 10 units  - Continue gabapentin     Psych:  -Continue sertraline    Daniele Sidhu MD     Of note the above was done with Dragon dictation system.  Note was proofread to minimize errors.           [1] bisacodyl, 10 mg, rectal, Daily  gabapentin, 300 mg, oral, Nightly  insulin glargine, 5 Units, subcutaneous, q24h  [Held by provider] metoprolol succinate XL, 25 mg, oral, Daily  polyethylene glycol, 17 g, oral, Daily  sennosides-docusate sodium, 1 tablet, oral, BID  sertraline, 50 mg, oral, Daily  [2] PRN medications: acetaminophen, acetaminophen, bisacodyl, dextrose, dextrose, glucagon, glucagon, glycopyrrolate, naloxone, naloxone, OLANZapine zydis, ondansetron, polyethylene glycol

## 2025-04-26 NOTE — SIGNIFICANT EVENT
[facility] INPATIENT   SIGNIFICANT EVENT NOTE        SERVICE DATE: 4/26/2025      Interval Events:   Scheduled oxycodone PO started yesterday for attempt to better control pain. Pt was unable to receive doses due to drowsiness, mental status.   Hydromorphone prn dose increased to 0.5 mg yesterday evening  Contacted by primary team for uncontrolled pain.  Pain scores 10/10. 24h opioid usage reviewed.  Plan for hospice meeting today 4:30 PM    Opioid Requirements  Past 24 h opioid requirements (425/25 at 0800 to 04/26/25 at 0800):   Oxycodone IR 5 mg PO x 1 doses = 5 mg = 6.25 OME  Hydromorphone 0.4 mg IV x 4 doses = 1.6 mg = 20 OME  Hydromorphone 0.5 mg IV x 4 doses = 2 mg = 25 OME    Total 24h OME use:  51.25     Pan:  Pt's need for increased dose and frequency of Hydromorphone indicates need for infusion  Recommend Hydromorphone  PCA:    Basal rate:  0.2 mg/h   No pt demand dose due to AMS  RN clinician dose 0.5 mg q15 min prn breakthrough pain or RDOS >/=3    Will assess comfort tomorrow and adjust as needed. Hospice meeting later this afternoon, pending dispo could transition to Fentanyl TD tomorrow if discharging to home (if comfortable on basal rate 0.2 mg/h would recommend Fentanyl 25 mcg/h patch) vs continuing infusion if discharging to hospice IPU    Thank you for asking [facility] to assist with care of this patient.  Please contact us for additional questions or concerns.        SIGNATURE: LOLIS Harmon-CNP, DNP  PAGER/CONTACT:  Contact information:  [facility]  Monday-Friday 8 AM-5 PM  Epic Secure chat or pager [phone].  After hours and weekends:  pager [phone]

## 2025-04-26 NOTE — CARE PLAN
The patient's goals for the shift include      The clinical goals for the shift include pain control      Problem: Pain - Adult  Goal: Verbalizes/displays adequate comfort level or baseline comfort level  Outcome: Progressing     Problem: Safety - Adult  Goal: Free from fall injury  Outcome: Progressing     Problem: Discharge Planning  Goal: Discharge to home or other facility with appropriate resources  Outcome: Progressing     Problem: Chronic Conditions and Co-morbidities  Goal: Patient's chronic conditions and co-morbidity symptoms are monitored and maintained or improved  Outcome: Progressing     Problem: Nutrition  Goal: Nutrient intake appropriate for maintaining nutritional needs  Outcome: Progressing     Problem: Fall/Injury  Goal: Not fall by end of shift  Outcome: Progressing  Goal: Be free from injury by end of the shift  Outcome: Progressing  Goal: Verbalize understanding of personal risk factors for fall in the hospital  Outcome: Progressing  Goal: Verbalize understanding of risk factor reduction measures to prevent injury from fall in the home  Outcome: Progressing  Goal: Use assistive devices by end of the shift  Outcome: Progressing  Goal: Pace activities to prevent fatigue by end of the shift  Outcome: Progressing     Problem: Skin  Goal: Decreased wound size/increased tissue granulation at next dressing change  Outcome: Progressing  Flowsheets (Taken 4/26/2025 0433)  Decreased wound size/increased tissue granulation at next dressing change:   Promote sleep for wound healing   Protective dressings over bony prominences  Goal: Participates in plan/prevention/treatment measures  Outcome: Progressing  Flowsheets (Taken 4/26/2025 0433)  Participates in plan/prevention/treatment measures: Elevate heels  Goal: Prevent/manage excess moisture  Outcome: Progressing  Flowsheets (Taken 4/26/2025 0433)  Prevent/manage excess moisture:   Cleanse incontinence/protect with barrier cream   Moisturize dry  skin  Goal: Prevent/minimize sheer/friction injuries  Outcome: Progressing  Flowsheets (Taken 4/26/2025 0433)  Prevent/minimize sheer/friction injuries: HOB 30 degrees or less  Goal: Promote/optimize nutrition  Outcome: Progressing  Flowsheets (Taken 4/26/2025 0433)  Promote/optimize nutrition: Assist with feeding  Goal: Promote skin healing  Outcome: Progressing  Flowsheets (Taken 4/26/2025 0433)  Promote skin healing: Assess skin/pad under line(s)/device(s)     Problem: Diabetes  Goal: Achieve decreasing blood glucose levels by end of shift  Outcome: Progressing  Goal: Increase stability of blood glucose readings by end of shift  Outcome: Progressing  Goal: Maintain glucose levels >70mg/dl to <250mg/dl throughout shift  Outcome: Progressing  Goal: Decrease in ketones present in urine by end of shift  Outcome: Progressing  Goal: Maintain electrolyte levels within acceptable range throughout shift  Outcome: Progressing  Goal: No changes in neurological exam by end of shift  Outcome: Progressing  Goal: Learn about and adhere to nutrition recommendations by end of shift  Outcome: Progressing  Goal: Vital signs within normal range for age by end of shift  Outcome: Progressing  Goal: Increase self care and/or family involovement by end of shift  Outcome: Progressing  Goal: Receive DSME education by end of shift  Outcome: Progressing     Problem: Pain  Goal: Takes deep breaths with improved pain control throughout the shift  Outcome: Progressing  Goal: Turns in bed with improved pain control throughout the shift  Outcome: Progressing  Goal: Walks with improved pain control throughout the shift  Outcome: Progressing  Goal: Performs ADL's with improved pain control throughout shift  Outcome: Progressing  Goal: Participates in PT with improved pain control throughout the shift  Outcome: Progressing  Goal: Free from opioid side effects throughout the shift  Outcome: Progressing  Goal: Free from acute confusion related to  pain meds throughout the shift  Outcome: Progressing

## 2025-04-26 NOTE — PROGRESS NOTES
"Barabra English is a 50 y.o. female on day 9 of admission presenting with Acute blood loss anemia.      Subjective   NAEO. This morning, patient continues to endorse abdominal pain repeatedly.     Objective     Last Recorded Vitals  BP (!) 162/98 (BP Location: Right arm, Patient Position: Lying) Comment: RN Notified  Pulse 94   Temp 36.2 °C (97.2 °F) (Temporal)   Resp 15   Wt 54.4 kg (120 lb)   SpO2 99%   Intake/Output last 3 Shifts:    Intake/Output Summary (Last 24 hours) at 4/26/2025 1219  Last data filed at 4/25/2025 1823  Gross per 24 hour   Intake --   Output 280 ml   Net -280 ml     Admission Weight  Weight: 54.4 kg (120 lb) (04/17/25 0900)    Daily Weight  04/17/25 : 54.4 kg (120 lb)    Image Results  ECG 12 lead  Normal sinus rhythm  Normal ECG  When compared with ECG of 31-MAR-2025 10:37,  Questionable change in QRS axis  T wave inversion no longer evident in Inferior leads  Nonspecific T wave abnormality, improved in Lateral leads    See ED provider note for full interpretation and clinical correlation  Confirmed by Meghana Mckenna ([id number]) on 4/21/2025 8:41:21 PM    Physical Exam  BP (!) 162/98 (BP Location: Right arm, Patient Position: Lying) Comment: RN Notified  Pulse 94   Temp 36.2 °C (97.2 °F) (Temporal)   Resp 15   Ht 1.6 m (5' 3\")   Wt 54.4 kg (120 lb)   SpO2 99%   BMI 21.26 kg/m²     Constitutional:       Comments: Patient resting in bed,  ill-appearing.   Cardiovascular:      Rate and Rhythm: Normal rate.      Heart sounds: No murmur heard.     No friction rub. No gallop.   Pulmonary:      Effort: Pulmonary effort is normal. No respiratory distress.      Breath sounds: No wheezing.   Abdominal:      General: Abdomen is soft, distended.      Comments: +Mild tenderness on palpation, no rebound/guarding  Musculoskeletal:      Right lower leg: No edema. TMA on RLE     Left lower leg: No edema.   Neuro:  -Baseline L arm/leg weakness  - AOX2 (oriented to self, place, somewhat situation, not " time)     Relevant Results  Scheduled medications  Scheduled Medications[1]  Continuous medications  Continuous Medications[2]  PRN medications  PRN Medications[3]    Results for orders placed or performed during the hospital encounter of 04/17/25 (from the past 24 hours)   POCT GLUCOSE   Result Value Ref Range    POCT Glucose 338 (H) 74 - 99 mg/dL   POCT GLUCOSE   Result Value Ref Range    POCT Glucose 355 (H) 74 - 99 mg/dL   POCT GLUCOSE   Result Value Ref Range    POCT Glucose 324 (H) 74 - 99 mg/dL   POCT GLUCOSE   Result Value Ref Range    POCT Glucose 343 (H) 74 - 99 mg/dL       This patient has a central line   Reason for the central line remaining today?       Assessment & Plan  Acute blood loss anemia      Barbara English is a 50 y.o. 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 ED labs notable for Hgb 7.3, ALP 1016. WBC wnl, CTAP not suggestive of obvious infection, though tx with CTX in ED for c/f SBP (patient has likely malignant ascites). In regard to patients anemia, less likely GIB as CHANTAL negative, patient largely constipated (as blood act as a laxative). Patients daughter reports seeing menstrual bleeding, and has had intermittent menses recently. CTAP not suggestive of acute bleed, though not an optimal study to examine acute blood loss. Favor patients abdominal pain likely 2/2 constipation (CTAP suggestive of moderate stool burden, no evidence of obvious obstruction). Finally, patients pancreatic adenoca appears to have progressed in regard to metastasis from previous study on 03/19. There are new liver lesions and now with malignant ascites and  mesenteric edema. Patient is admitted to medicine, and will continue to follow anemia with BID CBC, encourage Bms, and pursue GOC discussions.    Updates 4/26:  - Family meeting at 4:30pm to discuss hospice  - Supportive oncology following. Recommending Dilaudid PCA pump today. Will defer consideration of fentanyl patch tomorrow, depending on post-discharge placement decision.  - Patient continues to endorse abdominal pain, can consider relistor tomorrow if abdominal pain persists as patient passed BM yesterday.  - Increased lantus to 10U given blood sugars in the 300s    #Acute on chronic anemia  :: Patients family reports large menstrual bleeding recently, endorsing large clots in last few days, and some additional hemorrhoidal bleeding  :: GI vs  bleed. Favor  as patient still has menses, per daughters history appears to be coming from vagina. Less likely GIB even iso gastric varices (seen on CTAP) as CHANTAL negative, blood would likely act like laxative, though cannot totally rule out  :: CHANTAL in ED with some bright red blood  :: large amount of blood seen on UA  :: Patient hypertensive, Hgb 7.5  - CTM, no daily labs  - Hold home apixaban    #Constipation  #Abdominal Pain  :: Patient with abd pain/ vomiting iso metastatic pancreatic cancer on large amounts of oxycodone. Has not had a BM in 6 days.  :: No evidence of obstruction on CT  :: Patient had recent flex sig 01/2025 for rectal bleeding found 3 polyps, removed. Had internal and external hemorrhoids.  - low threshold to pursue NGT with decompression if patient has persistent nausea  - Fleet enema, will pursue additional if unable to produce bowel movement.  - PRN Zofran     #Metastatic prostate adenoca  #Cancer related pain  :: Diagnosed initially 11/2024 after presenting for nausea and vomiting, underwent 1 round Folfox therapy but did not tolerate  :: Last admission at [facility], discussions centered around palliative radiation vs hospice, family elected to  pursue palliative radiation then hospice if does not work  :: On CTAP 04/17, there is clear progression when compared to last scan 03/19. There are new liver lesions, now has likely malignant ascites and mesenteric edema  :: on recent hospitalization, was recommended suboxone, however patient was told to d/c it on discharge (no d/c summary to confirm), so patient has not taken it  - Ongoing GOC discussions with patient and family  - Supportive oncology following  - pain regimen per supportive onc  - Tylenol 650mg PRN for pain     #Splenic vein thrombosis  #Varices  :: New finding in 11/2024, patient started on apixaban  :: Splenic vein thrombus persists on CTAP obtained today, now with evidence of portal HTN and gastric varices  - Holding home apixaban iso anemia     #Recent CVA  :: Thrombotic CVA on 03/31 of R middle cerebral artery with reported residual deficit  :: Patient went home with outpatient PT on discharge  - c/w home ASA daily  - c/w home atorvastatin 40mg daily     #Type I Diabetes  :: Most recent A1c (10.7% 02/2025)  :: Recent home regimen recently switched, family instructed to STOP lantus and patient should remain on SSI  - lantus 10u  - POC glucose checks prn     #Hx of MW tear  - c/w home pantoprazole 40mg BID     #Dysphagia  :: Notes at last hospital that patient did not want PEG tube, accepted risk of aspiration  - passed SLP this admission, good for regular diet     #Hx HFpEF  #Hx HTN  :: Recent echocardiogram 04/01/2025 with EF 61%, grade I diastolic filling defect with elevated LAP, there is severely increased concentric LVH  :: Clinically euvolemic on exam   - holding home metoprolol succ 25mg daily     #Mood disorder  - c/w sertraline 50mg daily  - c/w gabapentin 400mg nightly, gabapentin 200mg qAM     F: PRN  E: PRN  N: Regular diet  A: PIV     DVT ppx: holding iso bleeding  GI ppx: PPI     Code: DNR/ DNI (confirmed with family on admission)  NOK: Nannette Nicole, Daughter, [phone]      Angela Rg MD  Internal Medicine, PGY-1         [1] bisacodyl, 10 mg, rectal, Daily  gabapentin, 300 mg, oral, Nightly  [START ON 4/27/2025] insulin glargine, 10 Units, subcutaneous, q24h  [Held by provider] metoprolol succinate XL, 25 mg, oral, Daily  polyethylene glycol, 17 g, oral, Daily  sennosides-docusate sodium, 1 tablet, oral, BID  sertraline, 50 mg, oral, Daily     [2] HYDROmorphone,      [3] PRN medications: acetaminophen, acetaminophen, bisacodyl, dextrose, dextrose, glucagon, glucagon, glycopyrrolate, naloxone, naloxone, OLANZapine zydis, ondansetron, polyethylene glycol

## 2025-04-26 NOTE — NURSING NOTE
[facility]: I met with pt's dtrs Nannette Nicole at bedside, Ashley via facetime. Hospice services reviewed. They are all in agreement with [facility] admission and POC, [facility]. Pt placed on list, hopeful for transfer there tomorrow if bed opens up. Trained pt and family on use of PCA dilaudid, pt appears comfortable now. Collaboration with Kendal Negrete RN. We will follow up tomorrow. Thank you for this referral. [phone]=available 24/7  Lili ALEX

## 2025-04-27 VITALS
DIASTOLIC BLOOD PRESSURE: 96 MMHG | OXYGEN SATURATION: 95 % | BODY MASS INDEX: 21.26 KG/M2 | TEMPERATURE: 97.9 F | SYSTOLIC BLOOD PRESSURE: 137 MMHG | HEART RATE: 102 BPM | WEIGHT: 120 LBS | RESPIRATION RATE: 17 BRPM | HEIGHT: 63 IN

## 2025-04-27 PROCEDURE — 99232 SBSQ HOSP IP/OBS MODERATE 35: CPT

## 2025-04-27 PROCEDURE — 2500000002 HC RX 250 W HCPCS SELF ADMINISTERED DRUGS (ALT 637 FOR MEDICARE OP, ALT 636 FOR OP/ED): Mod: SE

## 2025-04-27 RX ORDER — BISACODYL 10 MG/1
10 SUPPOSITORY RECTAL DAILY PRN
Start: 2025-04-27

## 2025-04-27 RX ORDER — NALOXONE HYDROCHLORIDE 0.4 MG/ML
0.2 INJECTION, SOLUTION INTRAMUSCULAR; INTRAVENOUS; SUBCUTANEOUS EVERY 5 MIN PRN
Start: 2025-04-27

## 2025-04-27 RX ORDER — POLYETHYLENE GLYCOL 3350 17 G/17G
17 POWDER, FOR SOLUTION ORAL DAILY PRN
Start: 2025-04-27

## 2025-04-27 RX ORDER — AMOXICILLIN 250 MG
1 CAPSULE ORAL 2 TIMES DAILY
Start: 2025-04-27

## 2025-04-27 RX ORDER — ONDANSETRON HYDROCHLORIDE 2 MG/ML
4 INJECTION, SOLUTION INTRAVENOUS EVERY 6 HOURS PRN
Start: 2025-04-27

## 2025-04-27 RX ORDER — ACETAMINOPHEN 160 MG/5ML
650 SOLUTION ORAL EVERY 6 HOURS PRN
Start: 2025-04-27 | End: 2025-05-27

## 2025-04-27 RX ORDER — POLYETHYLENE GLYCOL 3350 17 G/17G
17 POWDER, FOR SOLUTION ORAL DAILY
Start: 2025-04-27

## 2025-04-27 RX ORDER — GLYCOPYRROLATE 0.2 MG/ML
0.2 INJECTION INTRAMUSCULAR; INTRAVENOUS EVERY 4 HOURS PRN
Start: 2025-04-27

## 2025-04-27 RX ORDER — BISACODYL 10 MG/1
10 SUPPOSITORY RECTAL DAILY
Start: 2025-04-27

## 2025-04-27 RX ORDER — GABAPENTIN 300 MG/1
300 CAPSULE ORAL NIGHTLY
Start: 2025-04-27

## 2025-04-27 RX ORDER — OLANZAPINE 5 MG/1
5 TABLET, ORALLY DISINTEGRATING ORAL EVERY 12 HOURS PRN
Start: 2025-04-27

## 2025-04-27 RX ADMIN — INSULIN GLARGINE 10 UNITS: 100 INJECTION, SOLUTION SUBCUTANEOUS at 09:55

## 2025-04-27 ASSESSMENT — COGNITIVE AND FUNCTIONAL STATUS - GENERAL
DRESSING REGULAR LOWER BODY CLOTHING: A LOT
MOVING TO AND FROM BED TO CHAIR: A LOT
EATING MEALS: A LOT
PERSONAL GROOMING: A LOT
WALKING IN HOSPITAL ROOM: TOTAL
CLIMB 3 TO 5 STEPS WITH RAILING: TOTAL
TURNING FROM BACK TO SIDE WHILE IN FLAT BAD: A LOT
DRESSING REGULAR UPPER BODY CLOTHING: A LOT
TOILETING: A LOT
MOVING FROM LYING ON BACK TO SITTING ON SIDE OF FLAT BED WITH BEDRAILS: A LITTLE
DAILY ACTIVITIY SCORE: 12
MOBILITY SCORE: 11
STANDING UP FROM CHAIR USING ARMS: A LOT
HELP NEEDED FOR BATHING: A LOT

## 2025-04-27 ASSESSMENT — PAIN SCALES - GENERAL: PAINLEVEL_OUTOF10: 7

## 2025-04-27 ASSESSMENT — PAIN SCALES - WONG BAKER: WONGBAKER_NUMERICALRESPONSE: HURTS LITTLE MORE

## 2025-04-27 NOTE — CARE PLAN
The patient's goals for the shift include      The clinical goals for the shift include pain management pca pump

## 2025-04-27 NOTE — NURSING NOTE
0730 assumed patient care. Patient on PCA pump. Bed locked in low position. Call bell within reach.     Patient will be leaving today. Going to [facility]. Patient was placed on pca pump yesterday.     1310 patient discharged to [facility]. [facility]ported [facility].

## 2025-04-27 NOTE — CARE PLAN
The patient's goals for the shift include      The clinical goals for the shift include pain management pca pump      Problem: Pain - Adult  Goal: Verbalizes/displays adequate comfort level or baseline comfort level  Outcome: Progressing     Problem: Safety - Adult  Goal: Free from fall injury  Outcome: Progressing     Problem: Discharge Planning  Goal: Discharge to home or other facility with appropriate resources  Outcome: Progressing     Problem: Chronic Conditions and Co-morbidities  Goal: Patient's chronic conditions and co-morbidity symptoms are monitored and maintained or improved  Outcome: Progressing     Problem: Nutrition  Goal: Nutrient intake appropriate for maintaining nutritional needs  Outcome: Progressing

## 2025-04-27 NOTE — PROGRESS NOTES
"Barbara English is a 50 y.o. female on day 10 of admission presenting with Acute blood loss anemia.      Subjective   NAEO. Patient more lethargic this AM but appears comfortable.    Objective     Last Recorded Vitals  /89   Pulse 105   Temp 36.5 °C (97.7 °F)   Resp 17   Wt 54.4 kg (120 lb)   SpO2 96%   Intake/Output last 3 Shifts:    Intake/Output Summary (Last 24 hours) at 4/27/2025 0921  Last data filed at 4/26/2025 2000  Gross per 24 hour   Intake --   Output 300 ml   Net -300 ml     Admission Weight  Weight: 54.4 kg (120 lb) (04/17/25 0900)    Daily Weight  04/17/25 : 54.4 kg (120 lb)    Image Results  ECG 12 lead  Normal sinus rhythm  Normal ECG  When compared with ECG of 31-MAR-2025 10:37,  Questionable change in QRS axis  T wave inversion no longer evident in Inferior leads  Nonspecific T wave abnormality, improved in Lateral leads    See ED provider note for full interpretation and clinical correlation  Confirmed by Meghana Mckenna ([id number]) on 4/21/2025 8:41:21 PM    Physical Exam  /89   Pulse 105   Temp 36.5 °C (97.7 °F)   Resp 17   Ht 1.6 m (5' 3\")   Wt 54.4 kg (120 lb)   SpO2 96%   BMI 21.26 kg/m²     Constitutional:       Comments: Patient resting in bed,  ill-appearing.   Cardiovascular:      Rate and Rhythm: Normal rate.      Heart sounds: No murmur heard.     No friction rub. No gallop.   Pulmonary:      Effort: Pulmonary effort is normal. No respiratory distress.      Breath sounds: No wheezing.   Abdominal:      General: Abdomen is soft, distended.      Comments: +Mild tenderness on palpation, no rebound/guarding  Musculoskeletal:      Right lower leg: No edema. TMA on RLE     Left lower leg: No edema.   Neuro:  -Baseline L arm/leg weakness  - AOX2 (oriented to self, place, somewhat situation, not time)     Relevant Results  Scheduled medications  Scheduled Medications[1]  Continuous medications  Continuous Medications[2]  PRN medications  PRN Medications[3]    Results for orders " placed or performed during the hospital encounter of 04/17/25 (from the past 24 hours)   POCT GLUCOSE   Result Value Ref Range    POCT Glucose 343 (H) 74 - 99 mg/dL   POCT GLUCOSE   Result Value Ref Range    POCT Glucose 377 (H) 74 - 99 mg/dL       This patient has a central line   Reason for the central line remaining today?       Assessment & Plan  Acute blood loss anemia      Barbara English is a 50 y.o. 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 ED labs notable for Hgb 7.3, ALP 1016. WBC wnl, CTAP not suggestive of obvious infection, though tx with CTX in ED for c/f SBP (patient has likely malignant ascites). In regard to patients anemia, less likely GIB as CHANTAL negative, patient largely constipated (as blood act as a laxative). Patients daughter reports seeing menstrual bleeding, and has had intermittent menses recently. CTAP not suggestive of acute bleed, though not an optimal study to examine acute blood loss. Favor patients abdominal pain likely 2/2 constipation (CTAP suggestive of moderate stool burden, no evidence of obvious obstruction). Finally, patients pancreatic adenoca appears to have progressed in regard to metastasis from previous study on 03/19. There are new liver lesions and now with malignant ascites and mesenteric edema. Patient is admitted to medicine, and will continue to follow anemia with BID CBC, encourage Bms, and pursue GOC discussions.    Updates 4/27:  - Will check in with hospice regarding updates  - Patient to continue receiving insulin given her diabetes despite being comfort care    #Acute on chronic anemia  :: Patients family  reports large menstrual bleeding recently, endorsing large clots in last few days, and some additional hemorrhoidal bleeding  :: GI vs  bleed. Favor  as patient still has menses, per daughters history appears to be coming from vagina. Less likely GIB even iso gastric varices (seen on CTAP) as CHANTAL negative, blood would likely act like laxative, though cannot totally rule out  :: CHANTAL in ED with some bright red blood  :: large amount of blood seen on UA  :: Patient hypertensive, Hgb 7.5  - CTM, no daily labs  - Hold home apixaban    #Constipation  #Abdominal Pain  :: Patient with abd pain/ vomiting iso metastatic pancreatic cancer on large amounts of oxycodone. Has not had a BM in 6 days.  :: No evidence of obstruction on CT  :: Patient had recent flex sig 01/2025 for rectal bleeding found 3 polyps, removed. Had internal and external hemorrhoids.  - low threshold to pursue NGT with decompression if patient has persistent nausea  - Fleet enema, will pursue additional if unable to produce bowel movement.  - PRN Zofran     #Metastatic prostate adenoca  #Cancer related pain  :: Diagnosed initially 11/2024 after presenting for nausea and vomiting, underwent 1 round Folfox therapy but did not tolerate  :: Last admission at [facility], discussions centered around palliative radiation vs hospice, family elected to pursue palliative radiation then hospice if does not work  :: On CTAP 04/17, there is clear progression when compared to last scan 03/19. There are new liver lesions, now has likely malignant ascites and mesenteric edema  :: on recent hospitalization, was recommended suboxone, however patient was told to d/c it on discharge (no d/c summary to confirm), so patient has not taken it  - Ongoing C discussions with patient and family  - Supportive oncology following  - pain regimen per supportive onc  - Tylenol 650mg PRN for pain     #Splenic vein thrombosis  #Varices  :: New finding in 11/2024, patient started on  apixaban  :: Splenic vein thrombus persists on CTAP obtained today, now with evidence of portal HTN and gastric varices  - Holding home apixaban iso anemia     #Recent CVA  :: Thrombotic CVA on 03/31 of R middle cerebral artery with reported residual deficit  :: Patient went home with outpatient PT on discharge  - c/w home ASA daily  - c/w home atorvastatin 40mg daily     #Type I Diabetes  :: Most recent A1c (10.7% 02/2025)  :: Recent home regimen recently switched, family instructed to STOP lantus and patient should remain on SSI  - lantus 10u  - POC glucose checks prn     #Hx of MW tear  - c/w home pantoprazole 40mg BID     #Dysphagia  :: Notes at last hospital that patient did not want PEG tube, accepted risk of aspiration  - passed SLP this admission, good for regular diet     #Hx HFpEF  #Hx HTN  :: Recent echocardiogram 04/01/2025 with EF 61%, grade I diastolic filling defect with elevated LAP, there is severely increased concentric LVH  :: Clinically euvolemic on exam   - holding home metoprolol succ 25mg daily     #Mood disorder  - c/w sertraline 50mg daily  - c/w gabapentin 400mg nightly, gabapentin 200mg qAM     F: PRN  E: PRN  N: Regular diet  A: PIV     DVT ppx: holding iso bleeding  GI ppx: PPI     Code: DNR/ DNI - Comfort care  NOK: Nannette Nicole, Daughter, [phone]     Jovanny Castro MD  Internal Medicine, PGY-2         [1] bisacodyl, 10 mg, rectal, Daily  gabapentin, 300 mg, oral, Nightly  insulin glargine, 10 Units, subcutaneous, q24h  [Held by provider] metoprolol succinate XL, 25 mg, oral, Daily  polyethylene glycol, 17 g, oral, Daily  sennosides-docusate sodium, 1 tablet, oral, BID  sertraline, 50 mg, oral, Daily     [2] HYDROmorphone,      [3] PRN medications: acetaminophen, acetaminophen, bisacodyl, dextrose, dextrose, glucagon, glucagon, glycopyrrolate, naloxone, naloxone, OLANZapine zydis, ondansetron, polyethylene glycol

## 2025-04-28 ENCOUNTER — APPOINTMENT (OUTPATIENT)
Dept: HOME HEALTH SERVICES | Facility: HOME HEALTH | Age: 50
End: 2025-04-28
Payer: COMMERCIAL

## 2025-04-28 NOTE — DISCHARGE SUMMARY
Discharge Diagnosis  Acute blood loss anemia      Issues Requiring Follow-Up  -None    Discharge Meds     Medication List      START taking these medications     acetaminophen 650 mg/20.3 mL solution oral liquid; Commonly known as:   Tylenol; Take 20.3 mL (650 mg) by mouth every 6 hours if needed (fever   >38.0 C).; Replaces: acetaminophen 325 mg tablet   * bisacodyl 10 mg suppository; Commonly known as: Dulcolax; Insert 1   suppository (10 mg) into the rectum once daily.   * bisacodyl 10 mg suppository; Commonly known as: Dulcolax; Insert 1   suppository (10 mg) into the rectum once daily as needed for constipation.   glycopyrrolate 200 mcg/mL injection; Commonly known as: Robinul; Infuse   1 mL (0.2 mg) into a venous catheter every 4 hours if needed (excess   secretions).   naloxone 0.4 mg/mL injection; Commonly known as: Narcan; Infuse 0.5 mL   (0.2 mg) into a venous catheter every 5 minutes if needed for respiratory   depression.   OLANZapine zydis 5 mg disintegrating tablet; Commonly known as: ZyPREXA;   Dissolve 1 tablet (5 mg) in the mouth every 12 hours if needed (agitation,   delirium).   ondansetron 4 mg/2 mL injection; Commonly known as: Zofran; Infuse 2 mL   (4 mg) into a venous catheter every 6 hours if needed for vomiting.   * polyethylene glycol 17 gram packet; Commonly known as: Glycolax,   Miralax; Take 17 g by mouth once daily as needed (constipation).   * polyethylene glycol 17 gram packet; Commonly known as: Glycolax,   Miralax; Take 17 g by mouth once daily.  * This list has 4 medication(s) that are the same as other medications   prescribed for you. Read the directions carefully, and ask your doctor or   other care provider to review them with you.     CHANGE how you take these medications     gabapentin 300 mg capsule; Commonly known as: Neurontin; Take 1 capsule   (300 mg) by mouth once daily at bedtime.; What changed: how much to take,   when to take this, additional instructions    "sennosides-docusate sodium 8.6-50 mg tablet; Commonly known as:   Hayley-Colace; Take 1 tablet by mouth 2 times a day.; What changed: how much   to take, when to take this     CONTINUE taking these medications     FreeStyle Yulia 3 Plus Sensor device; Generic drug: blood-glucose   sensor; Replace sensors every 15 days.   FreeStyle Yulia 3 [place] misc; Generic drug:   blood-glucose,,cont; Use as instructed   hydrOXYzine HCL 25 mg tablet; Commonly known as: Atarax   sertraline 50 mg tablet; Commonly known as: Zoloft   Unifine Pentips 32 gauge x 5/32\" needle; Generic drug: pen needle,   diabetic; Use as directed with insulin injections     STOP taking these medications     acetaminophen 325 mg tablet; Commonly known as: Tylenol; Replaced by:   acetaminophen 650 mg/20.3 mL solution oral liquid   apixaban 5 mg tablet; Commonly known as: Eliquis   aspirin 81 mg EC tablet   atorvastatin 40 mg tablet; Commonly known as: Lipitor   buprenorphine-naloxone 2-0.5 mg per sublingual film; Commonly known as:   Suboxone   furosemide 40 mg tablet; Commonly known as: Lasix   glucagon 1 mg injection; Commonly known as: Glucagen   hydrALAZINE 10 mg tablet; Commonly known as: Apresoline   insulin glargine 100 unit/mL (3 mL) pen; Commonly known as: Lantus   insulin lispro 100 unit/mL pen; Commonly known as: HumaLOG   Lidocaine Pain Relief 4 % patch; Generic drug: lidocaine   lidocaine-prilocaine 2.5-2.5 % cream; Commonly known as: Emla   metoprolol succinate XL 25 mg 24 hr tablet; Commonly known as: Toprol-XL   oxyCODONE 20 mg immediate release tablet; Commonly known as: Roxicodone   pantoprazole 40 mg EC tablet; Commonly known as: ProtoNix       Test Results Pending At Discharge  Pending Labs       No current pending labs.            Hospital Course  Barbara English is a 50 y.o. female with a PMH of Stage IV metastatic pancreatic adenoCa with liver mets, diabetes mellitus type 1 (c/b gastroparesis/ neuropathy s/p several toe " amputations), HTN, HFpEF (EF 61% 04/2025), CKD 3b, LA Grade A Esophagitis (2023) c/b hematemesis, chronic splenic vein thrombosis, polysubstance use, recent CVA with residual deficits, who presents to the ED with abdominal pain, constipation, and bleeding. ED labs notable for Hgb 7.3, ALP 1016. WBC wnl, CTAP not suggestive of obvious infection, though tx with CTX in ED for c/f SBP (patient has likely malignant ascites).     In regard to patients acute on chronic anemia, patient is currently menstruating, with several clots as reported per daugther at home, and intermittently throughout admission. Less likely GIB as CHANTAL negative in ED, patient largely constipated (as blood act as a laxative).  CTAP not suggestive of acute bleed, though not an optimal study to examine acute blood loss. Patients daughter reports intermittent menses in the last few months. While admitted she has required 1u pRBC on 04/19. Patient has remained HDS throughout this time. Home apixaban (for splenic vein thrombosis), held on admission.    Favor patients admission abdominal pain likely 2/2 constipation (CTAP suggestive of moderate stool burden, no evidence of obvious obstruction). Patient began to have Bms with aggressive bowel regimen. Will likely continue to need aggressive bowel regimen given significant opioid burden for cancer related pain.    Finally, patients pancreatic adenoca appears to have progressed in regard to metastasis from previous study on 03/19. There are new liver lesions and now with malignant ascites and mesenteric edema. Supportive oncology consulted, and provided pain management recommendations.     After discussions were had with family, it was determined that patient would prefer comfort care and pursue hospice treatment.           Pertinent Physical Exam At Time of Discharge  Physical Exam  Constitutional:       Comments: Patient resting in bed,  ill-appearing.   Cardiovascular:      Rate and Rhythm: Normal rate.       Heart sounds: No murmur heard.     No friction rub. No gallop.   Pulmonary:      Effort: Pulmonary effort is normal. No respiratory distress.      Breath sounds: No wheezing.   Abdominal:      General: Abdomen is soft, distended.      Comments: +Mild tenderness on palpation, no rebound/guarding  Musculoskeletal:      Right lower leg: No edema. TMA on RLE     Left lower leg: No edema.   Neuro:  -Baseline L arm/leg weakness  - AOX2 (oriented to self, place, somewhat situation, not time)    Outpatient Follow-Up  Future Appointments   Date Time Provider Department Center   4/28/2025  8:00 AM Paul Casper, SLP [facility] Leonel Stark MD

## 2025-05-01 ENCOUNTER — HOME CARE VISIT (OUTPATIENT)
Dept: HOME HEALTH SERVICES | Facility: HOME HEALTH | Age: 50
End: 2025-05-01
Payer: COMMERCIAL

## 2025-05-14 NOTE — DOCUMENTATION CLARIFICATION NOTE
"    PATIENT:               ABDELRAHMAN CARBONE  ACCT #:                  [id number]  MRN:                       [id number]  :                       1975  ADMIT DATE:       2025 7:07 AM  DISCH DATE:        2025 1:37 PM  RESPONDING PROVIDER #:        [id number]          PROVIDER RESPONSE TEXT:    Cachectic appearance related to current severe protein calorie malnutrition    CDI QUERY TEXT:    Clarification    Instruction:    Based on your assessment of the patient and the clinical information, please provide the requested documentation by clicking on the appropriate radio button and enter any additional information if prompted.    Question: Please clarify if a relationship exists between cachectic appearance and nutritional status    When answering this query, please exercise your independent professional judgment. The fact that a question is being asked, does not imply that any particular answer is desired or expected.    The patient's clinical indicators include:  Clinical Information:   IM PN: 50 YOF w/PMH s/f \"Stage IV metastatic pancreatic adenoCa with liver mets, diabetes mellitus type 1 (c/b gastroparesis/ neuropathy s/p several toe amputations), HTN, HFpEF (EF 61% 2025), CKD 3b, LA Grade A Esophagitis () c/b hematemesis, chronic splenic vein thrombosis, polysubstance use, recent CVA with residual deficits\"  Presented for abd pain, constipation, and bleeding.  Noted pancreatic adenocarcinoma appears progressed w/mets.    Clinical Indicators:   ED noted patient has a history of \"severe protein calorie malnutrition\"     VS flowsheet: stated height 160 cm; stated weight 54.432 kg; BMI 21.26     Attending PN \"Cachectic female\"     IM PN \"Acute on chronic anemia\"  \"abd pain/ vomiting iso metastatic pancreatic cancer\"  \"Dysphagia\", \"did not want PEG tube, accepted risk of aspiration\"    I/O flowsheets note percentage of meals taken most often marked 0-25%    Labs:   Albumin " 3.1  4/24 Albumin 2.6  4/17 Total Protein 6.5  4/24 Total Protein 5.6  4/17 Hgb 7.5    Treatment:  Monitor labs and imaging  Supportive oncology consult  Pain control measures  SLP consult cleared for regular diet  Bowel regimen for constipation    Risk Factors:  History of severe protein calorie malnutrition  Metastatic pancreatic adenocarcinoma  Abdominal pain  Polysubstance use  Recent CVA w/residual deficits  Dysphagia  Esophagitis  Acute on chronic anemia  DM1  CKD 3b  Options provided:  -- Cachectic appearance related to current severe protein calorie malnutrition  -- Cachectic appearance unrelated to malnutrition  -- Other - I will add my own diagnosis  -- Refer to Clinical Documentation Reviewer    Query created by: Ayana Alfred on 5/9/2025 12:30 PM      Electronically signed by:  SAYDA BUCHANAN MD 5/14/2025 1:26 PM

## 2025-06-12 ENCOUNTER — HOME CARE VISIT (OUTPATIENT)
Dept: HOME HEALTH SERVICES | Facility: HOME HEALTH | Age: 50
End: 2025-06-12
Payer: MEDICAID